# Patient Record
Sex: MALE | Race: BLACK OR AFRICAN AMERICAN | NOT HISPANIC OR LATINO | Employment: OTHER | URBAN - METROPOLITAN AREA
[De-identification: names, ages, dates, MRNs, and addresses within clinical notes are randomized per-mention and may not be internally consistent; named-entity substitution may affect disease eponyms.]

---

## 2017-01-03 ENCOUNTER — HISTORICAL (OUTPATIENT)
Dept: ADMINISTRATIVE | Facility: HOSPITAL | Age: 52
End: 2017-01-03

## 2017-05-29 ENCOUNTER — HISTORICAL (OUTPATIENT)
Dept: RADIOLOGY | Facility: HOSPITAL | Age: 52
End: 2017-05-29

## 2017-05-29 LAB
POC CREATININE: 1 MG/DL (ref 0.6–1.3)
VIT B12 SERPL-MCNC: 414 PG/ML (ref 193–986)

## 2017-06-22 ENCOUNTER — HISTORICAL (OUTPATIENT)
Dept: ADMINISTRATIVE | Facility: HOSPITAL | Age: 52
End: 2017-06-22

## 2017-08-11 ENCOUNTER — HISTORICAL (OUTPATIENT)
Dept: ADMINISTRATIVE | Facility: HOSPITAL | Age: 52
End: 2017-08-11

## 2017-08-23 ENCOUNTER — HISTORICAL (OUTPATIENT)
Dept: ADMINISTRATIVE | Facility: HOSPITAL | Age: 52
End: 2017-08-23

## 2017-10-06 ENCOUNTER — HISTORICAL (OUTPATIENT)
Dept: ADMINISTRATIVE | Facility: HOSPITAL | Age: 52
End: 2017-10-06

## 2017-10-06 LAB
ABS NEUT (OLG): 5.28 X10(3)/MCL (ref 2.1–9.2)
BASOPHILS # BLD AUTO: 0 X10(3)/MCL (ref 0–0.2)
BASOPHILS NFR BLD AUTO: 0 %
EOSINOPHIL # BLD AUTO: 0 X10(3)/MCL (ref 0–0.9)
EOSINOPHIL NFR BLD AUTO: 0 %
ERYTHROCYTE [DISTWIDTH] IN BLOOD BY AUTOMATED COUNT: 12.7 % (ref 11.5–17)
HCT VFR BLD AUTO: 39.8 % (ref 42–52)
HGB BLD-MCNC: 12.7 GM/DL (ref 14–18)
LYMPHOCYTES # BLD AUTO: 0.8 X10(3)/MCL (ref 0.6–4.6)
LYMPHOCYTES NFR BLD AUTO: 13 %
MCH RBC QN AUTO: 28.8 PG (ref 27–31)
MCHC RBC AUTO-ENTMCNC: 31.9 GM/DL (ref 33–36)
MCV RBC AUTO: 90.2 FL (ref 80–94)
MONOCYTES # BLD AUTO: 0.2 X10(3)/MCL (ref 0.1–1.3)
MONOCYTES NFR BLD AUTO: 4 %
NEUTROPHILS # BLD AUTO: 5.28 X10(3)/MCL (ref 2.1–9.2)
NEUTROPHILS NFR BLD AUTO: 82 %
PLATELET # BLD AUTO: 251 X10(3)/MCL (ref 130–400)
PMV BLD AUTO: 8.3 FL (ref 9.4–12.4)
RBC # BLD AUTO: 4.41 X10(6)/MCL (ref 4.7–6.1)
WBC # SPEC AUTO: 6.4 X10(3)/MCL (ref 4.5–11.5)

## 2017-12-04 ENCOUNTER — HISTORICAL (OUTPATIENT)
Dept: ADMINISTRATIVE | Facility: HOSPITAL | Age: 52
End: 2017-12-04

## 2017-12-04 LAB — ERYTHROCYTE [SEDIMENTATION RATE] IN BLOOD: 8 MM/HR (ref 0–15)

## 2017-12-05 ENCOUNTER — HISTORICAL (OUTPATIENT)
Dept: ADMINISTRATIVE | Facility: HOSPITAL | Age: 52
End: 2017-12-05

## 2017-12-20 ENCOUNTER — HISTORICAL (OUTPATIENT)
Dept: ADMINISTRATIVE | Facility: HOSPITAL | Age: 52
End: 2017-12-20

## 2017-12-20 LAB
COLOR STL: NORMAL
CONSISTENCY STL: NORMAL
HEMOCCULT SP1 STL QL: NEGATIVE

## 2017-12-21 ENCOUNTER — HISTORICAL (OUTPATIENT)
Dept: LAB | Facility: HOSPITAL | Age: 52
End: 2017-12-21

## 2017-12-21 LAB
COLOR STL: NORMAL
CONSISTENCY STL: NORMAL
HEMOCCULT SP2 STL QL: NEGATIVE

## 2017-12-26 ENCOUNTER — HISTORICAL (OUTPATIENT)
Dept: LAB | Facility: HOSPITAL | Age: 52
End: 2017-12-26

## 2017-12-26 LAB — HEMOCCULT SP1 STL QL: NEGATIVE

## 2018-01-04 ENCOUNTER — HISTORICAL (OUTPATIENT)
Dept: ADMINISTRATIVE | Facility: HOSPITAL | Age: 53
End: 2018-01-04

## 2018-02-02 ENCOUNTER — HISTORICAL (OUTPATIENT)
Dept: RADIOLOGY | Facility: HOSPITAL | Age: 53
End: 2018-02-02

## 2018-02-02 LAB
EST. AVERAGE GLUCOSE BLD GHB EST-MCNC: 123 MG/DL
HBA1C MFR BLD: 5.9 % (ref 4.2–6.3)

## 2018-02-14 ENCOUNTER — HISTORICAL (OUTPATIENT)
Dept: PHYSICAL THERAPY | Facility: HOSPITAL | Age: 53
End: 2018-02-14

## 2018-02-26 ENCOUNTER — HISTORICAL (OUTPATIENT)
Dept: PHYSICAL THERAPY | Facility: HOSPITAL | Age: 53
End: 2018-02-26

## 2018-05-04 ENCOUNTER — HISTORICAL (OUTPATIENT)
Dept: ADMINISTRATIVE | Facility: HOSPITAL | Age: 53
End: 2018-05-04

## 2018-05-04 LAB
ABS NEUT (OLG): 3.82 X10(3)/MCL (ref 2.1–9.2)
BASOPHILS NFR BLD MANUAL: 1 % (ref 0–2)
EOSINOPHIL NFR BLD MANUAL: 3 % (ref 0–8)
ERYTHROCYTE [DISTWIDTH] IN BLOOD BY AUTOMATED COUNT: 13.2 % (ref 11.5–17)
HCT VFR BLD AUTO: 42 % (ref 42–52)
HGB BLD-MCNC: 13.8 GM/DL (ref 14–18)
LYMPHOCYTES NFR BLD MANUAL: 34 % (ref 13–40)
MCH RBC QN AUTO: 29.7 PG (ref 27–31)
MCHC RBC AUTO-ENTMCNC: 32.9 GM/DL (ref 33–36)
MCV RBC AUTO: 90.3 FL (ref 80–94)
MONOCYTES NFR BLD MANUAL: 5 % (ref 2–11)
NEUTROPHILS NFR BLD MANUAL: 57 % (ref 47–80)
PLATELET # BLD AUTO: 321 X10(3)/MCL (ref 130–400)
PLATELET # BLD EST: NORMAL 10*3/UL
PMV BLD AUTO: 8.2 FL (ref 7.4–10.4)
RBC # BLD AUTO: 4.65 X10(6)/MCL (ref 4.7–6.1)
WBC # SPEC AUTO: 6.1 X10(3)/MCL (ref 4.5–11.5)

## 2018-07-02 ENCOUNTER — HISTORICAL (OUTPATIENT)
Dept: ADMINISTRATIVE | Facility: HOSPITAL | Age: 53
End: 2018-07-02

## 2018-07-05 ENCOUNTER — HISTORICAL (OUTPATIENT)
Dept: ADMINISTRATIVE | Facility: HOSPITAL | Age: 53
End: 2018-07-05

## 2018-07-25 ENCOUNTER — HISTORICAL (OUTPATIENT)
Dept: ADMINISTRATIVE | Facility: HOSPITAL | Age: 53
End: 2018-07-25

## 2018-07-31 ENCOUNTER — HISTORICAL (OUTPATIENT)
Dept: PHYSICAL THERAPY | Facility: HOSPITAL | Age: 53
End: 2018-07-31

## 2018-08-16 ENCOUNTER — HISTORICAL (OUTPATIENT)
Dept: PHYSICAL THERAPY | Facility: HOSPITAL | Age: 53
End: 2018-08-16

## 2018-08-22 ENCOUNTER — HISTORICAL (OUTPATIENT)
Dept: PHYSICAL THERAPY | Facility: HOSPITAL | Age: 53
End: 2018-08-22

## 2018-08-24 ENCOUNTER — HISTORICAL (OUTPATIENT)
Dept: PHYSICAL THERAPY | Facility: HOSPITAL | Age: 53
End: 2018-08-24

## 2018-10-02 ENCOUNTER — HISTORICAL (OUTPATIENT)
Dept: ADMINISTRATIVE | Facility: HOSPITAL | Age: 53
End: 2018-10-02

## 2019-06-18 ENCOUNTER — HISTORICAL (OUTPATIENT)
Dept: INFUSION THERAPY | Facility: HOSPITAL | Age: 54
End: 2019-06-18

## 2019-07-22 ENCOUNTER — HISTORICAL (OUTPATIENT)
Dept: INFUSION THERAPY | Facility: HOSPITAL | Age: 54
End: 2019-07-22

## 2019-08-13 ENCOUNTER — HISTORICAL (OUTPATIENT)
Dept: ADMINISTRATIVE | Facility: HOSPITAL | Age: 54
End: 2019-08-13

## 2019-08-19 ENCOUNTER — HISTORICAL (OUTPATIENT)
Dept: INFUSION THERAPY | Facility: HOSPITAL | Age: 54
End: 2019-08-19

## 2019-08-27 ENCOUNTER — HISTORICAL (OUTPATIENT)
Dept: ADMINISTRATIVE | Facility: HOSPITAL | Age: 54
End: 2019-08-27

## 2019-10-14 ENCOUNTER — HISTORICAL (OUTPATIENT)
Dept: INFUSION THERAPY | Facility: HOSPITAL | Age: 54
End: 2019-10-14

## 2020-01-29 ENCOUNTER — HISTORICAL (OUTPATIENT)
Dept: INFUSION THERAPY | Facility: HOSPITAL | Age: 55
End: 2020-01-29

## 2020-03-25 ENCOUNTER — HISTORICAL (OUTPATIENT)
Dept: INFUSION THERAPY | Facility: HOSPITAL | Age: 55
End: 2020-03-25

## 2020-05-20 ENCOUNTER — HISTORICAL (OUTPATIENT)
Dept: INFUSION THERAPY | Facility: HOSPITAL | Age: 55
End: 2020-05-20

## 2020-09-14 ENCOUNTER — HISTORICAL (OUTPATIENT)
Dept: INFUSION THERAPY | Facility: HOSPITAL | Age: 55
End: 2020-09-14

## 2021-02-09 ENCOUNTER — HISTORICAL (OUTPATIENT)
Dept: INFUSION THERAPY | Facility: HOSPITAL | Age: 56
End: 2021-02-09

## 2021-06-11 ENCOUNTER — HISTORICAL (OUTPATIENT)
Dept: ADMINISTRATIVE | Facility: HOSPITAL | Age: 56
End: 2021-06-11

## 2021-06-11 LAB
ABS NEUT (OLG): 3.53 X10(3)/MCL (ref 2.1–9.2)
BASOPHILS # BLD AUTO: 0 X10(3)/MCL (ref 0–0.2)
BASOPHILS NFR BLD AUTO: 1 %
BUN SERPL-MCNC: 14.1 MG/DL (ref 8.4–25.7)
CALCIUM SERPL-MCNC: 9.6 MG/DL (ref 8.4–10.2)
CHLORIDE SERPL-SCNC: 108 MMOL/L (ref 98–107)
CO2 SERPL-SCNC: 25 MMOL/L (ref 22–29)
CREAT SERPL-MCNC: 1.22 MG/DL (ref 0.73–1.18)
CREAT/UREA NIT SERPL: 12
EOSINOPHIL # BLD AUTO: 0.1 X10(3)/MCL (ref 0–0.9)
EOSINOPHIL NFR BLD AUTO: 2 %
ERYTHROCYTE [DISTWIDTH] IN BLOOD BY AUTOMATED COUNT: 12.8 % (ref 11.5–17)
GLUCOSE SERPL-MCNC: 102 MG/DL (ref 74–100)
HCT VFR BLD AUTO: 42.4 % (ref 42–52)
HGB BLD-MCNC: 14.1 GM/DL (ref 14–18)
LYMPHOCYTES # BLD AUTO: 1.4 X10(3)/MCL (ref 0.6–4.6)
LYMPHOCYTES NFR BLD AUTO: 25 %
MCH RBC QN AUTO: 29.1 PG (ref 27–31)
MCHC RBC AUTO-ENTMCNC: 33.3 GM/DL (ref 33–36)
MCV RBC AUTO: 87.4 FL (ref 80–94)
MONOCYTES # BLD AUTO: 0.5 X10(3)/MCL (ref 0.1–1.3)
MONOCYTES NFR BLD AUTO: 10 %
NEUTROPHILS # BLD AUTO: 3.53 X10(3)/MCL (ref 2.1–9.2)
NEUTROPHILS NFR BLD AUTO: 63 %
PLATELET # BLD AUTO: 352 X10(3)/MCL (ref 130–400)
PMV BLD AUTO: 8.7 FL (ref 9.4–12.4)
POTASSIUM SERPL-SCNC: 4.8 MMOL/L (ref 3.5–5.1)
RBC # BLD AUTO: 4.85 X10(6)/MCL (ref 4.7–6.1)
SODIUM SERPL-SCNC: 141 MMOL/L (ref 136–145)
WBC # SPEC AUTO: 5.6 X10(3)/MCL (ref 4.5–11.5)

## 2022-04-11 ENCOUNTER — HISTORICAL (OUTPATIENT)
Dept: ADMINISTRATIVE | Facility: HOSPITAL | Age: 57
End: 2022-04-11

## 2022-04-27 VITALS
WEIGHT: 150.13 LBS | OXYGEN SATURATION: 100 % | HEIGHT: 67 IN | SYSTOLIC BLOOD PRESSURE: 151 MMHG | DIASTOLIC BLOOD PRESSURE: 89 MMHG | BODY MASS INDEX: 23.56 KG/M2

## 2022-05-31 ENCOUNTER — APPOINTMENT (OUTPATIENT)
Dept: LAB | Facility: HOSPITAL | Age: 57
End: 2022-05-31
Attending: INTERNAL MEDICINE
Payer: MEDICAID

## 2022-05-31 DIAGNOSIS — K62.5 HEMORRHAGE OF RECTUM AND ANUS: ICD-10-CM

## 2022-05-31 DIAGNOSIS — K50.919 CROHN'S DISEASE WITH COMPLICATION: Primary | ICD-10-CM

## 2022-05-31 LAB
ALBUMIN SERPL-MCNC: 3.4 GM/DL (ref 3.5–5)
ALBUMIN/GLOB SERPL: 1.1 RATIO (ref 1.1–2)
ALP SERPL-CCNC: 57 UNIT/L (ref 40–150)
ALT SERPL-CCNC: 15 UNIT/L (ref 0–55)
AST SERPL-CCNC: 16 UNIT/L (ref 5–34)
BASOPHILS # BLD AUTO: 0.03 X10(3)/MCL (ref 0–0.2)
BASOPHILS NFR BLD AUTO: 0.5 %
BILIRUBIN DIRECT+TOT PNL SERPL-MCNC: 0.4 MG/DL
BUN SERPL-MCNC: 12.9 MG/DL (ref 8.4–25.7)
CALCIUM SERPL-MCNC: 8.8 MG/DL (ref 8.4–10.2)
CHLORIDE SERPL-SCNC: 108 MMOL/L (ref 98–107)
CO2 SERPL-SCNC: 26 MMOL/L (ref 22–29)
CREAT SERPL-MCNC: 0.99 MG/DL (ref 0.73–1.18)
CRP SERPL-MCNC: 3.5 MG/L
EOSINOPHIL # BLD AUTO: 0.2 X10(3)/MCL (ref 0–0.9)
EOSINOPHIL NFR BLD AUTO: 3.4 %
ERYTHROCYTE [DISTWIDTH] IN BLOOD BY AUTOMATED COUNT: 12.7 % (ref 11.5–17)
ERYTHROCYTE [SEDIMENTATION RATE] IN BLOOD: 38 MM/HR (ref 0–15)
GLOBULIN SER-MCNC: 3.1 GM/DL (ref 2.4–3.5)
GLUCOSE SERPL-MCNC: 110 MG/DL (ref 74–100)
HCT VFR BLD AUTO: 35.2 % (ref 42–52)
HGB BLD-MCNC: 11.5 GM/DL (ref 14–18)
IMM GRANULOCYTES # BLD AUTO: 0.02 X10(3)/MCL (ref 0–0.02)
IMM GRANULOCYTES NFR BLD AUTO: 0.3 % (ref 0–0.43)
LYMPHOCYTES # BLD AUTO: 1.1 X10(3)/MCL (ref 0.6–4.6)
LYMPHOCYTES NFR BLD AUTO: 18.8 %
MCH RBC QN AUTO: 29.9 PG (ref 27–31)
MCHC RBC AUTO-ENTMCNC: 32.7 MG/DL (ref 33–36)
MCV RBC AUTO: 91.7 FL (ref 80–94)
MONOCYTES # BLD AUTO: 0.52 X10(3)/MCL (ref 0.1–1.3)
MONOCYTES NFR BLD AUTO: 8.9 %
NEUTROPHILS # BLD AUTO: 4 X10(3)/MCL (ref 2.1–9.2)
NEUTROPHILS NFR BLD AUTO: 68.1 %
NRBC BLD AUTO-RTO: 0 %
PLATELET # BLD AUTO: 386 X10(3)/MCL (ref 130–400)
PMV BLD AUTO: 8.6 FL (ref 9.4–12.4)
POTASSIUM SERPL-SCNC: 4.7 MMOL/L (ref 3.5–5.1)
PROT SERPL-MCNC: 6.5 GM/DL (ref 6.4–8.3)
RBC # BLD AUTO: 3.84 X10(6)/MCL (ref 4.7–6.1)
SODIUM SERPL-SCNC: 142 MMOL/L (ref 136–145)
WBC # SPEC AUTO: 5.8 X10(3)/MCL (ref 4.5–11.5)

## 2022-05-31 PROCEDURE — 36415 COLL VENOUS BLD VENIPUNCTURE: CPT

## 2022-05-31 PROCEDURE — 80053 COMPREHEN METABOLIC PANEL: CPT

## 2022-05-31 PROCEDURE — 85651 RBC SED RATE NONAUTOMATED: CPT

## 2022-05-31 PROCEDURE — 86140 C-REACTIVE PROTEIN: CPT

## 2022-05-31 PROCEDURE — 82397 CHEMILUMINESCENT ASSAY: CPT

## 2022-05-31 PROCEDURE — 85025 COMPLETE CBC W/AUTO DIFF WBC: CPT

## 2022-06-03 LAB
CLINICAL BIOCHEMIST REVIEW: ABNORMAL
CLINICAL BIOCHEMIST REVIEW: NORMAL
INFLIXIMAB AB SERPL-MCNC: <20 U/ML
INFLIXIMAB SERPL-MCNC: <1 MCG/ML

## 2022-06-04 LAB — CALPROTECTIN STL-MCNT: 446 MCG/G

## 2022-06-06 PROCEDURE — 82272 OCCULT BLD FECES 1-3 TESTS: CPT | Performed by: INTERNAL MEDICINE

## 2022-06-07 ENCOUNTER — LAB REQUISITION (OUTPATIENT)
Dept: LAB | Facility: HOSPITAL | Age: 57
End: 2022-06-07
Payer: MEDICAID

## 2022-06-07 DIAGNOSIS — Z12.11 ENCOUNTER FOR SCREENING FOR MALIGNANT NEOPLASM OF COLON: ICD-10-CM

## 2022-06-07 LAB
COLOR STL: ABNORMAL
CONSISTENCY STL: ABNORMAL
HEMOCCULT SP1 STL QL: POSITIVE

## 2022-09-23 ENCOUNTER — HOSPITAL ENCOUNTER (EMERGENCY)
Facility: HOSPITAL | Age: 57
Discharge: HOME | End: 2022-09-23
Attending: EMERGENCY MEDICINE
Payer: MEDICAID

## 2022-09-23 VITALS
TEMPERATURE: 98 F | WEIGHT: 150 LBS | OXYGEN SATURATION: 100 % | DIASTOLIC BLOOD PRESSURE: 93 MMHG | HEART RATE: 76 BPM | SYSTOLIC BLOOD PRESSURE: 191 MMHG | BODY MASS INDEX: 23.54 KG/M2 | RESPIRATION RATE: 18 BRPM

## 2022-09-23 DIAGNOSIS — S89.91XA INJURY OF RIGHT KNEE, INITIAL ENCOUNTER: Primary | ICD-10-CM
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
## 2022-09-23 PROCEDURE — 99283 EMERGENCY DEPT VISIT LOW MDM: CPT | Mod: 25

## 2022-09-23 RX ORDER — TRAMADOL HYDROCHLORIDE 50 MG/1
50 TABLET ORAL EVERY 8 HOURS PRN
Qty: 12 TABLET | Refills: 0 | Status: SHIPPED | OUTPATIENT
Start: 2022-09-23 | End: 2022-09-27
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
## 2022-09-23 NOTE — DISCHARGE INSTRUCTIONS
ICE therapy, 20 min on and 20 min off.     You have been prescribed  Tramadol . Please do not take this medication while working, drinking alcohol, swimming, or while driving/operating heavy machinery. This medication may cause drowsiness, dizziness, impair judgment, and reduce physical capabilities.You should not drive, operate heavy machinery, or make life changing decisions while taking this medication.
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
## 2022-09-23 NOTE — ED NOTES
Pt ambulatory to ED05 from [facility], c/o right knee pain, unable to take tylenol or motrin due to stomach discomfort. No obvious deformity, neurovascular intact.

## 2023-01-24 DIAGNOSIS — K50.919 CROHN'S DISEASE OF INTESTINE, UNSPECIFIED COMPLICATION: ICD-10-CM

## 2023-01-24 RX ORDER — DIPHENHYDRAMINE HYDROCHLORIDE 50 MG/ML
50 INJECTION INTRAMUSCULAR; INTRAVENOUS ONCE AS NEEDED
OUTPATIENT
Start: 2023-02-06

## 2023-01-24 RX ORDER — ACETAMINOPHEN 325 MG/1
650 TABLET ORAL
OUTPATIENT
Start: 2023-02-06

## 2023-01-24 RX ORDER — EPINEPHRINE 0.3 MG/.3ML
0.3 INJECTION SUBCUTANEOUS ONCE AS NEEDED
OUTPATIENT
Start: 2023-02-06

## 2023-01-24 RX ORDER — IPRATROPIUM BROMIDE AND ALBUTEROL SULFATE 2.5; .5 MG/3ML; MG/3ML
3 SOLUTION RESPIRATORY (INHALATION)
OUTPATIENT
Start: 2023-02-06

## 2023-01-24 RX ORDER — SODIUM CHLORIDE 0.9 % (FLUSH) 0.9 %
10 SYRINGE (ML) INJECTION
OUTPATIENT
Start: 2023-02-06

## 2023-01-24 RX ORDER — HEPARIN 100 UNIT/ML
500 SYRINGE INTRAVENOUS
OUTPATIENT
Start: 2023-02-06

## 2023-03-07 ENCOUNTER — DOCUMENTATION ONLY (OUTPATIENT)
Dept: INFUSION THERAPY | Facility: HOSPITAL | Age: 58
End: 2023-03-07
Payer: MEDICAID

## 2023-03-07 NOTE — PROGRESS NOTES
Spoke to Vangie at Dr. Breaux office in regards to no show appt x2 and numerous attempts contact the pt and pt mother with no answer or call back. So at this time Ms. Schaeffery has instructed me to not move forward with r/s the pt unless he f/u with Dr. Breaux first.

## 2023-04-24 ENCOUNTER — LAB VISIT (OUTPATIENT)
Dept: LAB | Facility: HOSPITAL | Age: 58
End: 2023-04-24
Attending: INTERNAL MEDICINE
Payer: MEDICAID

## 2023-04-24 DIAGNOSIS — K50.919 CROHN'S DISEASE, UNSPECIFIED, WITH UNSPECIFIED COMPLICATIONS: Primary | ICD-10-CM

## 2023-04-24 LAB
BASOPHILS # BLD AUTO: 0.03 X10(3)/MCL (ref 0–0.2)
BASOPHILS NFR BLD AUTO: 0.7 %
CRP SERPL-MCNC: <1 MG/L
EOSINOPHIL # BLD AUTO: 0.08 X10(3)/MCL (ref 0–0.9)
EOSINOPHIL NFR BLD AUTO: 1.7 %
ERYTHROCYTE [DISTWIDTH] IN BLOOD BY AUTOMATED COUNT: 13.1 % (ref 11.5–17)
ERYTHROCYTE [SEDIMENTATION RATE] IN BLOOD: 17 MM/HR (ref 0–15)
HCT VFR BLD AUTO: 43.8 % (ref 42–52)
HGB BLD-MCNC: 13.9 G/DL (ref 14–18)
IMM GRANULOCYTES # BLD AUTO: 0.01 X10(3)/MCL (ref 0–0.04)
IMM GRANULOCYTES NFR BLD AUTO: 0.2 %
LYMPHOCYTES # BLD AUTO: 0.96 X10(3)/MCL (ref 0.6–4.6)
LYMPHOCYTES NFR BLD AUTO: 20.8 %
MCH RBC QN AUTO: 28.8 PG (ref 27–31)
MCHC RBC AUTO-ENTMCNC: 31.7 G/DL (ref 33–36)
MCV RBC AUTO: 90.9 FL (ref 80–94)
MONOCYTES # BLD AUTO: 0.31 X10(3)/MCL (ref 0.1–1.3)
MONOCYTES NFR BLD AUTO: 6.7 %
NEUTROPHILS # BLD AUTO: 3.22 X10(3)/MCL (ref 2.1–9.2)
NEUTROPHILS NFR BLD AUTO: 69.9 %
NRBC BLD AUTO-RTO: 0 %
PLATELET # BLD AUTO: 334 X10(3)/MCL (ref 130–400)
PMV BLD AUTO: 8.6 FL (ref 7.4–10.4)
RBC # BLD AUTO: 4.82 X10(6)/MCL (ref 4.7–6.1)
WBC # SPEC AUTO: 4.6 X10(3)/MCL (ref 4.5–11.5)

## 2023-04-24 PROCEDURE — 85025 COMPLETE CBC W/AUTO DIFF WBC: CPT

## 2023-04-24 PROCEDURE — 85651 RBC SED RATE NONAUTOMATED: CPT

## 2023-04-24 PROCEDURE — 36415 COLL VENOUS BLD VENIPUNCTURE: CPT

## 2023-04-24 PROCEDURE — 86140 C-REACTIVE PROTEIN: CPT

## 2023-04-25 ENCOUNTER — LAB REQUISITION (OUTPATIENT)
Dept: LAB | Facility: HOSPITAL | Age: 58
End: 2023-04-25
Payer: MEDICAID

## 2023-04-25 DIAGNOSIS — K50.919 CROHN'S DISEASE, UNSPECIFIED, WITH UNSPECIFIED COMPLICATIONS: ICD-10-CM

## 2023-04-25 PROCEDURE — 83993 ASSAY FOR CALPROTECTIN FECAL: CPT | Mod: 90 | Performed by: REGISTERED NURSE

## 2023-04-27 LAB — CALPROTECTIN STL-MCNT: 81.2 MCG/G

## 2023-06-21 ENCOUNTER — HOSPITAL ENCOUNTER (OUTPATIENT)
Dept: RADIOLOGY | Facility: HOSPITAL | Age: 58
Discharge: HOME | End: 2023-06-21
Attending: FAMILY MEDICINE
Payer: MEDICAID

## 2023-06-21 ENCOUNTER — OFFICE VISIT (OUTPATIENT)
Dept: URGENT CARE | Facility: CLINIC | Age: 58
End: 2023-06-21
Payer: MEDICAID

## 2023-06-21 VITALS
HEART RATE: 63 BPM | WEIGHT: 143.81 LBS | OXYGEN SATURATION: 99 % | BODY MASS INDEX: 23.11 KG/M2 | DIASTOLIC BLOOD PRESSURE: 78 MMHG | RESPIRATION RATE: 18 BRPM | HEIGHT: 66 IN | SYSTOLIC BLOOD PRESSURE: 119 MMHG | TEMPERATURE: 98 F

## 2023-06-21 DIAGNOSIS — T14.90XA TRAUMA: ICD-10-CM

## 2023-06-21 DIAGNOSIS — T14.90XA TRAUMA: Primary | ICD-10-CM

## 2023-06-21 DIAGNOSIS — M79.18 MUSCULOSKELETAL PAIN: ICD-10-CM

## 2023-06-21 PROCEDURE — 99214 OFFICE O/P EST MOD 30 MIN: CPT | Mod: S$PBB,,, | Performed by: FAMILY MEDICINE

## 2023-06-21 PROCEDURE — 72100 X-RAY EXAM L-S SPINE 2/3 VWS: CPT | Mod: TC

## 2023-06-21 PROCEDURE — 99215 OFFICE O/P EST HI 40 MIN: CPT | Mod: PBBFAC | Performed by: FAMILY MEDICINE

## 2023-06-21 PROCEDURE — 73130 X-RAY EXAM OF HAND: CPT | Mod: TC,RT

## 2023-06-21 PROCEDURE — 73562 X-RAY EXAM OF KNEE 3: CPT | Mod: TC,RT

## 2023-06-21 PROCEDURE — 99214 PR OFFICE/OUTPT VISIT, EST, LEVL IV, 30-39 MIN: ICD-10-PCS | Mod: S$PBB,,, | Performed by: FAMILY MEDICINE

## 2023-06-21 RX ORDER — ALBUTEROL SULFATE 90 UG/1
AEROSOL, METERED RESPIRATORY (INHALATION)
COMMUNITY
Start: 2023-05-31

## 2023-06-21 RX ORDER — IRON,CARBONYL/ASCORBIC ACID 100-250 MG
1 TABLET ORAL
COMMUNITY
Start: 2023-05-31

## 2023-06-21 RX ORDER — UPADACITINIB 45 MG/1
1 TABLET, EXTENDED RELEASE ORAL
COMMUNITY
Start: 2023-05-01

## 2023-06-21 RX ORDER — TRAMADOL HYDROCHLORIDE 50 MG/1
50 TABLET ORAL EVERY 6 HOURS PRN
Qty: 15 TABLET | Refills: 0 | OUTPATIENT
Start: 2023-06-21 | End: 2023-07-20
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
## 2023-07-20 ENCOUNTER — HOSPITAL ENCOUNTER (EMERGENCY)
Facility: HOSPITAL | Age: 58
Discharge: HOME | End: 2023-07-20
Attending: STUDENT IN AN ORGANIZED HEALTH CARE EDUCATION/TRAINING PROGRAM
Payer: MEDICAID

## 2023-07-20 VITALS
HEART RATE: 56 BPM | DIASTOLIC BLOOD PRESSURE: 99 MMHG | RESPIRATION RATE: 20 BRPM | HEIGHT: 67 IN | WEIGHT: 175 LBS | OXYGEN SATURATION: 99 % | TEMPERATURE: 98 F | BODY MASS INDEX: 27.47 KG/M2 | SYSTOLIC BLOOD PRESSURE: 153 MMHG

## 2023-07-20 DIAGNOSIS — M79.602 LEFT ARM PAIN: Primary | ICD-10-CM
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
## 2023-07-20 PROCEDURE — 99284 EMERGENCY DEPT VISIT MOD MDM: CPT

## 2023-07-20 PROCEDURE — 25000003 PHARM REV CODE 250: Performed by: STUDENT IN AN ORGANIZED HEALTH CARE EDUCATION/TRAINING PROGRAM

## 2023-07-20 RX ORDER — METHOCARBAMOL 500 MG/1
1000 TABLET, FILM COATED ORAL 3 TIMES DAILY
Qty: 30 TABLET | Refills: 0 | Status: SHIPPED | OUTPATIENT
Start: 2023-07-20 | End: 2023-07-25

## 2023-07-20 RX ORDER — TRAMADOL HYDROCHLORIDE 50 MG/1
50 TABLET ORAL EVERY 6 HOURS PRN
Qty: 12 TABLET | Refills: 0 | Status: SHIPPED | OUTPATIENT
Start: 2023-07-20

## 2023-07-20 RX ORDER — TRAMADOL HYDROCHLORIDE 50 MG/1
50 TABLET ORAL
Status: COMPLETED | OUTPATIENT
Start: 2023-07-20 | End: 2023-07-20

## 2023-07-20 RX ORDER — METHOCARBAMOL 500 MG/1
1000 TABLET, FILM COATED ORAL
Status: COMPLETED | OUTPATIENT
Start: 2023-07-20 | End: 2023-07-20

## 2023-07-20 RX ADMIN — METHOCARBAMOL 1000 MG: 500 TABLET ORAL at 07:07

## 2023-07-20 RX ADMIN — TRAMADOL HYDROCHLORIDE 50 MG: 50 TABLET, COATED ORAL at 07:07
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
## 2023-07-20 NOTE — DISCHARGE INSTRUCTIONS

## 2023-08-21 ENCOUNTER — HOSPITAL ENCOUNTER (EMERGENCY)
Facility: HOSPITAL | Age: 58
Discharge: HOME | End: 2023-08-21
Attending: EMERGENCY MEDICINE
Payer: MEDICAID

## 2023-08-21 VITALS
DIASTOLIC BLOOD PRESSURE: 92 MMHG | WEIGHT: 140 LBS | HEART RATE: 61 BPM | RESPIRATION RATE: 16 BRPM | TEMPERATURE: 97 F | OXYGEN SATURATION: 100 % | HEIGHT: 67 IN | SYSTOLIC BLOOD PRESSURE: 134 MMHG | BODY MASS INDEX: 21.97 KG/M2

## 2023-08-21 DIAGNOSIS — G56.00 CARPAL TUNNEL SYNDROME, UNSPECIFIED LATERALITY: ICD-10-CM

## 2023-08-21 DIAGNOSIS — R05.9 COUGH: ICD-10-CM
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
## 2023-08-21 DIAGNOSIS — M70.51 PATELLAR BURSITIS OF RIGHT KNEE: Primary | ICD-10-CM

## 2023-08-21 PROCEDURE — 99284 EMERGENCY DEPT VISIT MOD MDM: CPT | Mod: 25

## 2023-08-21 PROCEDURE — 20610 DRAIN/INJ JOINT/BURSA W/O US: CPT

## 2023-08-21 RX ORDER — LORATADINE 10 MG/1
10 TABLET ORAL DAILY
Qty: 30 TABLET | Refills: 0 | Status: SHIPPED | OUTPATIENT
Start: 2023-08-21 | End: 2023-09-20

## 2023-08-21 RX ORDER — BENZONATATE 100 MG/1
100 CAPSULE ORAL 3 TIMES DAILY PRN
Qty: 15 CAPSULE | Refills: 0 | Status: SHIPPED | OUTPATIENT
Start: 2023-08-21 | End: 2023-08-26

## 2023-08-21 RX ORDER — HYDROCODONE BITARTRATE AND ACETAMINOPHEN 5; 325 MG/1; MG/1
1 TABLET ORAL EVERY 12 HOURS PRN
Qty: 10 TABLET | Refills: 0 | Status: SHIPPED | OUTPATIENT
Start: 2023-08-21 | End: 2023-08-26

## 2023-08-21 NOTE — ED PROVIDER NOTES
Encounter Date: 8/21/2023       History     Chief Complaint   Patient presents with    Cough     States coughing up yellow stuff. Also reports having bilateral carpal tunnel pain and right knee pain from a fall last week.     See MDM    The history is provided by the patient. No  was used.     Review of patient's allergies indicates:   Allergen Reactions    Acetaminophen      Other reaction(s): chronns    Ibuprofen Nausea Only     Past Medical History:   Diagnosis Date    Crohn's disease     GERD (gastroesophageal reflux disease)      No past surgical history on file.  No family history on file.  Social History     Tobacco Use    Smoking status: Some Days     Types: Cigarettes    Smokeless tobacco: Never   Substance Use Topics    Alcohol use: Yes     Comment: daily    Drug use: Never     Review of Systems   Respiratory:  Positive for cough.    Musculoskeletal:  Positive for joint swelling.   All other systems reviewed and are negative.      Physical Exam     Initial Vitals [08/21/23 1529]   BP Pulse Resp Temp SpO2   (!) 153/97 71 18 97.2 °F (36.2 °C) 95 %      MAP       --         Physical Exam    Nursing note and vitals reviewed.  Constitutional: He appears well-developed and well-nourished.   HENT:   Mouth/Throat: No oropharyngeal exudate, posterior oropharyngeal edema, posterior oropharyngeal erythema or tonsillar abscesses.   Cerumen impaction bilateral. No pain   Eyes: Conjunctivae are normal.   Cardiovascular:  Normal rate, regular rhythm, normal heart sounds and intact distal pulses.           Pulmonary/Chest: Breath sounds normal. No respiratory distress.   Musculoskeletal:      Right hand: Tenderness present. No swelling. Normal range of motion.      Left hand: Tenderness present. No swelling. Normal range of motion.      Right knee: Swelling present. No erythema, ecchymosis or bony tenderness. Normal range of motion.      Left knee: Normal.      Comments: Prepatellar bursitis, no  redness, not hot, full ROM of knee.     All other adjacent joints otherwise normal       Neurological: He is alert and oriented to person, place, and time.   Skin: Skin is warm and dry.   Psychiatric: He has a normal mood and affect.         ED Course   Procedures  Labs Reviewed - No data to display       Imaging Results              X-Ray Chest PA And Lateral (Final result)  Result time 08/21/23 16:47:55      Final result by Garland Salazar MD (08/21/23 16:47:55)                   Impression:      No acute cardiopulmonary process identified.      Electronically signed by: Garland Salazar  Date:    08/21/2023  Time:    16:47               Narrative:    EXAMINATION:  XR CHEST PA AND LATERAL    CLINICAL HISTORY:  Cough, unspecified    TECHNIQUE:  Two-view    COMPARISON:  September 13, 2017.    FINDINGS:  Cardiopericardial silhouette is within normal limits.  Hyperinflated lungs are without dense focal or segmental consolidation, congestion, pleural effusion or pneumothorax.                                       X-Ray Knee Complete 4 Or More Views Right (Final result)  Result time 08/21/23 17:01:59      Final result by Garland Salazar MD (08/21/23 17:01:59)                   Impression:      No acute osseous abnormality identified      Electronically signed by: Garland Salazar  Date:    08/21/2023  Time:    17:01               Narrative:    EXAMINATION:  XR KNEE COMP 4 OR MORE VIEWS RIGHT    CLINICAL HISTORY:  Unspecified fall, initial encounter    TECHNIQUE:  Four views    COMPARISON:  June 21, 2023    FINDINGS:  Right knee articular surfaces alignment is preserved.  No acute fracture or dislocation identified.                                       Medications - No data to display  Medical Decision Making  59 y/o male who presents with bilateral hand pain from his carpal tunnel for months now. Also cough for the last week and sometimes productive. Right knee swelling s/p fall about 2 weeks ago. Not red or hot. No fever.  No throat pain    Xray shows no effusion or fracture. Chest xray no pneumonia. Drained the right knee bursa s/t bursitis. Tolerated well. Ace wrap to knee. Will treat cough with meds. No abx at this time warranted.     Amount and/or Complexity of Data Reviewed  Radiology: ordered. Decision-making details documented in ED Course.    Risk  OTC drugs.  Prescription drug management.               ED Course as of 23 1038   Mon Aug 21, 2023   1800 Needle aspiration of right knee prepatellar bursa. Cleaned with hibiclens prior. 18g needle inserted into the bursa and 6ml of pink/yellow appearing fluid. Not thick or purulent in appearance. Removed needle and appled gauze and then pressure with ace wrap.  [EV]      ED Course User Index  [EV] Myriam Fabian FNP                    Clinical Impression:   Final diagnoses:  [R05.9] Cough  [W19.XXXA] Fall  [M70.51] Patellar bursitis of right knee (Primary)  [G56.00] Carpal tunnel syndrome, unspecified laterality        ED Disposition Condition    Discharge Stable          ED Prescriptions       Medication Sig Dispense Start Date End Date Auth. Provider    HYDROcodone-acetaminophen (NORCO) 5-325 mg per tablet () Take 1 tablet by mouth every 12 (twelve) hours as needed for Pain. 10 tablet 2023 Myriam Fabian FNP    benzonatate (TESSALON) 100 MG capsule () Take 1 capsule (100 mg total) by mouth 3 (three) times daily as needed for Cough. 15 capsule 2023 Myriam Fabian FNP    loratadine (CLARITIN) 10 mg tablet Take 1 tablet (10 mg total) by mouth once daily. 30 tablet 2023 Myriam Fabian FNP          Follow-up Information    None          Myriam Fabian FNP  23 1038

## 2023-08-21 NOTE — FIRST PROVIDER EVALUATION
Medical screening examination initiated.  I have conducted a focused provider triage encounter, findings are as follows:  Chief Complaint   Patient presents with    Cough     States coughing up yellow stuff. Also reports having bilateral carpal tunnel pain and right knee pain.          Brief history of present illness:  57 y/o male who presents with joint pain and cough (sputum). Fell about a week ago.     There were no vitals filed for this visit.    Pertinent physical exam:  alert, ambulatory, nonlabored     Brief workup plan:  xray    Preliminary workup initiated; this workup will be continued and followed by the physician or advanced practice provider that is assigned to the patient when roomed.

## 2023-08-22 ENCOUNTER — PATIENT OUTREACH (OUTPATIENT)
Dept: EMERGENCY MEDICINE | Facility: HOSPITAL | Age: 58
End: 2023-08-22
Payer: MEDICAID

## 2023-08-22 NOTE — PROGRESS NOTES
Called and spoke with patient for initial navigation call following ED visit. States he filled his pain medication Rx and voices no complaints. Says Dr Fernando Montoya is his PCP and he has an appointment this Thursday 08/24/23 this week . Declined enrolling in [facility] for navigation follow up calls at this time. Episode closed.

## 2023-08-30 ENCOUNTER — HOSPITAL ENCOUNTER (EMERGENCY)
Facility: HOSPITAL | Age: 58
Discharge: HOME | End: 2023-08-30
Attending: STUDENT IN AN ORGANIZED HEALTH CARE EDUCATION/TRAINING PROGRAM
Payer: MEDICAID

## 2023-08-30 VITALS
DIASTOLIC BLOOD PRESSURE: 85 MMHG | HEART RATE: 76 BPM | RESPIRATION RATE: 18 BRPM | SYSTOLIC BLOOD PRESSURE: 142 MMHG | WEIGHT: 148 LBS | OXYGEN SATURATION: 100 % | BODY MASS INDEX: 23.23 KG/M2 | HEIGHT: 67 IN | TEMPERATURE: 98 F

## 2023-08-30 DIAGNOSIS — M70.51 PATELLAR BURSITIS OF RIGHT KNEE: Primary | ICD-10-CM

## 2023-08-30 DIAGNOSIS — M25.561 RIGHT KNEE PAIN: ICD-10-CM

## 2023-08-30 PROCEDURE — 99283 EMERGENCY DEPT VISIT LOW MDM: CPT

## 2023-08-30 RX ORDER — DICLOFENAC SODIUM 10 MG/G
2 GEL TOPICAL 3 TIMES DAILY
Qty: 200 G | Refills: 0 | Status: SHIPPED | OUTPATIENT
Start: 2023-08-30 | End: 2023-09-06

## 2023-08-30 NOTE — ED PROVIDER NOTES
Encounter Date: 8/30/2023       History     Chief Complaint   Patient presents with    Joint Swelling     Pt reports right knee pain and swelling.pt states right knee was drained last week. Pt reports he wore ace wrap for a little while but had swelling distal to ace wrap so he stopped. Pt also reports plantar foot burning sensation. Pt states sometimes he stumbles into the wall when walking. Pt states he used to have to wear braces on his legs in the 70s     The patient is a 58 y.o. male with a history of crohn's disease and GERD who presents to the Emergency Department with a chief complaint of right knee pain. Patient reports a fall 3 weeks ago and has been having pain since the fall. Patient states that he was seen here last week and was prescribed norco but he needs something stronger. He states he had fluid drained from his knee. Symptoms began 3 weeks ago and have been constant since onset. His pain is currently rated as a 8/10 in severity and described as aching with no radiation. Associated symptoms include nothing. Symptoms are aggravated with ambulation and there are no alleviating factors. The patient denies numbness or tingling to extremity. He reports taking nothing prior to arrival with no relief of symptoms. No other reported symptoms at this time     The history is provided by the patient. No  was used.   Illness   The current episode started several weeks ago. The problem occurs frequently. The problem has been unchanged. The pain is at a severity of 6/10. Nothing relieves the symptoms. The symptoms are aggravated by activity. Pertinent negatives include no fever, no abdominal pain, no nausea, no sore throat, no muscle aches, no shortness of breath and no rash. Services received include medications given and tests performed. Recently, medical care has been given by the PCP and at this facility.     Review of patient's allergies indicates:   Allergen Reactions    Acetaminophen       Other reaction(s): chronns    Ibuprofen Nausea Only     Past Medical History:   Diagnosis Date    Crohn's disease     GERD (gastroesophageal reflux disease)      History reviewed. No pertinent surgical history.  History reviewed. No pertinent family history.  Social History     Tobacco Use    Smoking status: Some Days     Types: Cigarettes    Smokeless tobacco: Never   Substance Use Topics    Alcohol use: Yes     Comment: daily    Drug use: Never     Review of Systems   Constitutional:  Negative for fever.   HENT:  Negative for sore throat.    Respiratory:  Negative for shortness of breath.    Cardiovascular:  Negative for chest pain.   Gastrointestinal:  Negative for abdominal pain and nausea.   Genitourinary:  Negative for dysuria.   Musculoskeletal:  Positive for arthralgias. Negative for back pain.   Skin:  Negative for rash.   Neurological:  Negative for weakness.   Hematological:  Does not bruise/bleed easily.   Psychiatric/Behavioral:  Negative for behavioral problems.    All other systems reviewed and are negative.      Physical Exam     Initial Vitals [08/30/23 1102]   BP Pulse Resp Temp SpO2   (!) 142/85 76 18 98.4 °F (36.9 °C) 100 %      MAP       --         Physical Exam    Nursing note and vitals reviewed.  Constitutional: Vital signs are normal. He appears well-developed and well-nourished.   HENT:   Head: Normocephalic.   Right Ear: Hearing and tympanic membrane normal.   Left Ear: Hearing and tympanic membrane normal.   Mouth/Throat: Uvula is midline, oropharynx is clear and moist and mucous membranes are normal.   Cardiovascular:  Normal rate, regular rhythm, normal heart sounds and normal pulses.           Pulmonary/Chest: Effort normal and breath sounds normal.   Abdominal: Abdomen is soft. Bowel sounds are normal. There is no abdominal tenderness.   Musculoskeletal:      Cervical back: No spinous process tenderness or muscular tenderness.      Right knee: Tenderness present. Normal pulse.       Left knee: Normal.      Comments: Prepatellar bursitis of right knee. Patient has full ROM of the knee. There is no warmth or erythema present   All other adjacent joints normal      Lymphadenopathy:     He has no cervical adenopathy.   Neurological: He is alert. GCS eye subscore is 4. GCS verbal subscore is 5. GCS motor subscore is 6.   Skin: Skin is warm, dry and intact. Capillary refill takes less than 2 seconds.         ED Course   Procedures  Labs Reviewed - No data to display       Imaging Results              X-Ray Knee Complete 4 Or More Views Right (Final result)  Result time 08/30/23 12:25:29      Final result by Clifford Doyle MD (08/30/23 12:25:29)                   Impression:      No acute findings.      Electronically signed by: Clifford Doyle  Date:    08/30/2023  Time:    12:25               Narrative:    EXAMINATION:  XR KNEE COMP 4 OR MORE VIEWS RIGHT    CLINICAL HISTORY:  Pain in right knee    COMPARISON:  21 August 2023    FINDINGS:  Four views right knee.  There is no fracture or dislocation.  Mild degenerative changes without significant joint space narrowing.  No sizeable joint effusion.                                       Medications - No data to display  Medical Decision Making  Problems Addressed:  Patellar bursitis of right knee: acute illness or injury               ED Course as of 08/30/23 1324   Wed Aug 30, 2023   1240 Upon review of  it appears patient had 10 Norco 5 prescribed on 08/21/23 and then had 20 norco 5 prescribed on 08/24/23 [LM]   1300 Patient states he is just here because he needs pain medication. He states that nothing works for his pain but narcotics. He has had two narcotic prescriptions filled in the last 10 days. I have explained to patient that he needs to follow up with his pcp and orthopedic clinic. He is amendable to plan and ready for discharge home.  [LM]   1300 Discussed results in detail with patient including follow up. I did send orthopedic referral.   [LM]      ED Course User Index  [LM] Brielle Cordero, NP               Medical Decision Making:   Initial Assessment:   The patient is a 58 y.o. male with a history of crohn's disease and GERD who presents to the Emergency Department with a chief complaint of right knee pain. Patient reports a fall 3 weeks ago and has been having pain since the fall. Patient states that he was seen here last week and was prescribed norco but he needs something stronger. He states he had fluid drained from his knee. Symptoms began 3 weeks ago and have been constant since onset. His pain is currently rated as a 8/10 in severity and described as aching with no radiation. Associated symptoms include nothing. Symptoms are aggravated with ambulation and there are no alleviating factors. The patient denies numbness or tingling to extremity. He reports taking nothing prior to arrival with no relief of symptoms. No other reported symptoms at this time   Differential Diagnosis:   Differential diagnosis include but are not limited to on effusion, bursitis, osteoarthritis of knee, stress fracture   Clinical Tests:   Radiological Study: Ordered and Reviewed  ED Management:  MDM  History obtained by patient.  Co-morbidities and/or factors adding to the complexity or risk for the patient?: none  Differential diagnoses: Differential diagnosis include but are not limited to on effusion, bursitis, osteoarthritis of knee, stress fracture   Decision to obtain previous or outside records?: no  Chart Review (nursing home, outside records, CareEverywhere): none  Review of RX medications/new RX prescribed by me?: diclofenac gel  My EKG Interpretation: see above  Labs/imaging/other tests obtained/considered (risk/benefits of testing discussed): xr right knee  Labs/tests intepretation: NAF on xray, mild degenerative changes  My independent imaging interpretation: none  Treatment/interventions, IV fluids, IV medications: none  Complex management (IV  controlled substances, went to OR, DNR, meds requiring monitoring, transfer, etc)?: none  Workup/treatment affected by social determinants of health?: none   Point of care US done/interpretation: none  Consults/radiologist/EMS/social work/family discussion/alternate history: none  Advanced care planning/end of life discussion: none  Shared decision making: Shared decision making with patient  ETOH/smoking/drug cessation discussion: none  Dispo: discharge home.        Clinical Impression:   Final diagnoses:  [M25.561] Right knee pain  [M70.51] Patellar bursitis of right knee (Primary)        ED Disposition Condition    Discharge Stable          ED Prescriptions       Medication Sig Dispense Start Date End Date Auth. Provider    diclofenac sodium (VOLTAREN) 1 % Gel Apply 2 g topically 3 (three) times daily. for 7 days 200 g 8/30/2023 9/6/2023 Brielle Cordero NP          Follow-up Information       Follow up With Specialties Details Why Contact Info    [facility] Schedule an appointment as soon as possible for a visit   [address] [facility] [id number] [phone]             Brielle Cordero NP  08/30/23 4452       Brielle Cordero NP  08/30/23 0631

## 2023-08-30 NOTE — FIRST PROVIDER EVALUATION
"Medical screening examination initiated.  I have conducted a focused provider triage encounter, findings are as follows:    Brief history of present illness:  58 Male presents to ED for evaluation of right knee pain and swelling.  States he has had multiple falls. Recently had knee drained.     Vitals:    08/30/23 1102   BP: (!) 142/85   Pulse: 76   Resp: 18   Temp: 98.4 °F (36.9 °C)   SpO2: 100%   Weight: 67.1 kg (148 lb)   Height: 5' 7" (1.702 m)       Pertinent physical exam:  Patient is awake and alert and oriented.  Ambulatory to triage.  In no acute distress.      Brief workup plan:  XR right knee      Preliminary workup initiated; this workup will be continued and followed by the physician or advanced practice provider that is assigned to the patient when roomed.  "

## 2023-09-11 ENCOUNTER — LAB VISIT (OUTPATIENT)
Dept: LAB | Facility: HOSPITAL | Age: 58
End: 2023-09-11
Attending: INTERNAL MEDICINE
Payer: MEDICAID

## 2023-09-11 DIAGNOSIS — D50.0 IRON DEFICIENCY ANEMIA SECONDARY TO BLOOD LOSS (CHRONIC): ICD-10-CM

## 2023-09-11 DIAGNOSIS — K50.919 CROHN'S DISEASE WITH COMPLICATION: Primary | ICD-10-CM

## 2023-09-11 LAB
CHOLEST SERPL-MCNC: 229 MG/DL
CHOLEST/HDLC SERPL: 2 {RATIO} (ref 0–5)
HDLC SERPL-MCNC: 103 MG/DL (ref 35–60)
LDLC SERPL CALC-MCNC: 115 MG/DL (ref 50–140)
TRIGL SERPL-MCNC: 54 MG/DL (ref 34–140)
VLDLC SERPL CALC-MCNC: 11 MG/DL

## 2023-09-11 PROCEDURE — 36415 COLL VENOUS BLD VENIPUNCTURE: CPT

## 2023-09-11 PROCEDURE — 80061 LIPID PANEL: CPT

## 2023-09-25 ENCOUNTER — HOSPITAL ENCOUNTER (EMERGENCY)
Facility: HOSPITAL | Age: 58
Discharge: HOME | End: 2023-09-25
Attending: STUDENT IN AN ORGANIZED HEALTH CARE EDUCATION/TRAINING PROGRAM
Payer: MEDICAID

## 2023-09-25 VITALS
HEIGHT: 67 IN | BODY MASS INDEX: 23.23 KG/M2 | DIASTOLIC BLOOD PRESSURE: 92 MMHG | WEIGHT: 148 LBS | RESPIRATION RATE: 18 BRPM | HEART RATE: 50 BPM | SYSTOLIC BLOOD PRESSURE: 163 MMHG | OXYGEN SATURATION: 97 % | TEMPERATURE: 98 F

## 2023-09-25 DIAGNOSIS — K50.919 CROHN'S DISEASE WITH COMPLICATION, UNSPECIFIED GASTROINTESTINAL TRACT LOCATION: ICD-10-CM

## 2023-09-25 DIAGNOSIS — R11.2 NAUSEA AND VOMITING, UNSPECIFIED VOMITING TYPE: ICD-10-CM

## 2023-09-25 DIAGNOSIS — R10.9 ABDOMINAL PAIN, UNSPECIFIED ABDOMINAL LOCATION: Primary | ICD-10-CM

## 2023-09-25 LAB
ALBUMIN SERPL-MCNC: 3.7 G/DL (ref 3.4–4.8)
ALBUMIN/GLOB SERPL: 1 RATIO (ref 1.1–2)
ALP SERPL-CCNC: 49 UNIT/L (ref 40–150)
ALT SERPL-CCNC: 17 UNIT/L (ref 0–55)
APPEARANCE UR: CLEAR
AST SERPL-CCNC: 28 UNIT/L (ref 5–34)
BACTERIA #/AREA URNS AUTO: NORMAL /HPF
BASOPHILS # BLD AUTO: 0.02 X10(3)/MCL
BASOPHILS NFR BLD AUTO: 0.6 %
BILIRUB SERPL-MCNC: 0.3 MG/DL
BILIRUB UR QL STRIP.AUTO: NEGATIVE
BUN SERPL-MCNC: 11.9 MG/DL (ref 8.4–25.7)
CALCIUM SERPL-MCNC: 8.7 MG/DL (ref 8.8–10)
CHLORIDE SERPL-SCNC: 103 MMOL/L (ref 98–107)
CO2 SERPL-SCNC: 28 MMOL/L (ref 23–31)
COLOR UR AUTO: YELLOW
CREAT SERPL-MCNC: 1.01 MG/DL (ref 0.73–1.18)
EOSINOPHIL # BLD AUTO: 0 X10(3)/MCL (ref 0–0.9)
EOSINOPHIL NFR BLD AUTO: 0 %
ERYTHROCYTE [DISTWIDTH] IN BLOOD BY AUTOMATED COUNT: 13.2 % (ref 11.5–17)
GFR SERPLBLD CREATININE-BSD FMLA CKD-EPI: >60 MLS/MIN/1.73/M2
GLOBULIN SER-MCNC: 3.6 GM/DL (ref 2.4–3.5)
GLUCOSE SERPL-MCNC: 100 MG/DL (ref 82–115)
GLUCOSE UR QL STRIP.AUTO: NEGATIVE
HCT VFR BLD AUTO: 38.4 % (ref 42–52)
HGB BLD-MCNC: 12.5 G/DL (ref 14–18)
IMM GRANULOCYTES # BLD AUTO: 0.01 X10(3)/MCL (ref 0–0.04)
IMM GRANULOCYTES NFR BLD AUTO: 0.3 %
KETONES UR QL STRIP.AUTO: NEGATIVE
LEUKOCYTE ESTERASE UR QL STRIP.AUTO: NEGATIVE
LIPASE SERPL-CCNC: 16 U/L
LYMPHOCYTES # BLD AUTO: 1.67 X10(3)/MCL (ref 0.6–4.6)
LYMPHOCYTES NFR BLD AUTO: 48.7 %
MCH RBC QN AUTO: 29.8 PG (ref 27–31)
MCHC RBC AUTO-ENTMCNC: 32.6 G/DL (ref 33–36)
MCV RBC AUTO: 91.4 FL (ref 80–94)
MONOCYTES # BLD AUTO: 0.37 X10(3)/MCL (ref 0.1–1.3)
MONOCYTES NFR BLD AUTO: 10.8 %
NEUTROPHILS # BLD AUTO: 1.36 X10(3)/MCL (ref 2.1–9.2)
NEUTROPHILS NFR BLD AUTO: 39.6 %
NITRITE UR QL STRIP.AUTO: NEGATIVE
NRBC BLD AUTO-RTO: 0 %
PH UR STRIP.AUTO: 5.5 [PH]
PLATELET # BLD AUTO: 254 X10(3)/MCL (ref 130–400)
PMV BLD AUTO: 8 FL (ref 7.4–10.4)
POTASSIUM SERPL-SCNC: 3.5 MMOL/L (ref 3.5–5.1)
PROT SERPL-MCNC: 7.3 GM/DL (ref 5.8–7.6)
PROT UR QL STRIP.AUTO: ABNORMAL
RBC # BLD AUTO: 4.2 X10(6)/MCL (ref 4.7–6.1)
RBC #/AREA URNS AUTO: <5 /HPF
RBC UR QL AUTO: NEGATIVE
SODIUM SERPL-SCNC: 141 MMOL/L (ref 136–145)
SP GR UR STRIP.AUTO: 1.02 (ref 1–1.03)
SQUAMOUS #/AREA URNS AUTO: <5 /HPF
UROBILINOGEN UR STRIP-ACNC: 0.2
WBC # SPEC AUTO: 3.43 X10(3)/MCL (ref 4.5–11.5)
WBC #/AREA URNS AUTO: <5 /HPF

## 2023-09-25 PROCEDURE — 63600175 PHARM REV CODE 636 W HCPCS: Performed by: NURSE PRACTITIONER

## 2023-09-25 PROCEDURE — 96372 THER/PROPH/DIAG INJ SC/IM: CPT | Mod: 59 | Performed by: NURSE PRACTITIONER

## 2023-09-25 PROCEDURE — 96374 THER/PROPH/DIAG INJ IV PUSH: CPT

## 2023-09-25 PROCEDURE — 80053 COMPREHEN METABOLIC PANEL: CPT | Performed by: NURSE PRACTITIONER

## 2023-09-25 PROCEDURE — 99284 EMERGENCY DEPT VISIT MOD MDM: CPT | Mod: 25

## 2023-09-25 PROCEDURE — 81001 URINALYSIS AUTO W/SCOPE: CPT | Performed by: NURSE PRACTITIONER

## 2023-09-25 PROCEDURE — 83690 ASSAY OF LIPASE: CPT | Performed by: NURSE PRACTITIONER

## 2023-09-25 PROCEDURE — 25000003 PHARM REV CODE 250: Performed by: NURSE PRACTITIONER

## 2023-09-25 PROCEDURE — 85025 COMPLETE CBC W/AUTO DIFF WBC: CPT | Performed by: NURSE PRACTITIONER

## 2023-09-25 PROCEDURE — 96361 HYDRATE IV INFUSION ADD-ON: CPT

## 2023-09-25 RX ORDER — DICYCLOMINE HYDROCHLORIDE 10 MG/ML
20 INJECTION INTRAMUSCULAR
Status: COMPLETED | OUTPATIENT
Start: 2023-09-25 | End: 2023-09-25

## 2023-09-25 RX ORDER — DICYCLOMINE HYDROCHLORIDE 20 MG/1
20 TABLET ORAL 2 TIMES DAILY PRN
Qty: 10 TABLET | Refills: 0 | Status: SHIPPED | OUTPATIENT
Start: 2023-09-25

## 2023-09-25 RX ORDER — ONDANSETRON 8 MG/1
8 TABLET, ORALLY DISINTEGRATING ORAL EVERY 6 HOURS PRN
Qty: 20 TABLET | Refills: 0 | Status: SHIPPED | OUTPATIENT
Start: 2023-09-25

## 2023-09-25 RX ORDER — SODIUM CHLORIDE 9 MG/ML
1000 INJECTION, SOLUTION INTRAVENOUS
Status: COMPLETED | OUTPATIENT
Start: 2023-09-25 | End: 2023-09-25

## 2023-09-25 RX ORDER — ONDANSETRON 2 MG/ML
4 INJECTION INTRAMUSCULAR; INTRAVENOUS
Status: COMPLETED | OUTPATIENT
Start: 2023-09-25 | End: 2023-09-25

## 2023-09-25 RX ORDER — METHYLPREDNISOLONE SOD SUCC 125 MG
125 VIAL (EA) INJECTION
Status: DISCONTINUED | OUTPATIENT
Start: 2023-09-25 | End: 2023-09-25 | Stop reason: HOSPADM

## 2023-09-25 RX ORDER — PREDNISONE 20 MG/1
40 TABLET ORAL DAILY
Qty: 10 TABLET | Refills: 0 | Status: SHIPPED | OUTPATIENT
Start: 2023-09-25 | End: 2023-09-30

## 2023-09-25 RX ADMIN — SODIUM CHLORIDE 1000 ML: 9 INJECTION, SOLUTION INTRAVENOUS at 01:09

## 2023-09-25 RX ADMIN — ONDANSETRON 4 MG: 2 INJECTION INTRAMUSCULAR; INTRAVENOUS at 01:09

## 2023-09-25 RX ADMIN — DICYCLOMINE HYDROCHLORIDE 20 MG: 20 INJECTION, SOLUTION INTRAMUSCULAR at 01:09

## 2023-09-25 NOTE — ED PROVIDER NOTES
Encounter Date: 9/25/2023       History     Chief Complaint   Patient presents with    Abdominal Pain     Pt reports abd pain, n/v since Friday. PMH crohn's, arthritis. Denies fever, known sick contacts..     Patient states lower abdominal pain with nausea and vomiting x 3 days. Denies any diarrhea, blood in stool, dysuria, or fever. States a hx. Of Crohn's. States that pain is intermittent and cramping.     The history is provided by the patient.   Abdominal Pain  The current episode started several days ago. The onset of the illness was gradual. The abdominal pain is generalized. The abdominal pain does not radiate. The severity of the abdominal pain is 9/10. The abdominal pain is relieved by nothing. The other symptoms of the illness include nausea and vomiting. The other symptoms of the illness do not include fever or diarrhea.     Review of patient's allergies indicates:   Allergen Reactions    Acetaminophen      Other reaction(s): chronns    Tramadol     Ibuprofen Nausea Only     Past Medical History:   Diagnosis Date    Crohn's disease     GERD (gastroesophageal reflux disease)      No past surgical history on file.  No family history on file.  Social History     Tobacco Use    Smoking status: Some Days     Types: Cigarettes    Smokeless tobacco: Never   Substance Use Topics    Alcohol use: Yes     Comment: daily    Drug use: Never     Review of Systems   Constitutional: Negative.  Negative for fever.   HENT: Negative.     Eyes: Negative.    Respiratory: Negative.     Cardiovascular: Negative.    Gastrointestinal:  Positive for abdominal pain, nausea and vomiting. Negative for diarrhea.   Endocrine: Negative.    Genitourinary: Negative.    Musculoskeletal: Negative.    Skin: Negative.    Allergic/Immunologic: Negative.    Neurological: Negative.    Hematological: Negative.    Psychiatric/Behavioral: Negative.     All other systems reviewed and are negative.      Physical Exam     Initial Vitals [09/25/23  1214]   BP Pulse Resp Temp SpO2   (!) 156/100 (!) 51 (!) 22 98.1 °F (36.7 °C) 100 %      MAP       --         Physical Exam    Nursing note and vitals reviewed.  Constitutional: He appears well-developed and well-nourished. No distress.   HENT:   Head: Normocephalic and atraumatic.   Mouth/Throat: Oropharynx is clear and moist.   Eyes: Conjunctivae and EOM are normal. Pupils are equal, round, and reactive to light.   Neck: Neck supple.   Normal range of motion.  Cardiovascular:  Normal rate, regular rhythm, normal heart sounds and intact distal pulses.           Pulmonary/Chest: Breath sounds normal. No respiratory distress. He has no wheezes.   Abdominal: Abdomen is soft. Bowel sounds are normal. He exhibits no distension. There is no abdominal tenderness.   Musculoskeletal:         General: No edema. Normal range of motion.      Cervical back: Normal range of motion and neck supple.     Neurological: He is alert and oriented to person, place, and time. He has normal strength. GCS score is 15. GCS eye subscore is 4. GCS verbal subscore is 5. GCS motor subscore is 6.   Skin: Skin is warm and dry. No rash noted.   Psychiatric: He has a normal mood and affect. Thought content normal.         ED Course   Procedures  Labs Reviewed   COMPREHENSIVE METABOLIC PANEL - Abnormal; Notable for the following components:       Result Value    Calcium Level Total 8.7 (*)     Globulin 3.6 (*)     Albumin/Globulin Ratio 1.0 (*)     All other components within normal limits   URINALYSIS, REFLEX TO URINE CULTURE - Abnormal; Notable for the following components:    Protein, UA Trace (*)     All other components within normal limits   CBC WITH DIFFERENTIAL - Abnormal; Notable for the following components:    WBC 3.43 (*)     RBC 4.20 (*)     Hgb 12.5 (*)     Hct 38.4 (*)     MCHC 32.6 (*)     Neut # 1.36 (*)     All other components within normal limits   LIPASE - Normal   URINALYSIS, MICROSCOPIC - Normal   CBC W/ AUTO DIFFERENTIAL     Narrative:     The following orders were created for panel order CBC Auto Differential.  Procedure                               Abnormality         Status                     ---------                               -----------         ------                     CBC with Differential[[id number]]        Abnormal            Final result                 Please view results for these tests on the individual orders.          Imaging Results    None          Medications   methylPREDNISolone sodium succinate injection 125 mg (has no administration in time range)   ondansetron injection 4 mg (4 mg Intravenous Given 9/25/23 1347)   0.9%  NaCl infusion (1,000 mLs Intravenous New Bag 9/25/23 1349)   dicyclomine injection 20 mg (20 mg Intramuscular Given 9/25/23 1353)     Medical Decision Making  Patient states lower abdominal pain with nausea and vomiting x 3 days. Denies any diarrhea, blood in stool, dysuria, or fever. States a hx. Of Crohn's. States that pain is intermittent and cramping.     The history is provided by the patient.   Abdominal Pain  The current episode started several days ago. The onset of the illness was gradual. The abdominal pain is generalized. The abdominal pain does not radiate. The severity of the abdominal pain is 9/10. The abdominal pain is relieved by nothing. The other symptoms of the illness include nausea and vomiting. The other symptoms of the illness do not include fever or diarrhea.   Patient is awake, alert, afebrile, and nontoxic appearing in the ED.     Amount and/or Complexity of Data Reviewed  Labs: ordered.     Details: Labs are unremarkable.   Discussion of management or test interpretation with external provider(s): Differential diagnosis (including but not limited to):   Judging by the patient's chief complaint and pertinent history, the patient has the following possible differential diagnoses, including but not limited to the following.  Some of these are deemed to be lower  "likelihood and some more likely based on my physical exam and history combined with possible lab work and/or imaging studies.   Please see the pertinent studies, and refer to the HPI.  Some of these diagnoses will take further evaluation to fully rule out, perhaps as an outpatient and the patient was encouraged to follow up when discharged for more comprehensive evaluation.  Abdominal Pain, Crohn's, Crohn's Flare, Gastroenteritis.     Patient was given IM Bentyl for pain and IV Zofran for nausea in the ED. Patient states that pain and nausea are relieved. Patient is walking around the ED with a liter of Soda. Discussed with patient that he most likely is having a Crohn's Flare and offered CT scan of abdomen. Patient refused CT scan of abdomen stating that he is ready to leave "right now." Discussed with patient that we will discharge him home with steroids, zofran, pain control, and have him follow-up with his GI. Patient states understanding and was given strict ED return precautions.  Nursing reports that patient walked out of the ED prior to receiving his discharge information.       Risk  Prescription drug management.                               Clinical Impression:   Final diagnoses:  [R10.9] Abdominal pain, unspecified abdominal location (Primary)  [R11.2] Nausea and vomiting, unspecified vomiting type  [K50.919] Crohn's disease with complication, unspecified gastrointestinal tract location        ED Disposition Condition    Discharge Stable          ED Prescriptions       Medication Sig Dispense Start Date End Date Auth. Provider    ondansetron (ZOFRAN-ODT) 8 MG TbDL Take 1 tablet (8 mg total) by mouth every 6 (six) hours as needed (Nausea). 20 tablet 9/25/2023 -- Kaia Martinez FNP    dicyclomine (BENTYL) 20 mg tablet Take 1 tablet (20 mg total) by mouth 2 (two) times daily as needed (Abdominal Pain). 10 tablet 9/25/2023 -- Kaia Martinez FNP    predniSONE (DELTASONE) 20 MG tablet Take 2 tablets (40 mg " total) by mouth once daily. for 5 days 10 tablet 9/25/2023 9/30/2023 Kaia Martinez FNP          Follow-up Information       Follow up With Specialties Details Why Contact Info    Primary Care Provider  In 3 days      Arvind Breaux MD Gastroenterology In 1 week  [address] [address]  [phone]               Kaia Martinez FNP  09/25/23 1559

## 2023-09-25 NOTE — FIRST PROVIDER EVALUATION
Medical screening examination initiated.  I have conducted a focused provider triage encounter, findings are as follows:    Brief history of present illness:  Patient states nausea x 3 days. States abdominal pain and diarrhea.     There were no vitals filed for this visit.    Pertinent physical exam:  Awake, alert, ambulatory      Brief workup plan:  Labs    Preliminary workup initiated; this workup will be continued and followed by the physician or advanced practice provider that is assigned to the patient when roomed.

## 2023-12-09 ENCOUNTER — HOSPITAL ENCOUNTER (EMERGENCY)
Facility: HOSPITAL | Age: 58
Discharge: HOME | End: 2023-12-09
Attending: STUDENT IN AN ORGANIZED HEALTH CARE EDUCATION/TRAINING PROGRAM
Payer: MEDICAID

## 2023-12-09 VITALS
HEART RATE: 62 BPM | DIASTOLIC BLOOD PRESSURE: 105 MMHG | TEMPERATURE: 98 F | OXYGEN SATURATION: 99 % | RESPIRATION RATE: 19 BRPM | SYSTOLIC BLOOD PRESSURE: 164 MMHG

## 2023-12-09 DIAGNOSIS — M79.641 RIGHT HAND PAIN: Primary | ICD-10-CM

## 2023-12-09 PROCEDURE — 99283 EMERGENCY DEPT VISIT LOW MDM: CPT

## 2023-12-09 RX ORDER — TIZANIDINE 4 MG/1
4 TABLET ORAL EVERY 8 HOURS PRN
Qty: 12 TABLET | Refills: 0 | Status: SHIPPED | OUTPATIENT
Start: 2023-12-09

## 2023-12-09 NOTE — ED PROVIDER NOTES
Encounter Date: 12/9/2023       History     Chief Complaint   Patient presents with    Hand Pain     Presents with R hand pain after hitting hand on something Wednesday, unsure of what. Mild swelling noted. Pt can move fingers in triage.      58 y.o.  male with a history of Crohn's disease and GERD presents to Emergency Department with a chief complaint of R hand pain. Symptoms began three days ago after injury to his R hand and have been constant since onset. Patient states he hit his hand on something but isn't sure exactly what happened due to him being intoxicated at the time. Associated symptoms include R hand swelling. Symptoms are aggravated with palpation and exertion and there are no alleviating factors. The patient denies CP, SOB, fever, chills, abdominal pain, or dizziness. No other reported symptoms at this time      The history is provided by the patient. No  was used.   Hand Pain  This is a new problem. The current episode started more than 2 days ago. The problem occurs constantly. The problem has not changed since onset.Pertinent negatives include no chest pain, no abdominal pain, no headaches and no shortness of breath. The symptoms are aggravated by exertion. He has tried nothing for the symptoms.     Review of patient's allergies indicates:   Allergen Reactions    Acetaminophen      Other reaction(s): chronns    Tramadol     Ibuprofen Nausea Only     Past Medical History:   Diagnosis Date    Crohn's disease     GERD (gastroesophageal reflux disease)      History reviewed. No pertinent surgical history.  No family history on file.  Social History     Tobacco Use    Smoking status: Some Days     Types: Cigarettes    Smokeless tobacco: Never   Substance Use Topics    Alcohol use: Yes     Comment: daily    Drug use: Never     Review of Systems   Constitutional:  Negative for chills, fatigue and fever.   Eyes:  Negative for photophobia and visual disturbance.    Respiratory:  Negative for cough, shortness of breath, wheezing and stridor.    Cardiovascular:  Negative for chest pain, palpitations and leg swelling.   Gastrointestinal:  Negative for abdominal pain, nausea and vomiting.   Musculoskeletal:  Positive for arthralgias and joint swelling.   Neurological:  Negative for dizziness, syncope, weakness and headaches.   All other systems reviewed and are negative.      Physical Exam     Initial Vitals [12/09/23 1306]   BP Pulse Resp Temp SpO2   (!) 164/105 62 19 98.2 °F (36.8 °C) 99 %      MAP       --         Physical Exam    Nursing note and vitals reviewed.  Constitutional: He appears well-developed and well-nourished. He is not diaphoretic.  Non-toxic appearance. No distress.   HENT:   Head: Normocephalic and atraumatic.   Right Ear: External ear normal.   Left Ear: External ear normal.   Nose: Nose normal.   Mouth/Throat: Oropharynx is clear and moist.   Eyes: Conjunctivae and EOM are normal. Pupils are equal, round, and reactive to light.   Neck: Neck supple.   Normal range of motion.  Cardiovascular:  Normal rate, regular rhythm, normal heart sounds, intact distal pulses and normal pulses.           Pulmonary/Chest: Effort normal and breath sounds normal. No tachypnea and no bradypnea. No respiratory distress. He has no wheezes. He exhibits no tenderness.   Abdominal: Abdomen is soft. Bowel sounds are normal. He exhibits no distension. There is no abdominal tenderness. There is no rebound.   Musculoskeletal:         General: Tenderness present. Normal range of motion.      Right hand: Swelling and tenderness present. Normal range of motion. Normal strength. Normal sensation. There is no disruption of two-point discrimination. Normal capillary refill. Normal pulse.      Left hand: Normal.      Cervical back: Normal range of motion and neck supple.      Comments: Tenderness noted to R hand with mild swelling. Full 5/5 ROM noted. All other adjacent joints otherwise  normal.        Neurological: He is alert and oriented to person, place, and time. He has normal strength. No sensory deficit. GCS score is 15. GCS eye subscore is 4. GCS verbal subscore is 5. GCS motor subscore is 6.   Skin: Skin is warm and dry. Capillary refill takes less than 2 seconds. No abrasion, no bruising, no burn, no ecchymosis, no laceration, no lesion, no rash and no abscess noted. No erythema.   Psychiatric: Thought content normal. He is agitated.         ED Course   Procedures  Labs Reviewed - No data to display       Imaging Results              X-Ray Hand 3 view Right (Final result)  Result time 12/09/23 14:10:13      Final result by Quinton Kay MD (12/09/23 14:10:13)                   Impression:      No acute osseous process appreciated.      Electronically signed by: Quinton Kya  Date:    12/09/2023  Time:    14:10               Narrative:    EXAMINATION:  XR HAND COMPLETE 3 VIEW RIGHT    CLINICAL HISTORY:  hand pain swelling\;    TECHNIQUE:  Frontal, lateral and oblique views of the right hand    COMPARISON:  Radiography 06/21/2023    FINDINGS:  Suspect prior fracture of the 5th metacarpal and possibly distal 2nd metacarpal.  No acute fracture identified.  Joint alignments are maintained with regional degenerative changes, greatest at the thumb MCP joint.  Degenerative changes are similar since June.                                       Medications - No data to display  Medical Decision Making  Patient awake, alert, has non-labored breathing, and follows commands appropriately. C/o R hand pain after injury three days ago. Afebrile. NAD Noted.     Differential Diagnosis: OA, Joint Swelling, Boxer's Fracture     Amount and/or Complexity of Data Reviewed  Radiology: ordered.     Details: Hand Xray- No acute osseous process appreciated. Informed patient of results.   Discussion of management or test interpretation with external provider(s): Discussed plan of care and interventions with  patient. Agreed to and aware of plan of care. Comfortable being discharged home. Patient discharged home. Patient denies new or additional complaints; no further tests indicated at this time. Verbalized understanding of instructions. No emergent or apparent distress noted prior to discharge. To follow up with PCP in 1 week as needed. Strict ER return precautions given.       Risk  Prescription drug management.               ED Course as of 12/09/23 1613   Sat Dec 09, 2023   1556 Discussed allergies with patient. Patient reports he is allergic to NSAIDs, Tylenol (Acetaminophen), and Tramadol. Patient is unsure what his reactions are to these medications. Explicated to patient since he is allergic to Acetaminophen, Norco cannot be prescribed since this Acetaminophen is a component of Norco. Patient agitated, did not answer any additional questions regarding allergies to medications. [JA]      ED Course User Index  [JA] Kiara Alfred, NP                           Clinical Impression:  Final diagnoses:  [M79.641] Right hand pain (Primary)          ED Disposition Condition    Discharge Stable          ED Prescriptions       Medication Sig Dispense Start Date End Date Auth. Provider    tiZANidine (ZANAFLEX) 4 MG tablet Take 1 tablet (4 mg total) by mouth every 8 (eight) hours as needed (Take as needed every 8 hours for muscle pain.). 12 tablet 12/9/2023 -- Kiara Alfred, NP          Follow-up Information       Follow up With Specialties Details Why Contact Info    PCP  Call in 1 week As needed, If symptoms worsen     [facility] Emergency Medicine Go to  If symptoms worsen, As needed [address] [facility] [id number] [phone]             Kiara Alfred, NP  12/09/23 5403

## 2023-12-09 NOTE — FIRST PROVIDER EVALUATION
Medical screening examination initiated.  I have conducted a focused provider triage encounter, findings are as follows:    Brief history of present illness:  57y/o M presents to the ED with right hand swelling. Onset Wednesday    There were no vitals filed for this visit.    Pertinent physical exam:  AAA x 3    Brief workup plan:  Imaging    Preliminary workup initiated; this workup will be continued and followed by the physician or advanced practice provider that is assigned to the patient when roomed.

## 2023-12-09 NOTE — ED NOTES
"Pt given discharge paperwork, educated pt on prescription of a Zanaflex. Pt angered by prescription given, states "I told the doctor what I want them to prescribe. They don't get to fucking play God. I have Crohn's, I can't take that stuff." Pt educated that requested Norco's contain acetaminophen, which pt had stated he has an allergy to.  Pt states "If she doesn't give me what I want, I'll just write her up".  Pt's tone more aggressive at this time. Offered d/c paperwork and prescription to pt once again. Pt states "I don't want that shit, I'm getting out of here." Pt leaves ED without prescription or paperwork. Ambulatory, NAD, AAOx4.  "

## 2024-05-12 ENCOUNTER — HOSPITAL ENCOUNTER (EMERGENCY)
Facility: HOSPITAL | Age: 59
Discharge: HOME | End: 2024-05-12
Attending: EMERGENCY MEDICINE
Payer: MEDICAID

## 2024-05-12 VITALS
OXYGEN SATURATION: 98 % | DIASTOLIC BLOOD PRESSURE: 97 MMHG | RESPIRATION RATE: 16 BRPM | WEIGHT: 145 LBS | HEART RATE: 84 BPM | SYSTOLIC BLOOD PRESSURE: 162 MMHG | BODY MASS INDEX: 22.76 KG/M2 | HEIGHT: 67 IN | TEMPERATURE: 98 F

## 2024-05-12 DIAGNOSIS — M25.511 ACUTE PAIN OF RIGHT SHOULDER: Primary | ICD-10-CM

## 2024-05-12 PROCEDURE — 99283 EMERGENCY DEPT VISIT LOW MDM: CPT

## 2024-05-12 RX ORDER — TRAMADOL HYDROCHLORIDE 50 MG/1
50 TABLET ORAL EVERY 6 HOURS PRN
Qty: 12 TABLET | Refills: 0 | Status: SHIPPED | OUTPATIENT
Start: 2024-05-12

## 2024-05-12 NOTE — ED PROVIDER NOTES
Encounter Date: 5/12/2024       History     Chief Complaint   Patient presents with    Shoulder Pain     C/O R posterior shoulder pain x 4 days, no relief with pain relief patches (pt. unsure of type). Denies taking anything PO for pain due to Hx crohns. Pain positional and worse with arms at his sides. Frequently sleeps on R side and carries his bike up stairs for the past 3 years.      This is a 60 yo gentleman who says that he is having some pain behind his right shoulder he has been taking some over-the-counter pain patches but he says that this really has not helped.  He does not recall any direct injury to his shoulder he has not having any chest pain or shortness of breath.  He says he does carry his bike upstairs and he has a history of arthritis.  He also says that he has been engaging in some what he describes as vigorous sex activities lately they thinks he may have injured his shoulder doing that to.  Patient seems to have full range of motion of the shoulder he does have some tenderness over his rhomboids and trapezius muscle on the right side.      Review of patient's allergies indicates:   Allergen Reactions    Acetaminophen      Other reaction(s): chronns    Tramadol     Ibuprofen Nausea Only     Past Medical History:   Diagnosis Date    Crohn's disease     GERD (gastroesophageal reflux disease)      No past surgical history on file.  No family history on file.  Social History     Tobacco Use    Smoking status: Some Days     Types: Cigarettes    Smokeless tobacco: Never   Substance Use Topics    Alcohol use: Yes     Comment: daily    Drug use: Never     Review of Systems   Musculoskeletal:         Right shoulder pain       Physical Exam     Initial Vitals [05/12/24 0402]   BP Pulse Resp Temp SpO2   (!) 162/97 84 16 98.2 °F (36.8 °C) 98 %      MAP       --         Physical Exam    Constitutional: He appears well-developed and well-nourished. No distress.   HENT:   Head: Normocephalic and atraumatic.    Eyes: Conjunctivae and EOM are normal. Pupils are equal, round, and reactive to light. Right eye exhibits no discharge. Left eye exhibits no discharge. No scleral icterus.   Neck: No stridor present. No tracheal deviation present.   Pulmonary/Chest: No stridor.   Abdominal: He exhibits no distension.   Musculoskeletal:         General: No edema. Normal range of motion.      Comments: Patient has full range motion of his right shoulder he does have some tenderness over his trapezius and rhomboid on the right.  Patient has clubbing of his nails and some changes in the MCPs of his hands that suggest history of arthritis.     Neurological: He is alert and oriented to person, place, and time. He has normal strength and normal reflexes. No cranial nerve deficit.   Skin: Skin is warm and dry. No rash noted. No erythema. No pallor.   Psychiatric: He has a normal mood and affect. His behavior is normal. Judgment and thought content normal.         ED Course   Procedures  Labs Reviewed - No data to display       Imaging Results    None          Medications - No data to display  Medical Decision Making  Risk  Prescription drug management.                                      Clinical Impression:  Final diagnoses:  [M25.511] Acute pain of right shoulder (Primary)          ED Disposition Condition    Discharge Stable          ED Prescriptions       Medication Sig Dispense Start Date End Date Auth. Provider    traMADoL (ULTRAM) 50 mg tablet Take 1 tablet (50 mg total) by mouth every 6 (six) hours as needed for Pain. 12 tablet 5/12/2024 -- Lux Soliz MD          Follow-up Information       Follow up With Specialties Details Why Contact Info    Yaa Taveras FNP Family Medicine In 2 days  [address] [place] [id number] [phone]               Lux Soliz MD  05/12/24 1092

## 2024-08-10 ENCOUNTER — HOSPITAL ENCOUNTER (INPATIENT)
Facility: HOSPITAL | Age: 59
LOS: 40 days | Discharge: NURSING, REHAB OR LONG-TERM CARE | DRG: 065 | End: 2024-09-19
Attending: EMERGENCY MEDICINE | Admitting: STUDENT IN AN ORGANIZED HEALTH CARE EDUCATION/TRAINING PROGRAM
Payer: MEDICAID

## 2024-08-10 DIAGNOSIS — I10 PRIMARY HYPERTENSION: Primary | ICD-10-CM

## 2024-08-10 DIAGNOSIS — R29.818 ACUTE FOCAL NEUROLOGICAL DEFICIT: ICD-10-CM

## 2024-08-10 DIAGNOSIS — I63.9 STROKE: ICD-10-CM

## 2024-08-10 DIAGNOSIS — R07.9 CHEST PAIN: ICD-10-CM

## 2024-08-10 PROBLEM — I61.9 INTRAPARENCHYMAL HEMORRHAGE OF BRAIN: Status: ACTIVE | Noted: 2024-08-10

## 2024-08-10 LAB
ALBUMIN SERPL-MCNC: 3.7 G/DL (ref 3.4–4.8)
ALBUMIN/GLOB SERPL: 1.1 RATIO (ref 1.1–2)
ALP SERPL-CCNC: 60 UNIT/L (ref 40–150)
ALT SERPL-CCNC: 16 UNIT/L (ref 0–55)
ANION GAP SERPL CALC-SCNC: 9 MEQ/L
APICAL FOUR CHAMBER EJECTION FRACTION: 56 %
APICAL TWO CHAMBER EJECTION FRACTION: 59 %
AST SERPL-CCNC: 21 UNIT/L (ref 5–34)
AV INDEX (PROSTH): 0.78
AV MEAN GRADIENT: 5 MMHG
AV PEAK GRADIENT: 9 MMHG
AV VELOCITY RATIO: 0.79
BASOPHILS # BLD AUTO: 0.03 X10(3)/MCL
BASOPHILS NFR BLD AUTO: 0.5 %
BILIRUB SERPL-MCNC: 0.3 MG/DL
BUN SERPL-MCNC: 26.9 MG/DL (ref 8.4–25.7)
CALCIUM SERPL-MCNC: 9 MG/DL (ref 8.8–10)
CHLORIDE SERPL-SCNC: 110 MMOL/L (ref 98–107)
CHOLEST SERPL-MCNC: 215 MG/DL
CHOLEST/HDLC SERPL: 3 {RATIO} (ref 0–5)
CO2 SERPL-SCNC: 22 MMOL/L (ref 23–31)
CREAT SERPL-MCNC: 1.44 MG/DL (ref 0.73–1.18)
CREAT/UREA NIT SERPL: 19
CV ECHO LV RWT: 0.37 CM
DOP CALC AO PEAK VEL: 1.48 M/S
DOP CALC AO VTI: 27.2 CM
DOP CALC LVOT PEAK VEL: 1.17 M/S
DOP CALCLVOT PEAK VEL VTI: 21.3 CM
E WAVE DECELERATION TIME: 180 MSEC
E/A RATIO: 1.17
E/E' RATIO: 7.13 M/S
ECHO LV POSTERIOR WALL: 0.9 CM (ref 0.6–1.1)
EOSINOPHIL # BLD AUTO: 0.07 X10(3)/MCL (ref 0–0.9)
EOSINOPHIL NFR BLD AUTO: 1.1 %
ERYTHROCYTE [DISTWIDTH] IN BLOOD BY AUTOMATED COUNT: 13.3 % (ref 11.5–17)
EST. AVERAGE GLUCOSE BLD GHB EST-MCNC: 111.2 MG/DL
FRACTIONAL SHORTENING: 29 % (ref 28–44)
GFR SERPLBLD CREATININE-BSD FMLA CKD-EPI: 55 ML/MIN/1.73/M2
GLOBULIN SER-MCNC: 3.4 GM/DL (ref 2.4–3.5)
GLUCOSE SERPL-MCNC: 119 MG/DL (ref 82–115)
HBA1C MFR BLD: 5.5 %
HCT VFR BLD AUTO: 37.3 % (ref 42–52)
HDLC SERPL-MCNC: 76 MG/DL (ref 35–60)
HGB BLD-MCNC: 12.1 G/DL (ref 14–18)
IMM GRANULOCYTES # BLD AUTO: 0.03 X10(3)/MCL (ref 0–0.04)
IMM GRANULOCYTES NFR BLD AUTO: 0.5 %
INR PPP: 1
INTERVENTRICULAR SEPTUM: 1.2 CM (ref 0.6–1.1)
LDLC SERPL CALC-MCNC: 127 MG/DL (ref 50–140)
LEFT ATRIUM AREA SYSTOLIC (APICAL 2 CHAMBER): 24.5 CM2
LEFT ATRIUM AREA SYSTOLIC (APICAL 4 CHAMBER): 25.2 CM2
LEFT ATRIUM SIZE: 3.3 CM
LEFT ATRIUM VOLUME INDEX MOD: 46.8 ML/M2
LEFT ATRIUM VOLUME MOD: 82.4 CM3
LEFT INTERNAL DIMENSION IN SYSTOLE: 3.5 CM (ref 2.1–4)
LEFT VENTRICLE DIASTOLIC VOLUME INDEX: 64.1 ML/M2
LEFT VENTRICLE DIASTOLIC VOLUME: 112.81 ML
LEFT VENTRICLE END DIASTOLIC VOLUME APICAL 2 CHAMBER: 172 ML
LEFT VENTRICLE END DIASTOLIC VOLUME APICAL 4 CHAMBER: 164 ML
LEFT VENTRICLE END SYSTOLIC VOLUME APICAL 2 CHAMBER: 80.5 ML
LEFT VENTRICLE END SYSTOLIC VOLUME APICAL 4 CHAMBER: 80 ML
LEFT VENTRICLE MASS INDEX: 107 G/M2
LEFT VENTRICLE SYSTOLIC VOLUME INDEX: 28.9 ML/M2
LEFT VENTRICLE SYSTOLIC VOLUME: 50.87 ML
LEFT VENTRICULAR INTERNAL DIMENSION IN DIASTOLE: 4.9 CM (ref 3.5–6)
LEFT VENTRICULAR MASS: 188.09 G
LV LATERAL E/E' RATIO: 6.31 M/S
LV SEPTAL E/E' RATIO: 8.2 M/S
LVED V (TEICH): 112.81 ML
LVES V (TEICH): 50.87 ML
LVOT MG: 2 MMHG
LVOT MV: 0.7 CM/S
LYMPHOCYTES # BLD AUTO: 2.1 X10(3)/MCL (ref 0.6–4.6)
LYMPHOCYTES NFR BLD AUTO: 33.5 %
MCH RBC QN AUTO: 29.8 PG (ref 27–31)
MCHC RBC AUTO-ENTMCNC: 32.4 G/DL (ref 33–36)
MCV RBC AUTO: 91.9 FL (ref 80–94)
MONOCYTES # BLD AUTO: 0.59 X10(3)/MCL (ref 0.1–1.3)
MONOCYTES NFR BLD AUTO: 9.4 %
MV PEAK A VEL: 0.7 M/S
MV PEAK E VEL: 0.82 M/S
NEUTROPHILS # BLD AUTO: 3.44 X10(3)/MCL (ref 2.1–9.2)
NEUTROPHILS NFR BLD AUTO: 55 %
NRBC BLD AUTO-RTO: 0 %
OHS CV RV/LV RATIO: 0.84 CM
OHS LV EJECTION FRACTION SIMPSONS BIPLANE MOD: 58 %
OHS QRS DURATION: 92 MS
OHS QTC CALCULATION: 424 MS
OSMOLALITY SERPL: 293 MOSM/KG (ref 280–300)
PLATELET # BLD AUTO: 349 X10(3)/MCL (ref 130–400)
PLATELETS.RETICULATED NFR BLD AUTO: 1 % (ref 0.9–11.2)
PMV BLD AUTO: 8.4 FL (ref 7.4–10.4)
POC PTINR: 1.1 (ref 0.9–1.2)
POC PTWBT: 13.7 SEC (ref 9.7–14.3)
POCT GLUCOSE: 128 MG/DL (ref 70–110)
POTASSIUM SERPL-SCNC: 4.3 MMOL/L (ref 3.5–5.1)
PROT SERPL-MCNC: 7.1 GM/DL (ref 5.8–7.6)
PROTHROMBIN TIME: 13.5 SECONDS (ref 12.5–14.5)
PV PEAK GRADIENT: 6 MMHG
PV PEAK VELOCITY: 1.2 M/S
RA PRESSURE ESTIMATED: 3 MMHG
RBC # BLD AUTO: 4.06 X10(6)/MCL (ref 4.7–6.1)
RIGHT VENTRICULAR END-DIASTOLIC DIMENSION: 4.1 CM
SAMPLE: NORMAL
SODIUM SERPL-SCNC: 138 MMOL/L (ref 136–145)
SODIUM SERPL-SCNC: 141 MMOL/L (ref 136–145)
TDI LATERAL: 0.13 M/S
TDI SEPTAL: 0.1 M/S
TDI: 0.12 M/S
TRICUSPID ANNULAR PLANE SYSTOLIC EXCURSION: 1.57 CM
TRIGL SERPL-MCNC: 60 MG/DL (ref 34–140)
TSH SERPL-ACNC: 0.61 UIU/ML (ref 0.35–4.94)
VLDLC SERPL CALC-MCNC: 12 MG/DL
WBC # BLD AUTO: 6.26 X10(3)/MCL (ref 4.5–11.5)
Z-SCORE OF LEFT VENTRICULAR DIMENSION IN END DIASTOLE: 0.07
Z-SCORE OF LEFT VENTRICULAR DIMENSION IN END SYSTOLE: 1.19

## 2024-08-10 PROCEDURE — 85610 PROTHROMBIN TIME: CPT | Performed by: EMERGENCY MEDICINE

## 2024-08-10 PROCEDURE — 63600175 PHARM REV CODE 636 W HCPCS: Performed by: EMERGENCY MEDICINE

## 2024-08-10 PROCEDURE — 93010 ELECTROCARDIOGRAM REPORT: CPT | Mod: ,,, | Performed by: INTERNAL MEDICINE

## 2024-08-10 PROCEDURE — C9248 INJ, CLEVIDIPINE BUTYRATE: HCPCS | Performed by: EMERGENCY MEDICINE

## 2024-08-10 PROCEDURE — 25000003 PHARM REV CODE 250

## 2024-08-10 PROCEDURE — 85025 COMPLETE CBC W/AUTO DIFF WBC: CPT | Performed by: EMERGENCY MEDICINE

## 2024-08-10 PROCEDURE — 84443 ASSAY THYROID STIM HORMONE: CPT | Performed by: EMERGENCY MEDICINE

## 2024-08-10 PROCEDURE — 80053 COMPREHEN METABOLIC PANEL: CPT | Performed by: EMERGENCY MEDICINE

## 2024-08-10 PROCEDURE — 93005 ELECTROCARDIOGRAM TRACING: CPT

## 2024-08-10 PROCEDURE — 20000000 HC ICU ROOM

## 2024-08-10 PROCEDURE — 36620 INSERTION CATHETER ARTERY: CPT

## 2024-08-10 PROCEDURE — C1751 CATH, INF, PER/CENT/MIDLINE: HCPCS

## 2024-08-10 PROCEDURE — 63600175 PHARM REV CODE 636 W HCPCS: Performed by: NURSE PRACTITIONER

## 2024-08-10 PROCEDURE — 83930 ASSAY OF BLOOD OSMOLALITY: CPT | Performed by: NURSE PRACTITIONER

## 2024-08-10 PROCEDURE — 36573 INSJ PICC RS&I 5 YR+: CPT

## 2024-08-10 PROCEDURE — 99223 1ST HOSP IP/OBS HIGH 75: CPT | Mod: FS,,, | Performed by: PSYCHIATRY & NEUROLOGY

## 2024-08-10 PROCEDURE — 05HA33Z INSERTION OF INFUSION DEVICE INTO LEFT BRACHIAL VEIN, PERCUTANEOUS APPROACH: ICD-10-PCS | Performed by: HOSPITALIST

## 2024-08-10 PROCEDURE — 83036 HEMOGLOBIN GLYCOSYLATED A1C: CPT | Performed by: NURSE PRACTITIONER

## 2024-08-10 PROCEDURE — 4A133B1 MONITORING OF ARTERIAL PRESSURE, PERIPHERAL, PERCUTANEOUS APPROACH: ICD-10-PCS | Performed by: STUDENT IN AN ORGANIZED HEALTH CARE EDUCATION/TRAINING PROGRAM

## 2024-08-10 PROCEDURE — 84295 ASSAY OF SERUM SODIUM: CPT | Performed by: NURSE PRACTITIONER

## 2024-08-10 PROCEDURE — 80061 LIPID PANEL: CPT | Performed by: EMERGENCY MEDICINE

## 2024-08-10 PROCEDURE — 25500020 PHARM REV CODE 255: Performed by: EMERGENCY MEDICINE

## 2024-08-10 PROCEDURE — 36415 COLL VENOUS BLD VENIPUNCTURE: CPT | Performed by: NURSE PRACTITIONER

## 2024-08-10 PROCEDURE — 63600175 PHARM REV CODE 636 W HCPCS

## 2024-08-10 RX ORDER — HYDRALAZINE HYDROCHLORIDE 20 MG/ML
10 INJECTION INTRAMUSCULAR; INTRAVENOUS EVERY 4 HOURS PRN
Status: DISCONTINUED | OUTPATIENT
Start: 2024-08-10 | End: 2024-09-19 | Stop reason: HOSPADM

## 2024-08-10 RX ORDER — MUPIROCIN 20 MG/G
OINTMENT TOPICAL 2 TIMES DAILY
Status: DISPENSED | OUTPATIENT
Start: 2024-08-12 | End: 2024-08-17

## 2024-08-10 RX ORDER — SODIUM CHLORIDE 0.9 % (FLUSH) 0.9 %
10 SYRINGE (ML) INJECTION
Status: DISCONTINUED | OUTPATIENT
Start: 2024-08-10 | End: 2024-08-16

## 2024-08-10 RX ORDER — LABETALOL HYDROCHLORIDE 5 MG/ML
10 INJECTION, SOLUTION INTRAVENOUS
Status: DISCONTINUED | OUTPATIENT
Start: 2024-08-10 | End: 2024-08-15

## 2024-08-10 RX ORDER — 3% SODIUM CHLORIDE 3 G/100ML
60 INJECTION, SOLUTION INTRAVENOUS CONTINUOUS
Status: DISCONTINUED | OUTPATIENT
Start: 2024-08-10 | End: 2024-08-13

## 2024-08-10 RX ORDER — SODIUM CHLORIDE 0.9 % (FLUSH) 0.9 %
10 SYRINGE (ML) INJECTION EVERY 6 HOURS
Status: DISCONTINUED | OUTPATIENT
Start: 2024-08-11 | End: 2024-08-16

## 2024-08-10 RX ORDER — LIDOCAINE HYDROCHLORIDE 10 MG/ML
1 INJECTION, SOLUTION INFILTRATION; PERINEURAL ONCE
Status: COMPLETED | OUTPATIENT
Start: 2024-08-10 | End: 2024-08-10

## 2024-08-10 RX ORDER — BISACODYL 10 MG/1
10 SUPPOSITORY RECTAL DAILY PRN
Status: DISCONTINUED | OUTPATIENT
Start: 2024-08-10 | End: 2024-09-19 | Stop reason: HOSPADM

## 2024-08-10 RX ORDER — LIDOCAINE HYDROCHLORIDE 10 MG/ML
INJECTION, SOLUTION INFILTRATION; PERINEURAL
Status: COMPLETED
Start: 2024-08-10 | End: 2024-08-10

## 2024-08-10 RX ADMIN — CLEVIPIDINE 8 MG/HR: 0.5 EMULSION INTRAVENOUS at 12:08

## 2024-08-10 RX ADMIN — LIDOCAINE HYDROCHLORIDE 200 MG: 10 INJECTION, SOLUTION INFILTRATION; PERINEURAL at 06:08

## 2024-08-10 RX ADMIN — CLEVIPIDINE 12 MG/HR: 0.5 EMULSION INTRAVENOUS at 08:08

## 2024-08-10 RX ADMIN — LABETALOL HYDROCHLORIDE 10 MG: 5 INJECTION, SOLUTION INTRAVENOUS at 11:08

## 2024-08-10 RX ADMIN — CLEVIPIDINE 6 MG/HR: 0.5 EMULSION INTRAVENOUS at 11:08

## 2024-08-10 RX ADMIN — CLEVIPIDINE 10 MG/HR: 0.5 EMULSION INTRAVENOUS at 03:08

## 2024-08-10 RX ADMIN — IOHEXOL 100 ML: 350 INJECTION, SOLUTION INTRAVENOUS at 08:08

## 2024-08-10 RX ADMIN — LABETALOL HYDROCHLORIDE 10 MG: 5 INJECTION, SOLUTION INTRAVENOUS at 08:08

## 2024-08-10 RX ADMIN — CLEVIPIDINE 12 MG/HR: 0.5 EMULSION INTRAVENOUS at 04:08

## 2024-08-10 RX ADMIN — HYDRALAZINE HYDROCHLORIDE 10 MG: 20 INJECTION INTRAMUSCULAR; INTRAVENOUS at 10:08

## 2024-08-10 RX ADMIN — SODIUM CHLORIDE 50 ML/HR: 3 INJECTION, SOLUTION INTRAVENOUS at 09:08

## 2024-08-10 RX ADMIN — SODIUM CHLORIDE, PRESERVATIVE FREE 10 ML: 5 INJECTION INTRAVENOUS at 11:08

## 2024-08-10 RX ADMIN — CLEVIPIDINE 14 MG/HR: 0.5 EMULSION INTRAVENOUS at 05:08

## 2024-08-10 RX ADMIN — CLEVIPIDINE 1 MG/HR: 0.5 EMULSION INTRAVENOUS at 08:08

## 2024-08-10 NOTE — PROGRESS NOTES
Care update   Discussed case with Dr Butterfield   -recommend 3% NS for increased swelling on ct head at 2:00 pm   -q6 Na and osmo   -goal na 145-155  -hold if osmo is greater than 320 or Na greater than 160. Hold for 6 hours and restart when Na less than 160 or osmo is less than 320

## 2024-08-10 NOTE — H&P
"[facility] - 7th Floor ICU  Pulmonary Critical Care Note    Patient Name: Dell Garrett Jr.  MRN: [id number]  Admission Date: 8/10/2024  Hospital Length of Stay: 0 days  Code Status: No Order  Attending Provider: No att. providers found  Primary Care Provider: Yaa Taveras FNP     Subjective:     HPI:   Dell Garrett is a 59 yr old M w PMHx of Crohn's disease, GERD, and chronic tobacco use who presented to [facility] ED on 8/10/24 for acute onset left sided weakness, facial droop and aphasia. Patient's symptoms started around 06:45 when he was getting a hair cut. Patient reported standing from madrigal chair and suddenly developed a HA which he described as a "sticking" sensation. Patient subsequently fell and hit his head but did not lose consciousness. Patient presented bradycardic HR 48 and hypertensive with -160s. Labwork significant for mild normocytic anemia, NAGMA with bicarb 22. BUN/Cr elevated but appears to be at patient baseline. Patient is not on chronic anticoagulation. Imaging consistent with R basal ganglia hemorrhage w 8mm right to left shift and extensive intraventricular hemorrhage  Neurology was consulted from ED. Patient started on Cleviprex for BP control. Admitted to ICU for close monitoring.     Hospital Course/Significant events:  Admit to ICU - 8/10     24 Hour Interval History:  NA     Past Medical History:   Diagnosis Date    Crohn's disease     GERD (gastroesophageal reflux disease)        History reviewed. No pertinent surgical history.    Social History     Socioeconomic History    Marital status: Single   Tobacco Use    Smoking status: Some Days     Types: Cigarettes    Smokeless tobacco: Never   Substance and Sexual Activity    Alcohol use: Yes     Comment: daily    Drug use: Never           Current Outpatient Medications   Medication Instructions    albuterol (PROVENTIL/VENTOLIN HFA) 90 mcg/actuation inhaler Inhalation    diclofenac sodium (VOLTAREN) 2 g, Topical " (Top), 3 times daily    dicyclomine (BENTYL) 20 mg, Oral, 2 times daily PRN    iron-vitamin C 100-250 mg, ICAR-C, 100-250 mg Tab 1 tablet, Oral    loratadine (CLARITIN) 10 mg, Oral, Daily    ondansetron (ZOFRAN-ODT) 8 mg, Oral, Every 6 hours PRN    RINVOQ 45 mg Tb24 1 tablet, Oral    tiZANidine (ZANAFLEX) 4 mg, Oral, Every 8 hours PRN    traMADoL (ULTRAM) 50 mg, Oral, Every 6 hours PRN       Current Inpatient Medications      Current Intravenous Infusions   clevidipine  0-16 mg/hr Intravenous Continuous 4 mL/hr at 08/10/24 0826 2 mg/hr at 08/10/24 0826         ROS   Unable to assess 2/2 patient effort     Objective:     No intake or output data in the 24 hours ending 08/10/24 1030      Vital Signs (Most Recent):  Temp: 98.1 °F (36.7 °C) (08/10/24 0735)  Pulse: 66 (08/10/24 0942)  Resp: 17 (08/10/24 0942)  BP: 138/85 (08/10/24 0942)  SpO2: 97 % (08/10/24 0942)  Body mass index is 22.55 kg/m².  Weight: 65.3 kg (143 lb 15.4 oz) Vital Signs (24h Range):  Temp:  [98.1 °F (36.7 °C)] 98.1 °F (36.7 °C)  Pulse:  [] 66  Resp:  [13-26] 17  SpO2:  [94 %-100 %] 97 %  BP: (134-165)/(76-89) 138/85     Physical Exam  Vitals and nursing note reviewed.   Constitutional:       General: He is sleeping. He is not in acute distress.     Appearance: He is normal weight. He is not ill-appearing, toxic-appearing or diaphoretic.      Comments: Sating well on room air    HENT:      Head: Normocephalic. No raccoon eyes, contusion, masses or laceration.   Eyes:      Extraocular Movements:      Right eye: No nystagmus.      Left eye: No nystagmus.      Pupils: Pupils are equal.      Comments: Pupils fixed    Neck:      Vascular: No carotid bruit.   Cardiovascular:      Rate and Rhythm: Normal rate and regular rhythm.      Pulses: Normal pulses.      Heart sounds: No murmur heard.     No gallop.   Pulmonary:      Effort: Pulmonary effort is normal. No respiratory distress.      Breath sounds: No wheezing, rhonchi or rales.   Abdominal:       "General: Abdomen is flat. Bowel sounds are normal. There is no distension.      Palpations: Abdomen is soft.      Tenderness: There is no guarding.   Musculoskeletal:      Right lower leg: No edema.      Left lower leg: No edema.      Comments: Spontaneous movement of RUE    Skin:     General: Skin is warm and dry.      Capillary Refill: Capillary refill takes less than 2 seconds.   Neurological:      Comments: Unable to assess 2/2 patient effort    Psychiatric:         Behavior: Behavior is uncooperative.      Comments: Unable to assess 2/2 patient effort            Lines/Drains/Airways       Peripheral Intravenous Line  Duration                  Peripheral IV - Single Lumen 08/10/24 0854 20 G 1 1/4 in No Left;Anterior Forearm <1 day         Peripheral IV - Single Lumen 08/10/24 18 G Right Antecubital <1 day         Peripheral IV - Single Lumen 08/10/24 18 G Right Antecubital <1 day                    Significant Labs:    Lab Results   Component Value Date    WBC 6.26 08/10/2024    HGB 12.1 (L) 08/10/2024    HCT 37.3 (L) 08/10/2024    MCV 91.9 08/10/2024     08/10/2024           BMP  Lab Results   Component Value Date     08/10/2024    K 4.3 08/10/2024    CO2 22 (L) 08/10/2024    BUN 26.9 (H) 08/10/2024    CREATININE 1.44 (H) 08/10/2024    CALCIUM 9.0 08/10/2024    AGAP 9.0 08/10/2024    EGFRNONAA >60 06/11/2021         ABG  No results for input(s): "PH", "PO2", "PCO2", "HCO3", "POCBASEDEF" in the last 168 hours.    Mechanical Ventilation Support:         Significant Imaging:  I have reviewed the pertinent imaging within the past 24 hours.    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  CT angiogram was performed from the level of the luis to the top of the head following the IV administration 100 cc of Isovue 370 contrast .  Sagittal and coronal reconstructions and maximum intensity projection reconstructions were performed. Arterial stenosis percentages are based on NASCET measurement criteria.  Additional multiplanar reconstructions were performed on post processed imaging.  Automatic exposure control (AEC) is utilized to reduce patient radiation exposure.    COMPARISON:  None    FINDINGS:  There is a intracranial hemorrhage seen which is discussed in detail on a prior report.    Vascular images: The aortic arch is slightly dilated.  Maximum dimension is 3.9 cm.  There is a 3 vessel arch seen.  The common carotid arteries are widely patent.  No significant plaque is seen.  No significant plaque is seen in the carotid bulbs.  Internal carotid arteries are widely patent.  They are seen to level the petrous ridge and clinoid and supraclinoid region.  No obstruction is seen.  MCAs are patent.  M1 segments, M2 segments M3 segments of patent.  No aneurysm or obstruction is seen.    The A1 segments are patent.  Anterior cerebral arteries are patent.  Anterior communicating arteries patent.  No aneurysm or obstruction is seen.    Both vertebral arteries are widely patent.  No dissection is seen.  No obstruction is seen.  Both vertebral arteries perfuse a vertebral artery which shows some evidence of dolichoectasia.  No aneurysm is seen.  No obstruction is seen.  Posterior cerebral arteries are widely patent.  Aneurysm obstruction is seen.    .                                       CT Brain Perfusion inc Post Processing (Final result)   Result time 08/10/24 09:58:36      Final result by Maverick March MD (08/10/24 09:58:36)                   Narrative:    EXAMINATION  CT BRAIN PERFUSION INC POST PROCESSING    CLINICAL HISTORY  stroke;    TECHNIQUE  Axial perfusion CT images were obtained, with arterial in-flow JOSE DE JESUS at the basilar artery and venous out-flow JOSE DE JESUS at the straight sinus. Post-processing of the CT perfusion data was performed, generating color para-metric maps for cerebral blood volume (CBV), cerebral blood flow (CBF), mean transit time (MTT), time-to-peak (TTP), and time-to-maximum (T-max).  The perfusion maps and hemodynamic curves were transferred to PACS.    CONTRAST  *IV contrast media: Omnipaque 350  *Injected volume: 50 mL  *Injection rate: 5 mL/s    COMPARISON  *No prior CT perfusion exam is available at the time of the initial interpretation.  *Non-contrast head CT obtained immediately prior was reviewed.    FINDINGS/IMPRESSION  Exam quality: Somewhat limited assessment secondary to widespread right cerebral intraparenchymal hemorrhage as seen on the recent non-contrast head CT.    Asymmetrically decreased blood flow noted through the area of intra-axial hematoma, with no large volume area of perfusion mismatch.    Prior to the final Radiology report, initial assessment performed by teleradiology service and pertinent communication to the Neuro Interventional Team was accomplished for potential treatment planning; please refer to the corresponding consult and/or intervention notes for additional details.    RADIATION DOSE  Automated tube current modulation, weight-based exposure dosing, and/or iterative reconstruction technique utilized to reach lowest reasonably achievable exposure rate.    DLP: 1695 mGy*cm      Electronically signed by: Maverick March  Date:    08/10/2024  Time:    09:58                                     CT HEAD FOR STROKE (Final result)  Result time 08/10/24 08:11:45      Final result by Eduard Barajas  MD VERONICA (08/10/24 08:11:45)                   Impression:      1. There is 4.2 x 3.8 cm (AP x T) intraparenchymal hemorrhage centered in the right thalamocapsular region series 2 image 36. There is mild surrounding edema with resultant effacement of the adjacent sulci and Sylvian fissure, as well as compression of the right lateral ventricle with 8mm midline shift on series 2 image 36.  Intraventricular extension of bleed is seen in the right lateral ventricle and 3rd ventricle. There is mild dilatation of the left lateral ventricle compared to the right suggesting possible early obstructive hydrocephalus component. Sulcal hyperdensities are seen along the right temporal lobe reflective of subarachnoid hemorrhage. Recommend continued serial interval follow-up to full resolution as indicated.    2. Details and other findings as noted above.    I concur with the preliminary report above.      Electronically signed by: Den Barajas  Date:    08/10/2024  Time:    08:11               Narrative:    EXAMINATION:  CT HEAD FOR STROKE    CLINICAL HISTORY:  Neuro deficit, acute, stroke suspected;    TECHNIQUE:  Multiple axial images were obtained from the base of the brain to the vertex without contrast administration.  Sagittal and coronal reconstructions were performed..Automatic exposure control is utilized to reduce patient radiation exposure.    COMPARISON:  None    FINDINGS:  Hemorrhage: There is 4.2 x 3.8 cm (AP x T) intraparenchymal hemorrhage centered in the right thalamocapsular region series 2 image 36. There is mild surrounding edema with resultant effacement of the adjacent sulci and Sylvian fissure, as well as compression of the right lateral ventricle with 8mm midline shift on series 2 image 36.  Intraventricular extension of bleed is seen in the right lateral ventricle and 3rd ventricle. There is mild dilatation of the left lateral ventricle compared to the right suggesting possible early obstructive  hydrocephalus component. Sulcal hyperdensities are seen along the right temporal lobe reflective of subarachnoid hemorrhage. Recommend continued serial interval follow-up to full resolution as indicated    CSF spaces: The ventricles sulci and basal cisterns are within normal limits.    Cerebellum: Unremarkable    Vascular: Unremarkable venous sinuses    Sella and skull base: The sella appears to be within normal limits for age.    Cerebellopontine angles: Within normal limits.    Herniation: None.    Intracranial calcifications: Incidental note is made of some pineal region calcification.    Calvarium: No acute linear or depressed skull fracture is seen.    Maxillofacial Structures:Paranasal sinuses: The visualized paranasal sinuses appear clear with no mucoperiosteal thickening or air fluid levels identified    Orbits: The orbits appear unremarkable.    Zygomatic arches: The zygomatic arches are intact and unremarkable    Temporal bones and mastoids: The temporal bones and mastoids appear unremarkable.    TMJ: The mandibular condyles appear normally placed with respect to the mandibular fossa.    Notifications: The results were discussed with the emergency room physician (Dr Burnette) prior to dictation at 2024-08-10 08:00:14 CDT.                        Preliminary result by Stu Peck MD (08/10/24 08:00:30)                   Impression:    1. There is 4.2 x 3.8 cm (AP x T) intraparenchymal hemorrhage centered in the right thalamocapsular region series 2 image 36. There is mild surrounding edema with resultant effacement of the adjacent sulci and Sylvian fissure, as well as compression of the right lateral ventricle with 8mm midline shift on series 2 image 36. Intravantricular extension of bleed is seen in the right lateral ventricle and 3rd ventricle. There is mild dilatation of the left lateral ventricle compared to the right suggesting possible early obstructive hydrocephalus component. Sulcal  hyperdensities are seen along the right temporal lobe reflective of subarachnoid hemorrhage. Recommend continued serial interval follow-up to full resolution as indicated.  2. Details and other findings as noted above.               Narrative:    START OF REPORT:  Technique: CT of the head was performed without intravenous contrast with axial as well as coronal and sagittal images.    Comparison: None.    Dosage Information: Automated exposure control was utilized.    Clinical history: Slurred speech, facial droop, left arm weakness.    Findings:  Hemorrhage: There is 4.2 x 3.8 cm (AP x T) intraparenchymal hemorrhage centered in the right thalamocapsular region series 2 image 36. There is mild surrounding edema with resultant effacement of the adjacent sulci and Sylvian fissure, as well as compression of the right lateral ventricle with 8mm midline shift on series 2 image 36. Intravantricular extension of bleed is seen in the right lateral ventricle and 3rd ventricle. There is mild dilatation of the left lateral ventricle compared to the right suggesting possible early obstructive hydrocephalus component. Sulcal hyperdensities are seen along the right temporal lobe reflective of subarachnoid hemorrhage. Recommend continued serial interval follow-up to full resolution as indicated.  CSF spaces: The ventricles sulci and basal cisterns are within normal limits.  Cerebellum: Unremarkable.  Vascular: Unremarkable venous sinuses.  Sella and skull base: The sella appears to be within normal limits for age.  Cerebellopontine angles: Within normal limits.  Herniation: None.  Intracranial calcifications: Incidental note is made of some pineal region calcification.  Calvarium: No acute linear or depressed skull fracture is seen.    Maxillofacial Structures:  Paranasal sinuses: The visualized paranasal sinuses appear clear with no mucoperiosteal thickening or air fluid levels identified.  Orbits: The orbits appear  unremarkable.  Zygomatic arches: The zygomatic arches are intact and unremarkable.  Temporal bones and mastoids: The temporal bones and mastoids appear unremarkable.  TMJ: The mandibular condyles appear normally placed with respect to the mandibular fossa.    Notifications: The results were discussed with the emergency room physician (Dr Burnette) prior to dictation at 2024-08-10 08:00:14 CDT.                                         CT Cervical Spine Without Contrast (Final result)  Result time 08/10/24 08:01:39      Final result by Eduard Barajas MD (08/10/24 08:01:39)                   Impression:      Loss of the normal lordotic curve of the cervical spine most likely related to spasm but otherwise unremarkable with no evidence of acute fracture or dislocation seen    Incidental note is made of some degenerative changes in the  cervical spine      Electronically signed by: Den Barajas  Date:    08/10/2024  Time:    08:01               Narrative:    EXAMINATION:  CT CERVICAL SPINE WITHOUT CONTRAST    CLINICAL HISTORY:  Polytrauma, blunt;    TECHNIQUE:  Low dose axial images, sagittal and coronal reformations were performed though the cervical spine.  Contrast was not administered. Automatic exposure control is utilized to reduce patient radiation exposure.    COMPARISON:  10/11/2018    FINDINGS:  The vertebral body heights are well maintained. There is some loss of the normal lordotic curve cervical spine most likely related to spasm. No fracture is seen. No dislocation is seen. The odontoid and lateral masses appear grossly unremarkable.  There are some degenerative changes seen in the cervical spine which appear to be chronic.                                       No results found for this or any previous visit.     Assessment/Plan:     Assessment  R basal ganglia hemorrhage w 8mm right to left shift   Intraventricular hemorrhage   Mild Normocytic Anemia   Elevated Blood Pressure, no previous dx of HTN    Hypercholesterolemia   Elevated Creatinine, appears to be at baseline of 1.4       Plan  Admit to ICU for close monitoring   Neurology following   Q1hr neurochecks   SBP goal <130-150 for first 24 hours, then SBP goal <140, wean Cleviprex as necessary with PRN antihypertensives in place   Avoid anticoagulation and DAPT   Repeat CT head wo contrast later this afternoon to evaluate for interval change, will consider EVD placement if progressive   Echo with bubble   NPO  Aspiration and seizure precautions  Elevate HOB  PT/OT/SLP   Neurosurgery consulted, pending final recommendations     DVT Prophylaxis: SCDs  GI Prophylaxis: None      32 minutes of critical care was time spent personally by me on the following activities: development of treatment plan with patient or surrogate and bedside caregivers, discussions with consultants, evaluation of patient's response to treatment, examination of patient, ordering and performing treatments and interventions, ordering and review of laboratory studies, ordering and review of radiographic studies, pulse oximetry, re-evaluation of patient's condition.  This critical care time did not overlap with that of any other provider or involve time for any procedures.     Brooks Carlisle MD  Pulmonary Critical Care Medicine  [facility] - 7th Floor ICU  DOS: 08/10/2024

## 2024-08-10 NOTE — CONSULTS
[facility] - 7th Floor ICU  Neurosurgery  Consult Note    Inpatient consult to Neurosurgery  Consult performed by: Sofia Brody PA  Consult ordered by: Ochoa Parsons III, MD        Subjective:     Chief Complaint/Reason for Admission: IPH    History of Present Illness: Patient is a 60 y/o male with a hx of crohn's disease and GERD, who presented to the ED today via EMS as a code FAST. He was last seen normal at 6:45am. EMS reports the pt was at the [facility] getting his haircut just pta when he developed aphasia. Pt stood up and fell, hitting his head. Per EMS, pt had slurred speech and left arm weakness en route. Pt alert only to voice. Pt has no hx of CVA. Pt not on BT. CBG en route 126.     CT head significant for right basal ganglia hemorrhage with 8 mm right-to-left shift as well as IVH. CTA head and neck was negative for LVH or vascular abnormality. Dr. Fernández was consulted for evaluation and treatment recommendations.    On PE today he is sitting up in bed, maintaining airway. He does not open his eyes to pain. He is following commands in the right UE and LE.    PTA Medications   Medication Sig    albuterol (PROVENTIL/VENTOLIN HFA) 90 mcg/actuation inhaler Inhale into the lungs.    diclofenac sodium (VOLTAREN) 1 % Gel Apply 2 g topically 3 (three) times daily. for 7 days    dicyclomine (BENTYL) 20 mg tablet Take 1 tablet (20 mg total) by mouth 2 (two) times daily as needed (Abdominal Pain).    iron-vitamin C 100-250 mg, ICAR-C, 100-250 mg Tab Take 1 tablet by mouth.    loratadine (CLARITIN) 10 mg tablet Take 1 tablet (10 mg total) by mouth once daily.    ondansetron (ZOFRAN-ODT) 8 MG TbDL Take 1 tablet (8 mg total) by mouth every 6 (six) hours as needed (Nausea).    RINVOQ 45 mg Tb24 Take 1 tablet by mouth.    tiZANidine (ZANAFLEX) 4 MG tablet Take 1 tablet (4 mg total) by mouth every 8 (eight) hours as needed (Take as needed every 8 hours for muscle pain.).    traMADoL (ULTRAM) 50  mg tablet Take 1 tablet (50 mg total) by mouth every 6 (six) hours as needed for Pain.       Review of patient's allergies indicates:   Allergen Reactions    Acetaminophen      Other reaction(s): chronns    Tramadol     Ibuprofen Nausea Only       Past Medical History:   Diagnosis Date    Crohn's disease     GERD (gastroesophageal reflux disease)      History reviewed. No pertinent surgical history.  Family History    None       Tobacco Use    Smoking status: Some Days     Types: Cigarettes    Smokeless tobacco: Never   Substance and Sexual Activity    Alcohol use: Yes     Comment: daily    Drug use: Never    Sexual activity: Not on file     Review of Systems  Objective:   Unable to obtain d/t AMS    Weight: 65.3 kg (143 lb 15.4 oz)  Body mass index is 22.55 kg/m².  Vital Signs (Most Recent):  Temp: 98.9 °F (37.2 °C) (08/10/24 1015)  Pulse: 95 (08/10/24 1115)  Resp: 20 (08/10/24 1115)  BP: (!) 144/84 (08/10/24 1115)  SpO2: 100 % (08/10/24 1115) Vital Signs (24h Range):  Temp:  [98.1 °F (36.7 °C)-98.9 °F (37.2 °C)] 98.9 °F (37.2 °C)  Pulse:  [] 95  Resp:  [13-26] 20  SpO2:  [94 %-100 %] 100 %  BP: (134-165)/(76-90) 144/84     Date 08/10/24 0700 - 08/11/24 0659   Shift 0016-9483 9160-6609 0477-2135 24 Hour Total   INTAKE   I.V.(mL/kg) 15.4(0.2)   15.4(0.2)   Shift Total(mL/kg) 15.4(0.2)   15.4(0.2)   OUTPUT   Shift Total(mL/kg)       Weight (kg) 65.3 65.3 65.3 65.3                       Male External Urinary Catheter 08/10/24 1015 Medium (Active)   Collection Container Standard drainage bag 08/10/24 1015   Securement Method secured to top of thigh w/ adhesive device 08/10/24 1015   Skin no redness;no breakdown;skin barrier applied;penis/scrotum cleansed w/ soap and water 08/10/24 1015   Tolerance no signs/symptoms of discomfort;requires minimal reinforced 08/10/24 1015   Catheter Change Date 08/10/24 08/10/24 1015   Catheter Change Time 1015 08/10/24 1015       Neurosurgery Physical Exam  AFVSS  Pupils reactive  bilateral, brisk  Does not open eyes to painful stimuli. Is nodding appropriately to some questions but did no answer questions for me. Nurse reports he was oriented to name 1hr ago.  Followed commands briskly in right UE and LE  Some spontaneous movement in the left UE and LE    Significant Labs:  Recent Labs   Lab 08/10/24  0759      K 4.3   *   CO2 22*   BUN 26.9*   CREATININE 1.44*   CALCIUM 9.0     Recent Labs   Lab 08/10/24  0759   WBC 6.26   HGB 12.1*   HCT 37.3*        Recent Labs   Lab 08/10/24  0738 08/10/24  0853   INR 1.1 1.0     Microbiology Results (last 7 days)       ** No results found for the last 168 hours. **            Significant Diagnostics:  CT HEAD FOR STROKE [[id number]] Resulted: 08/10/24 0811   Order Status: Completed Updated: 08/10/24 0814   Narrative:     EXAMINATION:  CT HEAD FOR STROKE    CLINICAL HISTORY:  Neuro deficit, acute, stroke suspected;    TECHNIQUE:  Multiple axial images were obtained from the base of the brain to the vertex without contrast administration.  Sagittal and coronal reconstructions were performed..Automatic exposure control is utilized to reduce patient radiation exposure.    COMPARISON:  None    FINDINGS:  Hemorrhage: There is 4.2 x 3.8 cm (AP x T) intraparenchymal hemorrhage centered in the right thalamocapsular region series 2 image 36. There is mild surrounding edema with resultant effacement of the adjacent sulci and Sylvian fissure, as well as compression of the right lateral ventricle with 8mm midline shift on series 2 image 36.  Intraventricular extension of bleed is seen in the right lateral ventricle and 3rd ventricle. There is mild dilatation of the left lateral ventricle compared to the right suggesting possible early obstructive hydrocephalus component. Sulcal hyperdensities are seen along the right temporal lobe reflective of subarachnoid hemorrhage. Recommend continued serial interval follow-up to full resolution as  indicated    CSF spaces: The ventricles sulci and basal cisterns are within normal limits.    Cerebellum: Unremarkable    Vascular: Unremarkable venous sinuses    Sella and skull base: The sella appears to be within normal limits for age.    Cerebellopontine angles: Within normal limits.    Herniation: None.    Intracranial calcifications: Incidental note is made of some pineal region calcification.    Calvarium: No acute linear or depressed skull fracture is seen.    Maxillofacial Structures:Paranasal sinuses: The visualized paranasal sinuses appear clear with no mucoperiosteal thickening or air fluid levels identified    Orbits: The orbits appear unremarkable.    Zygomatic arches: The zygomatic arches are intact and unremarkable    Temporal bones and mastoids: The temporal bones and mastoids appear unremarkable.    TMJ: The mandibular condyles appear normally placed with respect to the mandibular fossa.    Notifications: The results were discussed with the emergency room physician (Dr Burnette) prior to dictation at 2024-08-10 08:00:14 CDT.   Impression:       1. There is 4.2 x 3.8 cm (AP x T) intraparenchymal hemorrhage centered in the right thalamocapsular region series 2 image 36. There is mild surrounding edema with resultant effacement of the adjacent sulci and Sylvian fissure, as well as compression of the right lateral ventricle with 8mm midline shift on series 2 image 36.  Intraventricular extension of bleed is seen in the right lateral ventricle and 3rd ventricle. There is mild dilatation of the left lateral ventricle compared to the right suggesting possible early obstructive hydrocephalus component. Sulcal hyperdensities are seen along the right temporal lobe reflective of subarachnoid hemorrhage. Recommend continued serial interval follow-up to full resolution as indicated.    2. Details and other findings as noted above.       Assessment/Plan:     Active Diagnoses:    Diagnosis Date Noted POA     Intraparenchymal hemorrhage of brain [I61.9] 08/10/2024 Yes      Problems Resolved During this Admission:     His GCS is currently 8  CT head with right thalamic IPH with mild edema and intraventricular ext and 8mm midline shift  He is admitted to ICU with Q1 hour neuro exams  BP parameters below 140/90  Keppra BID  SCDs for DVT prophylaxis  Repeat CT head in 6hrs  Call with any neuro decline    Thank you for your consult. I will follow-up with patient. Please contact us if you have any additional questions.    DAVE Hernandez  Neurosurgery  [facility] - 7th Floor ICU

## 2024-08-10 NOTE — PROCEDURES
"Dell Garrett Jr. is a 59 y.o. male patient.    Temp: 98.5 °F (36.9 °C) (08/10/24 1600)  Pulse: 86 (08/10/24 1815)  Resp: 19 (08/10/24 1815)  BP: (!) 147/80 (08/10/24 1815)  SpO2: 96 % (08/10/24 1815)  Weight: 65.3 kg (143 lb 15.4 oz) (08/10/24 1346)  Height: 5' 7" (170.2 cm) (08/10/24 1346)       Arterial Line    Date/Time: 8/10/2024 6:30 PM  Location procedure was performed: [facility] CRITICAL [facility]    Performed by: Brooks Carlisle MD  Authorized by: Brooks Carlisle MD  Consent Done: Yes  Consent: Verbal consent obtained.  Consent given by: patient  Preparation: Patient was prepped and draped in the usual sterile fashion.  Indications: hemodynamic monitoring  Location: left radial    Anesthesia:  Local Anesthetic: lidocaine 1% without epinephrine    Patient sedated: no  Needle gauge: 20  Seldinger technique: Seldinger technique used  Number of attempts: 1  Complications: No  Estimated blood loss (mL): 5  Post-procedure: dressing applied  Patient tolerance: Patient tolerated the procedure well with no immediate complications        Brooks Carlisle MD   [facility] Internal Medicine [id number]  8/10/2024    "

## 2024-08-10 NOTE — ED PROVIDER NOTES
Encounter Date: 8/10/2024    SCRIBE #1 NOTE: I, Jay Jay Kay, am scribing for, and in the presence of,  Ochoa Parsons III, MD. I have scribed the following portions of the note - Other sections scribed: HPI, ROS, PE.       History     Chief Complaint   Patient presents with    Cerebrovascular Accident     Pt was at [place] [address] sudden onset, unable to move left arm with left facial droop and slurred speech. Pt stood up and then fell and also has hematoma to head. Pt has a R fixed gaze. Cbg- 126     60 y/o male with a hx of crohn's disease and GERD presents to the ED via EMS as a code FAST. EMS reports the pt was at the [facility] getting his haircut just pta when he developed aphasia. Pt stood up and fell sticking his head. Per EMS, pt had slurred speech and left arm weakness en route. Pt alert only to voice. Pt has no hx of CVA. Pt not on BT. CBG en route 126.     Hx and ROS limited due to aphasia    The history is provided by the EMS personnel and medical records. History limited by: aphasia. No  was used.     Review of patient's allergies indicates:   Allergen Reactions    Acetaminophen      Other reaction(s): chronns    Tramadol     Ibuprofen Nausea Only     Past Medical History:   Diagnosis Date    Crohn's disease     GERD (gastroesophageal reflux disease)      History reviewed. No pertinent surgical history.  No family history on file.  Social History     Tobacco Use    Smoking status: Some Days     Types: Cigarettes    Smokeless tobacco: Never   Substance Use Topics    Alcohol use: Yes     Comment: daily    Drug use: Never     Review of Systems   Unable to perform ROS: Other (Aphasia)       Physical Exam     Initial Vitals [08/10/24 0735]   BP Pulse Resp Temp SpO2   (!) 148/76 (!) 48 14 98.1 °F (36.7 °C) 100 %      MAP       --         Physical Exam    Nursing note and vitals reviewed.  Constitutional: He appears well-developed and well-nourished. Cervical collar in place.   HENT:    Head: Normocephalic.   Questionable hematoma to left temporal area   Eyes: Pupils are equal, round, and reactive to light.   Leftward gaze   Neck: Trachea normal.   Normal range of motion.  Cardiovascular:  Normal rate and regular rhythm.           No murmur heard.  Pulmonary/Chest: Breath sounds normal. No respiratory distress.   Abdominal: Abdomen is soft. Bowel sounds are normal. He exhibits no distension. There is no abdominal tenderness.   Musculoskeletal:      Cervical back: Normal range of motion.     Neurological:   Left facial droop. Expressive aphasia. 3/5 motor strength to the left upper and left lower extremities.    Skin: Skin is warm and dry. No rash noted.   Psychiatric:   Shakes head yes and no to questions         ED Course   Critical Care    Date/Time: 8/10/2024 9:14 AM    Performed by: Ochoa Parsons III, MD  Authorized by: Ochoa Parsons III, MD  Direct patient critical care time: 45 minutes  Documentation critical care time: 5 minutes  Consulting other physicians critical care time: 5 minutes  Total critical care time (exclusive of procedural time) : 55 minutes  Critical care was necessary to treat or prevent imminent or life-threatening deterioration of the following conditions: CNS failure or compromise.  Critical care was time spent personally by me on the following activities: discussions with primary provider, discussions with consultants, examination of patient, re-evaluation of patient's condition, review of old charts, ordering and review of laboratory studies and ordering and performing treatments and interventions.        Labs Reviewed   COMPREHENSIVE METABOLIC PANEL - Abnormal       Result Value    Sodium 141      Potassium 4.3      Chloride 110 (*)     CO2 22 (*)     Glucose 119 (*)     Blood Urea Nitrogen 26.9 (*)     Creatinine 1.44 (*)     Calcium 9.0      Protein Total 7.1      Albumin 3.7      Globulin 3.4      Albumin/Globulin Ratio 1.1      Bilirubin Total 0.3      ALP  60      ALT 16      AST 21      eGFR 55      Anion Gap 9.0      BUN/Creatinine Ratio 19     LIPID PANEL - Abnormal    Cholesterol Total 215 (*)     HDL Cholesterol 76 (*)     Triglyceride 60      Cholesterol/HDL Ratio 3      Very Low Density Lipoprotein 12      LDL Cholesterol 127.00     CBC WITH DIFFERENTIAL - Abnormal    WBC 6.26      RBC 4.06 (*)     Hgb 12.1 (*)     Hct 37.3 (*)     MCV 91.9      MCH 29.8      MCHC 32.4 (*)     RDW 13.3      Platelet 349      MPV 8.4      Neut % 55.0      Lymph % 33.5      Mono % 9.4      Eos % 1.1      Basophil % 0.5      Lymph # 2.10      Neut # 3.44      Mono # 0.59      Eos # 0.07      Baso # 0.03      IG# 0.03      IG% 0.5      NRBC% 0.0      IPF 1.0     POCT GLUCOSE - Abnormal    POCT Glucose 128 (*)    PROTIME-INR - Normal    PT 13.5      INR 1.0     TSH - Normal    TSH 0.611     CBC W/ AUTO DIFFERENTIAL    Narrative:     The following orders were created for panel order CBC W/ AUTO DIFFERENTIAL.  Procedure                               Abnormality         Status                     ---------                               -----------         ------                     CBC with Differential[[id number]]       Abnormal            Final result                 Please view results for these tests on the individual orders.   POCT GLUCOSE, HAND-HELD DEVICE   ISTAT PROCEDURE    POC PTWBT 13.7      POC PTINR 1.1      Sample unknown          ECG Results              ECG 12 lead (Final result)        Collection Time Result Time QRS Duration OHS QTC Calculation    08/10/24 08:23:20 08/10/24 11:30:27 92 424                     Final result by Interface, Lab In [facility] (08/10/24 11:30:36)                   Narrative:    Test Reason : R29.818,    Vent. Rate : 061 BPM     Atrial Rate : 061 BPM     P-R Int : 166 ms          QRS Dur : 092 ms      QT Int : 422 ms       P-R-T Axes : 077 056 068 degrees     QTc Int : 424 ms    Sinus rhythm with marked sinus arrythmia  Minimal voltage criteria  for LVH, may be normal variant ( Sokolow-Bello )  Borderline Abnormal ECG  No previous ECGs available  Confirmed by Franko Carlisle MD ([id number]) on 8/10/2024 11:30:24 AM    Referred By:             Confirmed By:Franko Carlisle MD                                  Imaging Results              CTA Head and Neck (xpd) (Final result)  Result time 08/10/24 09:11:47      Final result by Eduard Barajas MD (08/10/24 09:11:47)                   Impression:        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  CT angiogram was performed from the level of the luis to the top of the head following the IV administration 100 cc of Isovue 370 contrast .  Sagittal and coronal reconstructions and maximum intensity projection reconstructions were performed. Arterial stenosis percentages are based on NASCET measurement criteria.  Additional multiplanar reconstructions were performed on post processed imaging.  Automatic exposure control (AEC) is utilized to reduce patient radiation exposure.    COMPARISON:  None    FINDINGS:  There is a intracranial hemorrhage seen which is discussed in detail on a prior report.    Vascular images: The aortic arch is slightly dilated.  Maximum dimension is 3.9 cm.  There is a 3 vessel arch seen.  The common carotid arteries are widely patent.  No significant plaque is seen.  No significant plaque is seen in the carotid bulbs.  Internal carotid arteries are widely patent.  They are seen to level the petrous ridge and clinoid and supraclinoid region.  No obstruction is seen.  MCAs are patent.  M1 segments, M2 segments M3 segments  of patent.  No aneurysm or obstruction is seen.    The A1 segments are patent.  Anterior cerebral arteries are patent.  Anterior communicating arteries patent.  No aneurysm or obstruction is seen.    Both vertebral arteries are widely patent.  No dissection is seen.  No obstruction is seen.  Both vertebral arteries perfuse a vertebral artery which shows some evidence of dolichoectasia.  No aneurysm is seen.  No obstruction is seen.  Posterior cerebral arteries are widely patent.  Aneurysm obstruction is seen.    .                                       CT Brain Perfusion inc Post Processing (Final result)  Result time 08/10/24 09:58:36      Final result by Maverick March MD (08/10/24 09:58:36)                   Narrative:    EXAMINATION  CT BRAIN PERFUSION INC POST PROCESSING    CLINICAL HISTORY  stroke;    TECHNIQUE  Axial perfusion CT images were obtained, with arterial in-flow JOSE DE JESUS at the basilar artery and venous out-flow JOSE DE JESUS at the straight sinus. Post-processing of the CT perfusion data was performed, generating color para-metric maps for cerebral blood volume (CBV), cerebral blood flow (CBF), mean transit time (MTT), time-to-peak (TTP), and time-to-maximum (T-max).  The perfusion maps and hemodynamic curves were transferred to PACS.    CONTRAST  *IV contrast media: Omnipaque 350  *Injected volume: 50 mL  *Injection rate: 5 mL/s    COMPARISON  *No prior CT perfusion exam is available at the time of the initial interpretation.  *Non-contrast head CT obtained immediately prior was reviewed.    FINDINGS/IMPRESSION  Exam quality: Somewhat limited assessment secondary to widespread right cerebral intraparenchymal hemorrhage as seen on the recent non-contrast head CT.    Asymmetrically decreased blood flow noted through the area of intra-axial hematoma, with no large volume area of perfusion mismatch.    Prior to the final Radiology report, initial assessment performed by teleradiology service and pertinent  communication to the Neuro Interventional Team was accomplished for potential treatment planning; please refer to the corresponding consult and/or intervention notes for additional details.    RADIATION DOSE  Automated tube current modulation, weight-based exposure dosing, and/or iterative reconstruction technique utilized to reach lowest reasonably achievable exposure rate.    DLP: 1695 mGy*cm      Electronically signed by: Maverick March  Date:    08/10/2024  Time:    09:58                                     CT HEAD FOR STROKE (Final result)  Result time 08/10/24 08:11:45      Final result by Eduard Barajas MD (08/10/24 08:11:45)                   Impression:      1. There is 4.2 x 3.8 cm (AP x T) intraparenchymal hemorrhage centered in the right thalamocapsular region series 2 image 36. There is mild surrounding edema with resultant effacement of the adjacent sulci and Sylvian fissure, as well as compression of the right lateral ventricle with 8mm midline shift on series 2 image 36.  Intraventricular extension of bleed is seen in the right lateral ventricle and 3rd ventricle. There is mild dilatation of the left lateral ventricle compared to the right suggesting possible early obstructive hydrocephalus component. Sulcal hyperdensities are seen along the right temporal lobe reflective of subarachnoid hemorrhage. Recommend continued serial interval follow-up to full resolution as indicated.    2. Details and other findings as noted above.    I concur with the preliminary report above.      Electronically signed by: Den Barajas  Date:    08/10/2024  Time:    08:11               Narrative:    EXAMINATION:  CT HEAD FOR STROKE    CLINICAL HISTORY:  Neuro deficit, acute, stroke suspected;    TECHNIQUE:  Multiple axial images were obtained from the base of the brain to the vertex without contrast administration.  Sagittal and coronal reconstructions were performed..Automatic exposure control is utilized to  reduce patient radiation exposure.    COMPARISON:  None    FINDINGS:  Hemorrhage: There is 4.2 x 3.8 cm (AP x T) intraparenchymal hemorrhage centered in the right thalamocapsular region series 2 image 36. There is mild surrounding edema with resultant effacement of the adjacent sulci and Sylvian fissure, as well as compression of the right lateral ventricle with 8mm midline shift on series 2 image 36.  Intraventricular extension of bleed is seen in the right lateral ventricle and 3rd ventricle. There is mild dilatation of the left lateral ventricle compared to the right suggesting possible early obstructive hydrocephalus component. Sulcal hyperdensities are seen along the right temporal lobe reflective of subarachnoid hemorrhage. Recommend continued serial interval follow-up to full resolution as indicated    CSF spaces: The ventricles sulci and basal cisterns are within normal limits.    Cerebellum: Unremarkable    Vascular: Unremarkable venous sinuses    Sella and skull base: The sella appears to be within normal limits for age.    Cerebellopontine angles: Within normal limits.    Herniation: None.    Intracranial calcifications: Incidental note is made of some pineal region calcification.    Calvarium: No acute linear or depressed skull fracture is seen.    Maxillofacial Structures:Paranasal sinuses: The visualized paranasal sinuses appear clear with no mucoperiosteal thickening or air fluid levels identified    Orbits: The orbits appear unremarkable.    Zygomatic arches: The zygomatic arches are intact and unremarkable    Temporal bones and mastoids: The temporal bones and mastoids appear unremarkable.    TMJ: The mandibular condyles appear normally placed with respect to the mandibular fossa.    Notifications: The results were discussed with the emergency room physician (Dr Burnette) prior to dictation at 2024-08-10 08:00:14 CDT.                        Preliminary result by Stu Peck MD (08/10/24  08:00:30)                   Impression:    1. There is 4.2 x 3.8 cm (AP x T) intraparenchymal hemorrhage centered in the right thalamocapsular region series 2 image 36. There is mild surrounding edema with resultant effacement of the adjacent sulci and Sylvian fissure, as well as compression of the right lateral ventricle with 8mm midline shift on series 2 image 36. Intravantricular extension of bleed is seen in the right lateral ventricle and 3rd ventricle. There is mild dilatation of the left lateral ventricle compared to the right suggesting possible early obstructive hydrocephalus component. Sulcal hyperdensities are seen along the right temporal lobe reflective of subarachnoid hemorrhage. Recommend continued serial interval follow-up to full resolution as indicated.  2. Details and other findings as noted above.               Narrative:    START OF REPORT:  Technique: CT of the head was performed without intravenous contrast with axial as well as coronal and sagittal images.    Comparison: None.    Dosage Information: Automated exposure control was utilized.    Clinical history: Slurred speech, facial droop, left arm weakness.    Findings:  Hemorrhage: There is 4.2 x 3.8 cm (AP x T) intraparenchymal hemorrhage centered in the right thalamocapsular region series 2 image 36. There is mild surrounding edema with resultant effacement of the adjacent sulci and Sylvian fissure, as well as compression of the right lateral ventricle with 8mm midline shift on series 2 image 36. Intravantricular extension of bleed is seen in the right lateral ventricle and 3rd ventricle. There is mild dilatation of the left lateral ventricle compared to the right suggesting possible early obstructive hydrocephalus component. Sulcal hyperdensities are seen along the right temporal lobe reflective of subarachnoid hemorrhage. Recommend continued serial interval follow-up to full resolution as indicated.  CSF spaces: The ventricles sulci and  basal cisterns are within normal limits.  Cerebellum: Unremarkable.  Vascular: Unremarkable venous sinuses.  Sella and skull base: The sella appears to be within normal limits for age.  Cerebellopontine angles: Within normal limits.  Herniation: None.  Intracranial calcifications: Incidental note is made of some pineal region calcification.  Calvarium: No acute linear or depressed skull fracture is seen.    Maxillofacial Structures:  Paranasal sinuses: The visualized paranasal sinuses appear clear with no mucoperiosteal thickening or air fluid levels identified.  Orbits: The orbits appear unremarkable.  Zygomatic arches: The zygomatic arches are intact and unremarkable.  Temporal bones and mastoids: The temporal bones and mastoids appear unremarkable.  TMJ: The mandibular condyles appear normally placed with respect to the mandibular fossa.    Notifications: The results were discussed with the emergency room physician (Dr Burnette) prior to dictation at 2024-08-10 08:00:14 CDT.                                         CT Cervical Spine Without Contrast (Final result)  Result time 08/10/24 08:01:39      Final result by Eduard Barajas MD (08/10/24 08:01:39)                   Impression:      Loss of the normal lordotic curve of the cervical spine most likely related to spasm but otherwise unremarkable with no evidence of acute fracture or dislocation seen    Incidental note is made of some degenerative changes in the  cervical spine      Electronically signed by: Den Barajas  Date:    08/10/2024  Time:    08:01               Narrative:    EXAMINATION:  CT CERVICAL SPINE WITHOUT CONTRAST    CLINICAL HISTORY:  Polytrauma, blunt;    TECHNIQUE:  Low dose axial images, sagittal and coronal reformations were performed though the cervical spine.  Contrast was not administered. Automatic exposure control is utilized to reduce patient radiation exposure.    COMPARISON:  10/11/2018    FINDINGS:  The vertebral body  heights are well maintained. There is some loss of the normal lordotic curve cervical spine most likely related to spasm. No fracture is seen. No dislocation is seen. The odontoid and lateral masses appear grossly unremarkable.  There are some degenerative changes seen in the cervical spine which appear to be chronic.                                       Medications   clevidipine (CLEVIPREX) 25 mg/50 mL infusion ( Intravenous Canceled Entry 8/10/24 1133)   sodium chloride 0.9% flush 10 mL (has no administration in time range)   labetaloL injection 10 mg (has no administration in time range)   bisacodyL suppository 10 mg (has no administration in time range)   iohexoL (OMNIPAQUE 350) injection 100 mL (100 mLs Intravenous Given 8/10/24 0808)     Medical Decision Making  Differential diagnosis includes but is not limited to: stroke, hemorrhagic vs. ischemic TIA, cervical spine injury    Within thrombolytic window was taken promptly the CT scan CT scan did reveal intracranial hemorrhage in his stroke pattern not a candidate for thrombolysis.  Patient's CT angio was read by Interventional Neurology no need for intervention CT scan of the cervical spine was done secondary to him striking his head it was negative cervical collar removed by me afterwards patient placed on Cleviprex to keep his blood pressure systolic below 140 discussed case with Neurosurgery in intensive care will admit patient able to shake his head at admission yesterday no for questions still appears to be aphasic unable to ascertain if he was left-handed,     Problems Addressed:  Acute focal neurological deficit: complicated acute illness or injury with systemic symptoms that poses a threat to life or bodily functions    Amount and/or Complexity of Data Reviewed  Independent Historian: EMS     Details: EMS reports the pt was at the [facility] getting his haircut just pta when he developed aphasia. Pt stood up and fell sticking his head. Per EMS, pt  had slurred speech and left arm weakness en route. Pt alert only to voice. Pt has no hx of CVA. Pt not on BT. CBG en route 126.     Labs: ordered.  Radiology: ordered and independent interpretation performed.     Details: Intracranial hemorrhage spotted on web read of CT head. Not a candidate for TNKase.     Risk  Prescription drug management.  Decision regarding hospitalization.            Scribe Attestation:   Scribe #1: I performed the above scribed service and the documentation accurately describes the services I performed. I attest to the accuracy of the note.    Attending Attestation:           Physician Attestation for Scribe:  Physician Attestation Statement for Scribe #1: I, Ochoa Parsons III, MD, reviewed documentation, as scribed by Jay Jay Kay in my presence, and it is both accurate and complete.             ED Course as of 08/10/24 1206   Sat Aug 10, 2024   0905 Paged neurosurgery [LH]   0911 Paged ICU [LH]   0918 EKG done at 08:23. Normal Sinus Rhythm. Rate of 61. No ectopy. Normal axis. No ST or T wave abnormalities. LVH.  [LH]      ED Course User Index  [] Jay Jay Kay                           Clinical Impression:  Final diagnoses:  [R29.818] Acute focal neurological deficit          ED Disposition Condition    Admit                 Ochoa Parsons III, MD  08/10/24 0917       Ochoa Parsons III, MD  08/10/24 1206

## 2024-08-10 NOTE — CONSULTS
[facility] - 7th Floor ICU  Neurology  Consult Note    Patient Name: Dell Garrett Jr.  MRN: [id number]  Admission Date: 8/10/2024  Hospital Length of Stay: 0 days  Code Status: No Order   Attending Provider: No att. providers found   Consulting Provider: Ileana Rene NP  Primary Care Physician: Yaa Taveras FNP  Principal Problem:<principal problem not specified>    Inpatient consult to Vascular (Stroke) Neurology  Consult performed by: Ileana Rene NP  Consult ordered by: Ochoa Parsons III, MD         Subjective:     Chief Complaint:  aphasia, left sided weakness     HPI:   59 year old male with a past medical history of Crohn's disease and GERD presented to ED on 08/10 for aphasia.  He was last seen normal at 6:45 a.m..  He was at the [employer] shop getting his haircut when he suddenly stood up and fell.  On scene, he had left arm weakness and slurred speech and was only responsive to voice.  Upon arrival to ED, a stroke alert was called.  CT head significant for right basal ganglia hemorrhage with 8 mm right-to-left shift as well as IVH.  CTA head and neck was negative for LVH or vascular abnormality.  Neurology was consulted for stroke workup.     Past Medical History:   Diagnosis Date    Crohn's disease     GERD (gastroesophageal reflux disease)        History reviewed. No pertinent surgical history.    Review of patient's allergies indicates:   Allergen Reactions    Acetaminophen      Other reaction(s): chronns    Tramadol     Ibuprofen Nausea Only       Current Neurological Medications:     No current facility-administered medications on file prior to encounter.     Current Outpatient Medications on File Prior to Encounter   Medication Sig    albuterol (PROVENTIL/VENTOLIN HFA) 90 mcg/actuation inhaler Inhale into the lungs.    diclofenac sodium (VOLTAREN) 1 % Gel Apply 2 g topically 3 (three) times daily. for 7 days    dicyclomine (BENTYL) 20 mg tablet Take 1 tablet (20 mg  total) by mouth 2 (two) times daily as needed (Abdominal Pain).    iron-vitamin C 100-250 mg, ICAR-C, 100-250 mg Tab Take 1 tablet by mouth.    loratadine (CLARITIN) 10 mg tablet Take 1 tablet (10 mg total) by mouth once daily.    ondansetron (ZOFRAN-ODT) 8 MG TbDL Take 1 tablet (8 mg total) by mouth every 6 (six) hours as needed (Nausea).    RINVOQ 45 mg Tb24 Take 1 tablet by mouth.    tiZANidine (ZANAFLEX) 4 MG tablet Take 1 tablet (4 mg total) by mouth every 8 (eight) hours as needed (Take as needed every 8 hours for muscle pain.).    traMADoL (ULTRAM) 50 mg tablet Take 1 tablet (50 mg total) by mouth every 6 (six) hours as needed for Pain.     Family History    None       Tobacco Use    Smoking status: Some Days     Types: Cigarettes    Smokeless tobacco: Never   Substance and Sexual Activity    Alcohol use: Yes     Comment: daily    Drug use: Never    Sexual activity: Not on file     Review of Systems   Unable to perform ROS: Acuity of condition     Objective:     Vital Signs (Most Recent):  Temp: 98.9 °F (37.2 °C) (08/10/24 1015)  Pulse: 109 (08/10/24 1030)  Resp: 20 (08/10/24 1030)  BP: (!) 150/89 (08/10/24 1030)  SpO2: 97 % (08/10/24 1030) Vital Signs (24h Range):  Temp:  [98.1 °F (36.7 °C)-98.9 °F (37.2 °C)] 98.9 °F (37.2 °C)  Pulse:  [] 109  Resp:  [13-26] 20  SpO2:  [94 %-100 %] 97 %  BP: (134-165)/(76-90) 150/89     Weight: 65.3 kg (143 lb 15.4 oz)  Body mass index is 22.55 kg/m².     Physical Exam  Vitals and nursing note reviewed.   Constitutional:       General: He is not in acute distress.     Appearance: He is not toxic-appearing.   Eyes:      Pupils: Pupils are equal, round, and reactive to light.   Cardiovascular:      Rate and Rhythm: Normal rate.   Pulmonary:      Effort: Pulmonary effort is normal.   Musculoskeletal:      Cervical back: Normal range of motion.      Right lower leg: No edema.      Left lower leg: No edema.   Skin:     General: Skin is warm and dry.      Capillary Refill:  Capillary refill takes less than 2 seconds.   Neurological:      Mental Status: He is lethargic.      Cranial Nerves: Dysarthria and facial asymmetry (left facial droop) present.      Comments:     Limited PE   Limited participation in exam  He did tell me he is 59 but outside of that aphasic  Does not follow commands  Spontaneously move RUE, RLE  Withdraws and localizes painful stim  Gaze midline  Resists having eyes opened  PERR           NEUROLOGICAL EXAMINATION:     CRANIAL NERVES     CN III, IV, VI   Pupils are equal, round, and reactive to light.      Significant Labs:   Recent Lab Results         08/10/24  0853   08/10/24  0759   08/10/24  0738   08/10/24  0734        Immature Platelet Fraction   1.0           Albumin/Globulin Ratio   1.1           Albumin   3.7           ALP   60           ALT   16           Anion Gap   9.0           AST   21           Baso #   0.03           Basophil %   0.5           BILIRUBIN TOTAL   0.3           BUN   26.9           BUN/CREAT RATIO   19           Calcium   9.0           Chloride   110           Cholesterol Total   215           CO2   22           Creatinine   1.44           eGFR   55           Eos #   0.07           Eos %   1.1           Globulin, Total   3.4           Glucose   119           HDL   76           Hematocrit   37.3           Hemoglobin   12.1           Immature Grans (Abs)   0.03           Immature Granulocytes   0.5           INR 1.0             LDL Cholesterol   127.00           Lymph #   2.10           LYMPH %   33.5           MCH   29.8           MCHC   32.4           MCV   91.9           Mono #   0.59           Mono %   9.4           MPV   8.4           Neut #   3.44           Neut %   55.0           nRBC   0.0           Platelet Count   349           POC PTINR     1.1         POC PTWBT     13.7         POCT Glucose       128       Potassium   4.3           PROTEIN TOTAL   7.1           PT 13.5             RBC   4.06           RDW   13.3            Sample     unknown         Sodium   141           Total Cholesterol/HDL Ratio   3           Triglycerides   60           TSH   0.611           Very Low Density Lipoprotein   12           WBC   6.26                   Significant Imaging: I have reviewed all pertinent imaging results/findings within the past 24 hours.  Assessment and Plan:     Intraparenchymal hemorrhage of brain  Presented with sudden onset aphasia and left sided weakness      -CT head:  There is 4.2 x 3.8 cm (AP x T) intraparenchymal hemorrhage centered in the right thalamocapsular region series 2 image 36. There is mild surrounding edema with resultant effacement of the adjacent sulci and Sylvian fissure, as well as compression of the right lateral ventricle with 8mm midline shift on series 2 image 36.  Intraventricular extension of bleed is seen in the right lateral ventricle and 3rd ventricle. There is mild dilatation of the left lateral ventricle compared to the right suggesting possible early obstructive hydrocephalus component. Sulcal hyperdensities are seen along the right temporal lobe reflective of subarachnoid hemorrhage. Recommend continued serial interval follow-up to full resolution as indicated.  -CTA head and neck:  Mild dolichoectasia in the basilar artery  Extensive intracranial hemorrhage       Plan    -hourly neuro checks ... notify neurology of any neuro change  -strict blood pressure control ... -150 for the first 24 hours, then SBP less than 140  -avoid antiplatelet or anticoagulation at this time  -seizure precautions ... notify neurology of any seizure-like activity  -therapy evaluations tomorrow   -neurosurgery consulted      Further recommendations to follow from MD     VTE Risk Mitigation (From admission, onward)      None            Ileana Rene NP  Neurology  [facility] - 7th Floor ICU

## 2024-08-10 NOTE — SUBJECTIVE & OBJECTIVE
Past Medical History:   Diagnosis Date    Crohn's disease     GERD (gastroesophageal reflux disease)        History reviewed. No pertinent surgical history.    Review of patient's allergies indicates:   Allergen Reactions    Acetaminophen      Other reaction(s): chronns    Tramadol     Ibuprofen Nausea Only       Current Neurological Medications:     No current facility-administered medications on file prior to encounter.     Current Outpatient Medications on File Prior to Encounter   Medication Sig    albuterol (PROVENTIL/VENTOLIN HFA) 90 mcg/actuation inhaler Inhale into the lungs.    diclofenac sodium (VOLTAREN) 1 % Gel Apply 2 g topically 3 (three) times daily. for 7 days    dicyclomine (BENTYL) 20 mg tablet Take 1 tablet (20 mg total) by mouth 2 (two) times daily as needed (Abdominal Pain).    iron-vitamin C 100-250 mg, ICAR-C, 100-250 mg Tab Take 1 tablet by mouth.    loratadine (CLARITIN) 10 mg tablet Take 1 tablet (10 mg total) by mouth once daily.    ondansetron (ZOFRAN-ODT) 8 MG TbDL Take 1 tablet (8 mg total) by mouth every 6 (six) hours as needed (Nausea).    RINVOQ 45 mg Tb24 Take 1 tablet by mouth.    tiZANidine (ZANAFLEX) 4 MG tablet Take 1 tablet (4 mg total) by mouth every 8 (eight) hours as needed (Take as needed every 8 hours for muscle pain.).    traMADoL (ULTRAM) 50 mg tablet Take 1 tablet (50 mg total) by mouth every 6 (six) hours as needed for Pain.     Family History    None       Tobacco Use    Smoking status: Some Days     Types: Cigarettes    Smokeless tobacco: Never   Substance and Sexual Activity    Alcohol use: Yes     Comment: daily    Drug use: Never    Sexual activity: Not on file     Review of Systems   Unable to perform ROS: Acuity of condition     Objective:     Vital Signs (Most Recent):  Temp: 98.9 °F (37.2 °C) (08/10/24 1015)  Pulse: 109 (08/10/24 1030)  Resp: 20 (08/10/24 1030)  BP: (!) 150/89 (08/10/24 1030)  SpO2: 97 % (08/10/24 1030) Vital Signs (24h Range):  Temp:  [98.1  °F (36.7 °C)-98.9 °F (37.2 °C)] 98.9 °F (37.2 °C)  Pulse:  [] 109  Resp:  [13-26] 20  SpO2:  [94 %-100 %] 97 %  BP: (134-165)/(76-90) 150/89     Weight: 65.3 kg (143 lb 15.4 oz)  Body mass index is 22.55 kg/m².     Physical Exam  Vitals and nursing note reviewed.   Constitutional:       General: He is not in acute distress.     Appearance: He is not toxic-appearing.   Eyes:      Pupils: Pupils are equal, round, and reactive to light.   Cardiovascular:      Rate and Rhythm: Normal rate.   Pulmonary:      Effort: Pulmonary effort is normal.   Musculoskeletal:      Cervical back: Normal range of motion.      Right lower leg: No edema.      Left lower leg: No edema.   Skin:     General: Skin is warm and dry.      Capillary Refill: Capillary refill takes less than 2 seconds.   Neurological:      Mental Status: He is lethargic.      Cranial Nerves: Dysarthria and facial asymmetry (left facial droop) present.      Comments:     Limited PE   Limited participation in exam  He did tell me he is 59 but outside of that aphasic  Does not follow commands  Spontaneously move RUE, RLE  Withdraws and localizes painful stim  Gaze midline  Resists having eyes opened  PERR           NEUROLOGICAL EXAMINATION:     CRANIAL NERVES     CN III, IV, VI   Pupils are equal, round, and reactive to light.      Significant Labs:   Recent Lab Results         08/10/24  0853   08/10/24  0759   08/10/24  0738   08/10/24  0734        Immature Platelet Fraction   1.0           Albumin/Globulin Ratio   1.1           Albumin   3.7           ALP   60           ALT   16           Anion Gap   9.0           AST   21           Baso #   0.03           Basophil %   0.5           BILIRUBIN TOTAL   0.3           BUN   26.9           BUN/CREAT RATIO   19           Calcium   9.0           Chloride   110           Cholesterol Total   215           CO2   22           Creatinine   1.44           eGFR   55           Eos #   0.07           Eos %   1.1            Globulin, Total   3.4           Glucose   119           HDL   76           Hematocrit   37.3           Hemoglobin   12.1           Immature Grans (Abs)   0.03           Immature Granulocytes   0.5           INR 1.0             LDL Cholesterol   127.00           Lymph #   2.10           LYMPH %   33.5           MCH   29.8           MCHC   32.4           MCV   91.9           Mono #   0.59           Mono %   9.4           MPV   8.4           Neut #   3.44           Neut %   55.0           nRBC   0.0           Platelet Count   349           POC PTINR     1.1         POC PTWBT     13.7         POCT Glucose       128       Potassium   4.3           PROTEIN TOTAL   7.1           PT 13.5             RBC   4.06           RDW   13.3           Sample     unknown         Sodium   141           Total Cholesterol/HDL Ratio   3           Triglycerides   60           TSH   0.611           Very Low Density Lipoprotein   12           WBC   6.26                   Significant Imaging: I have reviewed all pertinent imaging results/findings within the past 24 hours.

## 2024-08-10 NOTE — NURSING
Nurses Note -- 4 Eyes      8/10/2024   10:59 AM      Skin assessed during: Admit      [x] No Altered Skin Integrity Present    [x]Prevention Measures Documented      [] Yes- Altered Skin Integrity Present or Discovered   [] LDA Added if Not in Epic (Describe Wound)   [] New Altered Skin Integrity was Present on Admit and Documented in LDA   [] Wound Image Taken    Wound Care Consulted? No    Attending Nurse:  Myriam Bethea RN/Staff Member:   DANIEL Gilmore

## 2024-08-10 NOTE — HPI
59 year old male with a past medical history of Crohn's disease and GERD presented to ED on 08/10 for aphasia.  He was last seen normal at 6:45 a.m..  He was at the [employer] getting his haircut when he suddenly stood up and fell.  On scene, he had left arm weakness and slurred speech and was only responsive to voice.  Upon arrival to ED, a stroke alert was called.  CT head significant for right basal ganglia hemorrhage with 8 mm right-to-left shift as well as IVH.  CTA head and neck was negative for LVH or vascular abnormality.  Neurology was consulted for stroke workup.

## 2024-08-10 NOTE — PT/OT/SLP PROGRESS
[facility] Speech Language Pathology Department    Patient Name:  Dell Garrett Jr.   MRN:  [id number]    Routine CVA orders received, chart reviewed, and nursing consulted.  Pt admitted to ICU with a right thalamic ICH with intraventricular extension after presenting with aphasia, slurred speech and left UE weakness.  Nursing unable to complete swallow screen as pt minimally responsive.  Repeat CT head pending.  SLP to follow up as appropriate.

## 2024-08-11 LAB
ALBUMIN SERPL-MCNC: 3.7 G/DL (ref 3.4–4.8)
ALBUMIN/GLOB SERPL: 1.1 RATIO (ref 1.1–2)
ALP SERPL-CCNC: 62 UNIT/L (ref 40–150)
ALT SERPL-CCNC: 17 UNIT/L (ref 0–55)
ANION GAP SERPL CALC-SCNC: 10 MEQ/L
AST SERPL-CCNC: 20 UNIT/L (ref 5–34)
BASOPHILS # BLD AUTO: 0.02 X10(3)/MCL
BASOPHILS NFR BLD AUTO: 0.2 %
BILIRUB SERPL-MCNC: 0.9 MG/DL
BUN SERPL-MCNC: 12.9 MG/DL (ref 8.4–25.7)
CALCIUM SERPL-MCNC: 9.3 MG/DL (ref 8.8–10)
CHLORIDE SERPL-SCNC: 110 MMOL/L (ref 98–107)
CO2 SERPL-SCNC: 20 MMOL/L (ref 23–31)
CREAT SERPL-MCNC: 0.85 MG/DL (ref 0.73–1.18)
CREAT/UREA NIT SERPL: 15
EOSINOPHIL # BLD AUTO: 0 X10(3)/MCL (ref 0–0.9)
EOSINOPHIL NFR BLD AUTO: 0 %
ERYTHROCYTE [DISTWIDTH] IN BLOOD BY AUTOMATED COUNT: 13.3 % (ref 11.5–17)
GFR SERPLBLD CREATININE-BSD FMLA CKD-EPI: >60 ML/MIN/1.73/M2
GLOBULIN SER-MCNC: 3.4 GM/DL (ref 2.4–3.5)
GLUCOSE SERPL-MCNC: 114 MG/DL (ref 82–115)
HCT VFR BLD AUTO: 37.3 % (ref 42–52)
HGB BLD-MCNC: 12.5 G/DL (ref 14–18)
IMM GRANULOCYTES # BLD AUTO: 0.04 X10(3)/MCL (ref 0–0.04)
IMM GRANULOCYTES NFR BLD AUTO: 0.4 %
LYMPHOCYTES # BLD AUTO: 0.9 X10(3)/MCL (ref 0.6–4.6)
LYMPHOCYTES NFR BLD AUTO: 8.8 %
MAGNESIUM SERPL-MCNC: 2.1 MG/DL (ref 1.6–2.6)
MCH RBC QN AUTO: 29.8 PG (ref 27–31)
MCHC RBC AUTO-ENTMCNC: 33.5 G/DL (ref 33–36)
MCV RBC AUTO: 88.8 FL (ref 80–94)
MONOCYTES # BLD AUTO: 0.62 X10(3)/MCL (ref 0.1–1.3)
MONOCYTES NFR BLD AUTO: 6 %
NEUTROPHILS # BLD AUTO: 8.69 X10(3)/MCL (ref 2.1–9.2)
NEUTROPHILS NFR BLD AUTO: 84.6 %
NRBC BLD AUTO-RTO: 0 %
OSMOLALITY SERPL: 293 MOSM/KG (ref 280–300)
OSMOLALITY SERPL: 296 MOSM/KG (ref 280–300)
OSMOLALITY SERPL: 301 MOSM/KG (ref 280–300)
OSMOLALITY SERPL: 302 MOSM/KG (ref 280–300)
PHOSPHATE SERPL-MCNC: 3.5 MG/DL (ref 2.3–4.7)
PLATELET # BLD AUTO: 381 X10(3)/MCL (ref 130–400)
PLATELETS.RETICULATED NFR BLD AUTO: 1 % (ref 0.9–11.2)
PMV BLD AUTO: 8.5 FL (ref 7.4–10.4)
POTASSIUM SERPL-SCNC: 4 MMOL/L (ref 3.5–5.1)
PROT SERPL-MCNC: 7.1 GM/DL (ref 5.8–7.6)
RBC # BLD AUTO: 4.2 X10(6)/MCL (ref 4.7–6.1)
SODIUM SERPL-SCNC: 138 MMOL/L (ref 136–145)
SODIUM SERPL-SCNC: 140 MMOL/L (ref 136–145)
SODIUM SERPL-SCNC: 140 MMOL/L (ref 136–145)
SODIUM SERPL-SCNC: 141 MMOL/L (ref 136–145)
WBC # BLD AUTO: 10.27 X10(3)/MCL (ref 4.5–11.5)

## 2024-08-11 PROCEDURE — C9248 INJ, CLEVIDIPINE BUTYRATE: HCPCS | Performed by: EMERGENCY MEDICINE

## 2024-08-11 PROCEDURE — 25000003 PHARM REV CODE 250: Performed by: STUDENT IN AN ORGANIZED HEALTH CARE EDUCATION/TRAINING PROGRAM

## 2024-08-11 PROCEDURE — 25000003 PHARM REV CODE 250

## 2024-08-11 PROCEDURE — 83735 ASSAY OF MAGNESIUM: CPT

## 2024-08-11 PROCEDURE — 63600175 PHARM REV CODE 636 W HCPCS

## 2024-08-11 PROCEDURE — 20000000 HC ICU ROOM

## 2024-08-11 PROCEDURE — 99233 SBSQ HOSP IP/OBS HIGH 50: CPT | Mod: FS,,, | Performed by: PSYCHIATRY & NEUROLOGY

## 2024-08-11 PROCEDURE — 25000003 PHARM REV CODE 250: Performed by: NURSE PRACTITIONER

## 2024-08-11 PROCEDURE — 99233 SBSQ HOSP IP/OBS HIGH 50: CPT | Mod: ,,, | Performed by: NEUROLOGICAL SURGERY

## 2024-08-11 PROCEDURE — 25000003 PHARM REV CODE 250: Performed by: INTERNAL MEDICINE

## 2024-08-11 PROCEDURE — 84295 ASSAY OF SERUM SODIUM: CPT | Performed by: PSYCHIATRY & NEUROLOGY

## 2024-08-11 PROCEDURE — 85025 COMPLETE CBC W/AUTO DIFF WBC: CPT

## 2024-08-11 PROCEDURE — 63600175 PHARM REV CODE 636 W HCPCS: Performed by: EMERGENCY MEDICINE

## 2024-08-11 PROCEDURE — 84100 ASSAY OF PHOSPHORUS: CPT

## 2024-08-11 PROCEDURE — 80053 COMPREHEN METABOLIC PANEL: CPT

## 2024-08-11 PROCEDURE — 36415 COLL VENOUS BLD VENIPUNCTURE: CPT | Performed by: PSYCHIATRY & NEUROLOGY

## 2024-08-11 PROCEDURE — 83930 ASSAY OF BLOOD OSMOLALITY: CPT | Performed by: PSYCHIATRY & NEUROLOGY

## 2024-08-11 PROCEDURE — 63600175 PHARM REV CODE 636 W HCPCS: Performed by: NURSE PRACTITIONER

## 2024-08-11 PROCEDURE — A4216 STERILE WATER/SALINE, 10 ML: HCPCS | Performed by: STUDENT IN AN ORGANIZED HEALTH CARE EDUCATION/TRAINING PROGRAM

## 2024-08-11 RX ORDER — ACETAMINOPHEN 325 MG/1
650 TABLET ORAL EVERY 6 HOURS PRN
Status: DISCONTINUED | OUTPATIENT
Start: 2024-08-11 | End: 2024-09-19 | Stop reason: HOSPADM

## 2024-08-11 RX ORDER — ACETAMINOPHEN 650 MG/1
650 SUPPOSITORY RECTAL EVERY 6 HOURS PRN
Status: DISCONTINUED | OUTPATIENT
Start: 2024-08-11 | End: 2024-09-19 | Stop reason: HOSPADM

## 2024-08-11 RX ORDER — SODIUM CHLORIDE 9 MG/ML
INJECTION, SOLUTION INTRAVENOUS
Status: DISCONTINUED | OUTPATIENT
Start: 2024-08-11 | End: 2024-09-19 | Stop reason: HOSPADM

## 2024-08-11 RX ADMIN — LABETALOL HYDROCHLORIDE 10 MG: 5 INJECTION, SOLUTION INTRAVENOUS at 04:08

## 2024-08-11 RX ADMIN — CLEVIPIDINE 6 MG/HR: 0.5 EMULSION INTRAVENOUS at 06:08

## 2024-08-11 RX ADMIN — HYDRALAZINE HYDROCHLORIDE 10 MG: 20 INJECTION INTRAMUSCULAR; INTRAVENOUS at 11:08

## 2024-08-11 RX ADMIN — CLEVIPIDINE 5 MG/HR: 0.5 EMULSION INTRAVENOUS at 03:08

## 2024-08-11 RX ADMIN — CLEVIPIDINE 8 MG/HR: 0.5 EMULSION INTRAVENOUS at 08:08

## 2024-08-11 RX ADMIN — HYDRALAZINE HYDROCHLORIDE 10 MG: 20 INJECTION INTRAMUSCULAR; INTRAVENOUS at 02:08

## 2024-08-11 RX ADMIN — SODIUM CHLORIDE 60 ML/HR: 3 INJECTION, SOLUTION INTRAVENOUS at 05:08

## 2024-08-11 RX ADMIN — LEVETIRACETAM 500 MG: 100 INJECTION, SOLUTION INTRAVENOUS at 11:08

## 2024-08-11 RX ADMIN — SODIUM CHLORIDE, PRESERVATIVE FREE 10 ML: 5 INJECTION INTRAVENOUS at 05:08

## 2024-08-11 RX ADMIN — LABETALOL HYDROCHLORIDE 10 MG: 5 INJECTION, SOLUTION INTRAVENOUS at 06:08

## 2024-08-11 RX ADMIN — ACETAMINOPHEN 650 MG: 650 SUPPOSITORY RECTAL at 09:08

## 2024-08-11 RX ADMIN — LEVETIRACETAM 500 MG: 100 INJECTION, SOLUTION INTRAVENOUS at 08:08

## 2024-08-11 RX ADMIN — CLEVIPIDINE 8 MG/HR: 0.5 EMULSION INTRAVENOUS at 11:08

## 2024-08-11 RX ADMIN — SODIUM CHLORIDE, PRESERVATIVE FREE 10 ML: 5 INJECTION INTRAVENOUS at 11:08

## 2024-08-11 RX ADMIN — SODIUM CHLORIDE 50 ML/HR: 3 INJECTION, SOLUTION INTRAVENOUS at 06:08

## 2024-08-11 RX ADMIN — CLEVIPIDINE 4 MG/HR: 0.5 EMULSION INTRAVENOUS at 05:08

## 2024-08-11 RX ADMIN — SODIUM CHLORIDE: 9 INJECTION, SOLUTION INTRAVENOUS at 11:08

## 2024-08-11 RX ADMIN — CLEVIPIDINE 4 MG/HR: 0.5 EMULSION INTRAVENOUS at 01:08

## 2024-08-11 NOTE — PT/OT/SLP PROGRESS
Physical Therapy Treatment    Patient Name:  Dell Garrett Jr.   MRN:  [id number]    Spoke with nurse. Patient not appropriate for therapy today. PT to f/u tomorrow.

## 2024-08-11 NOTE — PROCEDURES
"Dell Garrett Jr. is a 59 y.o. male patient.    Temp: 98.5 °F (36.9 °C) (08/10/24 1600)  Pulse: 70 (08/10/24 1845)  Resp: 20 (08/10/24 1845)  BP: 126/76 (08/10/24 1845)  SpO2: 97 % (08/10/24 1845)  Weight: 65.3 kg (143 lb 15.4 oz) (08/10/24 1346)  Height: 5' 7" (170.2 cm) (08/10/24 1346)    PICC  Date/Time: 8/10/2024 7:23 PM  Performed by: Adolfo Kiran RN  Consent Done: Yes  Time out: Immediately prior to procedure a time out was called to verify the correct patient, procedure, equipment, support staff and site/side marked as required  Indications: med administration and vascular access  Anesthesia: local infiltration  Local anesthetic: lidocaine 1% without epinephrine  Anesthetic Total (mL): 5  Preparation: skin prepped with ChloraPrep  Skin prep agent dried: skin prep agent completely dried prior to procedure  Sterile barriers: all five maximum sterile barriers used - cap, mask, sterile gown, sterile gloves, and large sterile sheet  Hand hygiene: hand hygiene performed prior to central venous catheter insertion  Location details: left brachial  Catheter type: double lumen  Catheter size: 5 Fr  Catheter Length: 44cm    Ultrasound guidance: yes  Vessel Caliber: medium and patent, compressibility normal  Needle advanced into vessel with real time Ultrasound guidance.  Guidewire confirmed in vessel.  Sterile sheath used.  Number of attempts: 1  Post-procedure: blood return through all ports, chlorhexidine patch and sterile dressing applied    Assessment: placement verified by x-ray and free fluid flow          Name Adolfo Kiran RN  8/10/2024    "

## 2024-08-11 NOTE — ASSESSMENT & PLAN NOTE
Presented with sudden onset aphasia and left sided weakness      -CT head:  There is 4.2 x 3.8 cm (AP x T) intraparenchymal hemorrhage centered in the right thalamocapsular region series 2 image 36. There is mild surrounding edema with resultant effacement of the adjacent sulci and Sylvian fissure, as well as compression of the right lateral ventricle with 8mm midline shift on series 2 image 36.  Intraventricular extension of bleed is seen in the right lateral ventricle and 3rd ventricle. There is mild dilatation of the left lateral ventricle compared to the right suggesting possible early obstructive hydrocephalus component. Sulcal hyperdensities are seen along the right temporal lobe reflective of subarachnoid hemorrhage. Recommend continued serial interval follow-up to full resolution as indicated.  -CTA head and neck:  Mild dolichoectasia in the basilar artery  Extensive intracranial hemorrhage       Plan    -hourly neuro checks ... notify neurology of any neuro change  -strict blood pressure control ... -150 for the first 24 hours, then SBP less than 140  -avoid antiplatelet or anticoagulation at this time  -seizure precautions ... notify neurology of any seizure-like activity  -will add keppra 500 mg BID for seizure prophylaxis   -therapy evaluations tomorrow   -neurosurgery consulted

## 2024-08-11 NOTE — PROGRESS NOTES
"[facility]  Pulmonary Critical Care Note    Patient Name: Dell Garrett Jr.  MRN: [id number]  Admission Date: 8/10/2024  Hospital Length of Stay: 1 days  Code Status: Full Code  Attending Provider: JOE Piper MD  Primary Care Provider: Yaa Taveras FNP     Subjective:     HPI:   Dell Garrett is a 59 yr old M w PMHx of Crohn's disease, GERD, and chronic tobacco use who presented to [facility] ED on 8/10/24 for acute onset left sided weakness, facial droop and aphasia. Patient's symptoms started around 06:45 when he was getting a hair cut. Patient reported standing from madrigal chair and suddenly developed a HA which he described as a "sticking" sensation. Patient subsequently fell and hit his head but did not lose consciousness. Patient presented bradycardic HR 48 and hypertensive with -160s. Labwork significant for mild normocytic anemia, NAGMA with bicarb 22. BUN/Cr elevated but appears to be at patient baseline. Patient is not on chronic anticoagulation. Imaging consistent with R basal ganglia hemorrhage w 8mm right to left shift and extensive intraventricular hemorrhage  Neurology was consulted from ED. Patient started on Cleviprex for BP control. Admitted to ICU for close monitoring.     Hospital Course/Significant events:  Admit to ICU - 8/10     24 Hour Interval History:  CT head last evening suggested increased cerebral edema and 3% saline was initiated per Neurology.  Patient remains on Cleviprex for blood pressure control.  No real change in his neuro status.  Does withdraw right side to stimulation.  Does not speak or open his eyes.  Left side remains flaccid.    Past Medical History:   Diagnosis Date    Crohn's disease     GERD (gastroesophageal reflux disease)        History reviewed. No pertinent surgical history.    Social History     Socioeconomic History    Marital status: Single   Tobacco Use    Smoking status: Some Days     Types: Cigarettes    Smokeless " tobacco: Never   Substance and Sexual Activity    Alcohol use: Yes     Comment: daily    Drug use: Never           Current Outpatient Medications   Medication Instructions    albuterol (PROVENTIL/VENTOLIN HFA) 90 mcg/actuation inhaler Inhalation    diclofenac sodium (VOLTAREN) 2 g, Topical (Top), 3 times daily    dicyclomine (BENTYL) 20 mg, Oral, 2 times daily PRN    iron-vitamin C 100-250 mg, ICAR-C, 100-250 mg Tab 1 tablet, Oral    loratadine (CLARITIN) 10 mg, Oral, Daily    ondansetron (ZOFRAN-ODT) 8 mg, Oral, Every 6 hours PRN    RINVOQ 45 mg Tb24 1 tablet, Oral    tiZANidine (ZANAFLEX) 4 mg, Oral, Every 8 hours PRN    traMADoL (ULTRAM) 50 mg, Oral, Every 6 hours PRN       Current Inpatient Medications   [START ON 8/12/2024] mupirocin   Nasal BID    sodium chloride 0.9%  10 mL Intravenous Q6H       Current Intravenous Infusions   clevidipine  0-16 mg/hr Intravenous Continuous 12 mL/hr at 08/11/24 0642 6 mg/hr at 08/11/24 0642    sodium chloride 3% HYPERTONIC  50 mL/hr Intravenous Continuous 50 mL/hr at 08/11/24 0641 50 mL/hr at 08/11/24 0641         ROS   Unable to assess 2/2 patient effort     Objective:       Intake/Output Summary (Last 24 hours) at 8/11/2024 0800  Last data filed at 8/11/2024 0535  Gross per 24 hour   Intake 748.75 ml   Output 1550 ml   Net -801.25 ml         Vital Signs (Most Recent):  Temp: 99 °F (37.2 °C) (08/11/24 0400)  Pulse: (!) 59 (08/11/24 0745)  Resp: 15 (08/11/24 0745)  BP: 128/71 (08/11/24 0745)  SpO2: 97 % (08/11/24 0745)  Body mass index is 22.55 kg/m².  Weight: 65.3 kg (143 lb 15.4 oz) Vital Signs (24h Range):  Temp:  [98.5 °F (36.9 °C)-99.8 °F (37.7 °C)] 99 °F (37.2 °C)  Pulse:  [] 59  Resp:  [13-26] 15  SpO2:  [94 %-100 %] 97 %  BP: (116-165)/() 128/71  Arterial Line BP: (140-164)/(54-80) 149/55     Physical Exam  Vitals and nursing note reviewed.   Constitutional:       General: He is sleeping. He is not in acute distress.     Appearance: He is normal weight. He  is not ill-appearing, toxic-appearing or diaphoretic.      Comments: Sating well on room air    HENT:      Head: Normocephalic. No raccoon eyes, contusion, masses or laceration.   Eyes:      Extraocular Movements:      Right eye: No nystagmus.      Left eye: No nystagmus.      Pupils: Pupils are equal.      Comments: Pupils fixed    Neck:      Vascular: No carotid bruit.   Cardiovascular:      Rate and Rhythm: Normal rate and regular rhythm.      Pulses: Normal pulses.      Heart sounds: No murmur heard.     No gallop.   Pulmonary:      Effort: Pulmonary effort is normal. No respiratory distress.      Breath sounds: No wheezing, rhonchi or rales.   Abdominal:      General: Abdomen is flat. Bowel sounds are normal. There is no distension.      Palpations: Abdomen is soft.      Tenderness: There is no guarding.   Musculoskeletal:      Right lower leg: No edema.      Left lower leg: No edema.      Comments: Spontaneous movement of RUE    Skin:     General: Skin is warm and dry.      Capillary Refill: Capillary refill takes less than 2 seconds.   Neurological:      Comments: Unable to assess 2/2 patient effort    Psychiatric:         Behavior: Behavior is uncooperative.      Comments: Unable to assess 2/2 patient effort            Lines/Drains/Airways       Peripherally Inserted Central Catheter Line  Duration             PICC Double Lumen 08/10/24 1921 left brachial <1 day              Drain  Duration             Male External Urinary Catheter 08/10/24 1015 Medium <1 day              Arterial Line  Duration             Arterial Line 08/10/24 1810 Left Radial <1 day              Peripheral Intravenous Line  Duration                  Peripheral IV - Single Lumen 08/10/24 18 G Right Antecubital 1 day         Peripheral IV - Single Lumen 08/10/24 18 G Right Antecubital 1 day         Peripheral IV - Single Lumen 08/10/24 0854 20 G 1 1/4 in No Left;Anterior Forearm <1 day                    Significant Labs:    Lab Results  "  Component Value Date    WBC 10.27 08/11/2024    HGB 12.5 (L) 08/11/2024    HCT 37.3 (L) 08/11/2024    MCV 88.8 08/11/2024     08/11/2024           BMP  Lab Results   Component Value Date     08/11/2024    K 4.0 08/11/2024    CO2 20 (L) 08/11/2024    BUN 12.9 08/11/2024    CREATININE 0.85 08/11/2024    CALCIUM 9.3 08/11/2024    AGAP 10.0 08/11/2024    EGFRNONAA >60 06/11/2021         ABG  No results for input(s): "PH", "PO2", "PCO2", "HCO3", "POCBASEDEF" in the last 168 hours.    Mechanical Ventilation Support:         Significant Imaging:  I have reviewed the pertinent imaging within the past 24 hours.    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  CT angiogram was performed from the level of the luis to the top of the head following the IV administration 100 cc of Isovue 370 contrast .  Sagittal and coronal reconstructions and maximum intensity projection reconstructions were performed. Arterial stenosis percentages are based on NASCET measurement criteria.  Additional multiplanar reconstructions were performed on post processed imaging.  Automatic exposure control (AEC) is utilized to reduce patient radiation exposure.    COMPARISON:  None    FINDINGS:  There is a intracranial hemorrhage seen which is discussed in detail on a prior report.    Vascular images: The aortic arch is slightly dilated.  Maximum dimension is 3.9 " cm.  There is a 3 vessel arch seen.  The common carotid arteries are widely patent.  No significant plaque is seen.  No significant plaque is seen in the carotid bulbs.  Internal carotid arteries are widely patent.  They are seen to level the petrous ridge and clinoid and supraclinoid region.  No obstruction is seen.  MCAs are patent.  M1 segments, M2 segments M3 segments of patent.  No aneurysm or obstruction is seen.    The A1 segments are patent.  Anterior cerebral arteries are patent.  Anterior communicating arteries patent.  No aneurysm or obstruction is seen.    Both vertebral arteries are widely patent.  No dissection is seen.  No obstruction is seen.  Both vertebral arteries perfuse a vertebral artery which shows some evidence of dolichoectasia.  No aneurysm is seen.  No obstruction is seen.  Posterior cerebral arteries are widely patent.  Aneurysm obstruction is seen.    .                                       CT Brain Perfusion inc Post Processing (Final result)  Result time 08/10/24 09:58:36      Final result by Maverick March MD (08/10/24 09:58:36)                   Narrative:    EXAMINATION  CT BRAIN PERFUSION INC POST PROCESSING    CLINICAL HISTORY  stroke;    TECHNIQUE  Axial perfusion CT images were obtained, with arterial in-flow JOSE DE JESUS at the basilar artery and venous out-flow JOSE DE JESUS at the straight sinus. Post-processing of the CT perfusion data was performed, generating color para-metric maps for cerebral blood volume (CBV), cerebral blood flow (CBF), mean transit time (MTT), time-to-peak (TTP), and time-to-maximum (T-max).  The perfusion maps and hemodynamic curves were transferred to PACS.    CONTRAST  *IV contrast media: Omnipaque 350  *Injected volume: 50 mL  *Injection rate: 5 mL/s    COMPARISON  *No prior CT perfusion exam is available at the time of the initial interpretation.  *Non-contrast head CT obtained immediately prior was reviewed.    FINDINGS/IMPRESSION  Exam quality: Somewhat limited  assessment secondary to widespread right cerebral intraparenchymal hemorrhage as seen on the recent non-contrast head CT.    Asymmetrically decreased blood flow noted through the area of intra-axial hematoma, with no large volume area of perfusion mismatch.    Prior to the final Radiology report, initial assessment performed by teleradiology service and pertinent communication to the Neuro Interventional Team was accomplished for potential treatment planning; please refer to the corresponding consult and/or intervention notes for additional details.    RADIATION DOSE  Automated tube current modulation, weight-based exposure dosing, and/or iterative reconstruction technique utilized to reach lowest reasonably achievable exposure rate.    DLP: 1695 mGy*cm      Electronically signed by: Maverick March  Date:    08/10/2024  Time:    09:58                                     CT HEAD FOR STROKE (Final result)  Result time 08/10/24 08:11:45      Final result by Eduard Barajas MD (08/10/24 08:11:45)                   Impression:      1. There is 4.2 x 3.8 cm (AP x T) intraparenchymal hemorrhage centered in the right thalamocapsular region series 2 image 36. There is mild surrounding edema with resultant effacement of the adjacent sulci and Sylvian fissure, as well as compression of the right lateral ventricle with 8mm midline shift on series 2 image 36.  Intraventricular extension of bleed is seen in the right lateral ventricle and 3rd ventricle. There is mild dilatation of the left lateral ventricle compared to the right suggesting possible early obstructive hydrocephalus component. Sulcal hyperdensities are seen along the right temporal lobe reflective of subarachnoid hemorrhage. Recommend continued serial interval follow-up to full resolution as indicated.    2. Details and other findings as noted above.    I concur with the preliminary report above.      Electronically signed by: Den  [facility]  Date:    08/10/2024  Time:    08:11               Narrative:    EXAMINATION:  CT HEAD FOR STROKE    CLINICAL HISTORY:  Neuro deficit, acute, stroke suspected;    TECHNIQUE:  Multiple axial images were obtained from the base of the brain to the vertex without contrast administration.  Sagittal and coronal reconstructions were performed..Automatic exposure control is utilized to reduce patient radiation exposure.    COMPARISON:  None    FINDINGS:  Hemorrhage: There is 4.2 x 3.8 cm (AP x T) intraparenchymal hemorrhage centered in the right thalamocapsular region series 2 image 36. There is mild surrounding edema with resultant effacement of the adjacent sulci and Sylvian fissure, as well as compression of the right lateral ventricle with 8mm midline shift on series 2 image 36.  Intraventricular extension of bleed is seen in the right lateral ventricle and 3rd ventricle. There is mild dilatation of the left lateral ventricle compared to the right suggesting possible early obstructive hydrocephalus component. Sulcal hyperdensities are seen along the right temporal lobe reflective of subarachnoid hemorrhage. Recommend continued serial interval follow-up to full resolution as indicated    CSF spaces: The ventricles sulci and basal cisterns are within normal limits.    Cerebellum: Unremarkable    Vascular: Unremarkable venous sinuses    Sella and skull base: The sella appears to be within normal limits for age.    Cerebellopontine angles: Within normal limits.    Herniation: None.    Intracranial calcifications: Incidental note is made of some pineal region calcification.    Calvarium: No acute linear or depressed skull fracture is seen.    Maxillofacial Structures:Paranasal sinuses: The visualized paranasal sinuses appear clear with no mucoperiosteal thickening or air fluid levels identified    Orbits: The orbits appear unremarkable.    Zygomatic arches: The zygomatic arches are intact and  unremarkable    Temporal bones and mastoids: The temporal bones and mastoids appear unremarkable.    TMJ: The mandibular condyles appear normally placed with respect to the mandibular fossa.    Notifications: The results were discussed with the emergency room physician (Dr Burnette) prior to dictation at 2024-08-10 08:00:14 CDT.                        Preliminary result by Stu Peck MD (08/10/24 08:00:30)                   Impression:    1. There is 4.2 x 3.8 cm (AP x T) intraparenchymal hemorrhage centered in the right thalamocapsular region series 2 image 36. There is mild surrounding edema with resultant effacement of the adjacent sulci and Sylvian fissure, as well as compression of the right lateral ventricle with 8mm midline shift on series 2 image 36. Intravantricular extension of bleed is seen in the right lateral ventricle and 3rd ventricle. There is mild dilatation of the left lateral ventricle compared to the right suggesting possible early obstructive hydrocephalus component. Sulcal hyperdensities are seen along the right temporal lobe reflective of subarachnoid hemorrhage. Recommend continued serial interval follow-up to full resolution as indicated.  2. Details and other findings as noted above.               Narrative:    START OF REPORT:  Technique: CT of the head was performed without intravenous contrast with axial as well as coronal and sagittal images.    Comparison: None.    Dosage Information: Automated exposure control was utilized.    Clinical history: Slurred speech, facial droop, left arm weakness.    Findings:  Hemorrhage: There is 4.2 x 3.8 cm (AP x T) intraparenchymal hemorrhage centered in the right thalamocapsular region series 2 image 36. There is mild surrounding edema with resultant effacement of the adjacent sulci and Sylvian fissure, as well as compression of the right lateral ventricle with 8mm midline shift on series 2 image 36. Intravantricular extension of bleed is seen  in the right lateral ventricle and 3rd ventricle. There is mild dilatation of the left lateral ventricle compared to the right suggesting possible early obstructive hydrocephalus component. Sulcal hyperdensities are seen along the right temporal lobe reflective of subarachnoid hemorrhage. Recommend continued serial interval follow-up to full resolution as indicated.  CSF spaces: The ventricles sulci and basal cisterns are within normal limits.  Cerebellum: Unremarkable.  Vascular: Unremarkable venous sinuses.  Sella and skull base: The sella appears to be within normal limits for age.  Cerebellopontine angles: Within normal limits.  Herniation: None.  Intracranial calcifications: Incidental note is made of some pineal region calcification.  Calvarium: No acute linear or depressed skull fracture is seen.    Maxillofacial Structures:  Paranasal sinuses: The visualized paranasal sinuses appear clear with no mucoperiosteal thickening or air fluid levels identified.  Orbits: The orbits appear unremarkable.  Zygomatic arches: The zygomatic arches are intact and unremarkable.  Temporal bones and mastoids: The temporal bones and mastoids appear unremarkable.  TMJ: The mandibular condyles appear normally placed with respect to the mandibular fossa.    Notifications: The results were discussed with the emergency room physician (Dr Burnette) prior to dictation at 2024-08-10 08:00:14 CDT.                                         CT Cervical Spine Without Contrast (Final result)  Result time 08/10/24 08:01:39      Final result by Eduard Barajas MD (08/10/24 08:01:39)                   Impression:      Loss of the normal lordotic curve of the cervical spine most likely related to spasm but otherwise unremarkable with no evidence of acute fracture or dislocation seen    Incidental note is made of some degenerative changes in the  cervical spine      Electronically signed by: Den  Checohasinh  Date:    08/10/2024  Time:    08:01               Narrative:    EXAMINATION:  CT CERVICAL SPINE WITHOUT CONTRAST    CLINICAL HISTORY:  Polytrauma, blunt;    TECHNIQUE:  Low dose axial images, sagittal and coronal reformations were performed though the cervical spine.  Contrast was not administered. Automatic exposure control is utilized to reduce patient radiation exposure.    COMPARISON:  10/11/2018    FINDINGS:  The vertebral body heights are well maintained. There is some loss of the normal lordotic curve cervical spine most likely related to spasm. No fracture is seen. No dislocation is seen. The odontoid and lateral masses appear grossly unremarkable.  There are some degenerative changes seen in the cervical spine which appear to be chronic.                                       Results for orders placed or performed during the hospital encounter of 08/10/24   ECG 12 lead    Collection Time: 08/10/24  8:23 AM   Result Value Ref Range    QRS Duration 92 ms    [facility] QTC Calculation 424 ms    Narrative    Test Reason : R29.818,    Vent. Rate : 061 BPM     Atrial Rate : 061 BPM     P-R Int : 166 ms          QRS Dur : 092 ms      QT Int : 422 ms       P-R-T Axes : 077 056 068 degrees     QTc Int : 424 ms    Sinus rhythm with marked sinus arrythmia  Minimal voltage criteria for LVH, may be normal variant ( Sokolow-Bello )  Borderline Abnormal ECG  No previous ECGs available  Confirmed by Franko Carlisle MD ([id number]) on 8/10/2024 11:30:24 AM    Referred By:             Confirmed By:Franko Carlisle MD        Assessment/Plan:     Assessment  R basal ganglia hemorrhage w 8mm right to left shift and intraventricular extension with left-sided weakness and suppressed alertness  Elevated Blood Pressure, no previous dx of HTN   Hypercholesterolemia   Elevated Creatinine-now normal      Plan  Continue ICU monitoring with blood pressure control per Cleviprex drip.  Continue to follow recommendations and parameters set by  Neurology and Neurosurgery.  Continue 3% with close monitoring of electrolytes.    DVT Prophylaxis: SCDs  GI Prophylaxis: None      33 minutes of critical care was time spent personally by me on the following activities: development of treatment plan with patient or surrogate and bedside caregivers, discussions with consultants, evaluation of patient's response to treatment, examination of patient, ordering and performing treatments and interventions, ordering and review of laboratory studies, ordering and review of radiographic studies, pulse oximetry, re-evaluation of patient's condition.  This critical care time did not overlap with that of any other provider or involve time for any procedures.     AAYUSH Piper MD  Pulmonary Critical Care Medicine  [facility] - 7th Floor ICU  DOS: 08/11/2024

## 2024-08-11 NOTE — PROGRESS NOTES
Neurologically about the same   We will answer questions, mostly appropriately  Follow-up CT stable   Continue blood pressure control  No plans for surgery at this point

## 2024-08-11 NOTE — NURSING
Nurses Note -- 4 Eyes      8/11/2024   8:48 AM      Skin assessed during: Daily Assessment      [x] No Altered Skin Integrity Present    [x]Prevention Measures Documented      [] Yes- Altered Skin Integrity Present or Discovered   [] LDA Added if Not in Epic (Describe Wound)   [] New Altered Skin Integrity was Present on Admit and Documented in LDA   [] Wound Image Taken    Wound Care Consulted? No    Attending Nurse:  Myriam Bethea RN/Staff Member:   DANIEL Gilmore

## 2024-08-11 NOTE — SUBJECTIVE & OBJECTIVE
Subjective:     Interval History:   No new issues, currently agitate 2/2 feeling like he needs to urinate.  Neurologically appears unchanged compared to yesterday.     Current Neurological Medications:     Current Facility-Administered Medications   Medication Dose Route Frequency Provider Last Rate Last Admin    bisacodyL suppository 10 mg  10 mg Rectal Daily PRN Ileana Rene, NP        clevidipine (CLEVIPREX) 25 mg/50 mL infusion  0-16 mg/hr Intravenous Continuous Ochoa Parsons III, MD 8 mL/hr at 08/11/24 1035 4 mg/hr at 08/11/24 1035    hydrALAZINE injection 10 mg  10 mg Intravenous Q4H PRN Brooks Carlisle MD   10 mg at 08/11/24 0247    labetaloL injection 10 mg  10 mg Intravenous Q2H PRN Ileana Rene NP   10 mg at 08/11/24 0641    [START ON 8/12/2024] mupirocin 2 % ointment   Nasal BID JOE Piper MD        sodium chloride 0.9% flush 10 mL  10 mL Intravenous PRN Ileana Rene NP        sodium chloride 0.9% flush 10 mL  10 mL Intravenous Q6H Luis Enrique Saeed MD   10 mL at 08/11/24 0529    And    sodium chloride 0.9% flush 10 mL  10 mL Intravenous PRN Luis Enrique Saeed MD        sodium chloride 3% HYPERTONIC solution  50 mL/hr Intravenous Continuous AnjuIleana mendoza NP 50 mL/hr at 08/11/24 1035 Rate Verify at 08/11/24 1035       Review of Systems   Unable to perform ROS: Acuity of condition     Objective:     Vital Signs (Most Recent):  Temp: 98.5 °F (36.9 °C) (08/11/24 0800)  Pulse: 64 (08/11/24 1030)  Resp: 20 (08/11/24 1030)  BP: 129/71 (08/11/24 1030)  SpO2: 98 % (08/11/24 1030) Vital Signs (24h Range):  Temp:  [98.5 °F (36.9 °C)-99.8 °F (37.7 °C)] 98.5 °F (36.9 °C)  Pulse:  [] 64  Resp:  [13-26] 20  SpO2:  [95 %-100 %] 98 %  BP: (116-163)/() 129/71  Arterial Line BP: (128-172)/(54-86) 140/60     Weight: 65.3 kg (143 lb 15.4 oz)  Body mass index is 22.55 kg/m².     Physical Exam        Vitals and nursing note reviewed.   Constitutional:       General: He is  "not in acute distress.     Appearance: He is not toxic-appearing.   Eyes:      Pupils: Pupils are equal, round, and reactive to light.   Cardiovascular:      Rate and Rhythm: Normal rate.   Pulmonary:      Effort: Pulmonary effort is normal.   Musculoskeletal:      Cervical back: Normal range of motion.      Right lower leg: No edema.      Left lower leg: No edema.   Skin:     General: Skin is warm and dry.      Capillary Refill: Capillary refill takes less than 2 seconds.   Neurological:      Mental Status: He is lethargic.      Cranial Nerves: Dysarthria and facial asymmetry (left facial droop) present.      Comments:      Limited PE   Limited participation in exam  Dysarthria, currently saying "I need to pee"  Does not follow commands  Spontaneously move RUE, RLE  Withdraws and localizes painful stim LUE, LLE  Gaze midline  Resists having eyes opened  PERR          NEUROLOGICAL EXAMINATION:      CRANIAL NERVES      CN III, IV, VI   Pupils are equal, round, and reactive to light.     Significant Labs:   Recent Lab Results         08/11/24  0855   08/11/24  0347   08/10/24  1831   08/10/24  1308        Immature Platelet Fraction   1.0           Albumin/Globulin Ratio   1.1           A2C EF       59       A4C EF       56       Albumin   3.7           ALP   62           ALT   17           Anion Gap   10.0           Ao peak joceline       1.48       Ao VTI       27.20       AST   20           AV mean gradient       5       AV index (prosthetic)       0.78       AV peak gradient       9       AV Velocity Ratio       0.79       Baso #   0.02           Basophil %   0.2           BILIRUBIN TOTAL   0.9           BUN   12.9           BUN/CREAT RATIO   15           Calcium   9.3           Chloride   110           CO2   20           Creatinine   0.85           Left Ventricle Relative Wall Thickness       0.37       E/A ratio       1.17       E/E' ratio       7.13       eGFR   >60           Eos #   0.00           Eos %   0.0        "    E wave deceleration time       180.00       FS       29       Globulin, Total   3.4           Glucose   114           Hematocrit   37.3           Hemoglobin   12.5           Immature Grans (Abs)   0.04           Immature Granulocytes   0.4           IVSd       1.20       LA area A2C       24.50       LA area A4C       25.20       LA size       3.30       LA volume       82.40       LA Volume Index (Mod)       46.8       LV LATERAL E/E' RATIO       6.31       LV SEPTAL E/E' RATIO       8.20       LV EDV BP       112.81       LV Diastolic Volume Index       64.10       Left Ventricular End Diastolic Volume by Teichholz Method       112.81       LV EDV A2C       172       LV EDV A4C       164.00       Left Ventricular End Systolic Volume by Teichholz Method       50.87       LV ESV A2C       80.50       LV ESV A4C       80.00       LVIDd       4.90       LVIDs       3.50       LV mass       188.09       LV Mass Index       107       Left Ventricular Outflow Tract Mean Gradient       2.00       Left Ventricular Outflow Tract Mean Velocity       0.70       LVOT peak jorge luis       1.17       LVOT peak VTI       21.30       LV ESV BP       50.87       LV Systolic Volume Index       28.9       Lymph #   0.90           LYMPH %   8.8           Magnesium    2.10           MCH   29.8           MCHC   33.5           MCV   88.8           Mean e'       0.12       Mono #   0.62           Mono %   6.0           MPV   8.5           MV Peak A Jorge Luis       0.70       MV Peak E Jorge Luis       0.82       Neut #   8.69           Neut %   84.6           nRBC   0.0           Trejo's Biplane MOD Ejection Fraction       58       Osmolality 296   293   293         Phosphorus Level   3.5           Platelet Count   381           Potassium   4.0           PROTEIN TOTAL   7.1           PV peak gradient       6       PV PEAK VELOCITY       1.20       Posterior Wall       0.90       Est. RA pres       3       RBC   4.20           RDW   13.3           RV/LV  Ratio       0.84       RVDD       4.10       Sodium 138   140   138         TAPSE       1.57       TDI SEPTAL       0.10       TDI LATERAL       0.13       WBC   10.27           ZLVIDD       0.07       ZLVIDS       1.19               Significant Imaging: I have reviewed all pertinent imaging results/findings within the past 24 hours.

## 2024-08-11 NOTE — PROGRESS NOTES
[facility] - 7th Floor ICU  Neurology  Progress Note    Patient Name: Dell Garrett Jr.  MRN: [id number]  Admission Date: 8/10/2024  Hospital Length of Stay: 1 days  Code Status: Full Code   Attending Provider: JOE Pipre MD  Primary Care Physician: Yaa Taveras FNP   Principal Problem:<principal problem not specified>    Subjective:     Interval History:   No new issues, currently agitate 2/2 feeling like he needs to urinate.  Neurologically appears unchanged compared to yesterday.     Current Neurological Medications:     Current Facility-Administered Medications   Medication Dose Route Frequency Provider Last Rate Last Admin    bisacodyL suppository 10 mg  10 mg Rectal Daily PRN AnjuIleana mendoza NP        clevidipine (CLEVIPREX) 25 mg/50 mL infusion  0-16 mg/hr Intravenous Continuous Ochoa Parsons III, MD 8 mL/hr at 08/11/24 1035 4 mg/hr at 08/11/24 1035    hydrALAZINE injection 10 mg  10 mg Intravenous Q4H PRN Brooks Carlisle MD   10 mg at 08/11/24 0247    labetaloL injection 10 mg  10 mg Intravenous Q2H PRN Ileana Rene NP   10 mg at 08/11/24 0641    [START ON 8/12/2024] mupirocin 2 % ointment   Nasal BID JOE Piper MD        sodium chloride 0.9% flush 10 mL  10 mL Intravenous PRN Ileana Rene NP        sodium chloride 0.9% flush 10 mL  10 mL Intravenous Q6H Luis Enrique Saeed MD   10 mL at 08/11/24 0529    And    sodium chloride 0.9% flush 10 mL  10 mL Intravenous PRN Luis Enrique Saeed MD        sodium chloride 3% HYPERTONIC solution  50 mL/hr Intravenous Continuous Ileana Rene NP 50 mL/hr at 08/11/24 1035 Rate Verify at 08/11/24 1035       Review of Systems   Unable to perform ROS: Acuity of condition     Objective:     Vital Signs (Most Recent):  Temp: 98.5 °F (36.9 °C) (08/11/24 0800)  Pulse: 64 (08/11/24 1030)  Resp: 20 (08/11/24 1030)  BP: 129/71 (08/11/24 1030)  SpO2: 98 % (08/11/24 1030) Vital Signs (24h Range):  Temp:  [98.5 °F (36.9  "°C)-99.8 °F (37.7 °C)] 98.5 °F (36.9 °C)  Pulse:  [] 64  Resp:  [13-26] 20  SpO2:  [95 %-100 %] 98 %  BP: (116-163)/() 129/71  Arterial Line BP: (128-172)/(54-86) 140/60     Weight: 65.3 kg (143 lb 15.4 oz)  Body mass index is 22.55 kg/m².     Physical Exam        Vitals and nursing note reviewed.   Constitutional:       General: He is not in acute distress.     Appearance: He is not toxic-appearing.   Eyes:      Pupils: Pupils are equal, round, and reactive to light.   Cardiovascular:      Rate and Rhythm: Normal rate.   Pulmonary:      Effort: Pulmonary effort is normal.   Musculoskeletal:      Cervical back: Normal range of motion.      Right lower leg: No edema.      Left lower leg: No edema.   Skin:     General: Skin is warm and dry.      Capillary Refill: Capillary refill takes less than 2 seconds.   Neurological:      Mental Status: He is lethargic.      Cranial Nerves: Dysarthria and facial asymmetry (left facial droop) present.      Comments:      Limited PE   Limited participation in exam  Dysarthria, currently saying "I need to pee"  Does not follow commands  Spontaneously move RUE, RLE  Withdraws and localizes painful stim LUE, LLE  Gaze midline  Resists having eyes opened  PERR          NEUROLOGICAL EXAMINATION:      CRANIAL NERVES      CN III, IV, VI   Pupils are equal, round, and reactive to light.     Significant Labs:   Recent Lab Results         08/11/24  0855   08/11/24  0347   08/10/24  1831   08/10/24  1308        Immature Platelet Fraction   1.0           Albumin/Globulin Ratio   1.1           A2C EF       59       A4C EF       56       Albumin   3.7           ALP   62           ALT   17           Anion Gap   10.0           Ao peak joceline       1.48       Ao VTI       27.20       AST   20           AV mean gradient       5       AV index (prosthetic)       0.78       AV peak gradient       9       AV Velocity Ratio       0.79       Baso #   0.02           Basophil %   0.2           " BILIRUBIN TOTAL   0.9           BUN   12.9           BUN/CREAT RATIO   15           Calcium   9.3           Chloride   110           CO2   20           Creatinine   0.85           Left Ventricle Relative Wall Thickness       0.37       E/A ratio       1.17       E/E' ratio       7.13       eGFR   >60           Eos #   0.00           Eos %   0.0           E wave deceleration time       180.00       FS       29       Globulin, Total   3.4           Glucose   114           Hematocrit   37.3           Hemoglobin   12.5           Immature Grans (Abs)   0.04           Immature Granulocytes   0.4           IVSd       1.20       LA area A2C       24.50       LA area A4C       25.20       LA size       3.30       LA volume       82.40       LA Volume Index (Mod)       46.8       LV LATERAL E/E' RATIO       6.31       LV SEPTAL E/E' RATIO       8.20       LV EDV BP       112.81       LV Diastolic Volume Index       64.10       Left Ventricular End Diastolic Volume by Teichholz Method       112.81       LV EDV A2C       172       LV EDV A4C       164.00       Left Ventricular End Systolic Volume by Teichholz Method       50.87       LV ESV A2C       80.50       LV ESV A4C       80.00       LVIDd       4.90       LVIDs       3.50       LV mass       188.09       LV Mass Index       107       Left Ventricular Outflow Tract Mean Gradient       2.00       Left Ventricular Outflow Tract Mean Velocity       0.70       LVOT peak jorge luis       1.17       LVOT peak VTI       21.30       LV ESV BP       50.87       LV Systolic Volume Index       28.9       Lymph #   0.90           LYMPH %   8.8           Magnesium    2.10           MCH   29.8           MCHC   33.5           MCV   88.8           Mean e'       0.12       Mono #   0.62           Mono %   6.0           MPV   8.5           MV Peak A Jorge Luis       0.70       MV Peak E Jorge Luis       0.82       Neut #   8.69           Neut %   84.6           nRBC   0.0           Trejo's Biplane MOD  Ejection Fraction       58       Osmolality 296   293   293         Phosphorus Level   3.5           Platelet Count   381           Potassium   4.0           PROTEIN TOTAL   7.1           PV peak gradient       6       PV PEAK VELOCITY       1.20       Posterior Wall       0.90       Est. RA pres       3       RBC   4.20           RDW   13.3           RV/LV Ratio       0.84       RVDD       4.10       Sodium 138   140   138         TAPSE       1.57       TDI SEPTAL       0.10       TDI LATERAL       0.13       WBC   10.27           ZLVIDD       0.07       ZLVIDS       1.19               Significant Imaging: I have reviewed all pertinent imaging results/findings within the past 24 hours.  Assessment and Plan:     Intraparenchymal hemorrhage of brain  Presented with sudden onset aphasia and left sided weakness      -CT head:  There is 4.2 x 3.8 cm (AP x T) intraparenchymal hemorrhage centered in the right thalamocapsular region series 2 image 36. There is mild surrounding edema with resultant effacement of the adjacent sulci and Sylvian fissure, as well as compression of the right lateral ventricle with 8mm midline shift on series 2 image 36.  Intraventricular extension of bleed is seen in the right lateral ventricle and 3rd ventricle. There is mild dilatation of the left lateral ventricle compared to the right suggesting possible early obstructive hydrocephalus component. Sulcal hyperdensities are seen along the right temporal lobe reflective of subarachnoid hemorrhage. Recommend continued serial interval follow-up to full resolution as indicated.  -CTA head and neck:  Mild dolichoectasia in the basilar artery  Extensive intracranial hemorrhage       Plan    - hourly neuro checks ... notify neurology of any neuro change  - SBP less than 140  - avoid antiplatelet or anticoagulation at this time  - seizure precautions ... notify neurology of any seizure-like activity  - will add keppra 500 mg BID for seizure  prophylaxis   - therapy evaluations  - neurosurgery was consulted    Care update (1300)  -repeat CT haed in am  -increase 3% to 60 mL/hr    Further recommendations to follow from MD     VTE Risk Mitigation (From admission, onward)           Ordered     IP VTE LOW RISK PATIENT  Once         08/10/24 1059     Place sequential compression device  Until discontinued         08/10/24 1059                    Ileana Rene NP  Neurology  [facility] - 7th Floor ICU

## 2024-08-11 NOTE — PT/OT/SLP PROGRESS
Occupational Therapy      Patient Name:  Dell Garrett Jr.   MRN:  [id number]    Patient not seen today secondary to Nurse/ CORY hold (Not appropriate today.). Will follow-up tomorrow.    8/11/2024

## 2024-08-11 NOTE — PLAN OF CARE
Problem: Adult Inpatient Plan of Care  Goal: Plan of Care Review  Outcome: Progressing  Goal: Patient-Specific Goal (Individualized)  Outcome: Progressing  Goal: Absence of Hospital-Acquired Illness or Injury  Outcome: Progressing  Goal: Optimal Comfort and Wellbeing  Outcome: Progressing  Goal: Readiness for Transition of Care  Outcome: Progressing     Problem: Skin Injury Risk Increased  Goal: Skin Health and Integrity  Outcome: Progressing     Problem: Stroke, Intracerebral Hemorrhage  Goal: Optimal Coping  Outcome: Progressing  Goal: Effective Bowel Elimination  Outcome: Progressing  Goal: Optimal Cerebral Tissue Perfusion  Outcome: Progressing  Goal: Optimal Cognitive Function  Outcome: Progressing  Goal: Effective Communication Skills  Outcome: Progressing  Goal: Optimal Functional Ability  Outcome: Progressing  Goal: Optimal Nutrition Intake  Outcome: Progressing  Goal: Optimal Pain Control and Function  Outcome: Progressing  Goal: Effective Oxygenation and Ventilation  Outcome: Progressing  Goal: Improved Sensorimotor Function  Outcome: Progressing  Goal: Safe and Effective Swallow  Outcome: Progressing  Goal: Effective Urinary Elimination  Outcome: Progressing     Problem: Infection  Goal: Absence of Infection Signs and Symptoms  Outcome: Progressing

## 2024-08-12 PROBLEM — E44.0 MODERATE MALNUTRITION: Status: ACTIVE | Noted: 2024-08-12

## 2024-08-12 LAB
ALBUMIN SERPL-MCNC: 3.6 G/DL (ref 3.4–4.8)
ALBUMIN/GLOB SERPL: 1 RATIO (ref 1.1–2)
ALP SERPL-CCNC: 59 UNIT/L (ref 40–150)
ALT SERPL-CCNC: 16 UNIT/L (ref 0–55)
ANION GAP SERPL CALC-SCNC: 10 MMOL/L (ref 8–16)
ANION GAP SERPL CALC-SCNC: 11 MEQ/L
AST SERPL-CCNC: 17 UNIT/L (ref 5–34)
BASOPHILS # BLD AUTO: 0.02 X10(3)/MCL
BASOPHILS NFR BLD AUTO: 0.2 %
BILIRUB SERPL-MCNC: 1 MG/DL
BUN SERPL-MCNC: 12.2 MG/DL (ref 8.4–25.7)
BUN SERPL-MCNC: 26 MG/DL (ref 6–30)
CALCIUM SERPL-MCNC: 9.2 MG/DL (ref 8.8–10)
CHLORIDE SERPL-SCNC: 109 MMOL/L (ref 95–110)
CHLORIDE SERPL-SCNC: 115 MMOL/L (ref 98–107)
CO2 SERPL-SCNC: 19 MMOL/L (ref 23–31)
CREAT SERPL-MCNC: 0.78 MG/DL (ref 0.73–1.18)
CREAT SERPL-MCNC: 1.4 MG/DL (ref 0.5–1.4)
CREAT/UREA NIT SERPL: 16
EOSINOPHIL # BLD AUTO: 0 X10(3)/MCL (ref 0–0.9)
EOSINOPHIL NFR BLD AUTO: 0 %
ERYTHROCYTE [DISTWIDTH] IN BLOOD BY AUTOMATED COUNT: 13.6 % (ref 11.5–17)
GFR SERPLBLD CREATININE-BSD FMLA CKD-EPI: >60 ML/MIN/1.73/M2
GLOBULIN SER-MCNC: 3.7 GM/DL (ref 2.4–3.5)
GLUCOSE SERPL-MCNC: 119 MG/DL (ref 70–110)
GLUCOSE SERPL-MCNC: 121 MG/DL (ref 82–115)
HCT VFR BLD AUTO: 38.6 % (ref 42–52)
HCT VFR BLD CALC: 36 %PCV (ref 36–54)
HGB BLD-MCNC: 12 G/DL
HGB BLD-MCNC: 12.9 G/DL (ref 14–18)
IMM GRANULOCYTES # BLD AUTO: 0.03 X10(3)/MCL (ref 0–0.04)
IMM GRANULOCYTES NFR BLD AUTO: 0.3 %
LYMPHOCYTES # BLD AUTO: 1.04 X10(3)/MCL (ref 0.6–4.6)
LYMPHOCYTES NFR BLD AUTO: 9.1 %
MAGNESIUM SERPL-MCNC: 2.4 MG/DL (ref 1.6–2.6)
MCH RBC QN AUTO: 30.1 PG (ref 27–31)
MCHC RBC AUTO-ENTMCNC: 33.4 G/DL (ref 33–36)
MCV RBC AUTO: 90 FL (ref 80–94)
MONOCYTES # BLD AUTO: 1.21 X10(3)/MCL (ref 0.1–1.3)
MONOCYTES NFR BLD AUTO: 10.6 %
NEUTROPHILS # BLD AUTO: 9.12 X10(3)/MCL (ref 2.1–9.2)
NEUTROPHILS NFR BLD AUTO: 79.8 %
NRBC BLD AUTO-RTO: 0 %
OSMOLALITY SERPL: 303 MOSM/KG (ref 280–300)
OSMOLALITY SERPL: 304 MOSM/KG (ref 280–300)
OSMOLALITY SERPL: 312 MOSM/KG (ref 280–300)
OSMOLALITY SERPL: 322 MOSM/KG (ref 280–300)
PHOSPHATE SERPL-MCNC: 2.7 MG/DL (ref 2.3–4.7)
PLATELET # BLD AUTO: 326 X10(3)/MCL (ref 130–400)
PLATELETS.RETICULATED NFR BLD AUTO: 1.1 % (ref 0.9–11.2)
PMV BLD AUTO: 8.5 FL (ref 7.4–10.4)
POC IONIZED CALCIUM: 1.13 MMOL/L (ref 1.06–1.42)
POC TCO2 (MEASURED): 27 MMOL/L (ref 23–29)
POTASSIUM BLD-SCNC: 3.7 MMOL/L (ref 3.5–5.1)
POTASSIUM SERPL-SCNC: 3.9 MMOL/L (ref 3.5–5.1)
PROT SERPL-MCNC: 7.3 GM/DL (ref 5.8–7.6)
RBC # BLD AUTO: 4.29 X10(6)/MCL (ref 4.7–6.1)
SAMPLE: ABNORMAL
SODIUM BLD-SCNC: 142 MMOL/L (ref 136–145)
SODIUM SERPL-SCNC: 145 MMOL/L (ref 136–145)
SODIUM SERPL-SCNC: 146 MMOL/L (ref 136–145)
SODIUM SERPL-SCNC: 146 MMOL/L (ref 136–145)
SODIUM SERPL-SCNC: 150 MMOL/L (ref 136–145)
WBC # BLD AUTO: 11.42 X10(3)/MCL (ref 4.5–11.5)

## 2024-08-12 PROCEDURE — 25000003 PHARM REV CODE 250: Performed by: STUDENT IN AN ORGANIZED HEALTH CARE EDUCATION/TRAINING PROGRAM

## 2024-08-12 PROCEDURE — 83930 ASSAY OF BLOOD OSMOLALITY: CPT | Performed by: PSYCHIATRY & NEUROLOGY

## 2024-08-12 PROCEDURE — 84100 ASSAY OF PHOSPHORUS: CPT

## 2024-08-12 PROCEDURE — 25000003 PHARM REV CODE 250: Performed by: INTERNAL MEDICINE

## 2024-08-12 PROCEDURE — 99233 SBSQ HOSP IP/OBS HIGH 50: CPT | Mod: FS,,, | Performed by: NEUROLOGICAL SURGERY

## 2024-08-12 PROCEDURE — 63600175 PHARM REV CODE 636 W HCPCS: Performed by: EMERGENCY MEDICINE

## 2024-08-12 PROCEDURE — 25000003 PHARM REV CODE 250

## 2024-08-12 PROCEDURE — C9248 INJ, CLEVIDIPINE BUTYRATE: HCPCS | Performed by: EMERGENCY MEDICINE

## 2024-08-12 PROCEDURE — 83735 ASSAY OF MAGNESIUM: CPT

## 2024-08-12 PROCEDURE — 36415 COLL VENOUS BLD VENIPUNCTURE: CPT | Performed by: PSYCHIATRY & NEUROLOGY

## 2024-08-12 PROCEDURE — 25000003 PHARM REV CODE 250: Performed by: NURSE PRACTITIONER

## 2024-08-12 PROCEDURE — 97166 OT EVAL MOD COMPLEX 45 MIN: CPT

## 2024-08-12 PROCEDURE — A4216 STERILE WATER/SALINE, 10 ML: HCPCS | Performed by: STUDENT IN AN ORGANIZED HEALTH CARE EDUCATION/TRAINING PROGRAM

## 2024-08-12 PROCEDURE — 36415 COLL VENOUS BLD VENIPUNCTURE: CPT

## 2024-08-12 PROCEDURE — 99232 SBSQ HOSP IP/OBS MODERATE 35: CPT | Mod: FS,,, | Performed by: PSYCHIATRY & NEUROLOGY

## 2024-08-12 PROCEDURE — 84295 ASSAY OF SERUM SODIUM: CPT | Performed by: PSYCHIATRY & NEUROLOGY

## 2024-08-12 PROCEDURE — 63600175 PHARM REV CODE 636 W HCPCS: Performed by: NURSE PRACTITIONER

## 2024-08-12 PROCEDURE — 80053 COMPREHEN METABOLIC PANEL: CPT

## 2024-08-12 PROCEDURE — 20000000 HC ICU ROOM

## 2024-08-12 PROCEDURE — 97162 PT EVAL MOD COMPLEX 30 MIN: CPT

## 2024-08-12 PROCEDURE — 85025 COMPLETE CBC W/AUTO DIFF WBC: CPT

## 2024-08-12 PROCEDURE — 63600175 PHARM REV CODE 636 W HCPCS

## 2024-08-12 RX ORDER — ENALAPRILAT 1.25 MG/ML
1.25 INJECTION INTRAVENOUS ONCE
Status: COMPLETED | OUTPATIENT
Start: 2024-08-12 | End: 2024-08-12

## 2024-08-12 RX ADMIN — LABETALOL HYDROCHLORIDE 10 MG: 5 INJECTION, SOLUTION INTRAVENOUS at 11:08

## 2024-08-12 RX ADMIN — LABETALOL HYDROCHLORIDE 10 MG: 5 INJECTION, SOLUTION INTRAVENOUS at 09:08

## 2024-08-12 RX ADMIN — CLEVIPIDINE 11 MG/HR: 0.5 EMULSION INTRAVENOUS at 08:08

## 2024-08-12 RX ADMIN — SODIUM CHLORIDE 60 ML/HR: 3 INJECTION, SOLUTION INTRAVENOUS at 11:08

## 2024-08-12 RX ADMIN — SODIUM CHLORIDE 60 ML/HR: 3 INJECTION, SOLUTION INTRAVENOUS at 03:08

## 2024-08-12 RX ADMIN — CLEVIPIDINE 9 MG/HR: 0.5 EMULSION INTRAVENOUS at 02:08

## 2024-08-12 RX ADMIN — HYDRALAZINE HYDROCHLORIDE 10 MG: 20 INJECTION INTRAMUSCULAR; INTRAVENOUS at 04:08

## 2024-08-12 RX ADMIN — CLEVIPIDINE 10 MG/HR: 0.5 EMULSION INTRAVENOUS at 05:08

## 2024-08-12 RX ADMIN — SODIUM CHLORIDE, PRESERVATIVE FREE 10 ML: 5 INJECTION INTRAVENOUS at 11:08

## 2024-08-12 RX ADMIN — LEVETIRACETAM 500 MG: 100 INJECTION, SOLUTION INTRAVENOUS at 08:08

## 2024-08-12 RX ADMIN — SODIUM CHLORIDE, PRESERVATIVE FREE 10 ML: 5 INJECTION INTRAVENOUS at 06:08

## 2024-08-12 RX ADMIN — CLEVIPIDINE 11 MG/HR: 0.5 EMULSION INTRAVENOUS at 09:08

## 2024-08-12 RX ADMIN — MUPIROCIN: 20 OINTMENT TOPICAL at 08:08

## 2024-08-12 RX ADMIN — LABETALOL HYDROCHLORIDE 10 MG: 5 INJECTION, SOLUTION INTRAVENOUS at 06:08

## 2024-08-12 RX ADMIN — CLEVIPIDINE 6 MG/HR: 0.5 EMULSION INTRAVENOUS at 12:08

## 2024-08-12 RX ADMIN — ENALAPRILAT 1.25 MG: 2.5 INJECTION INTRAVENOUS at 07:08

## 2024-08-12 RX ADMIN — HYDRALAZINE HYDROCHLORIDE 10 MG: 20 INJECTION INTRAMUSCULAR; INTRAVENOUS at 09:08

## 2024-08-12 NOTE — PLAN OF CARE
Left Advanced Care Planning information packet in the room for patient's sister after talking to her about it in detail over the phone.

## 2024-08-12 NOTE — ASSESSMENT & PLAN NOTE
Presented with sudden onset aphasia and left sided weakness  Etiology: concerning for hypertensive bleed  Intervention: blood pressure control, ICU admission, neurosurgery consulted    Stroke workup:  -CT head:  There is 4.2 x 3.8 cm (AP x T) intraparenchymal hemorrhage centered in the right thalamocapsular region series 2 image 36. There is mild surrounding edema with resultant effacement of the adjacent sulci and Sylvian fissure, as well as compression of the right lateral ventricle with 8mm midline shift on series 2 image 36.  Intraventricular extension of bleed is seen in the right lateral ventricle and 3rd ventricle. There is mild dilatation of the left lateral ventricle compared to the right suggesting possible early obstructive hydrocephalus component. Sulcal hyperdensities are seen along the right temporal lobe reflective of subarachnoid hemorrhage. Recommend continued serial interval follow-up to full resolution as indicated.  -CTA head and neck:  Mild dolichoectasia in the basilar artery  Extensive intracranial hemorrhage   ECHO w/BS:   Normal left atrial size.estimated ejection fraction of 55 - 60%. Grade II diastolic dysfunction.Study is negative for shunt      Plan    -hourly neuro checks ... notify neurology of any neuro change  -SBP less than 140  -avoid antiplatelet or anticoagulation at this time  -seizure precautions ... notify neurology of any seizure-like activity  -continue keppra 500 mg BID for seizure prophylaxis   Hypertonic saline:  - Na goal 145-155  - 3% hypertonic saline at 50 mL/hour  - check Na and Osmo q6hr   - Hold 3% if Na is above 160 or Osmo is above 320 ... turn off ... recheck labs in 6 hours ... restart 3% when Na is below 160 or Osmo is below 310     Further recommendations may follow per MD.

## 2024-08-12 NOTE — PLAN OF CARE
08/12/24 1005   Discharge Assessment   Assessment Type Discharge Planning Assessment   Confirmed/corrected address, phone number and insurance Yes   Confirmed Demographics Correct on Facesheet   Source of Information patient;family   Communicated JÚNIOR with patient/caregiver Date not available/Unable to determine   Reason For Admission intraparenchimal hemorrhage of brain   People in Home alone   Facility Arrived From: home   Do you expect to return to your current living situation? Other (see comments)  (spoke to sister, see narrative)   Do you have help at home or someone to help you manage your care at home? Yes   Who are your caregiver(s) and their phone number(s)? maybe temporary help from brother and sister   Prior to hospitilization cognitive status: Alert/Oriented   Current cognitive status: Alert/Oriented   Walking or Climbing Stairs Difficulty no   Dressing/Bathing Difficulty no   Home Accessibility stairs to enter home   Number of Stairs, Main Entrance other (see comments)  (one flight)   Stair Railings, Main Entrance railings safe and in good condition   Equipment Currently Used at Home none   Patient currently being followed by outpatient case management? No   Do you currently have service(s) that help you manage your care at home? No   Do you take prescription medications? Yes   Do you have prescription coverage? Yes   Coverage Medicaid   Who is going to help you get home at discharge? family   How do you get to doctors appointments? family or friend will provide   Are you on dialysis? No   Do you take coumadin? No   Discharge Plan A Rehab   Discharge Plan B Other  (to be determined)   DME Needed Upon Discharge  other (see comments)  (to be determined)   Discharge Plan discussed with: Sibling   Name(s) and Number(s) spoke to sister Melina   Transition of Care Barriers Social     Patient lives alone in upstairs apartment. Uses bike to get around. Does not drive. Little help from family. Spoke to  "patient and then called his sister, Melina. Melina states that he is "hard to get along with" and that their mother has Dementia. She said he was living in a house that she owns, but he was not taking care of it or paying bills, so now he has an apartment. She said her brother is now living in that house to help her with their mother and that patient can possibly eventually go there temporarily to recover. Did discuss possible Rehab placement with her. She is wanting him to have this. She also said he was supposed to be on "anger" medication. I notified the nurse and communicated home situation with therapy.  "

## 2024-08-12 NOTE — NURSING
Nurses Note -- 4 Eyes      8/12/2024   4:28 AM      Skin assessed during: Q Shift Change      [x] No Altered Skin Integrity Present    [x]Prevention Measures Documented      [] Yes- Altered Skin Integrity Present or Discovered   [] LDA Added if Not in Epic (Describe Wound)   [] New Altered Skin Integrity was Present on Admit and Documented in LDA   [] Wound Image Taken    Wound Care Consulted? No    Attending Nurse:  Nishant Bethea RN/Staff Member:  Bianka ICU Nurse Tech

## 2024-08-12 NOTE — PROGRESS NOTES
[facility] - 7th Floor ICU  Neurosurgery  Progress Note    Subjective:     Interval History: F/u for right BG hemorrhage with IVH, HD #2  He is sitting up in bed, much more alert today. He is very agitated and refuses to answer questions appropriately. States his first name is Superman and his last name is Legit. Moving all over the bed, very purposeful with right UE and LE. Neurology did start 3% yesterday, currently at 60cc/hr, Na 145. On cleviprex for BP control.    Post-Op Info:  * No surgery found *          Medications:  Continuous Infusions:   clevidipine  0-16 mg/hr Intravenous Continuous 22 mL/hr at 08/12/24 0955 11 mg/hr at 08/12/24 0955    sodium chloride 3% HYPERTONIC  60 mL/hr Intravenous Continuous 60 mL/hr at 08/12/24 0300 60 mL/hr at 08/12/24 0300     Scheduled Meds:   levETIRAcetam (Keppra) IV (PEDS and ADULTS)  500 mg Intravenous Q12H    mupirocin   Nasal BID    sodium chloride 0.9%  10 mL Intravenous Q6H     PRN Meds:  Current Facility-Administered Medications:     0.9% NaCl, , Intravenous, PRN    acetaminophen, 650 mg, Rectal, Q6H PRN    acetaminophen, 650 mg, Oral, Q6H PRN    bisacodyL, 10 mg, Rectal, Daily PRN    hydrALAZINE, 10 mg, Intravenous, Q4H PRN    labetalol, 10 mg, Intravenous, Q2H PRN    sodium chloride 0.9%, 10 mL, Intravenous, PRN    Flushing PICC/Midline Protocol, , , Until Discontinued **AND** sodium chloride 0.9%, 10 mL, Intravenous, Q6H **AND** sodium chloride 0.9%, 10 mL, Intravenous, PRN     Review of Systems  Objective:     Weight: 65.3 kg (143 lb 15.4 oz)  Body mass index is 22.55 kg/m².  Vital Signs (Most Recent):  Temp: 97.9 °F (36.6 °C) (08/12/24 0800)  Pulse: 69 (08/12/24 1004)  Resp: 17 (08/12/24 1004)  BP: (!) 140/79 (08/12/24 1002)  SpO2: 96 % (08/12/24 1004) Vital Signs (24h Range):  Temp:  [97.9 °F (36.6 °C)-101.3 °F (38.5 °C)] 97.9 °F (36.6 °C)  Pulse:  [] 69  Resp:  [14-36] 17  SpO2:  [94 %-99 %] 96 %  BP: (101-164)/() 140/79  Arterial  "Line BP: (118-160)/(53-80) 130/63     Date 08/12/24 0700 - 08/13/24 0659   Shift 2364-5487 1827-6357 6087-6285 24 Hour Total   INTAKE   Shift Total(mL/kg)       OUTPUT   Urine(mL/kg/hr) 1000   1000   Shift Total(mL/kg) 1000(15.3)   1000(15.3)   Weight (kg) 65.3 65.3 65.3 65.3                       Male External Urinary Catheter 08/10/24 1015 Medium (Active)   Collection Container Other (Comment) 08/12/24 0948   Securement Method secured to lower ABD w/ tape 08/12/24 0948   Skin no redness;no breakdown 08/12/24 0948   Tolerance no signs/symptoms of discomfort 08/12/24 0948   Output (mL) 400 mL 08/12/24 0959   Catheter Change Date 08/11/24 08/12/24 0948   Catheter Change Time 2130 08/12/24 0400       Neurosurgery Physical Exam  AFVSS  PERRL, brisk bilateral. EOMI  Alert, refusing to answer questions appropriately with obvious sarcasm. Speech clear. Follows commands in the right UE and LE    Significant Labs:  Recent Labs   Lab 08/11/24 0347 08/11/24  0855 08/11/24 2052 08/12/24 0311 08/12/24 0824      < > 141 145 146*   K 4.0  --   --  3.9  --    *  --   --  115*  --    CO2 20*  --   --  19*  --    BUN 12.9  --   --  12.2  --    CREATININE 0.85  --   --  0.78  --    CALCIUM 9.3  --   --  9.2  --    MG 2.10  --   --  2.40  --     < > = values in this interval not displayed.     Recent Labs   Lab 08/11/24 0347 08/12/24 0311   WBC 10.27 11.42   HGB 12.5* 12.9*   HCT 37.3* 38.6*    326     No results for input(s): "LABPT", "INR", "APTT" in the last 48 hours.  Microbiology Results (last 7 days)       ** No results found for the last 168 hours. **            Significant Diagnostics:  CT Head Without Contrast [[id number]] Resulted: 08/12/24 0837   Order Status: Completed Updated: 08/12/24 0840   Narrative:     EXAMINATION:  CT HEAD WITHOUT CONTRAST    CLINICAL HISTORY:  Stroke, follow up;    TECHNIQUE:  Low dose axial images were obtained through the head.  Coronal and sagittal reformations were also " performed. Contrast was not administered.  Dose reduction techniques including automatic exposure control (AEC) were utilized.    Dose (DLP): 998 mGycm    COMPARISON:  CT head without contrast 08/10/2024. CT angiogram head and neck 08/10/2024.    FINDINGS:  Similar appearance of an acute intraparenchymal hematoma measuring 4.8 cm x 3.4 cm x 3.2 cm in AP, transverse, and AP dimensions involving the right thalamus and right putamen.  There is extension of hemorrhage into the right lateral and 3rd ventricles (and to a lesser extent left occipital horn) and also extension of hemorrhage into the subarachnoid space of the right sylvian fissure.  Mild vasogenic edema along the margins of the intraparenchymal hematoma.  Right to left midline shift of approximately 6 mm appears similar.  No evidence of descending transtentorial herniation.  No hydrocephalus.    Paranasal sinuses are clear.  Middle ear cavities and mastoid air cells are clear.    No acute osseous abnormality.  Chronic fracture deformity of the left lamina papyracea.   Impression:       Unchanged appearance of the intraparenchymal hematoma centered in the right deep gray nuclei with intraventricular and subarachnoid extension of hemorrhage.  Unchanged 6 mm right to left midline shift.  No hydrocephalus.       Assessment/Plan:     Active Diagnoses:    Diagnosis Date Noted POA    Intraparenchymal hemorrhage of brain [I61.9] 08/10/2024 Yes      Problems Resolved During this Admission:     His neuro exam is improved from yesterday  Repeat CT head shows stable IPH, IVH and stable midline shift. No hydrocephalus noted.  No plans for surgical intervention  OK for Q2 hour neuro exams  BP parameters below 140/90  Keppra BID  SCDs for DVT prophylaxis  Hypertonic saline being managed by neurology  We will sign off. Call with any neuro decline    DAVE Hernandez  Neurosurgery  [facility] - 7th Floor ICU

## 2024-08-12 NOTE — ASSESSMENT & PLAN NOTE
Patient meets ASPEN criteria for moderate malnutrition of acute illness or injury per RD assessment as evidenced by:  Energy Intake (Malnutrition):  (does not meet criteria)  Weight Loss (Malnutrition):  (does not meet criteria)  Subcutaneous Fat (Malnutrition): mild depletion  Muscle Mass (Malnutrition): mild depletion           A minimum of two characteristics is recommended for diagnosis of either severe or non-severe malnutrition.

## 2024-08-12 NOTE — PT/OT/SLP EVAL
"Occupational Therapy  Evaluation    Name: Dell Garrett Jr.  MRN: [id number]  Admitting Diagnosis: CVA  Recent Surgery: * No surgery found *      Recommendations:     Discharge therapy intensity: High Intensity Therapy   Discharge Equipment Recommendations:  to be determined by next level of care      Assessment:     Dell Garrett Jr. is a 59 y.o. male with a medical diagnosis of R basal ganglia hemorrhage with R to L shift and intraventricular extension, elevated BP.  He presents alert, R gaze preference, LUE and L flaccid, resistive to physical cues and assist.  Easily agitated.  Difficulty engaging in conversation or reasoning.   He presents with the following performance deficits affecting function: weakness, impaired self care skills, impaired functional mobility, gait instability, visual deficits, impaired balance, decreased upper extremity function, decreased lower extremity function, decreased safety awareness, abnormal tone, impaired fine motor.     Rehab Prognosis: Good; patient would benefit from acute skilled OT services to address these deficits and reach maximum level of function.       Plan:     Patient to be seen 6 x/week to address the above listed problems via self-care/home management, therapeutic activities, therapeutic exercises, neuromuscular re-education  Plan of Care Expires: 09/12/24  Plan of Care Reviewed with: patient    Subjective     Chief Complaint: "I want water"  Patient/Family Comments/goals: "go home"    Occupational Profile:  Living Environment: per chart, pt lives alone in an upstairs apartment, helps with care of mother  Previous level of function: independent  Roles and Routines: brother, son  Equipment Used at Home: none  Assistance upon Discharge: TBD    Pain/Comfort:  Pain Rating 1: 0/10    Patients cultural, spiritual, Restoration conflicts given the current situation:      Objective:     OT communicated with RN prior to session.      Patient was found HOB elevated with " arterial line (vital monitoring, SCD, heel floats) upon OT entry to room.    General Precautions: Standard,   Orthopedic Precautions: N/A  Braces: N/A    Vital Signs: Blood Pressure: 121/87    Bed Mobility:    Patient completed Supine to Sit with maxx2  Patient completed Sit to Supine with maxx2  Resistive to physical assist    Activities of Daily Living:  Grooming: maximal assistance      AMPAC 6 Click ADL:  AMPA Total Score: 11    Functional Cognition:  Follows commands RUE  Dysarthric speech    Visual Perceptual Skills:  R gaze preference     Upper Extremity Function:  Right Upper Extremity:   RUE WFL    Left Upper Extremity:  Flaccid, no active movement noted    Balance:   Max A static sitting balance, difficult to obtain a true assessment due to resistance from pt    Therapeutic Positioning  Risk for acquired pressure injuries is increased due to relative decrease in mobility d/t hospitalization  and impaired mobility.    OT interventions performed during the course of today's session:   Therapeutic positioning was provided at the conclusion of session to offload all bony prominences for the prevention and/or reduction of pressure injuries    Skin assessment: all bony prominences were assessed    Findings: no redness or breakdown noted    OT recommendations for therapeutic positioning throughout hospitalization:   Follow [facility] Pressure Injury Prevention Protocol        Patient Education:  Patient provided with verbal education education regarding safety awareness and Discharge/DME recommendations.  Additional teaching is warranted.     Patient left HOB elevated with all lines intact and CM present.  L heel floated, pt refused R heel float but actively moving.    GOALS:   Multidisciplinary Problems       Occupational Therapy Goals          Problem: Occupational Therapy    Goal Priority Disciplines Outcome Interventions   Occupational Therapy Goal     OT, PT/OT Progressing    Description: Goals to be met by:  9/12/24     Patient will increase functional independence with ADLs by performing:    UE Dressing with Stand-by Assistance.  LE Dressing with mod A  Grooming while seated at sink with min A  Assistance.  Toileting from toilet with mod A for hygiene and clothing management.   Toilet transfer to toilet with mod A  Increased functional strength to 3/5 LUE through therEX, neuromuscular re-ed.  Pt will visually attend to L side of environment with min cues                           History:     Past Medical History:   Diagnosis Date    Crohn's disease     GERD (gastroesophageal reflux disease)        History reviewed. No pertinent surgical history.    Time Tracking:     OT Date of Treatment:    OT Start Time: 0936  OT Stop Time: 0950  OT Total Time (min): 14 min    Billable Minutes:Evaluation MOD    8/12/2024

## 2024-08-12 NOTE — PROGRESS NOTES
[facility] - 7th Floor ICU  Neurology  Progress Note    Patient Name: Dell Garrett Jr.  MRN: [id number]  Admission Date: 8/10/2024  Hospital Length of Stay: 2 days  Code Status: Full Code   Attending Provider: JOE Piper MD  Primary Care Physician: Yaa Taveras FNP   Principal Problem:<principal problem not specified>    HPI:   59 year old male with a past medical history of Crohn's disease and GERD presented to ED on 08/10 for aphasia.  He was last seen normal at 6:45 a.m..  He was at the [employer] shop getting his haircut when he suddenly stood up and fell.  On scene, he had left arm weakness and slurred speech and was only responsive to voice.  Upon arrival to ED, a stroke alert was called.  CT head significant for right basal ganglia hemorrhage with 8 mm right-to-left shift as well as IVH.  CTA head and neck was negative for LVH or vascular abnormality.  Neurology was consulted for stroke workup.    Overview/Hospital Course:  No notes on file        Subjective:     Interval History: No new issues overnight. No official read on repeat CT head. No plans for surgical intervention per Neurosurgery. . Hypertonic saline at 60 ml/hr.    Current Neurological Medications:     Current Facility-Administered Medications   Medication Dose Route Frequency Provider Last Rate Last Admin    0.9%  NaCl infusion   Intravenous PRN JOE Piper MD   Stopped at 08/11/24 1455    acetaminophen suppository 650 mg  650 mg Rectal Q6H PRN Chinmay Grubbs DO   650 mg at 08/11/24 2111    acetaminophen tablet 650 mg  650 mg Oral Q6H PRN Chinmay Grubbs DO        bisacodyL suppository 10 mg  10 mg Rectal Daily PRN Ileana Rene, SHARRI        clevidipine (CLEVIPREX) 25 mg/50 mL infusion  0-16 mg/hr Intravenous Continuous Ochoa Parsons III, MD 20 mL/hr at 08/12/24 0515 10 mg/hr at 08/12/24 0515    hydrALAZINE injection 10 mg  10 mg Intravenous Q4H PRN Brooks Carlisle MD   10 mg at 08/11/24 1113     labetaloL injection 10 mg  10 mg Intravenous Q2H PRN AnjuIleana NP   10 mg at 08/11/24 0641    levETIRAcetam (Keppra) 500 mg in D5W 100 mL IVPB (MB+)  500 mg Intravenous Q12H Anju, Ileana W NP   Stopped at 08/11/24 2035    mupirocin 2 % ointment   Nasal BID JOE Piper MD        sodium chloride 0.9% flush 10 mL  10 mL Intravenous PRN AnjuIleana NP        sodium chloride 0.9% flush 10 mL  10 mL Intravenous Q6H Luis Enrique Saeed MD   10 mL at 08/11/24 1745    And    sodium chloride 0.9% flush 10 mL  10 mL Intravenous PRN Luis Enrique Saeed MD        sodium chloride 3% HYPERTONIC solution  60 mL/hr Intravenous Continuous Ileana Rene NP 60 mL/hr at 08/12/24 0300 60 mL/hr at 08/12/24 0300       Review of Systems  Objective:     Vital Signs (Most Recent):  Temp: 98.9 °F (37.2 °C) (08/12/24 0400)  Pulse: 102 (08/12/24 0715)  Resp: 18 (08/12/24 0715)  BP: 130/74 (08/12/24 0715)  SpO2: 98 % (08/12/24 0715) Vital Signs (24h Range):  Temp:  [98.9 °F (37.2 °C)-101.3 °F (38.5 °C)] 98.9 °F (37.2 °C)  Pulse:  [] 102  Resp:  [14-28] 18  SpO2:  [94 %-99 %] 98 %  BP: (101-164)/() 130/74  Arterial Line BP: (118-171)/(53-86) 140/62     Weight: 65.3 kg (143 lb 15.4 oz)  Body mass index is 22.55 kg/m².     Physical Exam        Vitals and nursing note reviewed.   Constitutional:       General: He is not in acute distress.     Appearance: He is not toxic-appearing.   Eyes:      Pupils: Pupils are equal, round, and reactive to light.   Cardiovascular:      Rate and Rhythm: Normal rate.   Pulmonary:      Effort: Pulmonary effort is normal.   Musculoskeletal:      Cervical back: Normal range of motion.      Right lower leg: No edema.      Left lower leg: No edema.   Skin:     General: Skin is warm and dry.      Capillary Refill: Capillary refill takes less than 2 seconds.   Neurological:      Mental Status: He is lethargic.      Cranial Nerves: Dysarthria and facial asymmetry (left facial  droop) present.      Comments:      Limited PE   Limited participation in exam  Dysarthria  Does not follow commands  Spontaneously move RUE, RLE  Withdraws and localizes painful stim LUE, LLE  Gaze midline  Resists having eyes opened  PERR          NEUROLOGICAL EXAMINATION:      CRANIAL NERVES      CN III, IV, VI   Pupils are equal, round, and reactive to light.    Significant Labs: CBC:   Recent Labs   Lab 08/11/24 0347 08/12/24 0311   WBC 10.27 11.42   HGB 12.5* 12.9*   HCT 37.3* 38.6*    326     CMP:   Recent Labs   Lab 08/11/24  0347 08/11/24  0855 08/11/24  1453 08/11/24 2052 08/12/24 0311      < > 140 141 145   K 4.0  --   --   --  3.9   *  --   --   --  115*   CO2 20*  --   --   --  19*   BUN 12.9  --   --   --  12.2   CREATININE 0.85  --   --   --  0.78   CALCIUM 9.3  --   --   --  9.2   MG 2.10  --   --   --  2.40   ALBUMIN 3.7  --   --   --  3.6   BILITOT 0.9  --   --   --  1.0   ALKPHOS 62  --   --   --  59   AST 20  --   --   --  17   ALT 17  --   --   --  16    < > = values in this interval not displayed.       Significant Imaging: I have reviewed all pertinent imaging results/findings within the past 24 hours.  Assessment and Plan:     Intraparenchymal hemorrhage of brain  Presented with sudden onset aphasia and left sided weakness  Etiology: concerning for hypertensive bleed  Intervention: blood pressure control, ICU admission, neurosurgery consulted    Stroke workup:  -CT head:  There is 4.2 x 3.8 cm (AP x T) intraparenchymal hemorrhage centered in the right thalamocapsular region series 2 image 36. There is mild surrounding edema with resultant effacement of the adjacent sulci and Sylvian fissure, as well as compression of the right lateral ventricle with 8mm midline shift on series 2 image 36.  Intraventricular extension of bleed is seen in the right lateral ventricle and 3rd ventricle. There is mild dilatation of the left lateral ventricle compared to the right suggesting  possible early obstructive hydrocephalus component. Sulcal hyperdensities are seen along the right temporal lobe reflective of subarachnoid hemorrhage. Recommend continued serial interval follow-up to full resolution as indicated.  -CTA head and neck:  Mild dolichoectasia in the basilar artery  Extensive intracranial hemorrhage   ECHO w/BS:   Normal left atrial size.estimated ejection fraction of 55 - 60%. Grade II diastolic dysfunction.Study is negative for shunt      Plan    -hourly neuro checks ... notify neurology of any neuro change  -SBP less than 140  -avoid antiplatelet or anticoagulation at this time  -seizure precautions ... notify neurology of any seizure-like activity  -continue keppra 500 mg BID for seizure prophylaxis   Hypertonic saline:  - Na goal 145-155  - 3% hypertonic saline at 50 mL/hour  - check Na and Osmo q6hr   - Hold 3% if Na is above 160 or Osmo is above 320 ... turn off ... recheck labs in 6 hours ... restart 3% when Na is below 160 or Osmo is below 310     Further recommendations may follow per MD.          VTE Risk Mitigation (From admission, onward)           Ordered     IP VTE LOW RISK PATIENT  Once         08/10/24 1059     Place sequential compression device  Until discontinued         08/10/24 1059                    Fern Aguilar NP  Neurology  [facility] - 7th Floor ICU

## 2024-08-12 NOTE — PT/OT/SLP PROGRESS
SLP attempted to visit patient this AM for bedside swallow evaluation; however patient lethargic and refusing PO at this time. Will continue to follow up as appropriate.

## 2024-08-12 NOTE — CONSULTS
Inpatient Nutrition Assessment    Admit Date: 8/10/2024   Total duration of encounter: 2 days   Patient Age: 59 y.o.    Nutrition Recommendation/Prescription     When feasible, regular diet as tolerated, consistency per SLP; add Boost Plus (provides 360 kcal, 14 g protein per serving).  If oral intake remains not feasible, recommend tube feeding.    Tube feeding recommendation:  Peptamen Intense VHP goal rate 50 ml/hr to provide (calculations based on estimated 20 hr/d run time)   1000 kcal/d  93 g protein/d  840 ml free water/d    Communication of Recommendations: reviewed with nurse, reviewed with patient, and reviewed with family    Nutrition Assessment     Malnutrition Assessment/Nutrition-Focused Physical Exam    Malnutrition Context: acute illness or injury (08/12/24 1142)  Malnutrition Level: moderate (08/12/24 1142)  Energy Intake (Malnutrition):  (does not meet criteria) (08/12/24 1142)  Weight Loss (Malnutrition):  (does not meet criteria) (08/12/24 1142)  Subcutaneous Fat (Malnutrition): mild depletion (08/12/24 1142)     Upper Arm Region (Subcutaneous Fat Loss): mild depletion     Muscle Mass (Malnutrition): mild depletion (08/12/24 1142)     Clavicle Bone Region (Muscle Loss): mild depletion                             A minimum of two characteristics is recommended for diagnosis of either severe or non-severe malnutrition.    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 with left-sided weakness and suppressed alertness  Elevated blood pressure, no previous dx of HTN   Hypercholesterolemia   Elevated creatinine, resolved    Relevant Medical History: Crohn's  disease, GERD, and chronic tobacco use     Scheduled Medications:  levETIRAcetam (Keppra) IV (PEDS and ADULTS), 500 mg, Q12H  mupirocin, , BID  sodium chloride 0.9%, 10 mL, Q6H    Continuous Infusions:  clevidipine, Last Rate: 11 mg/hr (08/12/24 0955)  sodium chloride 3% HYPERTONIC, Last Rate: 60 mL/hr (08/12/24 1120)    PRN Medications:   0.9% NaCl, , PRN  acetaminophen, 650 mg, Q6H PRN  acetaminophen, 650 mg, Q6H PRN  bisacodyL, 10 mg, Daily PRN  hydrALAZINE, 10 mg, Q4H PRN  labetalol, 10 mg, Q2H PRN  sodium chloride 0.9%, 10 mL, PRN  sodium chloride 0.9%, 10 mL, PRN    Calorie Containing IV Medications: Cleviprex @ 22 ml/hr (provides 1056 kcal/d)    Recent Labs   Lab 08/10/24  0740 08/10/24  0759 08/10/24  0759 08/10/24  1831 08/11/24  0347 08/11/24  0855 08/11/24  1453 08/11/24  2052 08/12/24  0311 08/12/24  0824   NA  --  141   < > 138 140 138 140 141 145 146*   K  --  4.3  --   --  4.0  --   --   --  3.9  --    CALCIUM  --  9.0  --   --  9.3  --   --   --  9.2  --    PHOS  --   --   --   --  3.5  --   --   --  2.7  --    MG  --   --   --   --  2.10  --   --   --  2.40  --    CO2  --  22*  --   --  20*  --   --   --  19*  --    BUN  --  26.9*  --   --  12.9  --   --   --  12.2  --    CREATININE  --  1.44*  --   --  0.85  --   --   --  0.78  --    EGFRNORACEVR  --  55  --   --  >60  --   --   --  >60  --    GLUCOSE  --  119*  --   --  114  --   --   --  121*  --    BILITOT  --  0.3  --   --  0.9  --   --   --  1.0  --    ALKPHOS  --  60  --   --  62  --   --   --  59  --    ALT  --  16  --   --  17  --   --   --  16  --    AST  --  21  --   --  20  --   --   --  17  --    ALBUMIN  --  3.7  --   --  3.7  --   --   --  3.6  --    TRIG  --  60  --   --   --   --   --   --   --   --    HGBA1C  --  5.5  --   --   --   --   --   --   --   --    WBC  --  6.26  --   --  10.27  --   --   --  11.42  --    HGB  --  12.1*  --   --  12.5*  --   --   --  12.9*  --    HCT 36 37.3*  --   --  37.3*  --   --   --  38.6*  --     <  "> = values in this interval not displayed.     Nutrition Orders:  Diet NPO    Appetite/Oral Intake: NPO/not applicable  Factors Affecting Nutritional Intake: impaired cognitive status/motor control and NPO  Social Needs Impacting Access to Food: none identified  Food/Buddhist/Cultural Preferences: none reported  Food Allergies: no known food allergies  Last Bowel Movement: 08/10/24  Wound(s): no pressure injuries documented at this time     Comments    8/12/24 Patient confused, sister at bedside. Patient's sister reports good appetite/intake prior to admission, regular diet at home, denies weight loss. Patient reports liking vanilla/strawberry Ensure. He is currently NPO, had not been able to participate in SLP evaluation. Tube feeding recommendation provided if oral intake remains not feasible.    Anthropometrics    Height: 5' 7" (170.2 cm), Height Method: Measured  Last Weight: 65.3 kg (143 lb 15.4 oz) (08/10/24 1346), Weight Method: Bed Scale  BMI (Calculated): 22.5  BMI Classification: normal (BMI 18.5-24.9)        Ideal Body Weight (IBW), Male: 148 lb     % Ideal Body Weight, Male (lb): 97.27 %                          Usual Weight Provided By: family/caregiver denies unintentional weight loss    Wt Readings from Last 5 Encounters:   08/10/24 65.3 kg (143 lb 15.4 oz)   05/12/24 65.8 kg (145 lb)   09/25/23 67.1 kg (148 lb)   08/30/23 67.1 kg (148 lb)   08/21/23 63.5 kg (140 lb)     Weight Change(s) Since Admission: none, new admit  Wt Readings from Last 1 Encounters:   08/10/24 1346 65.3 kg (143 lb 15.4 oz)   08/10/24 0743 65.3 kg (143 lb 15.4 oz)   Admit Weight: 65.3 kg (143 lb 15.4 oz) (08/10/24 0743), Weight Method: Bed Scale    Estimated Needs    Weight Used For Calorie Calculations: 65.3 kg (143 lb 15.4 oz)  Energy Calorie Requirements (kcal): 1984, 1.4 stress factor  Energy Need Method: [place]-St Jeor  Weight Used For Protein Calculations: 65.3 kg (143 lb 15.4 oz)  Protein Requirements: 79-98 g, 1.2-1.5 " g/kg  Fluid Requirements (mL): 1984, 1 ml/kcal      Enteral Nutrition Patient not receiving enteral nutrition at this time.    Parenteral Nutrition Patient not receiving parenteral nutrition support at this time.    Evaluation of Received Nutrient Intake    Calories: not meeting estimated needs  Protein: not meeting estimated needs    Patient Education Not applicable.    Nutrition Diagnosis     PES: Inadequate energy intake related to inability to consume sufficient nutrients as evidenced by less than 80% needs met. (new)  PES: Moderate acute disease or injury related malnutrition related to acute illness as evidenced by mild fat depletion and mild muscle depletion. (new)    Nutrition Interventions     Intervention(s): modified composition of meals/snacks, modified composition of enteral nutrition, commercial beverage, and collaboration with other providers  Goal: Meet greater than 80% of nutritional needs by follow-up. (new)    Nutrition Goals & Monitoring     Dietitian will monitor: food and beverage intake, energy intake, enteral nutrition intake, parenteral nutrition intake, weight, electrolyte/renal panel, and beliefs/attitudes  Discharge planning: too early to determine; pending clinical course  Nutrition Risk/Follow-Up: moderate (follow-up in 3-5 days)   Please consult if re-assessment needed sooner.

## 2024-08-12 NOTE — PLAN OF CARE
Problem: Occupational Therapy  Goal: Occupational Therapy Goal  Description: Goals to be met by: 9/12/24     Patient will increase functional independence with ADLs by performing:    UE Dressing with Stand-by Assistance.  LE Dressing with mod A  Grooming while seated at sink with min A  Assistance.  Toileting from toilet with mod A for hygiene and clothing management.   Toilet transfer to toilet with mod A  Increased functional strength to 3/5 LUE through therEX, neuromuscular re-ed.  Pt will visually attend to L side of environment with min cues      Outcome: Progressing

## 2024-08-12 NOTE — SUBJECTIVE & OBJECTIVE
Subjective:     Interval History: No new issues overnight. No official read on repeat CT head. No plans for surgical intervention per Neurosurgery. . Hypertonic saline at 60 ml/hr.    Current Neurological Medications:     Current Facility-Administered Medications   Medication Dose Route Frequency Provider Last Rate Last Admin    0.9%  NaCl infusion   Intravenous PRN JOE Piper MD   Stopped at 08/11/24 1455    acetaminophen suppository 650 mg  650 mg Rectal Q6H PRN Chinmay Grubbs DO   650 mg at 08/11/24 2111    acetaminophen tablet 650 mg  650 mg Oral Q6H PRN Chinmay Grubbs DO        bisacodyL suppository 10 mg  10 mg Rectal Daily PRN Ileana Rene NP        clevidipine (CLEVIPREX) 25 mg/50 mL infusion  0-16 mg/hr Intravenous Continuous Ochoa Parsons III, MD 20 mL/hr at 08/12/24 0515 10 mg/hr at 08/12/24 0515    hydrALAZINE injection 10 mg  10 mg Intravenous Q4H PRN Brooks Carlisle MD   10 mg at 08/11/24 1113    labetaloL injection 10 mg  10 mg Intravenous Q2H PRN Ileana Rene NP   10 mg at 08/11/24 0641    levETIRAcetam (Keppra) 500 mg in D5W 100 mL IVPB (MB+)  500 mg Intravenous Q12H Ileana Rene NP   Stopped at 08/11/24 2035    mupirocin 2 % ointment   Nasal BID JOE Piper MD        sodium chloride 0.9% flush 10 mL  10 mL Intravenous PRN Ileana Rene NP        sodium chloride 0.9% flush 10 mL  10 mL Intravenous Q6H Luis Enrique Saeed MD   10 mL at 08/11/24 1745    And    sodium chloride 0.9% flush 10 mL  10 mL Intravenous PRN Luis Enrique Saeed MD        sodium chloride 3% HYPERTONIC solution  60 mL/hr Intravenous Continuous Ileana Rene NP 60 mL/hr at 08/12/24 0300 60 mL/hr at 08/12/24 0300       Review of Systems  Objective:     Vital Signs (Most Recent):  Temp: 98.9 °F (37.2 °C) (08/12/24 0400)  Pulse: 102 (08/12/24 0715)  Resp: 18 (08/12/24 0715)  BP: 130/74 (08/12/24 0715)  SpO2: 98 % (08/12/24 0715) Vital Signs (24h Range):  Temp:  [98.9 °F  (37.2 °C)-101.3 °F (38.5 °C)] 98.9 °F (37.2 °C)  Pulse:  [] 102  Resp:  [14-28] 18  SpO2:  [94 %-99 %] 98 %  BP: (101-164)/() 130/74  Arterial Line BP: (118-171)/(53-86) 140/62     Weight: 65.3 kg (143 lb 15.4 oz)  Body mass index is 22.55 kg/m².     Physical Exam        Vitals and nursing note reviewed.   Constitutional:       General: He is not in acute distress.     Appearance: He is not toxic-appearing.   Eyes:      Pupils: Pupils are equal, round, and reactive to light.   Cardiovascular:      Rate and Rhythm: Normal rate.   Pulmonary:      Effort: Pulmonary effort is normal.   Musculoskeletal:      Cervical back: Normal range of motion.      Right lower leg: No edema.      Left lower leg: No edema.   Skin:     General: Skin is warm and dry.      Capillary Refill: Capillary refill takes less than 2 seconds.   Neurological:      Mental Status: He is lethargic.      Cranial Nerves: Dysarthria and facial asymmetry (left facial droop) present.      Comments:      Limited PE   Limited participation in exam  Dysarthria  Does not follow commands  Spontaneously move RUE, RLE  Withdraws and localizes painful stim LUE, LLE  Gaze midline  Resists having eyes opened  PERR          NEUROLOGICAL EXAMINATION:      CRANIAL NERVES      CN III, IV, VI   Pupils are equal, round, and reactive to light.    Significant Labs: CBC:   Recent Labs   Lab 08/11/24  0347 08/12/24  0311   WBC 10.27 11.42   HGB 12.5* 12.9*   HCT 37.3* 38.6*    326     CMP:   Recent Labs   Lab 08/11/24  0347 08/11/24  0855 08/11/24  1453 08/11/24  2052 08/12/24  0311      < > 140 141 145   K 4.0  --   --   --  3.9   *  --   --   --  115*   CO2 20*  --   --   --  19*   BUN 12.9  --   --   --  12.2   CREATININE 0.85  --   --   --  0.78   CALCIUM 9.3  --   --   --  9.2   MG 2.10  --   --   --  2.40   ALBUMIN 3.7  --   --   --  3.6   BILITOT 0.9  --   --   --  1.0   ALKPHOS 62  --   --   --  59   AST 20  --   --   --  17   ALT 17  --    --   --  16    < > = values in this interval not displayed.       Significant Imaging: I have reviewed all pertinent imaging results/findings within the past 24 hours.

## 2024-08-12 NOTE — PLAN OF CARE
Problem: Physical Therapy  Goal: Physical Therapy Goal  Description: Goals to be met by: 2024     Patient will increase functional independence with mobility by performin. Supine to sit with MInimal Assistance  2. Sit to supine with MInimal Assistance  3. Sit to stand transfer with Minimal Assistance  4. Pt to follow 75% of commands.   5. Gait  x 75 feet with Minimal Assistance using Rolling Walker vs LRAD.   6. Pt to increase LLE strength to 3/5     Outcome: Progressing

## 2024-08-12 NOTE — PROGRESS NOTES
"[facility] - 7th Floor ICU  Pulmonary Critical Care Note    Patient Name: Dell Garrett Jr.  MRN: [id number]  Admission Date: 8/10/2024  Hospital Length of Stay: 2 days  Code Status: Full Code  Attending Provider: JOE Piper MD  Primary Care Provider: Yaa Taveras FNP     Subjective:     HPI:   Dell Garrett is a 59 yr old M w PMHx of Crohn's disease, GERD, and chronic tobacco use who presented to [facility] ED on 8/10/24 for acute onset left sided weakness, facial droop and aphasia. Patient's symptoms started around 06:45 when he was getting a hair cut. Patient reported standing from madrigal chair and suddenly developed a HA which he described as a "sticking" sensation. Patient subsequently fell and hit his head but did not lose consciousness. Patient presented bradycardic HR 48 and hypertensive with -160s. Labwork significant for mild normocytic anemia, NAGMA with bicarb 22. BUN/Cr elevated but appears to be at patient baseline. Patient is not on chronic anticoagulation. Imaging consistent with R basal ganglia hemorrhage w 8mm right to left shift and extensive intraventricular hemorrhage  Neurology was consulted from ED. Patient started on Cleviprex for BP control. Admitted to ICU for close monitoring.     Hospital Course/Significant events:  Admit to ICU - 8/10     24 Hour Interval History:  Patient awake this morning and answers questions appropriately for the most part.  Complains of being very thirsty.  3% saline at 60 cc/hour.  Still on Cleviprex for blood pressure control.  No new neuro changes by exam.  CT head this morning shows persistent midline shift but stable.  Hemorrhage unchanged.    Past Medical History:   Diagnosis Date    Crohn's disease     GERD (gastroesophageal reflux disease)        History reviewed. No pertinent surgical history.    Social History     Socioeconomic History    Marital status: Single   Tobacco Use    Smoking status: Some Days     Types: Cigarettes    " Smokeless tobacco: Never   Substance and Sexual Activity    Alcohol use: Yes     Comment: daily    Drug use: Never     Social Determinants of Health     Financial Resource Strain: Patient Declined (8/11/2024)    Overall Financial Resource Strain (CARDIA)     Difficulty of Paying Living Expenses: Patient declined   Food Insecurity: Patient Declined (8/11/2024)    Hunger Vital Sign     Worried About Running Out of Food in the Last Year: Patient declined     Ran Out of Food in the Last Year: Patient declined   Transportation Needs: Patient Declined (8/11/2024)    TRANSPORTATION NEEDS     Transportation : Patient declined   Stress: Patient Declined (8/11/2024)    [employer] - Occupational Stress Questionnaire     Feeling of Stress : Patient declined   Housing Stability: Patient Declined (8/11/2024)    Housing Stability Vital Sign     Unable to Pay for Housing in the Last Year: Patient declined     Homeless in the Last Year: Patient declined           Current Outpatient Medications   Medication Instructions    albuterol (PROVENTIL/VENTOLIN HFA) 90 mcg/actuation inhaler Inhalation    diclofenac sodium (VOLTAREN) 2 g, Topical (Top), 3 times daily    dicyclomine (BENTYL) 20 mg, Oral, 2 times daily PRN    iron-vitamin C 100-250 mg, ICAR-C, 100-250 mg Tab 1 tablet, Oral    loratadine (CLARITIN) 10 mg, Oral, Daily    ondansetron (ZOFRAN-ODT) 8 mg, Oral, Every 6 hours PRN    RINVOQ 45 mg Tb24 1 tablet, Oral    tiZANidine (ZANAFLEX) 4 mg, Oral, Every 8 hours PRN    traMADoL (ULTRAM) 50 mg, Oral, Every 6 hours PRN       Current Inpatient Medications   levETIRAcetam (Keppra) IV (PEDS and ADULTS)  500 mg Intravenous Q12H    mupirocin   Nasal BID    sodium chloride 0.9%  10 mL Intravenous Q6H       Current Intravenous Infusions   clevidipine  0-16 mg/hr Intravenous Continuous 22 mL/hr at 08/12/24 0822 11 mg/hr at 08/12/24 0822    sodium chloride 3% HYPERTONIC  60 mL/hr Intravenous Continuous 60 mL/hr at  08/12/24 0300 60 mL/hr at 08/12/24 0300         ROS   Unable to assess 2/2 patient effort     Objective:       Intake/Output Summary (Last 24 hours) at 8/12/2024 0850  Last data filed at 8/12/2024 0400  Gross per 24 hour   Intake 749.75 ml   Output 1800 ml   Net -1050.25 ml         Vital Signs (Most Recent):  Temp: 98.9 °F (37.2 °C) (08/12/24 0400)  Pulse: 102 (08/12/24 0715)  Resp: 18 (08/12/24 0715)  BP: 130/74 (08/12/24 0715)  SpO2: 98 % (08/12/24 0715)  Body mass index is 22.55 kg/m².  Weight: 65.3 kg (143 lb 15.4 oz) Vital Signs (24h Range):  Temp:  [98.9 °F (37.2 °C)-101.3 °F (38.5 °C)] 98.9 °F (37.2 °C)  Pulse:  [] 102  Resp:  [14-28] 18  SpO2:  [94 %-99 %] 98 %  BP: (101-164)/() 130/74  Arterial Line BP: (118-171)/(53-86) 140/62     Physical Exam  Vitals and nursing note reviewed.   Constitutional:       General: He is sleeping. He is not in acute distress.     Appearance: He is normal weight. He is not ill-appearing, toxic-appearing or diaphoretic.      Comments: SpO2 98 on room air    HENT:      Head: Normocephalic. No raccoon eyes, contusion, masses or laceration.   Eyes:      Extraocular Movements:      Right eye: No nystagmus.      Left eye: No nystagmus.      Pupils: Pupils are equal.      Comments: Pupils fixed    Neck:      Vascular: No carotid bruit.   Cardiovascular:      Rate and Rhythm: Normal rate and regular rhythm.      Pulses: Normal pulses.      Heart sounds: No murmur heard.     No gallop.   Pulmonary:      Effort: Pulmonary effort is normal. No respiratory distress.      Breath sounds: No wheezing, rhonchi or rales.   Abdominal:      General: Abdomen is flat. Bowel sounds are normal. There is no distension.      Palpations: Abdomen is soft.      Tenderness: There is no guarding.   Musculoskeletal:      Right lower leg: No edema.      Left lower leg: No edema.      Comments: Spontaneous movement of RUE    Skin:     General: Skin is warm and dry.      Capillary Refill: Capillary  "refill takes less than 2 seconds.   Neurological:      Comments: Weakness left upper and lower extremities   Psychiatric:         Behavior: Behavior is uncooperative.      Comments: Unable to assess 2/2 patient effort            Lines/Drains/Airways       Peripherally Inserted Central Catheter Line  Duration             PICC Double Lumen 08/10/24 1921 left brachial 1 day              Drain  Duration             Male External Urinary Catheter 08/10/24 1015 Medium 1 day              Arterial Line  Duration             Arterial Line 08/10/24 1810 Left Radial 1 day              Peripheral Intravenous Line  Duration                  Peripheral IV - Single Lumen 08/10/24 18 G Right Antecubital 2 days         Peripheral IV - Single Lumen 08/10/24 18 G Right Antecubital 2 days         Peripheral IV - Single Lumen 08/10/24 0854 20 G 1 1/4 in No Left;Anterior Forearm 1 day                    Significant Labs:    Lab Results   Component Value Date    WBC 11.42 08/12/2024    HGB 12.9 (L) 08/12/2024    HCT 38.6 (L) 08/12/2024    MCV 90.0 08/12/2024     08/12/2024           BMP  Lab Results   Component Value Date     08/12/2024    K 3.9 08/12/2024    CO2 19 (L) 08/12/2024    BUN 12.2 08/12/2024    CREATININE 0.78 08/12/2024    CALCIUM 9.2 08/12/2024    AGAP 11.0 08/12/2024    EGFRNONAA >60 06/11/2021         ABG  No results for input(s): "PH", "PO2", "PCO2", "HCO3", "POCBASEDEF" in the last 168 hours.    Mechanical Ventilation Support:         Significant Imaging:  I have reviewed the pertinent imaging within the past 24 hours.    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  CT angiogram was performed from the level of the luis to the top of the head following the IV administration 100 cc of Isovue 370 contrast .  Sagittal and coronal reconstructions and maximum intensity projection reconstructions were performed. Arterial stenosis percentages are based on NASCET measurement criteria.  Additional multiplanar reconstructions were performed on post processed imaging.  Automatic exposure control (AEC) is utilized to reduce patient radiation exposure.    COMPARISON:  None    FINDINGS:  There is a intracranial hemorrhage seen which is discussed in detail on a prior report.    Vascular images: The aortic arch is slightly dilated.  Maximum dimension is 3.9 cm.  There is a 3 vessel arch seen.  The common carotid arteries are widely patent.  No significant plaque is seen.  No significant plaque is seen in the carotid bulbs.  Internal carotid arteries are widely patent.  They are seen to level the petrous ridge and clinoid and supraclinoid region.  No obstruction is seen.  MCAs are patent.  M1 segments, M2 segments M3 segments of patent.  No aneurysm or obstruction is seen.    The A1 segments are patent.  Anterior cerebral arteries are patent.  Anterior communicating arteries patent.  No aneurysm or obstruction is seen.    Both vertebral arteries are widely patent.  No dissection is seen.  No obstruction is seen.  Both vertebral arteries perfuse a vertebral artery which shows some evidence of dolichoectasia.  No aneurysm is seen.  No obstruction is seen.  Posterior cerebral arteries are widely patent.  Aneurysm obstruction is seen.    .                                       CT Brain Perfusion inc Post Processing (Final result)  Result time  08/10/24 09:58:36      Final result by Maverick March MD (08/10/24 09:58:36)                   Narrative:    EXAMINATION  CT BRAIN PERFUSION INC POST PROCESSING    CLINICAL HISTORY  stroke;    TECHNIQUE  Axial perfusion CT images were obtained, with arterial in-flow JOSE DE JESUS at the basilar artery and venous out-flow JOSE DE JESUS at the straight sinus. Post-processing of the CT perfusion data was performed, generating color para-metric maps for cerebral blood volume (CBV), cerebral blood flow (CBF), mean transit time (MTT), time-to-peak (TTP), and time-to-maximum (T-max).  The perfusion maps and hemodynamic curves were transferred to PACS.    CONTRAST  *IV contrast media: Omnipaque 350  *Injected volume: 50 mL  *Injection rate: 5 mL/s    COMPARISON  *No prior CT perfusion exam is available at the time of the initial interpretation.  *Non-contrast head CT obtained immediately prior was reviewed.    FINDINGS/IMPRESSION  Exam quality: Somewhat limited assessment secondary to widespread right cerebral intraparenchymal hemorrhage as seen on the recent non-contrast head CT.    Asymmetrically decreased blood flow noted through the area of intra-axial hematoma, with no large volume area of perfusion mismatch.    Prior to the final Radiology report, initial assessment performed by teleradiology service and pertinent communication to the Neuro Interventional Team was accomplished for potential treatment planning; please refer to the corresponding consult and/or intervention notes for additional details.    RADIATION DOSE  Automated tube current modulation, weight-based exposure dosing, and/or iterative reconstruction technique utilized to reach lowest reasonably achievable exposure rate.    DLP: 1695 mGy*cm      Electronically signed by: Maverick March  Date:    08/10/2024  Time:    09:58                                     CT HEAD FOR STROKE (Final result)  Result time 08/10/24 08:11:45      Final result by Eduard Barajas MD  (08/10/24 08:11:45)                   Impression:      1. There is 4.2 x 3.8 cm (AP x T) intraparenchymal hemorrhage centered in the right thalamocapsular region series 2 image 36. There is mild surrounding edema with resultant effacement of the adjacent sulci and Sylvian fissure, as well as compression of the right lateral ventricle with 8mm midline shift on series 2 image 36.  Intraventricular extension of bleed is seen in the right lateral ventricle and 3rd ventricle. There is mild dilatation of the left lateral ventricle compared to the right suggesting possible early obstructive hydrocephalus component. Sulcal hyperdensities are seen along the right temporal lobe reflective of subarachnoid hemorrhage. Recommend continued serial interval follow-up to full resolution as indicated.    2. Details and other findings as noted above.    I concur with the preliminary report above.      Electronically signed by: Den Barajas  Date:    08/10/2024  Time:    08:11               Narrative:    EXAMINATION:  CT HEAD FOR STROKE    CLINICAL HISTORY:  Neuro deficit, acute, stroke suspected;    TECHNIQUE:  Multiple axial images were obtained from the base of the brain to the vertex without contrast administration.  Sagittal and coronal reconstructions were performed..Automatic exposure control is utilized to reduce patient radiation exposure.    COMPARISON:  None    FINDINGS:  Hemorrhage: There is 4.2 x 3.8 cm (AP x T) intraparenchymal hemorrhage centered in the right thalamocapsular region series 2 image 36. There is mild surrounding edema with resultant effacement of the adjacent sulci and Sylvian fissure, as well as compression of the right lateral ventricle with 8mm midline shift on series 2 image 36.  Intraventricular extension of bleed is seen in the right lateral ventricle and 3rd ventricle. There is mild dilatation of the left lateral ventricle compared to the right suggesting possible early obstructive hydrocephalus  component. Sulcal hyperdensities are seen along the right temporal lobe reflective of subarachnoid hemorrhage. Recommend continued serial interval follow-up to full resolution as indicated    CSF spaces: The ventricles sulci and basal cisterns are within normal limits.    Cerebellum: Unremarkable    Vascular: Unremarkable venous sinuses    Sella and skull base: The sella appears to be within normal limits for age.    Cerebellopontine angles: Within normal limits.    Herniation: None.    Intracranial calcifications: Incidental note is made of some pineal region calcification.    Calvarium: No acute linear or depressed skull fracture is seen.    Maxillofacial Structures:Paranasal sinuses: The visualized paranasal sinuses appear clear with no mucoperiosteal thickening or air fluid levels identified    Orbits: The orbits appear unremarkable.    Zygomatic arches: The zygomatic arches are intact and unremarkable    Temporal bones and mastoids: The temporal bones and mastoids appear unremarkable.    TMJ: The mandibular condyles appear normally placed with respect to the mandibular fossa.    Notifications: The results were discussed with the emergency room physician (Dr Burnette) prior to dictation at 2024-08-10 08:00:14 CDT.                        Preliminary result by Stu Peck MD (08/10/24 08:00:30)                   Impression:    1. There is 4.2 x 3.8 cm (AP x T) intraparenchymal hemorrhage centered in the right thalamocapsular region series 2 image 36. There is mild surrounding edema with resultant effacement of the adjacent sulci and Sylvian fissure, as well as compression of the right lateral ventricle with 8mm midline shift on series 2 image 36. Intravantricular extension of bleed is seen in the right lateral ventricle and 3rd ventricle. There is mild dilatation of the left lateral ventricle compared to the right suggesting possible early obstructive hydrocephalus component. Sulcal hyperdensities are seen  along the right temporal lobe reflective of subarachnoid hemorrhage. Recommend continued serial interval follow-up to full resolution as indicated.  2. Details and other findings as noted above.               Narrative:    START OF REPORT:  Technique: CT of the head was performed without intravenous contrast with axial as well as coronal and sagittal images.    Comparison: None.    Dosage Information: Automated exposure control was utilized.    Clinical history: Slurred speech, facial droop, left arm weakness.    Findings:  Hemorrhage: There is 4.2 x 3.8 cm (AP x T) intraparenchymal hemorrhage centered in the right thalamocapsular region series 2 image 36. There is mild surrounding edema with resultant effacement of the adjacent sulci and Sylvian fissure, as well as compression of the right lateral ventricle with 8mm midline shift on series 2 image 36. Intravantricular extension of bleed is seen in the right lateral ventricle and 3rd ventricle. There is mild dilatation of the left lateral ventricle compared to the right suggesting possible early obstructive hydrocephalus component. Sulcal hyperdensities are seen along the right temporal lobe reflective of subarachnoid hemorrhage. Recommend continued serial interval follow-up to full resolution as indicated.  CSF spaces: The ventricles sulci and basal cisterns are within normal limits.  Cerebellum: Unremarkable.  Vascular: Unremarkable venous sinuses.  Sella and skull base: The sella appears to be within normal limits for age.  Cerebellopontine angles: Within normal limits.  Herniation: None.  Intracranial calcifications: Incidental note is made of some pineal region calcification.  Calvarium: No acute linear or depressed skull fracture is seen.    Maxillofacial Structures:  Paranasal sinuses: The visualized paranasal sinuses appear clear with no mucoperiosteal thickening or air fluid levels identified.  Orbits: The orbits appear unremarkable.  Zygomatic arches: The  zygomatic arches are intact and unremarkable.  Temporal bones and mastoids: The temporal bones and mastoids appear unremarkable.  TMJ: The mandibular condyles appear normally placed with respect to the mandibular fossa.    Notifications: The results were discussed with the emergency room physician (Dr Burnette) prior to dictation at 2024-08-10 08:00:14 CDT.                                         CT Cervical Spine Without Contrast (Final result)  Result time 08/10/24 08:01:39      Final result by Eduard Barajas MD (08/10/24 08:01:39)                   Impression:      Loss of the normal lordotic curve of the cervical spine most likely related to spasm but otherwise unremarkable with no evidence of acute fracture or dislocation seen    Incidental note is made of some degenerative changes in the  cervical spine      Electronically signed by: Den Barajas  Date:    08/10/2024  Time:    08:01               Narrative:    EXAMINATION:  CT CERVICAL SPINE WITHOUT CONTRAST    CLINICAL HISTORY:  Polytrauma, blunt;    TECHNIQUE:  Low dose axial images, sagittal and coronal reformations were performed though the cervical spine.  Contrast was not administered. Automatic exposure control is utilized to reduce patient radiation exposure.    COMPARISON:  10/11/2018    FINDINGS:  The vertebral body heights are well maintained. There is some loss of the normal lordotic curve cervical spine most likely related to spasm. No fracture is seen. No dislocation is seen. The odontoid and lateral masses appear grossly unremarkable.  There are some degenerative changes seen in the cervical spine which appear to be chronic.                                       Results for orders placed or performed during the hospital encounter of 08/10/24   ECG 12 lead    Collection Time: 08/10/24  8:23 AM   Result Value Ref Range    QRS Duration 92 ms    [facility] QTC Calculation 424 ms    Narrative    Test Reason : R29.818,    Vent. Rate : 061 BPM      Atrial Rate : 061 BPM     P-R Int : 166 ms          QRS Dur : 092 ms      QT Int : 422 ms       P-R-T Axes : 077 056 068 degrees     QTc Int : 424 ms    Sinus rhythm with marked sinus arrythmia  Minimal voltage criteria for LVH, may be normal variant ( Sokolow-Bello )  Borderline Abnormal ECG  No previous ECGs available  Confirmed by Franko Carlisle MD ([id number]) on 8/10/2024 11:30:24 AM    Referred By:             Confirmed By:Franko Carlisle MD        Assessment/Plan:     Assessment  R basal ganglia hemorrhage w 8mm right to left shift and intraventricular extension with left-sided weakness and suppressed alertness  Elevated Blood Pressure, no previous dx of HTN   Hypercholesterolemia   Elevated Creatinine-now normal      Plan  Continue ICU monitoring with blood pressure control per Cleviprex drip.  We will begin PRN meds per IV push and wean Cleviprex as tolerated.  Ask speech to evaluate.  Continue to follow recommendations and parameters set by Neurology and Neurosurgery.  Continue 3% with close monitoring of electrolytes.    DVT Prophylaxis: SCDs  GI Prophylaxis: None      34 minutes of critical care was time spent personally by me on the following activities: development of treatment plan with patient or surrogate and bedside caregivers, discussions with consultants, evaluation of patient's response to treatment, examination of patient, ordering and performing treatments and interventions, ordering and review of laboratory studies, ordering and review of radiographic studies, pulse oximetry, re-evaluation of patient's condition.  This critical care time did not overlap with that of any other provider or involve time for any procedures.     AAYUSH Piper MD  Pulmonary Critical Care Medicine  [facility] - 7th Floor ICU  DOS: 08/12/2024

## 2024-08-12 NOTE — PT/OT/SLP EVAL
"Physical Therapy Evaluation    Patient Name:  Dell Garrett Jr.   MRN:  [id number]    Recommendations:     Discharge therapy intensity: High Intensity Therapy   Discharge Equipment Recommendations: to be determined by next level of care   Barriers to discharge: Impaired mobility and Ongoing medical needs    Assessment:     Dell Garrett Jr. is a 59 y.o. 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  He presents with the following impairments/functional limitations: weakness, impaired functional mobility, decreased safety awareness, impaired self care skills, impaired endurance, impaired balance.    Pt alert and agitated upon entry. Pt seems to be unreliable historian at this time. Pt presents with L sided hemiparesis. Pt required max A x2 for supine to sit and sit to supine. Pt resistance to assistance with mobility. Unsafe to mobilize further today. Recommending high intensity therapy at this time to return patient to PLOF.     Rehab Prognosis: Good; patient would benefit from acute skilled PT services to address these deficits and reach maximum level of function.    Recent Surgery: * No surgery found *      Plan:     During this hospitalization, patient would benefit from acute PT services 6 x/week to address the identified rehab impairments via gait training, therapeutic activities, therapeutic exercises, neuromuscular re-education and progress toward the following goals:    Plan of Care Expires:  09/12/24    Subjective     Chief Complaint: "don't touch me"  Patient/Family Comments/goals: return home  Pain/Comfort:   No verbal pain rating stated     Patients cultural, spiritual, Mu-ism conflicts given the current situation: no    Living Environment:  Per chart, Pt lives alone in upstairs apartment.   Prior to admission, patients level of function was independent.  Equipment used at home: none.  DME owned (not currently used): " none.  Unsure of who patient will have assistance from at discharge.     Objective:     Communicated with RN prior to session.  Patient found HOB elevated with peripheral IV, arterial line, blood pressure cuff, pulse ox (continuous), PICC line, telemetry (R mitten)  upon PT entry to room.    General Precautions: Standard, fall  Orthopedic Precautions:N/A   Braces: N/A  Respiratory Status: Room air  Blood Pressure: 121/67      Exams:  Cognitive Exam:  Patient is oriented to Person  RLE ROM: WFL  RLE Strength: WFL  LLE: flaccid   Skin integrity: Visible skin intact      Functional Mobility:  Bed Mobility:   of note: pt resistance to assistance  Supine to Sit: maximal assistance x 2  Sit to Supine: maximal assistance x2       AM-PAC 6 CLICK MOBILITY  Total Score:        Treatment & Education:       Patient provided with verbal education education regarding PT role/goals/POC, fall prevention, and safety awareness.  Understanding was verbalized, however additional teaching warranted.     Patient left HOB elevated with all lines intact, call button in reach, RN notified, and  present.    GOALS:   Multidisciplinary Problems       Physical Therapy Goals          Problem: Physical Therapy    Goal Priority Disciplines Outcome Goal Variances Interventions   Physical Therapy Goal     PT, PT/OT Progressing     Description: Goals to be met by: 2024     Patient will increase functional independence with mobility by performin. Supine to sit with MInimal Assistance  2. Sit to supine with MInimal Assistance  3. Sit to stand transfer with Minimal Assistance  4. Pt to follow 75% of commands.   5. Gait  x 75 feet with Minimal Assistance using Rolling Walker vs LRAD.                          History:     Past Medical History:   Diagnosis Date    Crohn's disease     GERD (gastroesophageal reflux disease)        History reviewed. No pertinent surgical history.    Time Tracking:     PT Received On: 24  PT Start  Time: 0936     PT Stop Time: 0948  PT Total Time (min): 12 min     Billable Minutes: Evaluation Mod      08/12/2024

## 2024-08-13 LAB
ALBUMIN SERPL-MCNC: 3.5 G/DL (ref 3.4–4.8)
ALBUMIN/GLOB SERPL: 0.9 RATIO (ref 1.1–2)
ALP SERPL-CCNC: 60 UNIT/L (ref 40–150)
ALT SERPL-CCNC: 13 UNIT/L (ref 0–55)
ANION GAP SERPL CALC-SCNC: 11 MEQ/L
AST SERPL-CCNC: 14 UNIT/L (ref 5–34)
BACTERIA #/AREA URNS AUTO: ABNORMAL /HPF
BASOPHILS # BLD AUTO: 0.03 X10(3)/MCL
BASOPHILS NFR BLD AUTO: 0.3 %
BILIRUB SERPL-MCNC: 0.9 MG/DL
BILIRUB UR QL STRIP.AUTO: NEGATIVE
BUN SERPL-MCNC: 19.9 MG/DL (ref 8.4–25.7)
CALCIUM SERPL-MCNC: 9.6 MG/DL (ref 8.8–10)
CHLORIDE SERPL-SCNC: 117 MMOL/L (ref 98–107)
CLARITY UR: CLEAR
CO2 SERPL-SCNC: 20 MMOL/L (ref 23–31)
COLOR UR AUTO: ABNORMAL
CREAT SERPL-MCNC: 0.83 MG/DL (ref 0.73–1.18)
CREAT/UREA NIT SERPL: 24
EOSINOPHIL # BLD AUTO: 0 X10(3)/MCL (ref 0–0.9)
EOSINOPHIL NFR BLD AUTO: 0 %
ERYTHROCYTE [DISTWIDTH] IN BLOOD BY AUTOMATED COUNT: 13.6 % (ref 11.5–17)
GFR SERPLBLD CREATININE-BSD FMLA CKD-EPI: >60 ML/MIN/1.73/M2
GLOBULIN SER-MCNC: 4 GM/DL (ref 2.4–3.5)
GLUCOSE SERPL-MCNC: 116 MG/DL (ref 82–115)
GLUCOSE UR QL STRIP: ABNORMAL
HCT VFR BLD AUTO: 37.9 % (ref 42–52)
HGB BLD-MCNC: 12.3 G/DL (ref 14–18)
HGB UR QL STRIP: ABNORMAL
IMM GRANULOCYTES # BLD AUTO: 0.02 X10(3)/MCL (ref 0–0.04)
IMM GRANULOCYTES NFR BLD AUTO: 0.2 %
KETONES UR QL STRIP: NEGATIVE
LEUKOCYTE ESTERASE UR QL STRIP: 500
LYMPHOCYTES # BLD AUTO: 0.68 X10(3)/MCL (ref 0.6–4.6)
LYMPHOCYTES NFR BLD AUTO: 7.3 %
MAGNESIUM SERPL-MCNC: 2.4 MG/DL (ref 1.6–2.6)
MCH RBC QN AUTO: 29.5 PG (ref 27–31)
MCHC RBC AUTO-ENTMCNC: 32.5 G/DL (ref 33–36)
MCV RBC AUTO: 90.9 FL (ref 80–94)
MONOCYTES # BLD AUTO: 0.78 X10(3)/MCL (ref 0.1–1.3)
MONOCYTES NFR BLD AUTO: 8.3 %
MUCOUS THREADS URNS QL MICRO: ABNORMAL /LPF
NEUTROPHILS # BLD AUTO: 7.85 X10(3)/MCL (ref 2.1–9.2)
NEUTROPHILS NFR BLD AUTO: 83.9 %
NITRITE UR QL STRIP: NEGATIVE
NRBC BLD AUTO-RTO: 0 %
OSMOLALITY SERPL: 321 MOSM/KG (ref 280–300)
PH UR STRIP: 6 [PH]
PHOSPHATE SERPL-MCNC: 3.3 MG/DL (ref 2.3–4.7)
PLATELET # BLD AUTO: 288 X10(3)/MCL (ref 130–400)
PLATELETS.RETICULATED NFR BLD AUTO: 1.1 % (ref 0.9–11.2)
PMV BLD AUTO: 8.9 FL (ref 7.4–10.4)
POTASSIUM SERPL-SCNC: 3.7 MMOL/L (ref 3.5–5.1)
PROT SERPL-MCNC: 7.5 GM/DL (ref 5.8–7.6)
PROT UR QL STRIP: ABNORMAL
RBC # BLD AUTO: 4.17 X10(6)/MCL (ref 4.7–6.1)
RBC #/AREA URNS AUTO: ABNORMAL /HPF
SODIUM SERPL-SCNC: 148 MMOL/L (ref 136–145)
SP GR UR STRIP.AUTO: 1.03 (ref 1–1.03)
SQUAMOUS #/AREA URNS LPF: ABNORMAL /HPF
UROBILINOGEN UR STRIP-ACNC: NORMAL
WBC # BLD AUTO: 9.36 X10(3)/MCL (ref 4.5–11.5)
WBC #/AREA URNS AUTO: >100 /HPF

## 2024-08-13 PROCEDURE — 81001 URINALYSIS AUTO W/SCOPE: CPT | Performed by: PHYSICIAN ASSISTANT

## 2024-08-13 PROCEDURE — 36415 COLL VENOUS BLD VENIPUNCTURE: CPT

## 2024-08-13 PROCEDURE — 36415 COLL VENOUS BLD VENIPUNCTURE: CPT | Performed by: PHYSICIAN ASSISTANT

## 2024-08-13 PROCEDURE — 83930 ASSAY OF BLOOD OSMOLALITY: CPT | Performed by: PSYCHIATRY & NEUROLOGY

## 2024-08-13 PROCEDURE — 87086 URINE CULTURE/COLONY COUNT: CPT | Performed by: PHYSICIAN ASSISTANT

## 2024-08-13 PROCEDURE — 63600175 PHARM REV CODE 636 W HCPCS: Performed by: NURSE PRACTITIONER

## 2024-08-13 PROCEDURE — 87040 BLOOD CULTURE FOR BACTERIA: CPT | Performed by: PHYSICIAN ASSISTANT

## 2024-08-13 PROCEDURE — 83735 ASSAY OF MAGNESIUM: CPT

## 2024-08-13 PROCEDURE — 25000003 PHARM REV CODE 250

## 2024-08-13 PROCEDURE — 85025 COMPLETE CBC W/AUTO DIFF WBC: CPT

## 2024-08-13 PROCEDURE — A4216 STERILE WATER/SALINE, 10 ML: HCPCS | Performed by: STUDENT IN AN ORGANIZED HEALTH CARE EDUCATION/TRAINING PROGRAM

## 2024-08-13 PROCEDURE — 25000003 PHARM REV CODE 250: Performed by: INTERNAL MEDICINE

## 2024-08-13 PROCEDURE — 25000003 PHARM REV CODE 250: Performed by: STUDENT IN AN ORGANIZED HEALTH CARE EDUCATION/TRAINING PROGRAM

## 2024-08-13 PROCEDURE — 97110 THERAPEUTIC EXERCISES: CPT | Mod: CQ

## 2024-08-13 PROCEDURE — 92610 EVALUATE SWALLOWING FUNCTION: CPT

## 2024-08-13 PROCEDURE — 63600175 PHARM REV CODE 636 W HCPCS

## 2024-08-13 PROCEDURE — 80053 COMPREHEN METABOLIC PANEL: CPT

## 2024-08-13 PROCEDURE — 25000003 PHARM REV CODE 250: Performed by: PHYSICIAN ASSISTANT

## 2024-08-13 PROCEDURE — 25000003 PHARM REV CODE 250: Performed by: NURSE PRACTITIONER

## 2024-08-13 PROCEDURE — 21400001 HC TELEMETRY ROOM

## 2024-08-13 PROCEDURE — 97530 THERAPEUTIC ACTIVITIES: CPT

## 2024-08-13 PROCEDURE — 84100 ASSAY OF PHOSPHORUS: CPT

## 2024-08-13 PROCEDURE — 63600175 PHARM REV CODE 636 W HCPCS: Performed by: PHYSICIAN ASSISTANT

## 2024-08-13 RX ORDER — AMLODIPINE BESYLATE 5 MG/1
5 TABLET ORAL DAILY
Status: DISCONTINUED | OUTPATIENT
Start: 2024-08-13 | End: 2024-08-14

## 2024-08-13 RX ORDER — ALPRAZOLAM 0.25 MG/1
0.25 TABLET ORAL EVERY 4 HOURS PRN
Status: DISCONTINUED | OUTPATIENT
Start: 2024-08-13 | End: 2024-08-27

## 2024-08-13 RX ORDER — QUETIAPINE FUMARATE 25 MG/1
50 TABLET, FILM COATED ORAL DAILY
Status: DISCONTINUED | OUTPATIENT
Start: 2024-08-13 | End: 2024-08-15

## 2024-08-13 RX ORDER — CLONIDINE HYDROCHLORIDE 0.1 MG/1
0.1 TABLET ORAL ONCE
Status: CANCELLED | OUTPATIENT
Start: 2024-08-13

## 2024-08-13 RX ORDER — AMLODIPINE BESYLATE 5 MG/1
5 TABLET ORAL DAILY
Status: DISCONTINUED | OUTPATIENT
Start: 2024-08-14 | End: 2024-08-13

## 2024-08-13 RX ADMIN — MUPIROCIN: 20 OINTMENT TOPICAL at 07:08

## 2024-08-13 RX ADMIN — ALPRAZOLAM 0.25 MG: 0.25 TABLET ORAL at 02:08

## 2024-08-13 RX ADMIN — HYDRALAZINE HYDROCHLORIDE 10 MG: 20 INJECTION INTRAMUSCULAR; INTRAVENOUS at 08:08

## 2024-08-13 RX ADMIN — LEVETIRACETAM 500 MG: 100 INJECTION, SOLUTION INTRAVENOUS at 08:08

## 2024-08-13 RX ADMIN — CEFTRIAXONE SODIUM 1 G: 1 INJECTION, POWDER, FOR SOLUTION INTRAMUSCULAR; INTRAVENOUS at 05:08

## 2024-08-13 RX ADMIN — AMLODIPINE BESYLATE 5 MG: 5 TABLET ORAL at 05:08

## 2024-08-13 RX ADMIN — SODIUM CHLORIDE, PRESERVATIVE FREE 10 ML: 5 INJECTION INTRAVENOUS at 12:08

## 2024-08-13 RX ADMIN — LABETALOL HYDROCHLORIDE 10 MG: 5 INJECTION, SOLUTION INTRAVENOUS at 02:08

## 2024-08-13 RX ADMIN — SODIUM CHLORIDE, PRESERVATIVE FREE 10 ML: 5 INJECTION INTRAVENOUS at 06:08

## 2024-08-13 RX ADMIN — QUETIAPINE FUMARATE 50 MG: 25 TABLET ORAL at 07:08

## 2024-08-13 RX ADMIN — HYDRALAZINE HYDROCHLORIDE 10 MG: 20 INJECTION INTRAMUSCULAR; INTRAVENOUS at 02:08

## 2024-08-13 RX ADMIN — ACETAMINOPHEN 325MG 650 MG: 325 TABLET ORAL at 04:08

## 2024-08-13 RX ADMIN — LABETALOL HYDROCHLORIDE 10 MG: 5 INJECTION, SOLUTION INTRAVENOUS at 04:08

## 2024-08-13 RX ADMIN — ALPRAZOLAM 0.25 MG: 0.25 TABLET ORAL at 07:08

## 2024-08-13 RX ADMIN — HYDRALAZINE HYDROCHLORIDE 10 MG: 20 INJECTION INTRAMUSCULAR; INTRAVENOUS at 12:08

## 2024-08-13 RX ADMIN — ACETAMINOPHEN 325MG 650 MG: 325 TABLET ORAL at 08:08

## 2024-08-13 RX ADMIN — MUPIROCIN: 20 OINTMENT TOPICAL at 09:08

## 2024-08-13 RX ADMIN — ALPRAZOLAM 0.25 MG: 0.25 TABLET ORAL at 09:08

## 2024-08-13 NOTE — PROGRESS NOTES
"[facility] - 7th Floor ICU  Pulmonary Critical Care Note    Patient Name: Dell Garrett Jr.  MRN: [id number]  Admission Date: 8/10/2024  Hospital Length of Stay: 3 days  Code Status: Full Code  Attending Provider: JOE Piper MD  Primary Care Provider: Yaa Taveras FNP     Subjective:     HPI:   Dell Garrett is a 59 yr old M w PMHx of Crohn's disease, GERD, and chronic tobacco use who presented to [facility] ED on 8/10/24 for acute onset left sided weakness, facial droop and aphasia. Patient's symptoms started around 06:45 when he was getting a hair cut. Patient reported standing from madrigal chair and suddenly developed a HA which he described as a "sticking" sensation. Patient subsequently fell and hit his head but did not lose consciousness. Patient presented bradycardic HR 48 and hypertensive with -160s. Labwork significant for mild normocytic anemia, NAGMA with bicarb 22. BUN/Cr elevated but appears to be at patient baseline. Patient is not on chronic anticoagulation. Imaging consistent with R basal ganglia hemorrhage w 8mm right to left shift and extensive intraventricular hemorrhage  Neurology was consulted from ED. Patient started on Cleviprex for BP control. Admitted to ICU for close monitoring.     Hospital Course/Significant events:  Admit to ICU - 8/10     24 Hour Interval History:  He is off cleviprex drip and no longer on 3% saline. CT head yesterday on 8/12 with unchanged intraparenchymal hematoma and 6mm right to left midline shift. Temp of 100.6F overnight. He was agitated this morning and was given a xanax and seroquel. He is pretty sleepy on my exam, opens eyes to deep stimulation and spontaneously moves right side but does not follow commands.     Past Medical History:   Diagnosis Date    Crohn's disease     GERD (gastroesophageal reflux disease)        History reviewed. No pertinent surgical history.    Social History     Socioeconomic History    Marital status: Single "   Tobacco Use    Smoking status: Some Days     Types: Cigarettes    Smokeless tobacco: Never   Substance and Sexual Activity    Alcohol use: Yes     Comment: daily    Drug use: Never     Social Determinants of Health     Financial Resource Strain: Patient Declined (8/11/2024)    Overall Financial Resource Strain (CARDIA)     Difficulty of Paying Living Expenses: Patient declined   Food Insecurity: Patient Declined (8/11/2024)    Hunger Vital Sign     Worried About Running Out of Food in the Last Year: Patient declined     Ran Out of Food in the Last Year: Patient declined   Transportation Needs: Patient Declined (8/11/2024)    TRANSPORTATION NEEDS     Transportation : Patient declined   Stress: Patient Declined (8/11/2024)    [employer] - Occupational Stress Questionnaire     Feeling of Stress : Patient declined   Housing Stability: Patient Declined (8/11/2024)    Housing Stability Vital Sign     Unable to Pay for Housing in the Last Year: Patient declined     Homeless in the Last Year: Patient declined           Current Outpatient Medications   Medication Instructions    albuterol (PROVENTIL/VENTOLIN HFA) 90 mcg/actuation inhaler Inhalation    diclofenac sodium (VOLTAREN) 2 g, Topical (Top), 3 times daily    dicyclomine (BENTYL) 20 mg, Oral, 2 times daily PRN    iron-vitamin C 100-250 mg, ICAR-C, 100-250 mg Tab 1 tablet, Oral    loratadine (CLARITIN) 10 mg, Oral, Daily    ondansetron (ZOFRAN-ODT) 8 mg, Oral, Every 6 hours PRN    RINVOQ 45 mg Tb24 1 tablet, Oral    tiZANidine (ZANAFLEX) 4 mg, Oral, Every 8 hours PRN    traMADoL (ULTRAM) 50 mg, Oral, Every 6 hours PRN       Current Inpatient Medications   levETIRAcetam (Keppra) IV (PEDS and ADULTS)  500 mg Intravenous Q12H    mupirocin   Nasal BID    QUEtiapine  50 mg Per NG tube Daily    sodium chloride 0.9%  10 mL Intravenous Q6H       Current Intravenous Infusions   clevidipine  0-16 mg/hr Intravenous Continuous 12 mL/hr at 08/12/24  1227 6 mg/hr at 08/12/24 1227    sodium chloride 3% HYPERTONIC  60 mL/hr Intravenous Continuous 60 mL/hr at 08/12/24 1120 60 mL/hr at 08/12/24 1120         ROS   Unable to assess 2/2 patient effort     Objective:       Intake/Output Summary (Last 24 hours) at 8/13/2024 0905  Last data filed at 8/13/2024 0000  Gross per 24 hour   Intake --   Output 1750 ml   Net -1750 ml         Vital Signs (Most Recent):  Temp: 99.7 °F (37.6 °C) (08/13/24 0531)  Pulse: 74 (08/13/24 0810)  Resp: 16 (08/13/24 0810)  BP: 138/86 (08/13/24 0803)  SpO2: 97 % (08/13/24 0810)  Body mass index is 22.55 kg/m².  Weight: 65.3 kg (143 lb 15.4 oz) Vital Signs (24h Range):  Temp:  [97.9 °F (36.6 °C)-100.6 °F (38.1 °C)] 99.7 °F (37.6 °C)  Pulse:  [] 74  Resp:  [12-36] 16  SpO2:  [89 %-99 %] 97 %  BP: (115-162)/() 138/86  Arterial Line BP: (126-173)/(58-85) 126/59     Physical Exam  Vitals and nursing note reviewed.   Constitutional:       General: He is sleeping. He is not in acute distress.     Appearance: He is normal weight. He is not ill-appearing, toxic-appearing or diaphoretic.   HENT:      Head: Normocephalic. No raccoon eyes, contusion, masses or laceration.      Nose:      Comments: NG tube present   Eyes:      Extraocular Movements:      Right eye: No nystagmus.      Left eye: No nystagmus.      Pupils: Pupils are equal.      Comments: Pupils fixed    Neck:      Vascular: No carotid bruit.   Cardiovascular:      Rate and Rhythm: Normal rate and regular rhythm.      Pulses: Normal pulses.      Heart sounds: No murmur heard.     No gallop.   Pulmonary:      Effort: Pulmonary effort is normal. No respiratory distress.      Breath sounds: No wheezing, rhonchi or rales.   Abdominal:      General: Abdomen is flat. Bowel sounds are normal. There is no distension.      Palpations: Abdomen is soft.      Tenderness: There is no guarding.   Musculoskeletal:      Right lower leg: No edema.      Left lower leg: No edema.      Comments:  "Spontaneous movement of RUE    Skin:     General: Skin is warm and dry.      Capillary Refill: Capillary refill takes less than 2 seconds.   Neurological:      Comments: Weakness left upper and lower extremities   Psychiatric:         Behavior: Behavior is uncooperative.      Comments: Unable to assess 2/2 patient effort            Lines/Drains/Airways       Peripherally Inserted Central Catheter Line  Duration             PICC Double Lumen 08/10/24 1921 left brachial 2 days              Drain  Duration             Male External Urinary Catheter 08/10/24 1015 Medium 2 days              Arterial Line  Duration             Arterial Line 08/10/24 1810 Left Radial 2 days              Peripheral Intravenous Line  Duration                  Peripheral IV - Single Lumen 08/10/24 0854 20 G 1 1/4 in No Left;Anterior Forearm 3 days         Peripheral IV - Single Lumen 08/10/24 18 G Right Antecubital 3 days         Peripheral IV - Single Lumen 08/10/24 18 G Right Antecubital 3 days                    Significant Labs:    Lab Results   Component Value Date    WBC 9.36 08/13/2024    HGB 12.3 (L) 08/13/2024    HCT 37.9 (L) 08/13/2024    MCV 90.9 08/13/2024     08/13/2024           BMP  Lab Results   Component Value Date     (H) 08/13/2024    K 3.7 08/13/2024    CO2 20 (L) 08/13/2024    BUN 19.9 08/13/2024    CREATININE 0.83 08/13/2024    CALCIUM 9.6 08/13/2024    AGAP 11.0 08/13/2024    EGFRNONAA >60 06/11/2021         ABG  No results for input(s): "PH", "PO2", "PCO2", "HCO3", "POCBASEDEF" in the last 168 hours.    Mechanical Ventilation Support:         Significant Imaging:  I have reviewed the pertinent imaging within the past 24 hours.         Assessment/Plan:     Assessment  R basal ganglia hemorrhage w 8mm right to left shift and intraventricular extension with left-sided weakness and suppressed alertness  Elevated Blood Pressure, no previous dx of HTN   Reported hx of crack use  Hypercholesterolemia   Elevated " Creatinine-now normal      Plan  -Continue blood pressure control with oral medications  -keppra for seizure prophylaxis  -tube feeds for nutrition until cleared by speech  -OK to downgrade to floor bed     DVT Prophylaxis: SCDs  GI Prophylaxis: None      34 minutes of critical care was time spent personally by me on the following activities: development of treatment plan with patient or surrogate and bedside caregivers, discussions with consultants, evaluation of patient's response to treatment, examination of patient, ordering and performing treatments and interventions, ordering and review of laboratory studies, ordering and review of radiographic studies, pulse oximetry, re-evaluation of patient's condition.  This critical care time did not overlap with that of any other provider or involve time for any procedures.     KHANH Flores  Pulmonary Critical Care Medicine  [facility] - 7th Floor ICU  DOS: 08/13/2024

## 2024-08-13 NOTE — H&P
[facility] Hospital Medicine History & Physical Examination       Patient Name: Dell Garrett Jr.  MRN: [id number]  Patient Class: IP- Inpatient   Admission Date: 8/10/2024   Admitting Physician: Luis Enrique Saeed MD   Length of Stay: 3  Attending Physician: Abhijeet Hoyt MD   Primary Care Provider: Yaa Taveras FNP  Face-to-Face encounter date: 08/13/2024  Code Status:  Full code    Chief Complaint: Cerebrovascular Accident (Pt was at [facility], 0645 sudden onset, unable to move left arm with left facial droop and slurred speech. Pt stood up and then fell and also has hematoma to head. Pt has a R fixed gaze. Cbg- 126)        Patient information was obtained from patient, patient's family, past medical records and ER records.     HISTORY OF PRESENT ILLNESS:   Dell Garrett Jr. is a 59 y.o. Black or  male with a past medical history of Crohn's disease and GERD. The patient presented to [facility] on 8/10/2024 with aphasia, left arm weakness and fall.  Patient presented to the ED with bradycardia, normocytic anemia, bicarb 22.  CT of the head revealed a 4.2 x 3.8 cm intraparenchymal hemorrhage of the right thalamus capsular region with mild surrounding edema and effacement of the adjacent sulci in sylvian fissure with compression of the right lateral ventricle with an 8 mm midline shift attestation of bleed into the right lateral ventricle and 3rd ventricle with mild dilation of the left lateral ventricle compared to the right suggesting possible early obstructive hydrocephalus.  There was no noted to be sulci hyperdensities along the right temporal lobe reflecting subarachnoid hemorrhage.  CTA of the head and neck revealed extensive intracranial hemorrhage and mild dolichoectasia of the basilar artery.  Neurology and Neurosurgery was consulted and patient was started on Cleviprex drip for blood pressure parameters less than 140/90 and admitted to ICU.  Repeat CT of the head  revealed stable bleed with unchanged shift and no new hemorrhages.  Patient was started on 3% saline drip on 08/11/2024.  Cleviprex and saline drip has been discontinued.  Patient was currently has been NG tube and refusing speech therapy.  Repeat CT of the head on 08/11/2024 revealed unchanged appearance of intraparenchymal hematoma and unchanged 6 mm right-to-left midline shift without hydrocephalus.  Patient developed a fever of 100.6° F fairly this morning (08/13/2024).  No leukocytosis on labs this morning.  Patient has been getting Xanax and Seroquel for agitation.  Patient was cleared for downgrade from ICU to regular floor on 08/13/2024.  Neurology and neurosurgery has signed off.  He is admitted to hospital medicine services for further medical management.    PAST MEDICAL HISTORY:     Past Medical History:   Diagnosis Date    Crohn's disease     GERD (gastroesophageal reflux disease)        PAST SURGICAL HISTORY:   Unable to obtain due to medical condition    ALLERGIES:   Acetaminophen, Tramadol, and Ibuprofen    FAMILY HISTORY:   Unable to obtain due to medical condition    SOCIAL HISTORY:     Social History     Tobacco Use    Smoking status: Some Days     Types: Cigarettes    Smokeless tobacco: Never   Substance Use Topics    Alcohol use: Yes     Comment: daily        Screening for Social Drivers for health:  Patient screened for food insecurity, housing instability, transportation needs, utility difficulties, and interpersonal safety (select all that apply as identified as concern)  []Housing or Food  []Transportation Needs  []Utility Difficulties  []Interpersonal safety  [x]None    HOME MEDICATIONS:     Prior to Admission medications    Medication Sig Start Date End Date Taking? Authorizing Provider   albuterol (PROVENTIL/VENTOLIN HFA) 90 mcg/actuation inhaler Inhale into the lungs. 5/31/23   Provider, Historical   diclofenac sodium (VOLTAREN) 1 % Gel Apply 2 g topically 3 (three) times daily. for 7  days 8/30/23 9/6/23  Brielle Cordero, NP   dicyclomine (BENTYL) 20 mg tablet Take 1 tablet (20 mg total) by mouth 2 (two) times daily as needed (Abdominal Pain). 9/25/23   Kaia Martinez FNP   iron-vitamin C 100-250 mg, ICAR-C, 100-250 mg Tab Take 1 tablet by mouth. 5/31/23   Provider, Historical   loratadine (CLARITIN) 10 mg tablet Take 1 tablet (10 mg total) by mouth once daily. 8/21/23 9/20/23  Myriam Fabian FNP   ondansetron (ZOFRAN-ODT) 8 MG TbDL Take 1 tablet (8 mg total) by mouth every 6 (six) hours as needed (Nausea). 9/25/23   Kaia Martinez FNP   RINVOQ 45 mg Tb24 Take 1 tablet by mouth. 5/1/23   Provider, Historical   tiZANidine (ZANAFLEX) 4 MG tablet Take 1 tablet (4 mg total) by mouth every 8 (eight) hours as needed (Take as needed every 8 hours for muscle pain.). 12/9/23   Kiara Alfred NP   traMADoL (ULTRAM) 50 mg tablet Take 1 tablet (50 mg total) by mouth every 6 (six) hours as needed for Pain. 5/12/24   Lux Soliz MD       REVIEW OF SYSTEMS:   Unable to obtain due to medical condition    PHYSICAL EXAM:     VITAL SIGNS: 24 HRS MIN & MAX LAST   Temp  Min: 97.9 °F (36.6 °C)  Max: 100.6 °F (38.1 °C) 98.2 °F (36.8 °C)   BP  Min: 115/93  Max: 162/83 (!) 146/84   Pulse  Min: 47  Max: 96  (!) 55   Resp  Min: 12  Max: 28 17   SpO2  Min: 89 %  Max: 99 % 97 %       General appearance: Well-developed, well-nourished male in no apparent distress.  No family at bedside.  HEENT: Atraumatic head. Moist mucous membranes of oral cavity.  Lungs: Clear to auscultation bilaterally.   Heart: Regular rate and rhythm.   Abdomen: Soft, non-distended.  NG tube in place.  Extremities: No cyanosis, clubbing. No deformities.  Skin: No Rash. Warm and dry.  Neuro:  Sleeping.  Opens eyes but closes eyes and seems to be sleeping.  Moves right upper and lower extremities spontaneously.  Does not withdraw left lower extremity from stimuli.  Mittens in place.  Psych:  Uncooperative    Per nurse patient  becomes severely sleepy after receiving Xanax 0.25 mg.      LABS AND IMAGING:     Recent Labs   Lab 08/11/24 0347 08/12/24 0311 08/13/24  0235   WBC 10.27 11.42 9.36   RBC 4.20* 4.29* 4.17*   HGB 12.5* 12.9* 12.3*   HCT 37.3* 38.6* 37.9*   MCV 88.8 90.0 90.9   MCH 29.8 30.1 29.5   MCHC 33.5 33.4 32.5*   RDW 13.3 13.6 13.6    326 288   MPV 8.5 8.5 8.9       Recent Labs   Lab 08/11/24  0347 08/11/24  0855 08/12/24 0311 08/12/24 0824 08/12/24  1515 08/12/24 2028 08/13/24 0235      < > 145   < > 146* 150* 148*   K 4.0  --  3.9  --   --   --  3.7   *  --  115*  --   --   --  117*   CO2 20*  --  19*  --   --   --  20*   BUN 12.9  --  12.2  --   --   --  19.9   CREATININE 0.85  --  0.78  --   --   --  0.83   CALCIUM 9.3  --  9.2  --   --   --  9.6   MG 2.10  --  2.40  --   --   --  2.40   ALBUMIN 3.7  --  3.6  --   --   --  3.5   ALKPHOS 62  --  59  --   --   --  60   ALT 17  --  16  --   --   --  13   AST 20  --  17  --   --   --  14   BILITOT 0.9  --  1.0  --   --   --  0.9    < > = values in this interval not displayed.       Microbiology Results (last 7 days)       Procedure Component Value Units Date/Time    Urine culture [[id number]] Collected: 08/13/24 1216    Order Status: Sent Specimen: Urine Updated: 08/13/24 1234    Blood Culture [[id number]] Collected: 08/13/24 1128    Order Status: Resulted Specimen: Blood, Venous Updated: 08/13/24 1130    Blood Culture [[id number]] Collected: 08/13/24 1128    Order Status: Resulted Specimen: Blood, Venous Updated: 08/13/24 1130             X-Ray Chest 1 View  Narrative: EXAMINATION:  XR CHEST 1 VIEW    CLINICAL HISTORY:  fever;    TECHNIQUE:  Single frontal view of the chest was performed.    COMPARISON:  08/10/2024    FINDINGS:  LINES AND TUBES: Left upper extremity PICC tip projects over the SVC.  Enteric tube courses below the diaphragm.    MEDIASTINUM AND MADINA: Cardiac silhouette is enlarged.    LUNGS: No lobar consolidation. No  edema.    PLEURA:No pleural effusion. No pneumothorax.    OTHER: Patient is rotated to the left.  Impression: Enlarged cardiac silhouette without overt edema.    Electronically signed by: Isabella Jacques  Date:    08/13/2024  Time:    12:33  XR Gastric tube check, non-radiologist performed  EXAMINATION  XR GASTRIC TUBE CHECK, NON-RADIOLOGIST PERFORMED    CLINICAL HISTORY  NG tube insertion;    TECHNIQUE  A total of 1 AP image(s) submitted of the partially visualized lower chest and upper abdomen.    COMPARISON  4 January 2018    FINDINGS  Lines/tubes/devices: Enteric tube is present, following the expected esophageal course and terminating over the left upper abdomen.  The catheter side port is visualized at or just distal to the level of the GE junction, with tip projecting over the lateral left upper quadrant.    A non-obstructed bowel gas pattern is present. No intra-abdominal mass effect is appreciated.  No obvious findings to suggest large volume pneumoperitoneum are appreciated, although detection of free intra-abdominal air is markedly degraded secondary to supine technique.  Included lower thoracic cavity and visualized osseous structures are without acute abnormality.    IMPRESSION  1. Enteric tube side port at or just distal to the GE junction, recommend advancement approximately 5-7 cm.  2. No evidence of acute or focal intra-abdominal process. Additional details provided above.    Electronically signed by: Maverick March  Date:    08/13/2024  Time:    11:32  X-Ray Abdomen AP 1 View  EXAMINATION  XR ABDOMEN AP 1 VIEW    CLINICAL HISTORY  NG placement;    TECHNIQUE  A total of 1 AP image(s) of the abdomen.    COMPARISON  12 August 2024    FINDINGS  Lines/tubes/devices: Enteric tube is slightly advanced in the interval, side port now approximately 6 cm distal to the GE junction.    There are no interval changes to suggest new or worsening high-grade mechanical bowel obstruction.  No intra-abdominal mass  effect is developed.  Detection of air-fluid levels and low-volume pneumoperitoneum is limited due to supine technique.    Included lower thoracic cavity and osseous structures are similar in comparison.    IMPRESSION  1. Interval advancement of enteric tube, tip likely within the body of the stomach.  2. No new or worsening abnormality of the visualized upper abdomen.    Electronically signed by: Maverick March  Date:    08/13/2024  Time:    09:47        ASSESSMENT & PLAN:   Assessment:  Right thalamic capsular intraparenchymal hematoma with subarachnoid and intraventricular extension and right-to-left midline shift of 6 mm  Normocytic anemia, stable   Hypernatremia/hyperchloremia  Metabolic acidosis   Febrile likely secondary to acute bacterial cystitis  History of Crohn's disease and GERD    Plan:  - Neurology and neurosurgery has signed off  - Continue with tube feeds until patient was able to complete speech therapy evaluation  - Continue with occupational and physical therapy  - Leukocytosis workup included UA, chest x-ray and blood cultures.    - UA with trace mucus, no squamous epithelial cells, greater than 100 WBCs, 11-20 RBCs, 500 leukocyte esterase, 1+ glucose, 1+ blood and trace protein.  - Chest x-ray with enlarged cardiac silhouette without overt edema  - Follow results of blood and urine culture   - Will start Rocephin at this time  - Consider discontinuing Xanax  - Resume appropriate home medications when deemed necessary   - Labs in AM      VTE Prophylaxis: will be placed on SCD for DVT prophylaxis and will be advised to be as mobile as possible and sit in a chair as tolerated      __________________________________________________________________________  INPATIENT LIST OF MEDICATIONS     Current Facility-Administered Medications:     0.9%  NaCl infusion, , Intravenous, PRN, JOE Piper MD, Stopped at 08/11/24 9525    acetaminophen suppository 650 mg, 650 mg, Rectal, Q6H PRN, Chinmay Grubbs DO,  650 mg at 08/11/24 2111    acetaminophen tablet 650 mg, 650 mg, Oral, Q6H PRN, Chinmay Grubbs DO, 650 mg at 08/13/24 0431    ALPRAZolam tablet 0.25 mg, 0.25 mg, Per NG tube, Q4H PRN, JOE Piper MD, 0.25 mg at 08/13/24 1434    bisacodyL suppository 10 mg, 10 mg, Rectal, Daily PRN, Ileana Rene, NP    hydrALAZINE injection 10 mg, 10 mg, Intravenous, Q4H PRN, Brooks Carlisle MD, 10 mg at 08/13/24 0032    labetaloL injection 10 mg, 10 mg, Intravenous, Q2H PRN, Ileana Rene NP, 10 mg at 08/13/24 0236    levETIRAcetam (Keppra) 500 mg in D5W 100 mL IVPB (MB+), 500 mg, Intravenous, Q12H, AnjuIleana mendoza NP, Stopped at 08/13/24 0837    mupirocin 2 % ointment, , Nasal, BID, JOE Piper MD, Given at 08/13/24 0759    QUEtiapine tablet 50 mg, 50 mg, Per NG tube, Daily, JOE Piper MD, 50 mg at 08/13/24 0759    sodium chloride 0.9% flush 10 mL, 10 mL, Intravenous, PRN, AnjuIleana mendoza NP    Flushing PICC/Midline Protocol, , , Until Discontinued **AND** sodium chloride 0.9% flush 10 mL, 10 mL, Intravenous, Q6H, 10 mL at 08/13/24 1200 **AND** sodium chloride 0.9% flush 10 mL, 10 mL, Intravenous, PRN, Luis Enrique Saeed MD      Scheduled Meds:   levETIRAcetam (Keppra) IV (PEDS and ADULTS)  500 mg Intravenous Q12H    mupirocin   Nasal BID    QUEtiapine  50 mg Per NG tube Daily    sodium chloride 0.9%  10 mL Intravenous Q6H     Continuous Infusions:  PRN Meds:.  Current Facility-Administered Medications:     0.9% NaCl, , Intravenous, PRN    acetaminophen, 650 mg, Rectal, Q6H PRN    acetaminophen, 650 mg, Oral, Q6H PRN    ALPRAZolam, 0.25 mg, Per NG tube, Q4H PRN    bisacodyL, 10 mg, Rectal, Daily PRN    hydrALAZINE, 10 mg, Intravenous, Q4H PRN    labetalol, 10 mg, Intravenous, Q2H PRN    sodium chloride 0.9%, 10 mL, Intravenous, PRN    Flushing PICC/Midline Protocol, , , Until Discontinued **AND** sodium chloride 0.9%, 10 mL, Intravenous, Q6H **AND** sodium chloride 0.9%, 10 mL,  Intravenous, PRN      Discharge Planning and Disposition: Anticipated discharge to be determined.    IGeorgina PA, have reviewed and discussed the case with MD Dr. Lai Cardoso 08/13/2024-chart reviewed patient examined 59-year-old  male with a history of gastroesophageal reflux disease and Crohn's disease.  Patient presents to emergency room  post fall with aphasia and left arm weakness.  Sinus bradycardia.  CT of the head was done revealing a 4.2 x 3.8 cm intraparenchymal hemorrhage of the right thalamus capsular region with mild surrounding edema and effacement of the adjacent sulci and sylvian fissure with compression of the right lateral ventricle with an 8 mm midline shift attestation of bleed into the right lateral ventricle and 3rd ventricle with mild dilation of the left lateral ventricle compared to the right suggesting possible early obstructive hydrocephalus..  CTA of the head neck revealed extensive intracranial hemorrhage and mild dolichoectasia of the basilar artery.  Neurology or neurosurgery was consulted in ICU and patient was started on Cleviprex drip for blood pressure control to keep less than 140/90 patient was admitted to ICU.  Repeat CT of the head revealed stable bleed.  Patient was started on 3% saline drip on 08/11/2024.  Cleviprex on saline drip has been discontinued.  Patient is refusing speech therapy and remains with a NG tube.  Today patient developed a fever of 100.6 without leukocytosis.  He has periods/episodes of agitation controlled with Xanax and Seroquel.  Downgraded to hospitalist services.  Neurology and neurosurgery signed off the case.  Patient uncooperative.    General appearance: Well-developed, well-nourished male in no apparent distress.  No family at bedside.  HEENT: Atraumatic head. Moist mucous membranes of oral cavity.  Lungs: Clear to auscultation bilaterally.   Heart: Regular rate and rhythm.   Abdomen: Soft, non-distended.   NG tube in place.  Extremities: No cyanosis, clubbing. No deformities.  Skin: No Rash. Warm and dry.  Neuro:  Sleeping.  Opens eyes but closes eyes and seems to be sleeping.  Moves right upper and lower extremities spontaneously.  Does not withdraw left lower extremity from stimuli.  Mittens in place.  Psych:  Uncooperative      A-  Right thalamic capsular intraparenchymal hematoma with subarachnoid and intraventricular extension and right-to-left midline shift of 6 mm  Normocytic anemia, stable   Hypernatremia/hyperchloremia  Metabolic acidosis   Febrile episode  History of Crohn's disease and GERD  Uncooperative w medical tx   Substance abuse - cocaine in UDS      Plan -  - transfer to [facility] hospitalist  - Neurology and neurosurgery has signed off  - Continue with tube feeds until patient was able to complete speech therapy evaluation  - Continue with occupational and physical therapy  - Leukocytosis workup included UA, chest x-ray and blood cultures.    - UA with trace mucus, no squamous epithelial cells, greater than 100 WBCs, 11-20 RBCs, 500 leukocyte esterase, 1+ glucose, 1+ blood and trace protein.  - Chest x-ray with enlarged cardiac silhouette without overt edema  - Follow results of blood and urine culture   - continue Rocephin at this time  - Consider discontinuing Xanax  - Resume appropriate home medications when deemed necessary   - Labs in AM      Agree with the assessment and plans done by DAVE Varela.  Some corrections were done to HPI/physical examination/assessment and plans at the time of my evaluation.      Portion of this note is dictated using EMR integrated voice recognition software and may be subjected to voice recognition errors not corrected with proofreading. Please  for clarification if needed.     STANLEY Hoyt MD  [facility]  8/13/14

## 2024-08-13 NOTE — PROGRESS NOTES
[facility] - 7th Floor ICU  Neurology  Progress Note    Patient Name: Dell Garrett Jr.  MRN: [id number]  Admission Date: 8/10/2024  Hospital Length of Stay: 3 days  Code Status: Full Code   Attending Provider: JOE Piper MD  Primary Care Physician: Yaa Taveras FNP   Principal Problem:<principal problem not specified>    HPI:   59 year old male with a past medical history of Crohn's disease and GERD presented to ED on 08/10 for aphasia.  He was last seen normal at 6:45 a.m..  He was at the [employer] getting his haircut when he suddenly stood up and fell.  On scene, he had left arm weakness and slurred speech and was only responsive to voice.  Upon arrival to ED, a stroke alert was called.  CT head significant for right basal ganglia hemorrhage with 8 mm right-to-left shift as well as IVH.  CTA head and neck was negative for LVH or vascular abnormality.  Neurology was consulted for stroke workup.    Overview/Hospital Course:  No notes on file        Subjective:     Interval History: No acute events overnight. Patient is more alert this morning. He was very agitated and thrashing around in the bed. He said that he wants to be discharged home, now. Osmolarity 321, Sodium 148. Hypertonic saline on hold.     Current Neurological Medications: Keppra    Current Facility-Administered Medications   Medication Dose Route Frequency Provider Last Rate Last Admin    0.9%  NaCl infusion   Intravenous PRN JOE Piper MD   Stopped at 08/11/24 1455    acetaminophen suppository 650 mg  650 mg Rectal Q6H PRN Chinmay Grubbs DO   650 mg at 08/11/24 2111    acetaminophen tablet 650 mg  650 mg Oral Q6H PRN Chinmay Grubbs DO   650 mg at 08/13/24 0431    ALPRAZolam tablet 0.25 mg  0.25 mg Per NG tube Q4H PRN JOE Piper MD   0.25 mg at 08/13/24 0742    bisacodyL suppository 10 mg  10 mg Rectal Daily PRN Ileana Rene, NP        clevidipine (CLEVIPREX) 25 mg/50 mL infusion  0-16 mg/hr Intravenous  Continuous Ochoa Parsons III, MD 12 mL/hr at 08/12/24 1227 6 mg/hr at 08/12/24 1227    hydrALAZINE injection 10 mg  10 mg Intravenous Q4H PRN Brooks Carlisle MD   10 mg at 08/13/24 0032    labetaloL injection 10 mg  10 mg Intravenous Q2H PRN Anju, Ileana W, NP   10 mg at 08/13/24 0236    levETIRAcetam (Keppra) 500 mg in D5W 100 mL IVPB (MB+)  500 mg Intravenous Q12H Anju, Ileana W,  mL/hr at 08/13/24 0807 500 mg at 08/13/24 0807    mupirocin 2 % ointment   Nasal BID JOE Piper MD   Given at 08/13/24 0759    QUEtiapine tablet 50 mg  50 mg Per NG tube Daily JOE Piper MD   50 mg at 08/13/24 0759    sodium chloride 0.9% flush 10 mL  10 mL Intravenous PRN Ileana Rene, NP        sodium chloride 0.9% flush 10 mL  10 mL Intravenous Q6H Luis Enrique Saeed MD   10 mL at 08/12/24 2352    And    sodium chloride 0.9% flush 10 mL  10 mL Intravenous PRN Luis Enrique Saeed MD        sodium chloride 3% HYPERTONIC solution  60 mL/hr Intravenous Continuous Anju, Ileana W, NP 60 mL/hr at 08/12/24 1120 60 mL/hr at 08/12/24 1120       Review of Systems  Objective:     Vital Signs (Most Recent):  Temp: 99.7 °F (37.6 °C) (08/13/24 0531)  Pulse: 74 (08/13/24 0810)  Resp: 16 (08/13/24 0810)  BP: 138/86 (08/13/24 0803)  SpO2: 97 % (08/13/24 0810) Vital Signs (24h Range):  Temp:  [97.9 °F (36.6 °C)-100.6 °F (38.1 °C)] 99.7 °F (37.6 °C)  Pulse:  [] 74  Resp:  [12-36] 16  SpO2:  [89 %-99 %] 97 %  BP: (115-162)/() 138/86  Arterial Line BP: (126-173)/(57-85) 126/59     Weight: 65.3 kg (143 lb 15.4 oz)  Body mass index is 22.55 kg/m².     Physical Exam  Constitutional:       Appearance: Normal appearance.   HENT:      Head: Normocephalic.      Nose: Nose normal.      Mouth/Throat:      Mouth: Mucous membranes are dry.   Eyes:      Pupils: Pupils are equal, round, and reactive to light.   Pulmonary:      Effort: Pulmonary effort is normal.   Abdominal:      Palpations: Abdomen is soft.    Musculoskeletal:         General: Normal range of motion.      Cervical back: Normal range of motion and neck supple.   Skin:     General: Skin is warm and dry.      Capillary Refill: Capillary refill takes less than 2 seconds.   Neurological:      Mental Status: He is alert.      Comments: Alert to self  Agitated  Visual fields intact  Antigravity RUE/RLE  LUE flaccid  LLE withdraws            NEUROLOGICAL EXAMINATION:     CRANIAL NERVES     CN III, IV, VI   Pupils are equal, round, and reactive to light.      Significant Labs: CBC:   Recent Labs   Lab 08/12/24 0311 08/13/24 0235   WBC 11.42 9.36   HGB 12.9* 12.3*   HCT 38.6* 37.9*    288     CMP:   Recent Labs   Lab 08/12/24 0311 08/12/24 0824 08/12/24  1515 08/12/24 2028 08/13/24 0235      < > 146* 150* 148*   K 3.9  --   --   --  3.7   *  --   --   --  117*   CO2 19*  --   --   --  20*   BUN 12.2  --   --   --  19.9   CREATININE 0.78  --   --   --  0.83   CALCIUM 9.2  --   --   --  9.6   MG 2.40  --   --   --  2.40   ALBUMIN 3.6  --   --   --  3.5   BILITOT 1.0  --   --   --  0.9   ALKPHOS 59  --   --   --  60   AST 17  --   --   --  14   ALT 16  --   --   --  13    < > = values in this interval not displayed.       Significant Imaging: I have reviewed all pertinent imaging results/findings within the past 24 hours.  Assessment and Plan:     Intraparenchymal hemorrhage of brain  Presented with sudden onset aphasia and left sided weakness  Etiology: concerning for hypertensive bleed  Intervention: blood pressure control, ICU admission, neurosurgery consulted    Stroke workup:  -CT head:  There is 4.2 x 3.8 cm (AP x T) intraparenchymal hemorrhage centered in the right thalamocapsular region series 2 image 36. There is mild surrounding edema with resultant effacement of the adjacent sulci and Sylvian fissure, as well as compression of the right lateral ventricle with 8mm midline shift on series 2 image 36.  Intraventricular extension of  bleed is seen in the right lateral ventricle and 3rd ventricle. There is mild dilatation of the left lateral ventricle compared to the right suggesting possible early obstructive hydrocephalus component. Sulcal hyperdensities are seen along the right temporal lobe reflective of subarachnoid hemorrhage. Recommend continued serial interval follow-up to full resolution as indicated.  -CTA head and neck:  Mild dolichoectasia in the basilar artery  Extensive intracranial hemorrhage   ECHO w/BS: Normal left atrial size.  Study is negative for shunt.    Left Ventricle: The left ventricle is mildly dilated. Normal wall thickness. There is normal systolic function with a visually estimated ejection fraction of 55 - 60%. Grade II diastolic dysfunction.  Normal left atrial size.estimated ejection fraction of 55 - 60%. Grade II diastolic dysfunction.Study is negative for shunt    More alert this morning, although very agitated and not very cooperative. He has refused speech therapy.  Stroke workup completed. Etiology likely hypertensive bleed, along with illicit drug use.    Plan  -Discontinue hypertonic saline.  -SBP less [id number]  -continue to avoid antiplatelets  - Atorvastatin 40 mg  -Continue PT/OT/speech therapy recommendations. Will likely need inpatient rehab.   -Based on AHA/ACC 2021 guidelines, patient primary care doctor should target BP goal < 130 mm/hg, LDL-C < 70 mg/dl, and HBA1c of < 7% to minimize the risk of future strokes.  -Follow up in clinic with KHANH Palacios in 3 months.    -Ok to downgrade to Hospitalist's.    No further recommendations from a stroke stand point. Will sign off.          VTE Risk Mitigation (From admission, onward)           Ordered     IP VTE LOW RISK PATIENT  Once         08/10/24 1059     Place sequential compression device  Until discontinued         08/10/24 105                    Fern Aguilar NP  Neurology  [facility] - 7th Floor ICU

## 2024-08-13 NOTE — PT/OT/SLP PROGRESS
Occupational Therapy   Treatment    Name: Dell Garrett Jr.  MRN: [id number]  Admitting Diagnosis:  <principal problem not specified>       Recommendations:     Recommended therapy intensity at discharge: High Intensity Therapy   Discharge Equipment Recommendations:  to be determined by next level of care       Assessment:     Dell Garrett Jr. is a 59 y.o. male with a medical diagnosis of R basal ganglia hemorrhage with R to L shift and intraventricular extension, elevated BP.  He presents drowsy due to seroquel and ativan required due to significant agitation this morning.  Pt attempting to follow commands with RUE and open eyes.  LUE remains flaccid, gentle ROM provided.  Will progress as appropriate and as tolerated.   Performance deficits affecting function are weakness, impaired self care skills, impaired functional mobility, gait instability, visual deficits, impaired balance, decreased upper extremity function, decreased lower extremity function, decreased safety awareness, abnormal tone, impaired fine motor.     Rehab Prognosis:  Good; patient would benefit from acute skilled OT services to address these deficits and reach maximum level of function.       Plan:     Patient to be seen 6 x/week to address the above listed problems via self-care/home management, therapeutic activities, therapeutic exercises, neuromuscular re-education  Plan of Care Expires: 09/12/24  Plan of Care Reviewed with: patient    Subjective     Pain/Comfort:  Pain Rating 1: 0/10    Objective:     Communicated with: RN prior to session.  Patient found HOB elevated with  (NG, IV, vital monitoring, SCD, heel floats, roll belt) upon OT entry to room.    General Precautions: Standard, fall (<140)    Orthopedic Precautions:N/A  Braces: N/A    Vital Signs: Blood Pressure: 140/77, HR 52     Occupational Performance:     Bed Mobility:    Patient completed Supine to Sit with maxx2  Patient completed Sit to Supine with maxx2  Max A static sitting  balance, decreased participation in task, level of arousal did not change    Activities of Daily Living:  Total A ADLs today due to REILLY    Therapeutic Exercise:  PROM of LUE performed 2/10    Therapeutic Positioning    OT interventions performed during the course of today's session in an effort to prevent and/or reduce acquired pressure injuries:   Therapeutic positioning was provided at the conclusion of session to offload all bony prominences for the prevention and/or reduction of pressure injuries            Patient Education:  Patient provided with verbal education education regarding OT role/goals/POC, fall prevention, safety awareness, Discharge/DME recommendations, and pressure ulcer prevention.  Additional teaching is warranted.      Patient left right sidelying with all lines intact and RN present.  Roll belt applied.  PUP applied.    GOALS:   Multidisciplinary Problems       Occupational Therapy Goals          Problem: Occupational Therapy    Goal Priority Disciplines Outcome Interventions   Occupational Therapy Goal     OT, PT/OT Progressing    Description: Goals to be met by: 9/12/24     Patient will increase functional independence with ADLs by performing:    UE Dressing with Stand-by Assistance.  LE Dressing with mod A  Grooming while seated at sink with min A  Assistance.  Toileting from toilet with mod A for hygiene and clothing management.   Toilet transfer to toilet with mod A  Increased functional strength to 3/5 LUE through therEX, neuromuscular re-ed.  Pt will visually attend to L side of environment with min cues                           Time Tracking:     OT Date of Treatment:    OT Start Time: 1130  OT Stop Time: 1155  OT Total Time (min): 25 min    Billable Minutes:therAct 2    OT/MAC: OT     Number of MAC visits since last OT visit: 1    8/13/2024

## 2024-08-13 NOTE — PT/OT/SLP EVAL
[facility]  Speech Language Pathology Department  Clinical Swallow Evaluation    Patient Name:  Dell Garrett Jr.   MRN:  [id number]    Recommendations     General recommendations:  Modified Barium Swallow Study pending improved mental status  Solid texture recommendation:  NPO  Liquid consistency recommendation: NPO   Medications: via NG tube  Precautions: aspiration    History     Dell Garrett Jr. is a/n 59 y.o. male admitted to ICU with a right thalamic ICH with intraventricular extension after presenting with aphasia, slurred speech and left UE weakness.     Past Medical History:   Diagnosis Date    Crohn's disease     GERD (gastroesophageal reflux disease)      Home diet texture/consistency: unknown  Current method of nutrition: Tube feeding (NG)    Imaging   Results for orders placed during the hospital encounter of 08/10/24    X-Ray Chest 1 View    Narrative  EXAMINATION:  XR CHEST 1 VIEW    CLINICAL HISTORY:  fever;    TECHNIQUE:  Single frontal view of the chest was performed.    COMPARISON:  08/10/2024    FINDINGS:  LINES AND TUBES: Left upper extremity PICC tip projects over the SVC.  Enteric tube courses below the diaphragm.    MEDIASTINUM AND MADINA: Cardiac silhouette is enlarged.    LUNGS: No lobar consolidation. No edema.    PLEURA:No pleural effusion. No pneumothorax.    OTHER: Patient is rotated to the left.    Impression  Enlarged cardiac silhouette without overt edema.      Electronically signed by: Isabella Jacques  Date:    08/13/2024  Time:    12:33    Subjective     Patient  with fluctuating levels of alertness .    Patient goals: to eat/drink   Spiritual/Cultural/Rastafari Beliefs/Practices that affect care: no    Pain/Comfort: Pain Rating 1: 0/10    Respiratory Status:  room air    Restraints/positioning devices: soft mittens and roll belt    Objective     ORAL MUSCULATURE  Dentition: missing teeth  Secretion Management: left corner drooling  Mucosal Quality:  good  Facial Movement: reduced left  Buccal Strength & Mobility: impaired  Mandibular Strength & Mobility: WFL  Oral Labial Strength & Mobility: impaired pursing and impaired seal  Lingual Strength & Mobility: impaired strength and impaired protrusion  Vocal Quality: harsh    PO TRIALS  Consistency Fed By Oral Symptoms Pharyngeal Symptoms   Ice chips SLP Bolus holding  Suctioned from oral cavity N/A   Puree SLP Bolus holding  Slowed oral transit time  Tongue pumping  Pocketing left  Oral residue  Residue suctioned Multiple swallows  Throat clear after swallow     Assessment     Pt presents with signs/sx oropharyngeal dysphagia warranting an instrumental assessment of swallow function to determine safety of PO intake and develop appropriate treatment plan.    Education     Patient provided with verbal education regarding SLP POC.  Understanding was verbalized, however additional teaching warranted.    Plan     SLP Follow-Up:  Yes   Plan of Care reviewed with:  patient     Time Tracking     SLP Treatment Date:   08/13/24  Speech Start Time:  1450  Speech Stop Time:  1505     Speech Total Time (min):  15 min    Billable minutes:  Swallow and Oral Function Evaluation, 15 minutes     08/13/2024

## 2024-08-13 NOTE — SUBJECTIVE & OBJECTIVE
Subjective:     Interval History: No acute events overnight. Patient is more alert this morning. He was very agitated and thrashing around in the bed. He said that he wants to be discharged home, now. Osmolarity 321, Sodium 148. Hypertonic saline on hold.     Current Neurological Medications: Keppra    Current Facility-Administered Medications   Medication Dose Route Frequency Provider Last Rate Last Admin    0.9%  NaCl infusion   Intravenous PRN JOE Piper MD   Stopped at 08/11/24 1455    acetaminophen suppository 650 mg  650 mg Rectal Q6H PRN Chinmay Grubbs DO   650 mg at 08/11/24 2111    acetaminophen tablet 650 mg  650 mg Oral Q6H PRN Chinmay Grubbs DO   650 mg at 08/13/24 0431    ALPRAZolam tablet 0.25 mg  0.25 mg Per NG tube Q4H PRN JOE Piper MD   0.25 mg at 08/13/24 0742    bisacodyL suppository 10 mg  10 mg Rectal Daily PRN Ileana Rene NP        clevidipine (CLEVIPREX) 25 mg/50 mL infusion  0-16 mg/hr Intravenous Continuous Ochoa Parsons III, MD 12 mL/hr at 08/12/24 1227 6 mg/hr at 08/12/24 1227    hydrALAZINE injection 10 mg  10 mg Intravenous Q4H PRN Brooks Carlisle MD   10 mg at 08/13/24 0032    labetaloL injection 10 mg  10 mg Intravenous Q2H PRN Ileana Rene NP   10 mg at 08/13/24 0236    levETIRAcetam (Keppra) 500 mg in D5W 100 mL IVPB (MB+)  500 mg Intravenous Q12H Ileana Rene  mL/hr at 08/13/24 0807 500 mg at 08/13/24 0807    mupirocin 2 % ointment   Nasal BID JOE Piper MD   Given at 08/13/24 0759    QUEtiapine tablet 50 mg  50 mg Per NG tube Daily JOE Piper MD   50 mg at 08/13/24 0759    sodium chloride 0.9% flush 10 mL  10 mL Intravenous PRN Ileana Rene NP        sodium chloride 0.9% flush 10 mL  10 mL Intravenous Q6H Luis Enrique Saeed MD   10 mL at 08/12/24 2352    And    sodium chloride 0.9% flush 10 mL  10 mL Intravenous PRN Luis Enrique Saeed MD        sodium chloride 3% HYPERTONIC solution  60 mL/hr Intravenous  Continuous Anju, Ileana W, NP 60 mL/hr at 08/12/24 1120 60 mL/hr at 08/12/24 1120       Review of Systems  Objective:     Vital Signs (Most Recent):  Temp: 99.7 °F (37.6 °C) (08/13/24 0531)  Pulse: 74 (08/13/24 0810)  Resp: 16 (08/13/24 0810)  BP: 138/86 (08/13/24 0803)  SpO2: 97 % (08/13/24 0810) Vital Signs (24h Range):  Temp:  [97.9 °F (36.6 °C)-100.6 °F (38.1 °C)] 99.7 °F (37.6 °C)  Pulse:  [] 74  Resp:  [12-36] 16  SpO2:  [89 %-99 %] 97 %  BP: (115-162)/() 138/86  Arterial Line BP: (126-173)/(57-85) 126/59     Weight: 65.3 kg (143 lb 15.4 oz)  Body mass index is 22.55 kg/m².     Physical Exam  Constitutional:       Appearance: Normal appearance.   HENT:      Head: Normocephalic.      Nose: Nose normal.      Mouth/Throat:      Mouth: Mucous membranes are dry.   Eyes:      Pupils: Pupils are equal, round, and reactive to light.   Pulmonary:      Effort: Pulmonary effort is normal.   Abdominal:      Palpations: Abdomen is soft.   Musculoskeletal:         General: Normal range of motion.      Cervical back: Normal range of motion and neck supple.   Skin:     General: Skin is warm and dry.      Capillary Refill: Capillary refill takes less than 2 seconds.   Neurological:      Mental Status: He is alert.      Comments: Alert to self  Agitated  Visual fields intact  Antigravity RUE/RLE  LUE flaccid  LLE withdraws            NEUROLOGICAL EXAMINATION:     CRANIAL NERVES     CN III, IV, VI   Pupils are equal, round, and reactive to light.      Significant Labs: CBC:   Recent Labs   Lab 08/12/24  0311 08/13/24  0235   WBC 11.42 9.36   HGB 12.9* 12.3*   HCT 38.6* 37.9*    288     CMP:   Recent Labs   Lab 08/12/24  0311 08/12/24  0824 08/12/24  1515 08/12/24 2028 08/13/24 0235      < > 146* 150* 148*   K 3.9  --   --   --  3.7   *  --   --   --  117*   CO2 19*  --   --   --  20*   BUN 12.2  --   --   --  19.9   CREATININE 0.78  --   --   --  0.83   CALCIUM 9.2  --   --   --  9.6   MG  2.40  --   --   --  2.40   ALBUMIN 3.6  --   --   --  3.5   BILITOT 1.0  --   --   --  0.9   ALKPHOS 59  --   --   --  60   AST 17  --   --   --  14   ALT 16  --   --   --  13    < > = values in this interval not displayed.       Significant Imaging: I have reviewed all pertinent imaging results/findings within the past 24 hours.

## 2024-08-13 NOTE — ASSESSMENT & PLAN NOTE
Presented with sudden onset aphasia and left sided weakness  Etiology: concerning for hypertensive bleed  Intervention: blood pressure control, ICU admission, neurosurgery consulted    Stroke workup:  -CT head:  There is 4.2 x 3.8 cm (AP x T) intraparenchymal hemorrhage centered in the right thalamocapsular region series 2 image 36. There is mild surrounding edema with resultant effacement of the adjacent sulci and Sylvian fissure, as well as compression of the right lateral ventricle with 8mm midline shift on series 2 image 36.  Intraventricular extension of bleed is seen in the right lateral ventricle and 3rd ventricle. There is mild dilatation of the left lateral ventricle compared to the right suggesting possible early obstructive hydrocephalus component. Sulcal hyperdensities are seen along the right temporal lobe reflective of subarachnoid hemorrhage. Recommend continued serial interval follow-up to full resolution as indicated.  -CTA head and neck:  Mild dolichoectasia in the basilar artery  Extensive intracranial hemorrhage   ECHO w/BS: Normal left atrial size.  Study is negative for shunt.    Left Ventricle: The left ventricle is mildly dilated. Normal wall thickness. There is normal systolic function with a visually estimated ejection fraction of 55 - 60%. Grade II diastolic dysfunction.  Normal left atrial size.estimated ejection fraction of 55 - 60%. Grade II diastolic dysfunction.Study is negative for shunt    More alert this morning, although very agitated and not very cooperative. He has refused speech therapy.  Stroke workup completed. Etiology likely hypertensive bleed, along with illicit drug use.    Plan  -Discontinue hypertonic saline.  -SBP less [id number]  -continue to avoid antiplatelets  - Atorvastatin 40 mg  -Continue PT/OT/speech therapy recommendations. Will likely need inpatient rehab.   -Based on AHA/ACC 2021 guidelines, patient primary care doctor should target BP goal < 130 mm/hg, LDL-C <  70 mg/dl, and HBA1c of < 7% to minimize the risk of future strokes.  -Follow up in clinic with KHANH Palacios in 3 months.    -Ok to downgrade to Hospitalist's.    No further recommendations from a stroke stand point. Will sign off.

## 2024-08-13 NOTE — PT/OT/SLP PROGRESS
Physical Therapy Treatment    Patient Name:  Dell Garrett Jr.   MRN:  [id number]    Recommendations:     Discharge therapy intensity: High Intensity Therapy   Discharge Equipment Recommendations: to be determined by next level of care  Barriers to discharge: Impaired mobility    Assessment:     Dell Garrett Jr. is a 59 y.o. 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  He presents with the following impairments/functional limitations: weakness, impaired endurance, impaired balance, decreased safety awareness, impaired self care skills, impaired functional mobility, decreased upper extremity function, decreased lower extremity function .    Pt less agitated however decreased ability to participate 2/2 sedation. Mobilized to EOB in order to increase arousal. Kept eyes closed but did follow some commands on R side. Noted pt resistive to R cervical rotation with a preference to remain in L rotation.     Rehab Prognosis: Fair; patient would benefit from acute skilled PT services to address these deficits and reach maximum level of function.    Recent Surgery: * No surgery found *      Plan:     During this hospitalization, patient would benefit from acute PT services 6 x/week to address the identified rehab impairments via gait training, therapeutic activities, therapeutic exercises, neuromuscular re-education and progress toward the following goals:    Plan of Care Expires:  09/12/24    Subjective     Chief Complaint: unable to state  Patient/Family Comments/goals: unable to state  Pain/Comfort:  Pain Rating 1: 0/10      Objective:     Communicated with RN prior to session.  Patient found HOB elevated with NG tube, pulse ox (continuous), telemetry, blood pressure cuff, PureWick upon PT entry to room.     General Precautions: Standard, fall  Orthopedic Precautions: N/A  Braces: N/A  Respiratory Status: Room air  Vitals: /77, HR  52, Spo2 97%  Skin Integrity: Visible skin intact      Functional Mobility:  Bed Mobility:    Total assist supine<->sit  Balance:   Max assist for static sitting balance with a L lateral and posterior lean. Unable to progress likely due to sedation. Pt mildly more alert initially but unable to remain awake and engaged in tx session.     Therapeutic Activities/Exercises:  RLE PROM knee flex/ext and JUSTUS PROM ankle PF/DF  LLE AAROM knee extension, difficulty following commands for knee flexion.     Education:  Patient provided with verbal education education regarding PT role/goals/POC.  Additional teaching is warranted.     Patient left HOB elevated with all lines intact, call button in reach, pressure relief boots, and nurse present.    GOALS:   Multidisciplinary Problems       Physical Therapy Goals          Problem: Physical Therapy    Goal Priority Disciplines Outcome Goal Variances Interventions   Physical Therapy Goal     PT, PT/OT Progressing     Description: Goals to be met by: 2024     Patient will increase functional independence with mobility by performin. Supine to sit with MInimal Assistance  2. Sit to supine with MInimal Assistance  3. Sit to stand transfer with Minimal Assistance  4. Pt to follow 75% of commands.   5. Gait  x 75 feet with Minimal Assistance using Rolling Walker vs LRAD.                          Time Tracking:     PT Received On: 24  PT Start Time: 1143     PT Stop Time: 1153  PT Total Time (min): 10 min     Billable Minutes: Therapeutic Exercise 1 unit    Treatment Type: Treatment  PT/PTA: PTA     Number of PTA visits since last PT visit: 2024

## 2024-08-14 PROBLEM — I10 PRIMARY HYPERTENSION: Status: ACTIVE | Noted: 2024-08-14

## 2024-08-14 LAB
ALBUMIN SERPL-MCNC: 3.5 G/DL (ref 3.4–4.8)
ALBUMIN/GLOB SERPL: 0.9 RATIO (ref 1.1–2)
ALP SERPL-CCNC: 59 UNIT/L (ref 40–150)
ALT SERPL-CCNC: 17 UNIT/L (ref 0–55)
ANION GAP SERPL CALC-SCNC: 10 MEQ/L
AST SERPL-CCNC: 18 UNIT/L (ref 5–34)
BASOPHILS # BLD AUTO: 0.02 X10(3)/MCL
BASOPHILS NFR BLD AUTO: 0.2 %
BILIRUB SERPL-MCNC: 0.8 MG/DL
BUN SERPL-MCNC: 24.6 MG/DL (ref 8.4–25.7)
CALCIUM SERPL-MCNC: 9.4 MG/DL (ref 8.8–10)
CHLORIDE SERPL-SCNC: 113 MMOL/L (ref 98–107)
CO2 SERPL-SCNC: 21 MMOL/L (ref 23–31)
CREAT SERPL-MCNC: 0.94 MG/DL (ref 0.73–1.18)
CREAT/UREA NIT SERPL: 26
EOSINOPHIL # BLD AUTO: 0.01 X10(3)/MCL (ref 0–0.9)
EOSINOPHIL NFR BLD AUTO: 0.1 %
ERYTHROCYTE [DISTWIDTH] IN BLOOD BY AUTOMATED COUNT: 13.7 % (ref 11.5–17)
GFR SERPLBLD CREATININE-BSD FMLA CKD-EPI: >60 ML/MIN/1.73/M2
GLOBULIN SER-MCNC: 3.8 GM/DL (ref 2.4–3.5)
GLUCOSE SERPL-MCNC: 127 MG/DL (ref 82–115)
HCT VFR BLD AUTO: 38.4 % (ref 42–52)
HGB BLD-MCNC: 12.6 G/DL (ref 14–18)
IMM GRANULOCYTES # BLD AUTO: 0.02 X10(3)/MCL (ref 0–0.04)
IMM GRANULOCYTES NFR BLD AUTO: 0.2 %
LYMPHOCYTES # BLD AUTO: 0.81 X10(3)/MCL (ref 0.6–4.6)
LYMPHOCYTES NFR BLD AUTO: 7.5 %
MAGNESIUM SERPL-MCNC: 2.3 MG/DL (ref 1.6–2.6)
MCH RBC QN AUTO: 29.8 PG (ref 27–31)
MCHC RBC AUTO-ENTMCNC: 32.8 G/DL (ref 33–36)
MCV RBC AUTO: 90.8 FL (ref 80–94)
MONOCYTES # BLD AUTO: 1.19 X10(3)/MCL (ref 0.1–1.3)
MONOCYTES NFR BLD AUTO: 11 %
NEUTROPHILS # BLD AUTO: 8.74 X10(3)/MCL (ref 2.1–9.2)
NEUTROPHILS NFR BLD AUTO: 81 %
NRBC BLD AUTO-RTO: 0 %
PLATELET # BLD AUTO: 235 X10(3)/MCL (ref 130–400)
PLATELETS.RETICULATED NFR BLD AUTO: 1.2 % (ref 0.9–11.2)
PMV BLD AUTO: 8.5 FL (ref 7.4–10.4)
POTASSIUM SERPL-SCNC: 3.6 MMOL/L (ref 3.5–5.1)
PROT SERPL-MCNC: 7.3 GM/DL (ref 5.8–7.6)
RBC # BLD AUTO: 4.23 X10(6)/MCL (ref 4.7–6.1)
SODIUM SERPL-SCNC: 144 MMOL/L (ref 136–145)
WBC # BLD AUTO: 10.79 X10(3)/MCL (ref 4.5–11.5)

## 2024-08-14 PROCEDURE — 85025 COMPLETE CBC W/AUTO DIFF WBC: CPT | Performed by: PHYSICIAN ASSISTANT

## 2024-08-14 PROCEDURE — 97530 THERAPEUTIC ACTIVITIES: CPT

## 2024-08-14 PROCEDURE — 63600175 PHARM REV CODE 636 W HCPCS: Performed by: PHYSICIAN ASSISTANT

## 2024-08-14 PROCEDURE — 25000003 PHARM REV CODE 250: Performed by: STUDENT IN AN ORGANIZED HEALTH CARE EDUCATION/TRAINING PROGRAM

## 2024-08-14 PROCEDURE — 63600175 PHARM REV CODE 636 W HCPCS: Performed by: NURSE PRACTITIONER

## 2024-08-14 PROCEDURE — 25000003 PHARM REV CODE 250: Performed by: PHYSICIAN ASSISTANT

## 2024-08-14 PROCEDURE — 63600175 PHARM REV CODE 636 W HCPCS

## 2024-08-14 PROCEDURE — 25000003 PHARM REV CODE 250: Performed by: INTERNAL MEDICINE

## 2024-08-14 PROCEDURE — 25000003 PHARM REV CODE 250: Performed by: NURSE PRACTITIONER

## 2024-08-14 PROCEDURE — 83735 ASSAY OF MAGNESIUM: CPT

## 2024-08-14 PROCEDURE — 25000003 PHARM REV CODE 250

## 2024-08-14 PROCEDURE — 92526 ORAL FUNCTION THERAPY: CPT

## 2024-08-14 PROCEDURE — A4216 STERILE WATER/SALINE, 10 ML: HCPCS | Performed by: STUDENT IN AN ORGANIZED HEALTH CARE EDUCATION/TRAINING PROGRAM

## 2024-08-14 PROCEDURE — 36415 COLL VENOUS BLD VENIPUNCTURE: CPT | Performed by: PHYSICIAN ASSISTANT

## 2024-08-14 PROCEDURE — 21400001 HC TELEMETRY ROOM

## 2024-08-14 PROCEDURE — 80053 COMPREHEN METABOLIC PANEL: CPT | Performed by: PHYSICIAN ASSISTANT

## 2024-08-14 RX ORDER — CLONIDINE HYDROCHLORIDE 0.1 MG/1
0.1 TABLET ORAL EVERY 6 HOURS PRN
Status: DISCONTINUED | OUTPATIENT
Start: 2024-08-14 | End: 2024-08-27

## 2024-08-14 RX ORDER — AMLODIPINE BESYLATE 5 MG/1
10 TABLET ORAL DAILY
Status: DISCONTINUED | OUTPATIENT
Start: 2024-08-15 | End: 2024-08-27

## 2024-08-14 RX ORDER — OXYCODONE HYDROCHLORIDE 5 MG/1
5 TABLET ORAL EVERY 6 HOURS PRN
Status: DISCONTINUED | OUTPATIENT
Start: 2024-08-14 | End: 2024-08-27

## 2024-08-14 RX ORDER — DOCUSATE SODIUM 50 MG/5ML
100 LIQUID ORAL DAILY
Status: CANCELLED | OUTPATIENT
Start: 2024-08-15

## 2024-08-14 RX ADMIN — LEVETIRACETAM 500 MG: 100 INJECTION, SOLUTION INTRAVENOUS at 09:08

## 2024-08-14 RX ADMIN — CEFTRIAXONE SODIUM 1 G: 1 INJECTION, POWDER, FOR SOLUTION INTRAMUSCULAR; INTRAVENOUS at 04:08

## 2024-08-14 RX ADMIN — ACETAMINOPHEN 325MG 650 MG: 325 TABLET ORAL at 03:08

## 2024-08-14 RX ADMIN — ALPRAZOLAM 0.25 MG: 0.25 TABLET ORAL at 03:08

## 2024-08-14 RX ADMIN — SODIUM CHLORIDE, PRESERVATIVE FREE 10 ML: 5 INJECTION INTRAVENOUS at 05:08

## 2024-08-14 RX ADMIN — ACETAMINOPHEN 325MG 650 MG: 325 TABLET ORAL at 12:08

## 2024-08-14 RX ADMIN — HYDRALAZINE HYDROCHLORIDE 10 MG: 20 INJECTION INTRAMUSCULAR; INTRAVENOUS at 12:08

## 2024-08-14 RX ADMIN — MUPIROCIN: 20 OINTMENT TOPICAL at 09:08

## 2024-08-14 RX ADMIN — SODIUM CHLORIDE, PRESERVATIVE FREE 10 ML: 5 INJECTION INTRAVENOUS at 12:08

## 2024-08-14 RX ADMIN — HYDRALAZINE HYDROCHLORIDE 10 MG: 20 INJECTION INTRAMUSCULAR; INTRAVENOUS at 05:08

## 2024-08-14 RX ADMIN — OXYCODONE HYDROCHLORIDE 5 MG: 5 TABLET ORAL at 06:08

## 2024-08-14 RX ADMIN — AMLODIPINE BESYLATE 5 MG: 5 TABLET ORAL at 09:08

## 2024-08-14 NOTE — PROGRESS NOTES
[facility] - 7th Floor [facility] Medicine  Progress Note    Patient Name: Dell Garrett Jr.  MRN: [id number]  Patient Class: IP- Inpatient   Admission Date: 8/10/2024  Length of Stay: 4 days  Attending Physician: Abhijeet Hoyt MD  Primary Care Provider: Yaa Taveras FNP        Subjective:     Principal Problem:Intraparenchymal hemorrhage of brain        HPI:  59 y.o. Black or  male with a past medical history of Crohn's disease and GERD. The patient presented to [facility] on 8/10/2024 with aphasia, left arm weakness and fall.  Patient presented to the ED with bradycardia, normocytic anemia, bicarb 22.  CT of the head revealed a 4.2 x 3.8 cm intraparenchymal hemorrhage of the right thalamus capsular region with mild surrounding edema and effacement of the adjacent sulci in sylvian fissure with compression of the right lateral ventricle with an 8 mm midline shift attestation of bleed into the right lateral ventricle and 3rd ventricle with mild dilation of the left lateral ventricle compared to the right suggesting possible early obstructive hydrocephalus.  There was no noted to be sulci hyperdensities along the right temporal lobe reflecting subarachnoid hemorrhage.  CTA of the head and neck revealed extensive intracranial hemorrhage and mild dolichoectasia of the basilar artery.  Neurology and Neurosurgery was consulted and patient was started on Cleviprex drip for blood pressure parameters less than 140/90 and admitted to ICU.  Repeat CT of the head revealed stable bleed with unchanged shift and no new hemorrhages.  Patient was started on 3% saline drip on 08/11/2024.  Cleviprex and saline drip has been discontinued.  Patient was currently has been NG tube and refusing speech therapy.  Repeat CT of the head on 08/11/2024 revealed unchanged appearance of intraparenchymal hematoma and unchanged 6 mm right-to-left midline shift without hydrocephalus.  Patient developed a fever of  100.6° F fairly this morning (08/13/2024).  No leukocytosis on labs this morning.  Patient has been getting Xanax and Seroquel for agitation.  Patient was cleared for downgrade from ICU to regular floor on 08/13/2024.  Neurology and neurosurgery has signed off.  He is admitted to hospital medicine services for further medical management.     Overview/Hospital Course:  8/14/24-Patient is resting in the bed.  Has NGF tube still in place.  MBS in 1-2 days.  Will increase BP meds for better control and monitor.  He will need rehab placement.    Interval History:     Review of Systems   Unable to perform ROS: Mental status change     Objective:     Vital Signs (Most Recent):  Temp: 98.3 °F (36.8 °C) (08/14/24 0338)  Pulse: (!) 53 (08/14/24 1130)  Resp: (!) 21 (08/14/24 1130)  BP: (!) 138/92 (08/14/24 1115)  SpO2: 98 % (08/14/24 1130) Vital Signs (24h Range):  Temp:  [97.7 °F (36.5 °C)-100.9 °F (38.3 °C)] 98.3 °F (36.8 °C)  Pulse:  [] 53  Resp:  [13-32] 21  SpO2:  [86 %-99 %] 98 %  BP: (108-149)/(73-92) 138/92     Weight: 65.3 kg (143 lb 15.4 oz)  Body mass index is 22.55 kg/m².    Intake/Output Summary (Last 24 hours) at 8/14/2024 1404  Last data filed at 8/14/2024 0600  Gross per 24 hour   Intake 1728 ml   Output 1150 ml   Net 578 ml         Physical Exam  Constitutional:       Appearance: Normal appearance. He is normal weight.   HENT:      Head: Normocephalic and atraumatic.      Nose: Nose normal.      Mouth/Throat:      Mouth: Mucous membranes are dry.      Pharynx: Oropharynx is clear.   Eyes:      Extraocular Movements: Extraocular movements intact.      Conjunctiva/sclera: Conjunctivae normal.      Pupils: Pupils are equal, round, and reactive to light.   Cardiovascular:      Rate and Rhythm: Normal rate and regular rhythm.      Pulses: Normal pulses.      Heart sounds: Normal heart sounds.   Pulmonary:      Effort: Pulmonary effort is normal.      Breath sounds: Normal breath sounds.   Abdominal:       General: Bowel sounds are normal.      Palpations: Abdomen is soft.      Comments: NGT in place   Musculoskeletal:         General: Normal range of motion.      Cervical back: Normal range of motion and neck supple.   Skin:     General: Skin is warm and dry.      Capillary Refill: Capillary refill takes 2 to 3 seconds.   Neurological:      Mental Status: He is easily aroused. He is lethargic and disoriented.      Motor: Weakness present.   Psychiatric:         Attention and Perception: He is inattentive.         Speech: Speech is delayed.         Cognition and Memory: Cognition is impaired. Memory is impaired.             Significant Labs: All pertinent labs within the past 24 hours have been reviewed.  BMP:   Recent Labs   Lab 08/14/24  0359      K 3.6   *   CO2 21*   BUN 24.6   CREATININE 0.94   CALCIUM 9.4   MG 2.30     CBC:   Recent Labs   Lab 08/13/24 0235 08/14/24  0359   WBC 9.36 10.79   HGB 12.3* 12.6*   HCT 37.9* 38.4*    235     CMP:   Recent Labs   Lab 08/12/24 2028 08/13/24 0235 08/14/24  0359   * 148* 144   K  --  3.7 3.6   CL  --  117* 113*   CO2  --  20* 21*   BUN  --  19.9 24.6   CREATININE  --  0.83 0.94   CALCIUM  --  9.6 9.4   ALBUMIN  --  3.5 3.5   BILITOT  --  0.9 0.8   ALKPHOS  --  60 59   AST  --  14 18   ALT  --  13 17     Magnesium:   Recent Labs   Lab 08/13/24 0235 08/14/24  0359   MG 2.40 2.30       Significant Imaging: I have reviewed all pertinent imaging results/findings within the past 24 hours.    Assessment/Plan:      Primary hypertension  Chronic, improving. Latest blood pressure and vitals reviewed-     Temp:  [97.7 °F (36.5 °C)-100.9 °F (38.3 °C)]   Pulse:  []   Resp:  [13-32]   BP: (108-149)/(73-92)   SpO2:  [86 %-99 %] .   Home meds for hypertension were reviewed and noted below.       While in the hospital, will manage blood pressure as follows; Adjust home antihypertensive regimen as follows- increase norvasc and add prn clonidine    Will  utilize p.r.n. blood pressure medication only if patient's blood pressure greater than 140/90 and he develops symptoms such as worsening chest pain or shortness of breath.    Moderate malnutrition  Nutrition consulted. Most recent weight and BMI monitored-     Measurements:  Wt Readings from Last 1 Encounters:   08/10/24 65.3 kg (143 lb 15.4 oz)   Body mass index is 22.55 kg/m².    Patient has been screened and assessed by RD.    Malnutrition Type:  Context: acute illness or injury  Level: moderate    Malnutrition Characteristic Summary:  Weight Loss (Malnutrition):  (does not meet criteria)  Energy Intake (Malnutrition):  (does not meet criteria)  Subcutaneous Fat (Malnutrition): mild depletion  Muscle Mass (Malnutrition): mild depletion    Interventions/Recommendations (treatment strategy):           VTE Risk Mitigation (From admission, onward)           Ordered     IP VTE LOW RISK PATIENT  Once         08/10/24 1059     Place sequential compression device  Until discontinued         08/10/24 1059                  Follow labs  Adjust BP meds  MBS tomorrow?  Tube feeds for now  Spoke with sister Melina  Urine is pending-continue rocephin until culture finalizes  Discharge Planning   JÚNIOR:      Code Status: Full Code   Is the patient medically ready for discharge?:     Reason for patient still in hospital (select all that apply): Patient trending condition, Laboratory test, Treatment, Consult recommendations, and PT / OT recommendations  Discharge Plan A: Rehab                  Christopher Mathis MD  Department of Hospital Medicine   [facility] - 7th Floor ICU

## 2024-08-14 NOTE — HOSPITAL COURSE
8/14/24-Patient is resting in the bed.  Has NGF tube still in place.  MBS in 1-2 days.  Will increase BP meds for better control and monitor.  He will need rehab placement.

## 2024-08-14 NOTE — ASSESSMENT & PLAN NOTE
Chronic, improving. Latest blood pressure and vitals reviewed-     Temp:  [97.7 °F (36.5 °C)-100.9 °F (38.3 °C)]   Pulse:  []   Resp:  [13-32]   BP: (108-149)/(73-92)   SpO2:  [86 %-99 %] .   Home meds for hypertension were reviewed and noted below.       While in the hospital, will manage blood pressure as follows; Adjust home antihypertensive regimen as follows- increase norvasc and add prn clonidine    Will utilize p.r.n. blood pressure medication only if patient's blood pressure greater than 140/90 and he develops symptoms such as worsening chest pain or shortness of breath.

## 2024-08-14 NOTE — PT/OT/SLP PROGRESS
Occupational Therapy   Treatment    Name: Dell Garrett Jr.  MRN: [id number]  Admitting Diagnosis:  <principal problem not specified>       Recommendations:     Recommended therapy intensity at discharge: High Intensity Therapy   Discharge Equipment Recommendations:  to be determined by next level of care      Assessment:     Dell Garrett Jr. is a 59 y.o. male with a medical diagnosis of R basal ganglia hemorrhage with R to L shift and intraventricular extension, elevated BP.   He presents drowsy, not responding to OT or opening eyes initially.  As session progressed, he became more alert.  ST present for portion of session for swallowing.  Pt with strong pushing with RUE, cues required for midline orientation.  R lateral gaze preference, able to achieve midline but not cross midline. Followed basic commands with RUE, oriented to place.  Performance deficits affecting function are weakness, impaired self care skills, impaired functional mobility, gait instability, visual deficits, impaired balance, decreased upper extremity function, decreased lower extremity function, decreased safety awareness, abnormal tone, impaired fine motor.     Rehab Prognosis:  Good; patient would benefit from acute skilled OT services to address these deficits and reach maximum level of function.       Plan:     Patient to be seen 6 x/week to address the above listed problems via self-care/home management, therapeutic activities, therapeutic exercises, neuromuscular re-education  Plan of Care Expires: 09/12/24  Plan of Care Reviewed with: patient    Subjective     Pain/Comfort:  Pain Rating 1: 0/10    Objective:     Communicated with: RN prior to session.  Patient found HOB elevated with  (vital monitoring, SCD, roll belt, NG) upon OT entry to room.    General Precautions: Standard, fall (<140)    Orthopedic Precautions:N/A  Braces: N/A  Vital Signs: Blood Pressure: 138/92     Occupational Performance:     Bed Mobility, therACT:  Patient  completed Supine to Sit with maxx2  Patient completed Sit to Supine with maxx2  Max A static sitting balance, strong pusher with RUE, addressed sitting balance with re-orientation of midline, WB RUE  Cervical stretching provided due to L preference       Activities of Daily Living:  Lower Body Dressing: maximal assistance   .  Feeding: NG      Therapeutic Exercise:  PROM LUE provided with proper positoining    Patient Education:  Patient provided with verbal education education regarding OT role/goals/POC, fall prevention, safety awareness, and pressure ulcer prevention.  Understanding was verbalized, however additional teaching warranted.      Patient left  PUP applied, mitt on R hand , RN aware of status.    GOALS:   Multidisciplinary Problems       Occupational Therapy Goals          Problem: Occupational Therapy    Goal Priority Disciplines Outcome Interventions   Occupational Therapy Goal     OT, PT/OT Progressing    Description: Goals to be met by: 9/12/24     Patient will increase functional independence with ADLs by performing:    UE Dressing with Stand-by Assistance.  LE Dressing with mod A  Grooming while seated at sink with min A  Assistance.  Toileting from toilet with mod A for hygiene and clothing management.   Toilet transfer to toilet with mod A  Increased functional strength to 3/5 LUE through therEX, neuromuscular re-ed.  Pt will visually attend to L side of environment with min cues                           Time Tracking:     OT Date of Treatment:    OT Start Time: 1040  OT Stop Time: 1103  OT Total Time (min): 23 min    Billable Minutes:Therapeutic Activity 2    OT/MAC: OT     Number of MAC visits since last OT visit: 2    8/14/2024

## 2024-08-14 NOTE — NURSING
Nurses Note -- 4 Eyes      8/14/2024   4:44 AM      Skin assessed during: Daily Assessment      [x] No Altered Skin Integrity Present    [x]Prevention Measures Documented      [] Yes- Altered Skin Integrity Present or Discovered   [] LDA Added if Not in Epic (Describe Wound)   [] New Altered Skin Integrity was Present on Admit and Documented in LDA   [] Wound Image Taken    Wound Care Consulted? No    Attending Nurse:  Alla Bethea RN/Staff Member:  HAYLEY Hines

## 2024-08-14 NOTE — PT/OT/SLP PROGRESS
[facility]  Speech Language Pathology Department  Dysphagia Therapy Progress Note    Patient Name:  Dell Garrett Jr.   MRN:  [id number]    Recommendations     General recommendations:  Modified Barium Swallow Study pending improved mental status  Solid texture recommendation:  NPO  Liquid consistency recommendation: NPO   Medications: via NG tube    Discharge therapy intensity: High Intensity Therapy   Barriers to safe discharge:  acuity of illness, severity of impairment, and level of skilled assistance needed    Subjective     Patient awake and alert. ST tx completed with PT/OT today.    Spiritual/Cultural/Hoahaoism Beliefs/Practices that affect care: no    Pain/Comfort: Pain Rating 1: 0/10    Objective     Therapeutic PO Trials:  Consistency Amount Fed By Oral Symptoms Pharyngeal Symptoms   Ice chips X3 tsp SLP Bolus holding  Prolonged bolus formation/mastication Wet vocal quality after swallow   Puree X3 tsp SLP Bolus holding  Prolonged bolus formation/mastication  Slowed oral transit time  Pocketing in left buccal cavity Multiple swallows  Throat clear after the swallow     Assessment     Pt continues to require max cues for consistent swallow trigger and to remain awake/alert for PO intake. Remains unsafe for PO diet. Would benefit from MBS in the near future (next 1-2 days; in aim for continued improvement in mentation prior to study) to determine safety of PO intake and develop appropriate treatment plan.     Goals     Multidisciplinary Problems       SLP Goals       Not on file                  Patient Education     Patient provided with verbal education regarding ST POC.  Understanding was verbalized, however additional teaching warranted.    Plan     Will continue to follow and tx as appropriate.    SLP Follow-Up:  Yes   Patient to be seen:  5 x/week   Plan of Care reviewed with:  patient       Time Tracking     SLP Treatment Date:   08/14/24  Speech Start Time:  1000  Speech  Stop Time:  1020     Speech Total Time (min):  20 min    Billable minutes:  Treatment of Swallow Dysfunction, 20 minutes       08/14/2024     Pt expressed understanding. Compliance is expected.

## 2024-08-14 NOTE — HPI
59 y.o. Black or  male with a past medical history of Crohn's disease and GERD. The patient presented to [facility] on 8/10/2024 with aphasia, left arm weakness and fall.  Patient presented to the ED with bradycardia, normocytic anemia, bicarb 22.  CT of the head revealed a 4.2 x 3.8 cm intraparenchymal hemorrhage of the right thalamus capsular region with mild surrounding edema and effacement of the adjacent sulci in sylvian fissure with compression of the right lateral ventricle with an 8 mm midline shift attestation of bleed into the right lateral ventricle and 3rd ventricle with mild dilation of the left lateral ventricle compared to the right suggesting possible early obstructive hydrocephalus.  There was no noted to be sulci hyperdensities along the right temporal lobe reflecting subarachnoid hemorrhage.  CTA of the head and neck revealed extensive intracranial hemorrhage and mild dolichoectasia of the basilar artery.  Neurology and Neurosurgery was consulted and patient was started on Cleviprex drip for blood pressure parameters less than 140/90 and admitted to ICU.  Repeat CT of the head revealed stable bleed with unchanged shift and no new hemorrhages.  Patient was started on 3% saline drip on 08/11/2024.  Cleviprex and saline drip has been discontinued.  Patient was currently has been NG tube and refusing speech therapy.  Repeat CT of the head on 08/11/2024 revealed unchanged appearance of intraparenchymal hematoma and unchanged 6 mm right-to-left midline shift without hydrocephalus.  Patient developed a fever of 100.6° F fairly this morning (08/13/2024).  No leukocytosis on labs this morning.  Patient has been getting Xanax and Seroquel for agitation.  Patient was cleared for downgrade from ICU to regular floor on 08/13/2024.  Neurology and neurosurgery has signed off.  He is admitted to hospital medicine services for further medical management.

## 2024-08-14 NOTE — CONSULTS
Inpatient Nutrition Assessment    Admit Date: 8/10/2024   Total duration of encounter: 4 days   Patient Age: 59 y.o.    Nutrition Recommendation/Prescription     Tube feeding recommendation:  Fibersource HN goal rate 80 ml/hr to provide (calculations based on estimated 20 hr/d run time)   1920 kcal  86 g protein  1296 ml free water, will require additional fluid source, can be given as 30 ml/hr water flush when appropriate    Communication of Recommendations: reviewed with nurse    Nutrition Assessment     Malnutrition Assessment/Nutrition-Focused Physical Exam    Malnutrition Context: acute illness or injury (08/12/24 1142)  Malnutrition Level: moderate (08/12/24 1142)  Energy Intake (Malnutrition):  (does not meet criteria) (08/12/24 1142)  Weight Loss (Malnutrition):  (does not meet criteria) (08/12/24 1142)  Subcutaneous Fat (Malnutrition): mild depletion (08/12/24 1142)     Upper Arm Region (Subcutaneous Fat Loss): mild depletion     Muscle Mass (Malnutrition): mild depletion (08/12/24 1142)     Clavicle Bone Region (Muscle Loss): mild depletion                             A minimum of two characteristics is recommended for diagnosis of either severe or non-severe malnutrition.    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 with left-sided weakness and suppressed alertness  Elevated blood pressure, no previous dx of HTN   Hypercholesterolemia   Elevated creatinine, resolved    Relevant Medical History: Crohn's disease, GERD, and chronic tobacco use     Scheduled Medications:  amLODIPine, 5 mg, Daily  cefTRIAXone (Rocephin) IV (PEDS and ADULTS), 1 g, Q24H  levETIRAcetam (Keppra) IV (PEDS and ADULTS), 500 mg,  Q12H  mupirocin, , BID  QUEtiapine, 50 mg, Daily  sodium chloride 0.9%, 10 mL, Q6H    Continuous Infusions: none       PRN Medications:   0.9% NaCl, , PRN  acetaminophen, 650 mg, Q6H PRN  acetaminophen, 650 mg, Q6H PRN  ALPRAZolam, 0.25 mg, Q4H PRN  bisacodyL, 10 mg, Daily PRN  hydrALAZINE, 10 mg, Q4H PRN  labetalol, 10 mg, Q2H PRN  sodium chloride 0.9%, 10 mL, PRN  sodium chloride 0.9%, 10 mL, PRN    Calorie Containing IV Medications: no significant kcals from medications at this time    Recent Labs   Lab 08/10/24  0740 08/10/24  0759 08/10/24  1831 08/11/24  0347 08/11/24  0855 08/11/24  2052 08/12/24  0311 08/12/24  0824 08/12/24  1515 08/12/24  2028 08/13/24  0235 08/14/24  0359   NA  --  141   < > 140   < > 141 145 146* 146* 150* 148* 144   K  --  4.3  --  4.0  --   --  3.9  --   --   --  3.7 3.6   CALCIUM  --  9.0  --  9.3  --   --  9.2  --   --   --  9.6 9.4   PHOS  --   --   --  3.5  --   --  2.7  --   --   --  3.3  --    MG  --   --   --  2.10  --   --  2.40  --   --   --  2.40 2.30   CO2  --  22*  --  20*  --   --  19*  --   --   --  20* 21*   BUN  --  26.9*  --  12.9  --   --  12.2  --   --   --  19.9 24.6   CREATININE  --  1.44*  --  0.85  --   --  0.78  --   --   --  0.83 0.94   EGFRNORACEVR  --  55  --  >60  --   --  >60  --   --   --  >60 >60   GLUCOSE  --  119*  --  114  --   --  121*  --   --   --  116* 127*   BILITOT  --  0.3  --  0.9  --   --  1.0  --   --   --  0.9 0.8   ALKPHOS  --  60  --  62  --   --  59  --   --   --  60 59   ALT  --  16  --  17  --   --  16  --   --   --  13 17   AST  --  21  --  20  --   --  17  --   --   --  14 18   ALBUMIN  --  3.7  --  3.7  --   --  3.6  --   --   --  3.5 3.5   TRIG  --  60  --   --   --   --   --   --   --   --   --   --    HGBA1C  --  5.5  --   --   --   --   --   --   --   --   --   --    WBC  --  6.26  --  10.27  --   --  11.42  --   --   --  9.36 10.79   HGB  --  12.1*  --  12.5*  --   --  12.9*  --   --   --  12.3* 12.6*   HCT 36 37.3*  --  37.3*   "--   --  38.6*  --   --   --  37.9* 38.4*    < > = values in this interval not displayed.     Nutrition Orders:  Diet NPO  Tube Feedings/Formulas 50; Peptamen Intense VHP; NG    Appetite/Oral Intake: NPO/not applicable  Factors Affecting Nutritional Intake: impaired cognitive status/motor control and NPO  Social Needs Impacting Access to Food: none identified  Food/Congregation/Cultural Preferences: none reported  Food Allergies: no known food allergies  Last Bowel Movement: 08/10/24  Wound(s): no pressure injuries documented at this time     Comments    8/12/24 Patient confused, sister at bedside. Patient's sister reports good appetite/intake prior to admission, regular diet at home, denies weight loss. Patient reports liking vanilla/strawberry Ensure. He is currently NPO, had not been able to participate in SLP evaluation. Tube feeding recommendation provided if oral intake remains not feasible.    8/14/24 Patient remains NPO, started on tube feeding, no longer receiving calories from Cleviprex, will change to standard polymeric formula.    Anthropometrics    Height: 5' 7" (170.2 cm), Height Method: Measured  Last Weight: 65.3 kg (143 lb 15.4 oz) (08/10/24 1346), Weight Method: Bed Scale  BMI (Calculated): 22.5  BMI Classification: normal (BMI 18.5-24.9)        Ideal Body Weight (IBW), Male: 148 lb     % Ideal Body Weight, Male (lb): 97.27 %                          Usual Weight Provided By: family/caregiver denies unintentional weight loss    Wt Readings from Last 5 Encounters:   08/10/24 65.3 kg (143 lb 15.4 oz)   05/12/24 65.8 kg (145 lb)   09/25/23 67.1 kg (148 lb)   08/30/23 67.1 kg (148 lb)   08/21/23 63.5 kg (140 lb)     Weight Change(s) Since Admission: none, new admit  Wt Readings from Last 1 Encounters:   08/10/24 1346 65.3 kg (143 lb 15.4 oz)   08/10/24 0743 65.3 kg (143 lb 15.4 oz)   Admit Weight: 65.3 kg (143 lb 15.4 oz) (08/10/24 0743), Weight Method: Bed Scale    Estimated Needs    Weight Used For " Calorie Calculations: 65.3 kg (143 lb 15.4 oz)  Energy Calorie Requirements (kcal): 1984, 1.4 stress factor  Energy Need Method: Platte-St Jeor  Weight Used For Protein Calculations: 65.3 kg (143 lb 15.4 oz)  Protein Requirements: 79-98 g, 1.2-1.5 g/kg  Fluid Requirements (mL): 1984, 1 ml/kcal      Enteral Nutrition     Formula: Peptamen Intense VHP  Rate/Volume: 50 ml/hr  Water Flushes: 50 ml every 2 hours  Additives/Modulars: none at this time  Route: nasogastric tube  Method: continuous  Total Nutrition Provided by Tube Feeding, Additives, and Flushes:  Calories Provided  1000 kcal/d, 50% needs   Protein Provided  93 g/d, 100% needs   Fluid Provided  1340 ml/d, 68% needs   Continuous feeding calculations based on estimated 20 hr/d run time unless otherwise stated.    Parenteral Nutrition Patient not receiving parenteral nutrition support at this time.    Evaluation of Received Nutrient Intake    Calories: not meeting estimated needs  Protein: meeting estimated needs    Patient Education Not applicable.    Nutrition Diagnosis     PES: Inadequate energy intake related to inability to consume sufficient nutrients as evidenced by less than 80% needs met. (active)  PES: Moderate acute disease or injury related malnutrition related to acute illness as evidenced by mild fat depletion and mild muscle depletion. (active)    Nutrition Interventions     Intervention(s): modified composition of enteral nutrition, modified rate of enteral nutrition, and collaboration with other providers  Goal: Meet greater than 80% of nutritional needs by follow-up. (goal not met)    Nutrition Goals & Monitoring     Dietitian will monitor: food and beverage intake, energy intake, enteral nutrition intake, weight, electrolyte/renal panel, and beliefs/attitudes  Discharge planning: too early to determine; pending clinical course  Nutrition Risk/Follow-Up: moderate (follow-up in 3-5 days)   Please consult if re-assessment needed sooner.

## 2024-08-14 NOTE — PT/OT/SLP PROGRESS
Physical Therapy Treatment    Patient Name:  Dell Garrett Jr.   MRN:  [id number]    Recommendations:     Discharge therapy intensity: High Intensity Therapy   Discharge Equipment Recommendations: to be determined by next level of care  Barriers to discharge: Impaired mobility and Ongoing medical needs    Assessment:     Dell Garrett Jr. is a 59 y.o. 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 .  He presents with the following impairments/functional limitations: weakness, impaired endurance, impaired self care skills, gait instability, impaired functional mobility, impaired balance, impaired cognition, decreased coordination, decreased upper extremity function, decreased lower extremity function . Pt not really consistently  following my verbal commands and he is still pretty lethargic. Once sitting up , he pushes hard with the RUE causing him to list heavily to left side    Rehab Prognosis: Good; patient would benefit from acute skilled PT services to address these deficits and reach maximum level of function.    Recent Surgery: * No surgery found *      Plan:     During this hospitalization, patient would benefit from acute PT services 6 x/week to address the identified rehab impairments via gait training, therapeutic activities, therapeutic exercises and progress toward the following goals:    Plan of Care Expires:  09/12/24    Subjective     Chief Complaint:   Patient/Family Comments/goals:   Pain/Comfort:         Objective:     Communicated with nurse prior to session.  Patient found supine with blood pressure cuff, NG tube, PureWick, peripheral IV, telemetry, SCD, pulse ox (continuous) upon PT entry to room.     General Precautions: Standard, fall  Orthopedic Precautions: N/A  Braces: N/A  Respiratory Status: Room air  Blood Pressure:   Skin Integrity: Visible skin intact      Functional Mobility:  Pt went sit to supine and  supine to sit with total a .  Pt sat at edge of bed needing at least moda to maintain sitting  In sitting worked on pt leaning forward and backward which he did inconsistently. He then had to get back in bed to pee     Therapeutic Activities/Exercises:  Pt given prom to LLE    Education:  Patient provided with verbal education education regarding PT role/goals/POC and safety awareness.  Additional teaching is warranted.     Patient left supine with all lines intact, call button in reach, and restraints reapplied at end of session    GOALS:   Multidisciplinary Problems       Physical Therapy Goals          Problem: Physical Therapy    Goal Priority Disciplines Outcome Goal Variances Interventions   Physical Therapy Goal     PT, PT/OT Progressing     Description: Goals to be met by: 2024     Patient will increase functional independence with mobility by performin. Supine to sit with MInimal Assistance  2. Sit to supine with MInimal Assistance  3. Sit to stand transfer with Minimal Assistance  4. Pt to follow 75% of commands.   5. Gait  x 75 feet with Minimal Assistance using Rolling Walker vs LRAD.                          Time Tracking:     PT Received On:    PT Start Time: 48     PT Stop Time: 1008  PT Total Time (min): 20 min     Billable Minutes: Therapeutic Activity 20    Treatment Type: Treatment  PT/PTA: PT     Number of PTA visits since last PT visit: 2     2024

## 2024-08-14 NOTE — PLAN OF CARE
Problem: Adult Inpatient Plan of Care  Goal: Plan of Care Review  8/14/2024 0747 by David Givens RN  Outcome: Progressing  8/14/2024 0747 by David Givens RN  Outcome: Progressing  Goal: Patient-Specific Goal (Individualized)  8/14/2024 0747 by David Givens RN  Outcome: Progressing  8/14/2024 0747 by David Givens, RN  Outcome: Progressing  Goal: Absence of Hospital-Acquired Illness or Injury  8/14/2024 0747 by David Givens RN  Outcome: Progressing  8/14/2024 0747 by David Givens RN  Outcome: Progressing  Goal: Optimal Comfort and Wellbeing  8/14/2024 0747 by David Givens RN  Outcome: Progressing  8/14/2024 0747 by David Givens RN  Outcome: Progressing  Goal: Readiness for Transition of Care  8/14/2024 0747 by David Givens RN  Outcome: Progressing  8/14/2024 0747 by David Givens, RN  Outcome: Progressing     Problem: Skin Injury Risk Increased  Goal: Skin Health and Integrity  8/14/2024 0747 by David Givens RN  Outcome: Progressing  8/14/2024 0747 by David Givens, RN  Outcome: Progressing     Problem: Stroke, Intracerebral Hemorrhage  Goal: Optimal Coping  8/14/2024 0747 by David Givens RN  Outcome: Progressing  8/14/2024 0747 by David Givens, RN  Outcome: Progressing  Goal: Effective Bowel Elimination  8/14/2024 0747 by David Givens, RN  Outcome: Progressing  8/14/2024 0747 by David Givens, RN  Outcome: Progressing  Goal: Optimal Cerebral Tissue Perfusion  8/14/2024 0747 by David Givens RN  Outcome: Progressing  8/14/2024 0747 by David Givens, RN  Outcome: Progressing  Goal: Optimal Cognitive Function  8/14/2024 0747 by David Givens RN  Outcome: Progressing  8/14/2024 0747 by David Givens, RN  Outcome: Progressing  Goal: Effective Communication Skills  8/14/2024 0747 by David Givens, RN  Outcome: Progressing  8/14/2024 0747 by David Givens RN  Outcome: Progressing  Goal: Optimal Functional Ability  8/14/2024 0747 by  David Givens RN  Outcome: Progressing  8/14/2024 0747 by David Givens RN  Outcome: Progressing  Goal: Optimal Nutrition Intake  8/14/2024 0747 by David Givens RN  Outcome: Progressing  8/14/2024 0747 by David Givens RN  Outcome: Progressing  Goal: Optimal Pain Control and Function  8/14/2024 0747 by David Givens RN  Outcome: Progressing  8/14/2024 0747 by David Givens RN  Outcome: Progressing  Goal: Effective Oxygenation and Ventilation  8/14/2024 0747 by David Givens RN  Outcome: Progressing  8/14/2024 0747 by David Givens RN  Outcome: Progressing  Goal: Improved Sensorimotor Function  8/14/2024 0747 by David Givens RN  Outcome: Progressing  8/14/2024 0747 by David Givens RN  Outcome: Progressing  Goal: Safe and Effective Swallow  8/14/2024 0747 by David Givens RN  Outcome: Progressing  8/14/2024 0747 by David Givens RN  Outcome: Progressing  Goal: Effective Urinary Elimination  8/14/2024 0747 by David Givens RN  Outcome: Progressing  8/14/2024 0747 by David Givens RN  Outcome: Progressing     Problem: Infection  Goal: Absence of Infection Signs and Symptoms  8/14/2024 0747 by David Givens RN  Outcome: Progressing  8/14/2024 0747 by David Givens RN  Outcome: Progressing

## 2024-08-14 NOTE — NURSING
Nurses Note -- 4 Eyes      8/14/2024   7:48 AM      Skin assessed during: Daily Assessment      [x] No Altered Skin Integrity Present    []Prevention Measures Documented      [] Yes- Altered Skin Integrity Present or Discovered   [] LDA Added if Not in Epic (Describe Wound)   [] New Altered Skin Integrity was Present on Admit and Documented in LDA   [] Wound Image Taken    Wound Care Consulted? No    Attending Nurse:  David Bethea RN/Staff Member:  DANIEL Martinez

## 2024-08-14 NOTE — SUBJECTIVE & OBJECTIVE
Interval History:     Review of Systems   Unable to perform ROS: Mental status change     Objective:     Vital Signs (Most Recent):  Temp: 98.3 °F (36.8 °C) (08/14/24 0338)  Pulse: (!) 53 (08/14/24 1130)  Resp: (!) 21 (08/14/24 1130)  BP: (!) 138/92 (08/14/24 1115)  SpO2: 98 % (08/14/24 1130) Vital Signs (24h Range):  Temp:  [97.7 °F (36.5 °C)-100.9 °F (38.3 °C)] 98.3 °F (36.8 °C)  Pulse:  [] 53  Resp:  [13-32] 21  SpO2:  [86 %-99 %] 98 %  BP: (108-149)/(73-92) 138/92     Weight: 65.3 kg (143 lb 15.4 oz)  Body mass index is 22.55 kg/m².    Intake/Output Summary (Last 24 hours) at 8/14/2024 1404  Last data filed at 8/14/2024 0600  Gross per 24 hour   Intake 1728 ml   Output 1150 ml   Net 578 ml         Physical Exam  Constitutional:       Appearance: Normal appearance. He is normal weight.   HENT:      Head: Normocephalic and atraumatic.      Nose: Nose normal.      Mouth/Throat:      Mouth: Mucous membranes are dry.      Pharynx: Oropharynx is clear.   Eyes:      Extraocular Movements: Extraocular movements intact.      Conjunctiva/sclera: Conjunctivae normal.      Pupils: Pupils are equal, round, and reactive to light.   Cardiovascular:      Rate and Rhythm: Normal rate and regular rhythm.      Pulses: Normal pulses.      Heart sounds: Normal heart sounds.   Pulmonary:      Effort: Pulmonary effort is normal.      Breath sounds: Normal breath sounds.   Abdominal:      General: Bowel sounds are normal.      Palpations: Abdomen is soft.      Comments: NGT in place   Musculoskeletal:         General: Normal range of motion.      Cervical back: Normal range of motion and neck supple.   Skin:     General: Skin is warm and dry.      Capillary Refill: Capillary refill takes 2 to 3 seconds.   Neurological:      Mental Status: He is easily aroused. He is lethargic and disoriented.      Motor: Weakness present.   Psychiatric:         Attention and Perception: He is inattentive.         Speech: Speech is delayed.          Cognition and Memory: Cognition is impaired. Memory is impaired.             Significant Labs: All pertinent labs within the past 24 hours have been reviewed.  BMP:   Recent Labs   Lab 08/14/24  0359      K 3.6   *   CO2 21*   BUN 24.6   CREATININE 0.94   CALCIUM 9.4   MG 2.30     CBC:   Recent Labs   Lab 08/13/24 0235 08/14/24 0359   WBC 9.36 10.79   HGB 12.3* 12.6*   HCT 37.9* 38.4*    235     CMP:   Recent Labs   Lab 08/12/24 2028 08/13/24 0235 08/14/24 0359   * 148* 144   K  --  3.7 3.6   CL  --  117* 113*   CO2  --  20* 21*   BUN  --  19.9 24.6   CREATININE  --  0.83 0.94   CALCIUM  --  9.6 9.4   ALBUMIN  --  3.5 3.5   BILITOT  --  0.9 0.8   ALKPHOS  --  60 59   AST  --  14 18   ALT  --  13 17     Magnesium:   Recent Labs   Lab 08/13/24 0235 08/14/24 0359   MG 2.40 2.30       Significant Imaging: I have reviewed all pertinent imaging results/findings within the past 24 hours.

## 2024-08-15 LAB
BACTERIA UR CULT: NO GROWTH
MAGNESIUM SERPL-MCNC: 2.2 MG/DL (ref 1.6–2.6)

## 2024-08-15 PROCEDURE — 97110 THERAPEUTIC EXERCISES: CPT | Mod: CQ

## 2024-08-15 PROCEDURE — 25000003 PHARM REV CODE 250: Performed by: STUDENT IN AN ORGANIZED HEALTH CARE EDUCATION/TRAINING PROGRAM

## 2024-08-15 PROCEDURE — 97535 SELF CARE MNGMENT TRAINING: CPT | Mod: CO

## 2024-08-15 PROCEDURE — 36415 COLL VENOUS BLD VENIPUNCTURE: CPT

## 2024-08-15 PROCEDURE — 25000003 PHARM REV CODE 250: Performed by: INTERNAL MEDICINE

## 2024-08-15 PROCEDURE — 92611 MOTION FLUOROSCOPY/SWALLOW: CPT

## 2024-08-15 PROCEDURE — 63600175 PHARM REV CODE 636 W HCPCS: Performed by: NURSE PRACTITIONER

## 2024-08-15 PROCEDURE — 21400001 HC TELEMETRY ROOM

## 2024-08-15 PROCEDURE — 97530 THERAPEUTIC ACTIVITIES: CPT | Mod: CQ

## 2024-08-15 PROCEDURE — 25000003 PHARM REV CODE 250: Performed by: NURSE PRACTITIONER

## 2024-08-15 PROCEDURE — 25000003 PHARM REV CODE 250

## 2024-08-15 PROCEDURE — A4216 STERILE WATER/SALINE, 10 ML: HCPCS | Performed by: STUDENT IN AN ORGANIZED HEALTH CARE EDUCATION/TRAINING PROGRAM

## 2024-08-15 PROCEDURE — 83735 ASSAY OF MAGNESIUM: CPT

## 2024-08-15 PROCEDURE — 63600175 PHARM REV CODE 636 W HCPCS: Performed by: INTERNAL MEDICINE

## 2024-08-15 RX ORDER — DIPHENHYDRAMINE HCL 25 MG
25 CAPSULE ORAL EVERY 6 HOURS PRN
Status: DISCONTINUED | OUTPATIENT
Start: 2024-08-15 | End: 2024-09-19 | Stop reason: HOSPADM

## 2024-08-15 RX ORDER — QUETIAPINE FUMARATE 25 MG/1
50 TABLET, FILM COATED ORAL NIGHTLY PRN
Status: DISCONTINUED | OUTPATIENT
Start: 2024-08-15 | End: 2024-08-19

## 2024-08-15 RX ADMIN — SODIUM CHLORIDE, PRESERVATIVE FREE 10 ML: 5 INJECTION INTRAVENOUS at 05:08

## 2024-08-15 RX ADMIN — LEVETIRACETAM 500 MG: 100 INJECTION, SOLUTION INTRAVENOUS at 10:08

## 2024-08-15 RX ADMIN — AMLODIPINE BESYLATE 10 MG: 5 TABLET ORAL at 07:08

## 2024-08-15 RX ADMIN — LEVETIRACETAM 500 MG: 100 INJECTION, SOLUTION INTRAVENOUS at 08:08

## 2024-08-15 RX ADMIN — CEFTRIAXONE SODIUM 1 G: 1 INJECTION, POWDER, FOR SOLUTION INTRAMUSCULAR; INTRAVENOUS at 05:08

## 2024-08-15 RX ADMIN — QUETIAPINE FUMARATE 50 MG: 25 TABLET ORAL at 10:08

## 2024-08-15 RX ADMIN — OXYCODONE HYDROCHLORIDE 5 MG: 5 TABLET ORAL at 12:08

## 2024-08-15 RX ADMIN — SODIUM CHLORIDE, PRESERVATIVE FREE 10 ML: 5 INJECTION INTRAVENOUS at 12:08

## 2024-08-15 RX ADMIN — SODIUM CHLORIDE, PRESERVATIVE FREE 10 ML: 5 INJECTION INTRAVENOUS at 06:08

## 2024-08-15 RX ADMIN — MUPIROCIN: 20 OINTMENT TOPICAL at 10:08

## 2024-08-15 RX ADMIN — ACETAMINOPHEN 325MG 650 MG: 325 TABLET ORAL at 10:08

## 2024-08-15 NOTE — PROGRESS NOTES
[facility] Hospital Medicine Progress Note      Chief Complaint: Cerebrovascular Accident (Pt was at [employer], 0645 sudden onset, unable to move left arm with left facial droop and slurred speech. Pt stood up and then fell and also has hematoma to head. Pt has a R fixed gaze. Cbg- 126)         Patient information was obtained from patient, patient's family, past medical records and ER records.      HISTORY OF PRESENT ILLNESS:   Dell Garrett Jr. is a 59 y.o. Black or  male with a past medical history of Crohn's disease and GERD. The patient presented to [facility] on 8/10/2024 with aphasia, left arm weakness and fall.  Patient presented to the ED with bradycardia, normocytic anemia, bicarb 22.  CT of the head revealed a 4.2 x 3.8 cm intraparenchymal hemorrhage of the right thalamus capsular region with mild surrounding edema and effacement of the adjacent sulci in sylvian fissure with compression of the right lateral ventricle with an 8 mm midline shift attestation of bleed into the right lateral ventricle and 3rd ventricle with mild dilation of the left lateral ventricle compared to the right suggesting possible early obstructive hydrocephalus.  There was no noted to be sulci hyperdensities along the right temporal lobe reflecting subarachnoid hemorrhage.  CTA of the head and neck revealed extensive intracranial hemorrhage and mild dolichoectasia of the basilar artery.  Neurology and Neurosurgery was consulted and patient was started on Cleviprex drip for blood pressure parameters less than 140/90 and admitted to ICU.  Repeat CT of the head revealed stable bleed with unchanged shift and no new hemorrhages.  Patient was started on 3% saline drip on 08/11/2024.  Cleviprex and saline drip has been discontinued.  Patient was currently has been NG tube and refusing speech therapy.  Repeat CT of the head on 08/11/2024 revealed unchanged appearance of intraparenchymal hematoma and  unchanged 6 mm right-to-left midline shift without hydrocephalus.  Patient developed a fever of 100.6° F fairly this morning (08/13/2024).  No leukocytosis on labs this morning.  Patient has been getting Xanax and Seroquel for agitation.  Patient was cleared for downgrade from ICU to regular floor on 08/13/2024.  Neurology and neurosurgery has signed off.  He is admitted to hospital medicine services for further medical management.        Currently being managed for right thalamic hemorrhagic stroke with a right to left shift, 6 mm.  He currently suffers from oropharyngeal dysphagia on NG tube feeds       Today's information   Patient seen and examined at bedside, no family member at bedside   Patient was asking for warm blanket, he has no other problems   He is going for swallow study with speech   Vitals reviewed and stable on room air   Blood cultures reviewed negative at 24 hours       Exam  General appearance: Well-developed, well-nourished male in no apparent distress.   HEENT: Atraumatic head. Moist mucous membranes of oral cavity.  Lungs: Clear to auscultation bilaterally.   Heart: Regular rate and rhythm.   Abdomen: Soft, non-distended.  NG tube in place.  Extremities: No cyanosis, clubbing. No deformities.  Skin: No Rash. Warm and dry.  Neuro:  Sleeping.  Opens eyes but closes eyes and seems to be sleeping.  Moves right upper and lower extremities spontaneously.  Does not withdraw left lower extremity from stimuli.  Mittens in place.  Psych:  Uncooperative.       ASSESSMENT & PLAN:   Assessment:  Oropharyngeal dysphagia on NG tube feeds   Right thalamic capsular intraparenchymal hematoma with subarachnoid and intraventricular extension and right-to-left midline shift of 6 mm  Normocytic anemia, stable   Febrile likely secondary to acute bacterial cystitis  UTI   UDS with cocaine   Hypertension, controlled  History of Crohn's disease and GERD     Plan:  Follow up with speech eval for MBS today   Continue  with IV ceftriaxone day 3 of 5   - Neurology and neurosurgery has signed off  - Continue with tube feeds until patient was able to complete speech therapy evaluation  - Continue with occupational and physical therapy  - Leukocytosis workup included UA, chest x-ray and blood cultures.    - UA with trace mucus, no squamous epithelial cells, greater than 100 WBCs, 11-20 RBCs, 500 leukocyte esterase, 1+ glucose, 1+ blood and trace protein.  - Chest x-ray with enlarged cardiac silhouette without overt edema  - Follow results of blood and urine culture   - Resume appropriate home medications when deemed necessary   - Labs in AM        VTE Prophylaxis: will be placed on SCD for DVT prophylaxis  given brain bleed and will be advised to be as mobile as possible and sit in a chair as tolerated     Disposition pending feeding , to rehab    VITAL SIGNS: 24 HRS MIN & MAX LAST   Temp  Min: 98 °F (36.7 °C)  Max: 98.3 °F (36.8 °C) 98 °F (36.7 °C)   BP  Min: 119/68  Max: 139/83 139/83   Pulse  Min: 54  Max: 98  85   Resp  Min: 12  Max: 33 18   SpO2  Min: 95 %  Max: 100 % 96 %     I have reviewed the following labs:  Recent Labs   Lab 08/12/24  0311 08/13/24 0235 08/14/24  0359   WBC 11.42 9.36 10.79   RBC 4.29* 4.17* 4.23*   HGB 12.9* 12.3* 12.6*   HCT 38.6* 37.9* 38.4*   MCV 90.0 90.9 90.8   MCH 30.1 29.5 29.8   MCHC 33.4 32.5* 32.8*   RDW 13.6 13.6 13.7    288 235   MPV 8.5 8.9 8.5     Recent Labs   Lab 08/12/24  0311 08/12/24 0824 08/12/24 2028 08/13/24  0235 08/14/24  0359 08/15/24  0217      < > 150* 148* 144  --    K 3.9  --   --  3.7 3.6  --    *  --   --  117* 113*  --    CO2 19*  --   --  20* 21*  --    BUN 12.2  --   --  19.9 24.6  --    CREATININE 0.78  --   --  0.83 0.94  --    CALCIUM 9.2  --   --  9.6 9.4  --    MG 2.40  --   --  2.40 2.30 2.20   ALBUMIN 3.6  --   --  3.5 3.5  --    ALKPHOS 59  --   --  60 59  --    ALT 16  --   --  13 17  --    AST 17  --   --  14 18  --    BILITOT 1.0  --   --   0.9 0.8  --     < > = values in this interval not displayed.     Microbiology Results (last 7 days)       Procedure Component Value Units Date/Time    Blood Culture [[id number]]  (Normal) Collected: 08/13/24 1128    Order Status: Completed Specimen: Blood, Venous Updated: 08/15/24 1301     Blood Culture No Growth At 48 Hours    Blood Culture [[id number]]  (Normal) Collected: 08/13/24 1128    Order Status: Completed Specimen: Blood, Venous Updated: 08/15/24 1301     Blood Culture No Growth At 48 Hours    Urine culture [[id number]] Collected: 08/13/24 1216    Order Status: Completed Specimen: Urine Updated: 08/15/24 0722     Urine Culture No Growth             See below for Radiology    Assessment/Plan:      VTE prophylaxis:     Patient condition:  Stable/Fair/Guarded/ Serious/ Critical    Anticipated discharge and Disposition:         All diagnosis and differential diagnosis have been reviewed; assessment and plan has been documented; I have personally reviewed the labs and test results that are presently available; I have reviewed the patients medication list; I have reviewed the consulting providers response and recommendations. I have reviewed or attempted to review medical records based upon their availability    All of the patient's questions have been  addressed and answered. Patient's is agreeable to the above stated plan. I will continue to monitor closely and make adjustments to medical management as needed.    Portions of this note dictated using EMR integrated voice recognition software, and may be subject to voice recognition errors not corrected at proofreading. Please contact writer for clarification if needed.   _____________________________________________________________________    Malnutrition Status:    Scheduled Med:   amLODIPine  10 mg Per NG tube Daily    cefTRIAXone (Rocephin) IV (PEDS and ADULTS)  1 g Intravenous Q24H    levETIRAcetam (Keppra) IV (PEDS and ADULTS)  500 mg Intravenous Q12H     mupirocin   Nasal BID    sodium chloride 0.9%  10 mL Intravenous Q6H      Continuous Infusions:     PRN Meds:    Current Facility-Administered Medications:     0.9% NaCl, , Intravenous, PRN    acetaminophen, 650 mg, Rectal, Q6H PRN    acetaminophen, 650 mg, Oral, Q6H PRN    ALPRAZolam, 0.25 mg, Per NG tube, Q4H PRN    bisacodyL, 10 mg, Rectal, Daily PRN    cloNIDine, 0.1 mg, Per NG tube, Q6H PRN    hydrALAZINE, 10 mg, Intravenous, Q4H PRN    oxyCODONE, 5 mg, Per NG tube, Q6H PRN    QUEtiapine, 50 mg, Per NG tube, Nightly PRN    sodium chloride 0.9%, 10 mL, Intravenous, PRN    Flushing PICC/Midline Protocol, , , Until Discontinued **AND** sodium chloride 0.9%, 10 mL, Intravenous, Q6H **AND** sodium chloride 0.9%, 10 mL, Intravenous, PRN     Radiology:  I have personally reviewed the following imaging and agree with the radiologist.     Fl Modified Barium Swallow Speech  See procedure notes from Speech Pathologist.    This procedure was auto-finalized.      Nain Lizama MD  Department of Hospital Medicine   [facility]   08/15/2024

## 2024-08-15 NOTE — PT/OT/SLP PROGRESS
Occupational Therapy   Treatment    Name: Dell Garrett Jr.  MRN: [id number]  Admitting Diagnosis:  Intraparenchymal hemorrhage of brain       Recommendations:     Recommended therapy intensity at discharge: High Intensity Therapy   Discharge Equipment Recommendations:  to be determined by next level of care  Barriers to discharge:       Assessment:     Dell Garrett Jr. is a 59 y.o. male with a medical diagnosis of Intraparenchymal hemorrhage of brain.  He presents with poor activity tolerance and endurance, Pt. Is a fall risk, recommending High intensity therapy pending progress. Performance deficits affecting function are weakness, impaired self care skills, impaired functional mobility, gait instability, visual deficits, impaired balance, decreased upper extremity function, decreased lower extremity function, decreased safety awareness, abnormal tone, impaired fine motor.     Rehab Prognosis:  Fair; patient would benefit from acute skilled OT services to address these deficits and reach maximum level of function.       Plan:     Patient to be seen 6 x/week to address the above listed problems via self-care/home management, therapeutic activities, therapeutic exercises, neuromuscular re-education  Plan of Care Expires: 09/12/24  Plan of Care Reviewed with: patient    Subjective     Pain/Comfort:       Objective:     Communicated with: RN prior to session.  Patient found HOB elevated with   upon OT entry to room.    General Precautions: Standard, fall    Orthopedic Precautions:N/A  Braces: N/A  Respiratory Status: Room air  Vital Signs: Sp02: 96     Occupational Performance:   (Bed Mobility- Max A)  (Sitting balance- Max A) Leaning in all directions intermittently. Strong posterior lean.   Pt. Performing grooming task (washing face) R UE for excursion to face.  PROM performed to L UE.   Pt. Laid back down and repositioned in bed appropriately.       Therapeutic Positioning    OT interventions performed during the  course of today's session in an effort to prevent and/or reduce acquired pressure injuries:   Therapeutic positioning was provided at the conclusion of session to offload all bony prominences for the prevention and/or reduction of pressure injuries      [facility] 6 Click ADL:      Patient Education:  Patient provided with verbal education education regarding fall prevention, safety awareness, and pressure ulcer prevention.  Additional teaching is warranted.      Patient left HOB elevated with all lines intact and call button in reach.    GOALS:   Multidisciplinary Problems       Occupational Therapy Goals          Problem: Occupational Therapy    Goal Priority Disciplines Outcome Interventions   Occupational Therapy Goal     OT, PT/OT Progressing    Description: Goals to be met by: 9/12/24     Patient will increase functional independence with ADLs by performing:    UE Dressing with Stand-by Assistance.  LE Dressing with mod A  Grooming while seated at sink with min A  Assistance.  Toileting from toilet with mod A for hygiene and clothing management.   Toilet transfer to toilet with mod A  Increased functional strength to 3/5 LUE through therEX, neuromuscular re-ed.  Pt will visually attend to L side of environment with min cues                           Time Tracking:     OT Date of Treatment: 08/15/24  OT Start Time: 0932  OT Stop Time: 0955  OT Total Time (min): 23 min    Billable Minutes:Self Care/Home Management 2    OT/MAC: MAC     Number of MAC visits since last OT visit: 3    8/15/2024

## 2024-08-15 NOTE — PLAN OF CARE
Spoke to sister, Melina over the phone. Texted inpatient rehab choices to her. Informed her that patient is not medically ready for rehab, but that she can make a choice and let us know. CM to follow up

## 2024-08-15 NOTE — NURSING
Nurses Note -- 4 Eyes      8/15/2024   2:50 AM      Skin assessed during: Daily Assessment      [x] No Altered Skin Integrity Present    [x]Prevention Measures Documented      [] Yes- Altered Skin Integrity Present or Discovered   [] LDA Added if Not in Epic (Describe Wound)   [] New Altered Skin Integrity was Present on Admit and Documented in LDA   [] Wound Image Taken    Wound Care Consulted? No    Attending Nurse:  Alla Bethea RN/Staff Member:  DANIEL Diez

## 2024-08-15 NOTE — PROCEDURES
"[facility]  Speech Language Pathology Department  Modified Barium Swallow (MBS) Study    Patient Name:  Dell Garrett Jr.   MRN:  [id number]    Recommendations     General recommendations:  dysphagia therapy and Speech/Language and Cognitive Evaluation  Repeat MBS study: as clinically warranted  Solid texture recommendation:  NPO  Liquid consistency recommendation: NPO   Medications: via NG tube  General precautions: aspiration    History     Dell Garrett Jr. is a 59 y.o. male admitted to ICU with a right thalamic ICH with intraventricular extension after presenting with aphasia, slurred speech and left UE weakness.     Past Medical History:   Diagnosis Date    Crohn's disease     GERD (gastroesophageal reflux disease)      History reviewed. No pertinent surgical history.    A MBS Study was completed to assess the efficiency of his swallow function, rule out aspiration and make recommendations regarding safe dietary consistencies, effective compensatory strategies, and safe eating environment.     Home diet texture/consistency: Regular and thin liquids  Current Method of Nutrition: Tube feeding (NGT)    Patient complaint: "I want some ice water"    Imaging   Results for orders placed during the hospital encounter of 08/10/24    X-Ray Chest 1 View    Narrative  EXAMINATION:  XR CHEST 1 VIEW    CLINICAL HISTORY:  fever;    TECHNIQUE:  Single frontal view of the chest was performed.    COMPARISON:  08/10/2024    FINDINGS:  LINES AND TUBES: Left upper extremity PICC tip projects over the SVC.  Enteric tube courses below the diaphragm.    MEDIASTINUM AND MADINA: Cardiac silhouette is enlarged.    LUNGS: No lobar consolidation. No edema.    PLEURA:No pleural effusion. No pneumothorax.    OTHER: Patient is rotated to the left.    Impression  Enlarged cardiac silhouette without overt edema.      Electronically signed by: Isabella Jacques  Date:    08/13/2024  Time:    12:33    No results found for " this or any previous visit.    No results found for this or any previous visit.    Subjective     Patient lethargic.    Spiritual/Cultural/Baptism Beliefs/Practices that affect care: no  Pain/Comfort: Pain Rating 1: 0/10    Restraints/positioning devices: posey vest    Fluoroscopic Findings     Setup  Upright in bed  Unable to self feed due  Poor head control    Visualization  Lateral view  NG tube in place    Oral Phase:   Reduced lip closure around utensil/straw  Anterior spillage  Bolus holding  Inadequate mastication  Prolonged/disorganized bolus formation  Tongue pumping  Reduced bolus cohesion  Cues required to initiate anterior-posterior transport  Loss of bolus control with liquid trials  Mild-moderate oral residue    Pharyngeal Phase:   Reduced base of tongue retraction  Reduced epiglottic deflection  Reduced hyolaryngeal excursion  Reduced arytenoid/vocal fold closure  Reduced airway protection  Reduced UES opening  Delayed pharyngeal swallow    Consistency Fed by Laryngeal Penetration Aspiration Residue   Thin liquid by spoon SLP During the swallow  Did NOT clear None Trace   Thin liquid by cup SLP Before the swallow  During the swallow  To the vocal folds  Did NOT clear After the swallow Mild   Mildly thick liquid by cup SLP During the swallow  Cleared spontaneously None Mild   Mildly thick liquid by straw SLP During the swallow  To the vocal folds  Did NOT clear After the swallow Mild   Moderately thick liquid by straw SLP During the swallow  Cleared spontaneously None Mild-moderate   Puree SLP None None Mild     Solids attempted at the end of study; however removed from patient's oral cavity by SLP given severity of oral dysphagia and fluctuating awareness of of PO trials.    Cervical Esophageal Phase:   residual material visualized  decreased UES opening    Assessment     Pt exhibited moderate oropharyngeal dysphagia characterized by the findings noted above. Both swallow safety and efficiency are  impaired.     Patient appears to be at high risk for aspiration related pneumonia when considering severity of dysphagia, complicated medical status, reduced airway closure, and weak/ineffective cough.  Prognosis for behavioral swallow rehabilitation is good. Recommend patient complete meal trial of puree/mildly thick liquids with SLP at bedside prior to initiating PO diet.    Goals     Multidisciplinary Problems       SLP Goals          Problem: SLP    Goal Priority Disciplines Outcome   SLP Goal     SLP    Description: LTG: Patient will tolerate safest and least restrictive PO diet without signs/sx of aspiration.  STG: Meal trial of pureed solids and mildly thick liquids via cup without adequate REILLY and no signs/sx of aspiration.  STG: Laryngeal/BOT exercises Min A.                     Education     Patient provided with verbal education regarding ST POC.  Understanding was verbalized, however additional teaching warranted.    Plan     SLP Follow-Up:  Yes    Patient to be seen:  5 x/week   Plan of Care expires:  08/29/24  Plan of Care reviewed with:  patient     Time Tracking     SLP Treatment Date:   08/15/24  Speech Start Time:  1230  Speech Stop Time:  1250     Speech Total Time (min):  20 min    Billable minutes:   Motion Fluoroscopic Evaluation, Video Recording, 20 minutes     08/15/2024

## 2024-08-15 NOTE — PT/OT/SLP PROGRESS
"Physical Therapy Treatment    Patient Name:  Dell Garrett Jr.   MRN:  [id number]    Recommendations:     Discharge therapy intensity: High Intensity Therapy   Discharge Equipment Recommendations: to be determined by next level of care  Barriers to discharge: Impaired mobility    Assessment:     Dell Garrett Jr. is a 59 y.o. 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  He presents with the following impairments/functional limitations: weakness, impaired endurance, impaired balance, decreased upper extremity function, decreased lower extremity function, impaired coordination, decreased safety awareness, impaired cognition, impaired self care skills, impaired functional mobility, impaired sensation, abnormal tone .    Pt lethargic but arousable with stimulation. Perseverated on thirst, frequently asking for water and to rest. Less agitated. Tends to remain in L cervical rotation, performed PROM and AAROM to R. Poor ability to maintain midline with VC's and assistance. No active LUE movement, noted some hip movement LLE when mobilizing to EOB but unable to replicate to command.     Rehab Prognosis: Fair; patient would benefit from acute skilled PT services to address these deficits and reach maximum level of function.    Recent Surgery: * No surgery found *      Plan:     During this hospitalization, patient would benefit from acute PT services 6 x/week to address the identified rehab impairments via gait training, therapeutic activities, therapeutic exercises, neuromuscular re-education and progress toward the following goals:    Plan of Care Expires:  09/12/24    Subjective     Chief Complaint: "mouth is dry"  Patient/Family Comments/goals: to go home  Pain/Comfort:  Pain Rating 1: 0/10      Objective:     Communicated with RN prior to session.  Patient found HOB elevated with Other (comments) (roll belt, vitals monitoring, NGT, " SCD's and pressure relief boots) upon PT entry to room.     General Precautions: Standard, fall, SBP <140  Orthopedic Precautions: N/A  Braces: N/A  Respiratory Status: Room air  Vitals: /72, HR ranged from 55-72 at rest  Skin Integrity: Visible skin intact      Functional Mobility:  Bed Mobility:    Supine to sit with max assist; initiated movement of LE's to EOB and assisted with RUE for trunk flexion  Sit to supine with total assist  Balance:   Predominantly max assist with periods of moderate assist. LOB in all directions but mainly posteriorly. Intermittently pushing through RUE causing L lean, improved with RUE placed in lap. Mildly restless and impulsive t/o.     Therapeutic Activities/Exercises:  RLE LAQ's  PROM LLE knee flex and ext and ankle DF/PF.     Education:  Patient provided with verbal education education regarding PT role/goals/POC.  Additional teaching is warranted.     Patient left HOB elevated with all lines intact, call button in reach, wedge under R side, pressure relief boots, and nurse notified.    GOALS:   Multidisciplinary Problems       Physical Therapy Goals          Problem: Physical Therapy    Goal Priority Disciplines Outcome Goal Variances Interventions   Physical Therapy Goal     PT, PT/OT Progressing     Description: Goals to be met by: 2024     Patient will increase functional independence with mobility by performin. Supine to sit with MInimal Assistance  2. Sit to supine with MInimal Assistance  3. Sit to stand transfer with Minimal Assistance  4. Pt to follow 75% of commands.   5. Gait  x 75 feet with Minimal Assistance using Rolling Walker vs LRAD.                          Time Tracking:     PT Received On: 08/15/24  PT Start Time: 930     PT Stop Time: 953  PT Total Time (min): 23 min     Billable Minutes: Therapeutic Activity 1 unit and Therapeutic Exercise 1 unit    Treatment Type: Treatment  PT/PTA: PTA     Number of PTA visits since last PT visit: 3      08/15/2024

## 2024-08-16 LAB — MAGNESIUM SERPL-MCNC: 2.3 MG/DL (ref 1.6–2.6)

## 2024-08-16 PROCEDURE — 97530 THERAPEUTIC ACTIVITIES: CPT

## 2024-08-16 PROCEDURE — 25000003 PHARM REV CODE 250: Performed by: NURSE PRACTITIONER

## 2024-08-16 PROCEDURE — 36415 COLL VENOUS BLD VENIPUNCTURE: CPT

## 2024-08-16 PROCEDURE — 25000003 PHARM REV CODE 250: Performed by: HOSPITALIST

## 2024-08-16 PROCEDURE — 25000003 PHARM REV CODE 250: Performed by: STUDENT IN AN ORGANIZED HEALTH CARE EDUCATION/TRAINING PROGRAM

## 2024-08-16 PROCEDURE — 25000003 PHARM REV CODE 250: Performed by: INTERNAL MEDICINE

## 2024-08-16 PROCEDURE — A4216 STERILE WATER/SALINE, 10 ML: HCPCS | Performed by: STUDENT IN AN ORGANIZED HEALTH CARE EDUCATION/TRAINING PROGRAM

## 2024-08-16 PROCEDURE — 97112 NEUROMUSCULAR REEDUCATION: CPT | Mod: CQ

## 2024-08-16 PROCEDURE — 83735 ASSAY OF MAGNESIUM: CPT

## 2024-08-16 PROCEDURE — 21400001 HC TELEMETRY ROOM

## 2024-08-16 PROCEDURE — 63600175 PHARM REV CODE 636 W HCPCS: Performed by: INTERNAL MEDICINE

## 2024-08-16 PROCEDURE — 63600175 PHARM REV CODE 636 W HCPCS: Performed by: NURSE PRACTITIONER

## 2024-08-16 RX ORDER — POLYETHYLENE GLYCOL 3350 17 G/17G
17 POWDER, FOR SOLUTION ORAL 2 TIMES DAILY PRN
Status: DISCONTINUED | OUTPATIENT
Start: 2024-08-16 | End: 2024-09-19 | Stop reason: HOSPADM

## 2024-08-16 RX ORDER — TRAMADOL HYDROCHLORIDE 50 MG/1
50 TABLET ORAL EVERY 6 HOURS PRN
Status: DISCONTINUED | OUTPATIENT
Start: 2024-08-16 | End: 2024-09-19 | Stop reason: HOSPADM

## 2024-08-16 RX ADMIN — QUETIAPINE FUMARATE 50 MG: 25 TABLET ORAL at 09:08

## 2024-08-16 RX ADMIN — MUPIROCIN: 20 OINTMENT TOPICAL at 09:08

## 2024-08-16 RX ADMIN — AMLODIPINE BESYLATE 10 MG: 5 TABLET ORAL at 09:08

## 2024-08-16 RX ADMIN — SODIUM CHLORIDE, PRESERVATIVE FREE 10 ML: 5 INJECTION INTRAVENOUS at 12:08

## 2024-08-16 RX ADMIN — SODIUM CHLORIDE, PRESERVATIVE FREE 10 ML: 5 INJECTION INTRAVENOUS at 06:08

## 2024-08-16 RX ADMIN — TRAMADOL HYDROCHLORIDE 50 MG: 50 TABLET, COATED ORAL at 06:08

## 2024-08-16 RX ADMIN — TRAMADOL HYDROCHLORIDE 50 MG: 50 TABLET, COATED ORAL at 01:08

## 2024-08-16 RX ADMIN — POLYETHYLENE GLYCOL 3350 17 G: 17 POWDER, FOR SOLUTION ORAL at 09:08

## 2024-08-16 RX ADMIN — CEFTRIAXONE SODIUM 1 G: 1 INJECTION, POWDER, FOR SOLUTION INTRAMUSCULAR; INTRAVENOUS at 05:08

## 2024-08-16 RX ADMIN — POLYETHYLENE GLYCOL 3350 17 G: 17 POWDER, FOR SOLUTION ORAL at 01:08

## 2024-08-16 RX ADMIN — LEVETIRACETAM 500 MG: 100 INJECTION, SOLUTION INTRAVENOUS at 09:08

## 2024-08-16 NOTE — PROGRESS NOTES
08/16/24 1435   Missed Time Reason   SLP Attempted Eval Date 08/16/24   SLP Attempted Eval Time 1435   Missed Treatment Reason Patient fatigue;Therapist assessment     SLP in to see pt for PO trials however pt not appropriate at this time.     Pt seen sitting in bed, with RN and CNA in to adjust pt in bed prior to PO trials. Pt wakes, vocalizes largely unintelligible speech, and closes eyes. SLP providing verbal and tactile stimuli however pt unable to maintain alertness and consistently follow commands. SLP to continue to follow and treat as appropriate.

## 2024-08-16 NOTE — PT/OT/SLP PROGRESS
Physical Therapy Treatment    Patient Name:  Dell Garrett Jr.   MRN:  [id number]    Recommendations:     Discharge therapy intensity: High Intensity Therapy   Discharge Equipment Recommendations: to be determined by next level of care  Barriers to discharge: Impaired mobility    Assessment:     Dell Garrett Jr. is a 59 y.o. 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  He presents with the following impairments/functional limitations: weakness, impaired endurance, impaired balance, decreased upper extremity function, decreased lower extremity function, impaired coordination, decreased safety awareness, impaired cognition, impaired self care skills, impaired functional mobility, impaired sensation, abnormal tone .    Pt asleep but fairly easy to arouse, tends to keep eyes closed initially requiring cues to open them. Pt frequently asking for water but able to redirect. Tolerated session very well today. Able to progress to transfers and side steps. L knee buckling, no active movement noted.     Rehab Prognosis: Fair; patient would benefit from acute skilled PT services to address these deficits and reach maximum level of function.    Recent Surgery: * No surgery found *      Plan:     During this hospitalization, patient would benefit from acute PT services 6 x/week to address the identified rehab impairments via gait training, therapeutic activities, therapeutic exercises, neuromuscular re-education and progress toward the following goals:    Plan of Care Expires:  09/12/24    Subjective     Chief Complaint: thirsty   Patient/Family Comments/goals: to go home  Pain/Comfort:  Pain Rating 1: other (see comments) (reported a HA and neck pain)  Pain Addressed 1: Reposition      Objective:     Communicated with RN prior to session.  Patient found HOB elevated with Other (comments) (roll belt, vitals monitoring, NGT, SCD's and pressure  relief boots) upon PT entry to room.     General Precautions: Standard, fall, SBP <140  Orthopedic Precautions: N/A  Braces: N/A  Respiratory Status: Room air  Vitals: /65, HR 73, SPO2 98%  Skin Integrity: Visible skin intact      Functional Mobility:  Bed Mobility:    Supine to sit with max assist; activated trunk and assisted with mobility to EOB however requiring max assist 2/2 heavy L lean  Sit to supine with max assist  Scooting with max assist  Balance:   Predominantly max assist for balance with heavy L lateral lean; pushing through RUE causing LOB.  Placed on R elbow to improve midline orientation  ROM and gentle stretching to encourage neutral cervical alignment   Transfers:   Sit<->stand with mod assist; L knee blocked and LUE supported  Pre-gait: HHA and L knee blocked   Lateral weight shifting   Side step to HOB, pt able perform L hip adduction to perform side step. L knee buckling.     Therapeutic Activities/Exercises:      Education:  Patient provided with verbal education education regarding PT role/goals/POC.  Additional teaching is warranted.     Patient left HOB elevated with all lines intact, call button in reach, pressure relief boots, and nurse notified.    GOALS:   Multidisciplinary Problems       Physical Therapy Goals          Problem: Physical Therapy    Goal Priority Disciplines Outcome Goal Variances Interventions   Physical Therapy Goal     PT, PT/OT Progressing     Description: Goals to be met by: 2024     Patient will increase functional independence with mobility by performin. Supine to sit with MInimal Assistance  2. Sit to supine with MInimal Assistance  3. Sit to stand transfer with Minimal Assistance  4. Pt to follow 75% of commands.   5. Gait  x 75 feet with Minimal Assistance using Rolling Walker vs LRAD.                          Time Tracking:     PT Received On: 24  PT Start Time: 1053     PT Stop Time: 1121  PT Total Time (min): 28 min     Billable  Minutes: Neuromuscular Re-education 2 units    Treatment Type: Treatment  PT/PTA: PTA     Number of PTA visits since last PT visit: 4     08/16/2024

## 2024-08-16 NOTE — PROGRESS NOTES
[facility] Hospital Medicine Progress Note        Chief Complaint: Inpatient Follow-up     HPI:    59 y.o. Black or  male with a past medical history of Crohn's disease and GERD. The patient presented to [facility] on 8/10/2024 with aphasia, left arm weakness and fall.  Patient presented to the ED with bradycardia, normocytic anemia, bicarb 22.  CT of the head revealed a 4.2 x 3.8 cm intraparenchymal hemorrhage of the right thalamus capsular region with mild surrounding edema and effacement of the adjacent sulci in sylvian fissure with compression of the right lateral ventricle with an 8 mm midline shift attestation of bleed into the right lateral ventricle and 3rd ventricle with mild dilation of the left lateral ventricle compared to the right suggesting possible early obstructive hydrocephalus.  There was no noted to be sulci hyperdensities along the right temporal lobe reflecting subarachnoid hemorrhage.  CTA of the head and neck revealed extensive intracranial hemorrhage and mild dolichoectasia of the basilar artery.  Neurology and Neurosurgery was consulted and patient was started on Cleviprex drip for blood pressure parameters less than 140/90 and admitted to ICU.  Repeat CT of the head revealed stable bleed with unchanged shift and no new hemorrhages.  Patient was started on 3% saline drip on 08/11/2024.  Cleviprex and saline drip has been discontinued.  Patient was currently has been NG tube and refusing speech therapy.  Repeat CT of the head on 08/11/2024 revealed unchanged appearance of intraparenchymal hematoma and unchanged 6 mm right-to-left midline shift without hydrocephalus.  Patient developed a fever of 100.6° F fairly this morning (08/13/2024).  No leukocytosis on labs this morning.  Patient has been getting Xanax and Seroquel for agitation.  Patient was cleared for downgrade from ICU to regular floor on 08/13/2024.  Neurology and neurosurgery has signed  off.  He is admitted to hospital medicine services for further medical management.    Interval Hx:  Unclear if patient went for MBS yesterday.  Will follow up speech notes.  NGT remain in place and tolerating tube feeds at 80cc/hr.  Vitals stable. Nursing does report some choking with sips of water    Objective/physical exam:  General appearance: Well-developed, well-nourished male in no apparent distress.   HEENT: Atraumatic head. Moist mucous membranes of oral cavity.  Lungs: Clear to auscultation bilaterally.   Heart: Regular rate and rhythm.   Abdomen: Soft, non-distended.  NG tube in place.  Extremities: No cyanosis, clubbing. No deformities.  Skin: No Rash. Warm and dry.  Neuro:  Sleeping.  Opens eyes but closes eyes and seems to be sleeping.  Moves right upper and lower extremities spontaneously.  Does not withdraw left lower extremity from stimuli.  Mittens in place.  Psych:  Uncooperative.    VITAL SIGNS: 24 HRS MIN & MAX LAST   Temp  Min: 98 °F (36.7 °C)  Max: 99.1 °F (37.3 °C) 98.9 °F (37.2 °C)   BP  Min: 119/69  Max: 139/83 119/69   Pulse  Min: 60  Max: 114  (!) 114   Resp  Min: 13  Max: 34 (!) 25   SpO2  Min: 96 %  Max: 99 % 99 %       Recent Labs   Lab 08/12/24  0311 08/13/24 0235 08/14/24  0359   WBC 11.42 9.36 10.79   RBC 4.29* 4.17* 4.23*   HGB 12.9* 12.3* 12.6*   HCT 38.6* 37.9* 38.4*   MCV 90.0 90.9 90.8   MCH 30.1 29.5 29.8   MCHC 33.4 32.5* 32.8*   RDW 13.6 13.6 13.7    288 235   MPV 8.5 8.9 8.5       Recent Labs   Lab 08/12/24  0311 08/12/24 0824 08/12/24 2028 08/13/24  0235 08/14/24  0359 08/15/24  0217 08/16/24  0254      < > 150* 148* 144  --   --    K 3.9  --   --  3.7 3.6  --   --    *  --   --  117* 113*  --   --    CO2 19*  --   --  20* 21*  --   --    BUN 12.2  --   --  19.9 24.6  --   --    CREATININE 0.78  --   --  0.83 0.94  --   --    CALCIUM 9.2  --   --  9.6 9.4  --   --    MG 2.40  --   --  2.40 2.30 2.20 2.30   ALBUMIN 3.6  --   --  3.5 3.5  --   --     ALKPHOS 59  --   --  60 59  --   --    ALT 16  --   --  13 17  --   --    AST 17  --   --  14 18  --   --    BILITOT 1.0  --   --  0.9 0.8  --   --     < > = values in this interval not displayed.          Microbiology Results (last 7 days)       Procedure Component Value Units Date/Time    Blood Culture [[id number]]  (Normal) Collected: 08/13/24 1128    Order Status: Completed Specimen: Blood, Venous Updated: 08/15/24 1301     Blood Culture No Growth At 48 Hours    Blood Culture [[id number]]  (Normal) Collected: 08/13/24 1128    Order Status: Completed Specimen: Blood, Venous Updated: 08/15/24 1301     Blood Culture No Growth At 48 Hours    Urine culture [[id number]] Collected: 08/13/24 1216    Order Status: Completed Specimen: Urine Updated: 08/15/24 0722     Urine Culture No Growth             Radiology:  XR Gastric tube check, non-radiologist performed  START OF REPORT:  Technique/Views: Single AP supine chest and abdomen view.    Comparison: None.    Clinical history: NGT placement.    Findings:  Lines and Tubes: A nasogastric catheter is seen with its tip below the diaphragm in the stomach with its side port just below the gastroesophageal junction. Consider 3-5 cm advancement.  Heart: Normal.  Lungs: The lungs are clear with no focal infiltrate or consolidation.  Diaphragms: A right diaphragmatic hump is present.  Pleura: No pneumothorax or effusions are identified.  Abdomen:  Bowel Gas Pattern: Nonspecific bowel gas pattern.  Bones: The visualized bony structures appear intact.    Impression:  1. A nasogastric catheter is seen with its tip below the diaphragm in the stomach with its side port just below the gastroesophageal junction. Consider 3-5 cm advancement.  2. Details as above.  Fl Modified Barium Swallow Speech  See procedure notes from Speech Pathologist.    This procedure was auto-finalized.        Medications:  Scheduled Meds:   amLODIPine  10 mg Per NG tube Daily    cefTRIAXone (Rocephin) IV  (PEDS and ADULTS)  1 g Intravenous Q24H    levETIRAcetam (Keppra) IV (PEDS and ADULTS)  500 mg Intravenous Q12H    mupirocin   Nasal BID    sodium chloride 0.9%  10 mL Intravenous Q6H     Continuous Infusions:  PRN Meds:.  Current Facility-Administered Medications:     0.9% NaCl, , Intravenous, PRN    acetaminophen, 650 mg, Rectal, Q6H PRN    acetaminophen, 650 mg, Oral, Q6H PRN    ALPRAZolam, 0.25 mg, Per NG tube, Q4H PRN    bisacodyL, 10 mg, Rectal, Daily PRN    cloNIDine, 0.1 mg, Per NG tube, Q6H PRN    diphenhydrAMINE, 25 mg, Oral, Q6H PRN    hydrALAZINE, 10 mg, Intravenous, Q4H PRN    oxyCODONE, 5 mg, Per NG tube, Q6H PRN    QUEtiapine, 50 mg, Per NG tube, Nightly PRN    sodium chloride 0.9%, 10 mL, Intravenous, PRN    Flushing PICC/Midline Protocol, , , Until Discontinued **AND** sodium chloride 0.9%, 10 mL, Intravenous, Q6H **AND** sodium chloride 0.9%, 10 mL, Intravenous, PRN        Assessment/Plan:   Oropharyngeal dysphagia on NG tube feeds   Right thalamic capsular intraparenchymal hematoma with subarachnoid and intraventricular extension and right-to-left midline shift of 6 mm  Normocytic anemia, stable   UTI, POA   UDS with cocaine   Hypertension, controlled  History of Crohn's disease and GERD    Plan for rehab vs. SNF placement upon DC but will need long term enteral nutrition route first  Speech following. MBS yesterday vs today? Follow up recs  Continue PT/OT  Rocephin day 4 of 5 for UTI.  No fevers or leukocytosis.   BP stable.  Continue Norvasc.   Neuro signed off.  Follow up in clinic.  Continue Keppra for seizure prophylaxis      Rocky Vallecillo MD   08/16/2024     All diagnosis and differential diagnosis have been reviewed; assessment and plan has been documented; I have personally reviewed the labs and test results that are presently available; I have reviewed the patients medication list; I have reviewed the consulting providers response and recommendations. I have reviewed or attempted to  review medical records based upon their availability    All of the patient's questions have been  addressed and answered. Patient's is agreeable to the above stated plan. I will continue to monitor closely and make adjustments to medical management as needed.  _____________________________________________________________________

## 2024-08-16 NOTE — PT/OT/SLP PROGRESS
Occupational Therapy   Treatment    Name: Dell Garrett Jr.  MRN: [id number]  Admitting Diagnosis:  Intraparenchymal hemorrhage of brain       Recommendations:     Recommended therapy intensity at discharge: High Intensity Therapy   Discharge Equipment Recommendations:  to be determined by next level of care      Assessment:     Dell Garrett Jr. is a 59 y.o. male with a medical diagnosis o R basal ganglia hemorrhage with R to L shift and intraventricular extension, elevated BP.  He presents with good motivation and pleasant today.  He was fixated on drinking, required redirection.  Anticipate up to chair in upcoming sessions but will require safety devices in place.  Performance deficits affecting function are weakness, impaired self care skills, impaired functional mobility, gait instability, visual deficits, impaired balance, decreased upper extremity function, decreased lower extremity function, decreased safety awareness, abnormal tone, impaired fine motor.     Rehab Prognosis:  Good; patient would benefit from acute skilled OT services to address these deficits and reach maximum level of function.       Plan:     Patient to be seen 6 x/week to address the above listed problems via self-care/home management, therapeutic activities, therapeutic exercises, neuromuscular re-education  Plan of Care Expires: 09/12/24  Plan of Care Reviewed with: patient    Subjective     Pain/Comfort:  Pain Rating 1: 7/10 (headache and neck pain, gentle stretch applied to neck, RN notified of headache)    Objective:     Communicated with: RN prior to session.  Patient found HOB elevated with  (roll belt, NG, vital monitoring, SCD, L heel float) upon OT entry to room.    General Precautions: Standard, fall    Orthopedic Precautions:N/A  Braces: N/A  Respiratory Status: Room air  Vital Signs: 120/65     Occupational Performance:     Bed Mobility:    Patient completed Supine to Sit with moderate assistance  Patient completed Sit to  Supine with maximal assistance   WB through R UE for midline orientation, pt heavy pusher.  Eventually progressed towards mod A static sitting balance.      Functional Mobility/Transfers:  Patient completed Sit <> Stand Transfer with maxx2  with L knee blocked, L knee buckling, good effort with weight shifting, L lateral lean, responded well to tactile cues.  Tends to keep eyes closed, cues to open, able to gaze in L visual field today.  Closes one eye frequently, denied double vision.   Pt continues to prefer L lateral neck rotation, able to actively achieve midline.    Activities of Daily Living:  Feeding:  total NG, NPO  Grooming: stand by assistance wiping face      Therapeutic Exercise:  Gentle PROM LUE performed, 1 finger subluxation present today    Therapeutic Positioning    OT interventions performed during the course of today's session in an effort to prevent and/or reduce acquired pressure injuries:   Therapeutic positioning was provided at the conclusion of session to offload all bony prominences for the prevention and/or reduction of pressure injuries      Patient Education:  Patient provided with verbal education education regarding OT role/goals/POC, fall prevention, safety awareness, Discharge/DME recommendations, and pressure ulcer prevention.  Understanding was verbalized, however additional teaching warranted.      Patient left HOB elevated with all lines intact and positioning devices in place .    GOALS:   Multidisciplinary Problems       Occupational Therapy Goals          Problem: Occupational Therapy    Goal Priority Disciplines Outcome Interventions   Occupational Therapy Goal     OT, PT/OT Progressing    Description: Goals to be met by: 9/12/24     Patient will increase functional independence with ADLs by performing:    UE Dressing with Stand-by Assistance.  LE Dressing with mod A  Grooming while seated at sink with min A  Assistance.  Toileting from toilet with mod A for hygiene and  clothing management.   Toilet transfer to toilet with mod A  Increased functional strength to 3/5 LUE through therEX, neuromuscular re-ed.  Pt will visually attend to L side of environment with min cues                           Time Tracking:     OT Date of Treatment:    OT Start Time: 1053  OT Stop Time: 1121  OT Total Time (min): 28 min    Billable Minutes:Therapeutic Activity 2    OT/MAC: OT     Number of MAC visits since last OT visit: 4    8/16/2024

## 2024-08-16 NOTE — NURSING
Nurses Note -- 4 Eyes      8/16/2024   7:18 AM      Skin assessed during: Daily Assessment      [x] No Altered Skin Integrity Present    [x]Prevention Measures Documented      [] Yes- Altered Skin Integrity Present or Discovered   [] LDA Added if Not in Epic (Describe Wound)   [] New Altered Skin Integrity was Present on Admit and Documented in LDA   [] Wound Image Taken    Wound Care Consulted? No    Attending Nurse:  DANIEL Gold     Second RN/Staff Member:  HAYLEY Del Castillo

## 2024-08-16 NOTE — PLAN OF CARE
Problem: Adult Inpatient Plan of Care  Goal: Plan of Care Review  Outcome: Progressing  Goal: Patient-Specific Goal (Individualized)  Outcome: Progressing  Goal: Absence of Hospital-Acquired Illness or Injury  Outcome: Progressing  Goal: Optimal Comfort and Wellbeing  Outcome: Not Progressing  Goal: Readiness for Transition of Care  Outcome: Not Progressing     Problem: Skin Injury Risk Increased  Goal: Skin Health and Integrity  Outcome: Progressing     Problem: Stroke, Intracerebral Hemorrhage  Goal: Optimal Coping  Intervention: Support Psychosocial Response to Stroke  Flowsheets (Taken 8/16/2024 0929)  Supportive Measures:   relaxation techniques promoted   verbalization of feelings encouraged  Goal: Effective Bowel Elimination  Outcome: Not Progressing  Goal: Optimal Cerebral Tissue Perfusion  Intervention: Protect and Optimize Cerebral Perfusion  Flowsheets (Taken 8/16/2024 0929)  Cerebral Perfusion Promotion:   blood pressure monitored   normothermia promoted  Goal: Optimal Cognitive Function  Outcome: Progressing  Intervention: Optimize Cognitive Function  Flowsheets (Taken 8/16/2024 0929)  Environment Familiarity/Consistency: daily routine followed  Reorientation Measures: clock in view

## 2024-08-16 NOTE — PLAN OF CARE
Spoke to patient's sister, Melina, regarding rehab choices. She states she wants to speak to her friend who is familiar with the facilities. She did verbalize she does not want a NH setting for her brother. Patient did give me verbal permission to speak to his sister about these decisions. Spoke to her about HCPOA and POA paperwork which she is going to pursue. Explained that patient is not ready yet for placement.

## 2024-08-16 NOTE — PLAN OF CARE
Problem: Adult Inpatient Plan of Care  Goal: Plan of Care Review  8/16/2024 1839 by Janice Covarrubias RN  Outcome: Progressing  8/16/2024 0929 by Janice Covarrubias RN  Outcome: Progressing  Goal: Patient-Specific Goal (Individualized)  Outcome: Progressing  Goal: Absence of Hospital-Acquired Illness or Injury  Outcome: Progressing  Goal: Optimal Comfort and Wellbeing  Outcome: Not Progressing  Goal: Readiness for Transition of Care  Outcome: Not Progressing

## 2024-08-17 LAB
ANION GAP SERPL CALC-SCNC: 9 MEQ/L
BASOPHILS # BLD AUTO: 0.03 X10(3)/MCL
BASOPHILS NFR BLD AUTO: 0.4 %
BUN SERPL-MCNC: 20.7 MG/DL (ref 8.4–25.7)
CALCIUM SERPL-MCNC: 9 MG/DL (ref 8.8–10)
CHLORIDE SERPL-SCNC: 106 MMOL/L (ref 98–107)
CO2 SERPL-SCNC: 25 MMOL/L (ref 23–31)
CREAT SERPL-MCNC: 0.77 MG/DL (ref 0.73–1.18)
CREAT/UREA NIT SERPL: 27
EOSINOPHIL # BLD AUTO: 0.07 X10(3)/MCL (ref 0–0.9)
EOSINOPHIL NFR BLD AUTO: 0.9 %
ERYTHROCYTE [DISTWIDTH] IN BLOOD BY AUTOMATED COUNT: 12.8 % (ref 11.5–17)
GFR SERPLBLD CREATININE-BSD FMLA CKD-EPI: >60 ML/MIN/1.73/M2
GLUCOSE SERPL-MCNC: 94 MG/DL (ref 82–115)
HCT VFR BLD AUTO: 36 % (ref 42–52)
HGB BLD-MCNC: 11.6 G/DL (ref 14–18)
IMM GRANULOCYTES # BLD AUTO: 0.03 X10(3)/MCL (ref 0–0.04)
IMM GRANULOCYTES NFR BLD AUTO: 0.4 %
LYMPHOCYTES # BLD AUTO: 1.14 X10(3)/MCL (ref 0.6–4.6)
LYMPHOCYTES NFR BLD AUTO: 14.7 %
MAGNESIUM SERPL-MCNC: 2.3 MG/DL (ref 1.6–2.6)
MCH RBC QN AUTO: 29.7 PG (ref 27–31)
MCHC RBC AUTO-ENTMCNC: 32.2 G/DL (ref 33–36)
MCV RBC AUTO: 92.1 FL (ref 80–94)
MONOCYTES # BLD AUTO: 0.81 X10(3)/MCL (ref 0.1–1.3)
MONOCYTES NFR BLD AUTO: 10.4 %
NEUTROPHILS # BLD AUTO: 5.69 X10(3)/MCL (ref 2.1–9.2)
NEUTROPHILS NFR BLD AUTO: 73.2 %
NRBC BLD AUTO-RTO: 0 %
PLATELET # BLD AUTO: 198 X10(3)/MCL (ref 130–400)
PMV BLD AUTO: 9.8 FL (ref 7.4–10.4)
POTASSIUM SERPL-SCNC: 4.2 MMOL/L (ref 3.5–5.1)
RBC # BLD AUTO: 3.91 X10(6)/MCL (ref 4.7–6.1)
SODIUM SERPL-SCNC: 140 MMOL/L (ref 136–145)
WBC # BLD AUTO: 7.77 X10(3)/MCL (ref 4.5–11.5)

## 2024-08-17 PROCEDURE — 25000003 PHARM REV CODE 250: Performed by: INTERNAL MEDICINE

## 2024-08-17 PROCEDURE — 25000003 PHARM REV CODE 250: Performed by: NURSE PRACTITIONER

## 2024-08-17 PROCEDURE — 85025 COMPLETE CBC W/AUTO DIFF WBC: CPT | Performed by: HOSPITALIST

## 2024-08-17 PROCEDURE — 63600175 PHARM REV CODE 636 W HCPCS: Performed by: INTERNAL MEDICINE

## 2024-08-17 PROCEDURE — 97530 THERAPEUTIC ACTIVITIES: CPT | Mod: CQ

## 2024-08-17 PROCEDURE — 25000003 PHARM REV CODE 250

## 2024-08-17 PROCEDURE — 97112 NEUROMUSCULAR REEDUCATION: CPT | Mod: CQ

## 2024-08-17 PROCEDURE — 63600175 PHARM REV CODE 636 W HCPCS: Performed by: NURSE PRACTITIONER

## 2024-08-17 PROCEDURE — 36415 COLL VENOUS BLD VENIPUNCTURE: CPT | Performed by: HOSPITALIST

## 2024-08-17 PROCEDURE — 83735 ASSAY OF MAGNESIUM: CPT

## 2024-08-17 PROCEDURE — 97535 SELF CARE MNGMENT TRAINING: CPT | Mod: CO

## 2024-08-17 PROCEDURE — 80048 BASIC METABOLIC PNL TOTAL CA: CPT | Performed by: HOSPITALIST

## 2024-08-17 PROCEDURE — 21400001 HC TELEMETRY ROOM

## 2024-08-17 RX ADMIN — QUETIAPINE FUMARATE 50 MG: 25 TABLET ORAL at 08:08

## 2024-08-17 RX ADMIN — ACETAMINOPHEN 325MG 650 MG: 325 TABLET ORAL at 04:08

## 2024-08-17 RX ADMIN — AMLODIPINE BESYLATE 10 MG: 5 TABLET ORAL at 09:08

## 2024-08-17 RX ADMIN — ALPRAZOLAM 0.25 MG: 0.25 TABLET ORAL at 04:08

## 2024-08-17 RX ADMIN — LEVETIRACETAM 500 MG: 100 INJECTION, SOLUTION INTRAVENOUS at 08:08

## 2024-08-17 RX ADMIN — CEFTRIAXONE SODIUM 1 G: 1 INJECTION, POWDER, FOR SOLUTION INTRAMUSCULAR; INTRAVENOUS at 05:08

## 2024-08-17 RX ADMIN — LEVETIRACETAM 500 MG: 100 INJECTION, SOLUTION INTRAVENOUS at 09:08

## 2024-08-17 RX ADMIN — OXYCODONE HYDROCHLORIDE 5 MG: 5 TABLET ORAL at 01:08

## 2024-08-17 NOTE — PLAN OF CARE
Problem: Adult Inpatient Plan of Care  Goal: Plan of Care Review  Outcome: Progressing  Goal: Patient-Specific Goal (Individualized)  Outcome: Progressing  Goal: Absence of Hospital-Acquired Illness or Injury  Outcome: Progressing  Goal: Optimal Comfort and Wellbeing  Outcome: Progressing  Goal: Readiness for Transition of Care  Outcome: Progressing     Problem: Skin Injury Risk Increased  Goal: Skin Health and Integrity  Outcome: Progressing     Problem: Stroke, Intracerebral Hemorrhage  Goal: Optimal Coping  Outcome: Progressing  Goal: Effective Bowel Elimination  Outcome: Progressing  Goal: Optimal Cerebral Tissue Perfusion  Outcome: Progressing  Goal: Optimal Cognitive Function  Outcome: Progressing  Goal: Effective Communication Skills  Outcome: Progressing  Goal: Optimal Functional Ability  Outcome: Progressing  Goal: Optimal Nutrition Intake  Outcome: Progressing  Goal: Optimal Pain Control and Function  Outcome: Progressing  Goal: Effective Oxygenation and Ventilation  Outcome: Progressing  Goal: Improved Sensorimotor Function  Outcome: Progressing  Goal: Safe and Effective Swallow  Outcome: Progressing  Goal: Effective Urinary Elimination  Outcome: Progressing     Problem: Infection  Goal: Absence of Infection Signs and Symptoms  Outcome: Progressing      Composite Graft Text: The defect edges were debeveled with a #15 scalpel blade.  Given the location of the defect, shape of the defect, the proximity to free margins and the fact the defect was full thickness a composite graft was deemed most appropriate.  The defect was outline and then transferred to the donor site.  A full thickness graft was then excised from the donor site. The graft was then placed in the primary defect, oriented appropriately and then sutured into place.  The secondary defect was then repaired using a primary closure.

## 2024-08-17 NOTE — PROGRESS NOTES
[facility] Hospital Medicine Progress Note        Chief Complaint: Inpatient Follow-up     HPI:    59 y.o. Black or  male with a past medical history of Crohn's disease and GERD. The patient presented to [facility] on 8/10/2024 with aphasia, left arm weakness and fall.  Patient presented to the ED with bradycardia, normocytic anemia, bicarb 22.  CT of the head revealed a 4.2 x 3.8 cm intraparenchymal hemorrhage of the right thalamus capsular region with mild surrounding edema and effacement of the adjacent sulci in sylvian fissure with compression of the right lateral ventricle with an 8 mm midline shift attestation of bleed into the right lateral ventricle and 3rd ventricle with mild dilation of the left lateral ventricle compared to the right suggesting possible early obstructive hydrocephalus.  There was no noted to be sulci hyperdensities along the right temporal lobe reflecting subarachnoid hemorrhage.  CTA of the head and neck revealed extensive intracranial hemorrhage and mild dolichoectasia of the basilar artery.  Neurology and Neurosurgery was consulted and patient was started on Cleviprex drip for blood pressure parameters less than 140/90 and admitted to ICU.  Repeat CT of the head revealed stable bleed with unchanged shift and no new hemorrhages.  Patient was started on 3% saline drip on 08/11/2024.  Cleviprex and saline drip has been discontinued.  Patient was currently has been NG tube and refusing speech therapy.  Repeat CT of the head on 08/11/2024 revealed unchanged appearance of intraparenchymal hematoma and unchanged 6 mm right-to-left midline shift without hydrocephalus.  Patient developed a fever of 100.6° F fairly this morning (08/13/2024).  No leukocytosis on labs this morning.  Patient has been getting Xanax and Seroquel for agitation.  Patient was cleared for downgrade from ICU to regular floor on 08/13/2024.  Neurology and neurosurgery has signed  off.  He is admitted to hospital medicine services for further medical management.     Interval Hx:  Patient was continues to work with PT OT and speech therapy.  Remains NPO at this time.  NG tube feeds in place.  Suspect that will end up needing a PEG tube.  Will wait to the weekend to see if any further progress he was made    Objective/physical exam:  General appearance: Well-developed, well-nourished male in no apparent distress.   HEENT: Atraumatic head. Moist mucous membranes of oral cavity.  Lungs: Clear to auscultation bilaterally.   Heart: Regular rate and rhythm.   Abdomen: Soft, non-distended.  NG tube in place.  Extremities: No cyanosis, clubbing. No deformities.  Skin: No Rash. Warm and dry.  Neuro:  Sleeping.  Opens eyes but closes eyes and seems to be sleeping.  Moves right upper and lower extremities spontaneously.  Does not withdraw left lower extremity from stimuli.  Mittens in place.  Psych:  Uncooperative.    VITAL SIGNS: 24 HRS MIN & MAX LAST   Temp  Min: 99.1 °F (37.3 °C)  Max: 100 °F (37.8 °C) 99.2 °F (37.3 °C)   BP  Min: 101/52  Max: 133/75 133/75   Pulse  Min: 57  Max: 69  (!) 59   Resp  Min: 13  Max: 28 15   SpO2  Min: 98 %  Max: 100 % 100 %       Recent Labs   Lab 08/13/24 0235 08/14/24  0359 08/17/24  0420   WBC 9.36 10.79 7.77   RBC 4.17* 4.23* 3.91*   HGB 12.3* 12.6* 11.6*   HCT 37.9* 38.4* 36.0*   MCV 90.9 90.8 92.1   MCH 29.5 29.8 29.7   MCHC 32.5* 32.8* 32.2*   RDW 13.6 13.7 12.8    235 198   MPV 8.9 8.5 9.8       Recent Labs   Lab 08/12/24  0311 08/12/24  0824 08/13/24 0235 08/14/24  0359 08/15/24  0217 08/16/24  0254 08/17/24  0420      < > 148* 144  --   --  140   K 3.9  --  3.7 3.6  --   --  4.2   *  --  117* 113*  --   --  106   CO2 19*  --  20* 21*  --   --  25   BUN 12.2  --  19.9 24.6  --   --  20.7   CREATININE 0.78  --  0.83 0.94  --   --  0.77   CALCIUM 9.2  --  9.6 9.4  --   --  9.0   MG 2.40  --  2.40 2.30 2.20 2.30 2.30   ALBUMIN 3.6  --  3.5 3.5   --   --   --    ALKPHOS 59  --  60 59  --   --   --    ALT 16  --  13 17  --   --   --    AST 17  --  14 18  --   --   --    BILITOT 1.0  --  0.9 0.8  --   --   --     < > = values in this interval not displayed.          Microbiology Results (last 7 days)       Procedure Component Value Units Date/Time    Blood Culture [[id number]]  (Normal) Collected: 08/13/24 1128    Order Status: Completed Specimen: Blood, Venous Updated: 08/16/24 1301     Blood Culture No Growth At 72 Hours    Blood Culture [[id number]]  (Normal) Collected: 08/13/24 1128    Order Status: Completed Specimen: Blood, Venous Updated: 08/16/24 1301     Blood Culture No Growth At 72 Hours    Urine culture [[id number]] Collected: 08/13/24 1216    Order Status: Completed Specimen: Urine Updated: 08/15/24 0722     Urine Culture No Growth             Radiology:  XR Gastric tube check, non-radiologist performed  EXAMINATION:  XR GASTRIC TUBE CHECK, NON-RADIOLOGIST PERFORMED    CLINICAL HISTORY:  NG tube placement;    TECHNIQUE:  AP View(s) of the abdomen was performed.    COMPARISON:  08/13/2024    FINDINGS:  Enteric tube terminates in the stomach.    Electronically signed by: Isabella Jacques  Date:    08/16/2024  Time:    11:17        Medications:  Scheduled Meds:   amLODIPine  10 mg Per NG tube Daily    cefTRIAXone (Rocephin) IV (PEDS and ADULTS)  1 g Intravenous Q24H    levETIRAcetam (Keppra) IV (PEDS and ADULTS)  500 mg Intravenous Q12H    mupirocin   Nasal BID     Continuous Infusions:  PRN Meds:.  Current Facility-Administered Medications:     0.9% NaCl, , Intravenous, PRN    acetaminophen, 650 mg, Rectal, Q6H PRN    acetaminophen, 650 mg, Oral, Q6H PRN    ALPRAZolam, 0.25 mg, Per NG tube, Q4H PRN    bisacodyL, 10 mg, Rectal, Daily PRN    cloNIDine, 0.1 mg, Per NG tube, Q6H PRN    diphenhydrAMINE, 25 mg, Oral, Q6H PRN    hydrALAZINE, 10 mg, Intravenous, Q4H PRN    oxyCODONE, 5 mg, Per NG tube, Q6H PRN    polyethylene glycol, 17 g, Oral, BID PRN     QUEtiapine, 50 mg, Per NG tube, Nightly PRN    traMADoL, 50 mg, Oral, Q6H PRN        Assessment/Plan:   Oropharyngeal dysphagia on NG tube feeds   Right thalamic capsular intraparenchymal hematoma with subarachnoid and intraventricular extension and right-to-left midline shift of 6 mm  Normocytic anemia, stable   UTI, POA   UDS with cocaine   Hypertension, controlled  History of Crohn's disease and GERD    Unable to work much with speech yesterday.  NG tube remains in place with tube feeds.  Suspect that he will end up unit PEG tube.  Plan to consult GI on Monday if no significant improvement.  Rocephin day 5 for UTI.  No leukocytosis or fevers.  Discontinue after today's dose.    Continue Keppra for seizure prophylaxis.  Currently IV formulation while NPO.    Vital signs stable.  Labs great this morning.    SNF placement upon discharge once long-term enteral nutrition he was achieved      Rocky Vallecillo MD   08/17/2024     All diagnosis and differential diagnosis have been reviewed; assessment and plan has been documented; I have personally reviewed the labs and test results that are presently available; I have reviewed the patients medication list; I have reviewed the consulting providers response and recommendations. I have reviewed or attempted to review medical records based upon their availability    All of the patient's questions have been  addressed and answered. Patient's is agreeable to the above stated plan. I will continue to monitor closely and make adjustments to medical management as needed.  _____________________________________________________________________

## 2024-08-17 NOTE — NURSING
Nurses Note -- 4 Eyes      8/17/2024   5:09 PM      Skin assessed during: Daily Assessment      [x] No Altered Skin Integrity Present    [x]Prevention Measures Documented      [] Yes- Altered Skin Integrity Present or Discovered   [] LDA Added if Not in Epic (Describe Wound)   [] New Altered Skin Integrity was Present on Admit and Documented in LDA   [] Wound Image Taken    Wound Care Consulted? No    Attending Nurse:  Renuka cazares RN/Staff Member:  DANIEL Child       Eyes

## 2024-08-17 NOTE — PT/OT/SLP PROGRESS
"Physical Therapy Treatment    Patient Name:  Dell Garrett Jr.   MRN:  [id number]    Recommendations:     Discharge therapy intensity: High Intensity Therapy   Discharge Equipment Recommendations: to be determined by next level of care  Barriers to discharge: Impaired mobility    Assessment:     Dell Garrett Jr. is a 59 y.o. 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  He presents with the following impairments/functional limitations: weakness, impaired endurance, impaired balance, decreased upper extremity function, decreased lower extremity function, impaired coordination, decreased safety awareness, impaired cognition, impaired self care skills, impaired functional mobility, impaired sensation, abnormal tone .    Pt awake and agreeable to tx session. Tolerated session fairly well. Continues with RUE pushers requiring max assist to maintain sitting balance. Unable to follow commands to take step today.     Rehab Prognosis: Fair; patient would benefit from acute skilled PT services to address these deficits and reach maximum level of function.    Recent Surgery: * No surgery found *      Plan:     During this hospitalization, patient would benefit from acute PT services 6 x/week to address the identified rehab impairments via gait training, therapeutic activities, therapeutic exercises, neuromuscular re-education and progress toward the following goals:    Plan of Care Expires:  09/12/24    Subjective     Chief Complaint: "my nose is burning"  Patient/Family Comments/goals: to go home  Pain/Comfort:  Pain Rating 1: other (see comments) (reported his nose is "burning" from NGT)  Pain Addressed 1: Distraction      Objective:     Communicated with RN prior to session.  Patient found HOB elevated with  (Roll belt, vitals monitoring, JUSTUS SCD's, LLE heel floater and NGT, pt removed purewick) upon PT entry to room.     General " Precautions: Standard, fall, SBP <140  Orthopedic Precautions: N/A  Braces: N/A  Respiratory Status: Room air  Vitals: /72, HR 57, SPO2 97%  Skin Integrity: Visible skin intact      Functional Mobility:  Bed Mobility:    Supine to sit with mod assist x 2  Sit to supine with max assist x 2  Scooting anteriorly with max assist  Balance:   Max assist for balance with heavy L lateral lean; pushing through RUE causing LOB.  Placed on R elbow x 4 trials to improve midline orientation. No improvement this date  Active cervical ROM to R with cues  Transfers:   Sit<->stand with mod assist; L knee blocked and LUE supported  Performed weight shifting, unable to follow commands for side step    Therapeutic Activities/Exercises:  Improvement in attending to environment on the R with stimulation    Education:  Patient provided with verbal education education regarding PT role/goals/POC.  Additional teaching is warranted.     Patient left HOB elevated with all lines intact, call button in reach, wedge under R side, pressure relief boots, and nurse notified.    GOALS:   Multidisciplinary Problems       Physical Therapy Goals          Problem: Physical Therapy    Goal Priority Disciplines Outcome Goal Variances Interventions   Physical Therapy Goal     PT, PT/OT Progressing     Description: Goals to be met by: 2024     Patient will increase functional independence with mobility by performin. Supine to sit with MInimal Assistance  2. Sit to supine with MInimal Assistance  3. Sit to stand transfer with Minimal Assistance  4. Pt to follow 75% of commands.   5. Gait  x 75 feet with Minimal Assistance using Rolling Walker vs LRAD.                          Time Tracking:     PT Received On: 24  PT Start Time: 1134     PT Stop Time: 1159  PT Total Time (min): 25 min     Billable Minutes: Therapeutic Activity 1 unit and Neuromuscular Re-education 1 unit    Treatment Type: Treatment  PT/PTA: PTA     Number of PTA  visits since last PT visit: 5 08/17/2024

## 2024-08-17 NOTE — PT/OT/SLP PROGRESS
Occupational Therapy   Treatment    Name: Dell Garrett Jr.  MRN: [id number]  Admitting Diagnosis:  Intraparenchymal hemorrhage of brain       Recommendations:     Discharge Recommendations: High Intensity Therapy  Discharge Equipment Recommendations:   (TBD)  Barriers to discharge:   (ongoing medical needs)    Assessment:     Dell Garrett Jr. is a 59 y.o. male with a medical diagnosis of Intraparenchymal hemorrhage of brain. Performance deficits affecting function are weakness, impaired endurance, impaired self care skills, impaired functional mobility, gait instability, impaired balance, decreased upper extremity function, decreased lower extremity function.     Rehab Prognosis:  Good; patient would benefit from acute skilled OT services to address these deficits and reach maximum level of function.       Plan:     Patient to be seen 6 x/week to address the above listed problems via self-care/home management, therapeutic activities, therapeutic exercises, neuromuscular re-education  Plan of Care Expires: 09/12/24  Plan of Care Reviewed with: patient    Subjective     Chief Complaint: none  Patient/Family Comments/goals: to get better  Pain/Comfort:  Pain Rating 1: 0/10    Objective:     Communicated with: RN prior to session.  Patient found HOB elevated with telemetry, NG tube, SCD (roll belt) upon OT entry to room.    General Precautions: Standard, airborne    Orthopedic Precautions:N/A  Braces: N/A  Respiratory Status: Room air     Occupational Performance:     Bed Mobility:    Patient completed Supine to Sit with moderate assistance  Patient completed Sit to Supine with moderate assistance     Functional Mobility/Transfers:  Patient completed Sit <> Stand Transfer with moderate assistance with LL blocked and LUE supported x 2 attempts in preparation for ADL task     Activities of Daily Living:  Toileting: maximal assistance for pericare while standing     AMPA 6 Click ADL:      Treatment & Education:  Educated  Pt on roles/goals and POC.    Patient left HOB elevated with all lines intact, call button in reach, and RN notified    GOALS:   Multidisciplinary Problems       Occupational Therapy Goals          Problem: Occupational Therapy    Goal Priority Disciplines Outcome Interventions   Occupational Therapy Goal     OT, PT/OT Progressing    Description: Goals to be met by: 9/12/24     Patient will increase functional independence with ADLs by performing:    UE Dressing with Stand-by Assistance.  LE Dressing with mod A  Grooming while seated at sink with min A  Assistance.  Toileting from toilet with mod A for hygiene and clothing management.   Toilet transfer to toilet with mod A  Increased functional strength to 3/5 LUE through therEX, neuromuscular re-ed.  Pt will visually attend to L side of environment with min cues                           Time Tracking:     OT Date of Treatment: 08/17/24  OT Start Time: 1129  OT Stop Time: 1159  OT Total Time (min): 30 min    Billable Minutes:Self Care/Home Management 30    OT/MAC: MAC     Number of MAC visits since last OT visit: 1    8/17/2024

## 2024-08-18 LAB
BACTERIA BLD CULT: NORMAL
BACTERIA BLD CULT: NORMAL
MAGNESIUM SERPL-MCNC: 2.2 MG/DL (ref 1.6–2.6)

## 2024-08-18 PROCEDURE — 25000003 PHARM REV CODE 250: Performed by: INTERNAL MEDICINE

## 2024-08-18 PROCEDURE — 21400001 HC TELEMETRY ROOM

## 2024-08-18 PROCEDURE — 25000003 PHARM REV CODE 250: Performed by: NURSE PRACTITIONER

## 2024-08-18 PROCEDURE — 36415 COLL VENOUS BLD VENIPUNCTURE: CPT

## 2024-08-18 PROCEDURE — 63600175 PHARM REV CODE 636 W HCPCS: Performed by: NURSE PRACTITIONER

## 2024-08-18 PROCEDURE — 83735 ASSAY OF MAGNESIUM: CPT

## 2024-08-18 PROCEDURE — 25000003 PHARM REV CODE 250

## 2024-08-18 RX ADMIN — LEVETIRACETAM 500 MG: 100 INJECTION, SOLUTION INTRAVENOUS at 08:08

## 2024-08-18 RX ADMIN — ACETAMINOPHEN 325MG 650 MG: 325 TABLET ORAL at 08:08

## 2024-08-18 RX ADMIN — AMLODIPINE BESYLATE 10 MG: 5 TABLET ORAL at 08:08

## 2024-08-18 RX ADMIN — QUETIAPINE FUMARATE 50 MG: 25 TABLET ORAL at 08:08

## 2024-08-18 RX ADMIN — ALPRAZOLAM 0.25 MG: 0.25 TABLET ORAL at 08:08

## 2024-08-18 NOTE — NURSING
Attempted to call [facility] [place] [phone], with an update, however the call went to voicemail; message left.

## 2024-08-18 NOTE — NURSING
Nurses Note -- 4 Eyes      8/18/2024   8:32 AM      Skin assessed during: Daily Assessment      [x] No Altered Skin Integrity Present    [x]Prevention Measures Documented      [] Yes- Altered Skin Integrity Present or Discovered   [] LDA Added if Not in Epic (Describe Wound)   [] New Altered Skin Integrity was Present on Admit and Documented in LDA   [] Wound Image Taken    Wound Care Consulted? No    Attending Nurse:  Renuka SANCHEZ     Second RN/Staff Member:  DANIEL Shaikh

## 2024-08-18 NOTE — PROGRESS NOTES
[facility] Hospital Medicine Progress Note        Chief Complaint: Inpatient Follow-up     HPI:   59 y.o. Black or  male with a past medical history of Crohn's disease and GERD. The patient presented to [facility] on 8/10/2024 with aphasia, left arm weakness and fall.  Patient presented to the ED with bradycardia, normocytic anemia, bicarb 22.  CT of the head revealed a 4.2 x 3.8 cm intraparenchymal hemorrhage of the right thalamus capsular region with mild surrounding edema and effacement of the adjacent sulci in sylvian fissure with compression of the right lateral ventricle with an 8 mm midline shift attestation of bleed into the right lateral ventricle and 3rd ventricle with mild dilation of the left lateral ventricle compared to the right suggesting possible early obstructive hydrocephalus.  There was no noted to be sulci hyperdensities along the right temporal lobe reflecting subarachnoid hemorrhage.  CTA of the head and neck revealed extensive intracranial hemorrhage and mild dolichoectasia of the basilar artery.  Neurology and Neurosurgery was consulted and patient was started on Cleviprex drip for blood pressure parameters less than 140/90 and admitted to ICU.  Repeat CT of the head revealed stable bleed with unchanged shift and no new hemorrhages.  Patient was started on 3% saline drip on 08/11/2024.  Cleviprex and saline drip has been discontinued.  Patient was currently has been NG tube and refusing speech therapy.  Repeat CT of the head on 08/11/2024 revealed unchanged appearance of intraparenchymal hematoma and unchanged 6 mm right-to-left midline shift without hydrocephalus.  Patient developed a fever of 100.6° F fairly this morning (08/13/2024).  No leukocytosis on labs this morning.  Patient has been getting Xanax and Seroquel for agitation.  Patient was cleared for downgrade from ICU to regular floor on 08/13/2024.  Neurology and neurosurgery has signed  off.  He is admitted to hospital medicine services for further medical management.     Interval Hx:  NG tube remains in place.  Mental status is somewhat better.  Responding appropriately.  Nurse reports he talks when he wants to.  Not always cooperative with exam or commands.  Speech to re-evaluate today.  Possible MBS tomorrow.       Objective/physical exam:  General appearance: Well-developed, well-nourished male in no apparent distress.   HEENT: Atraumatic head. Moist mucous membranes of oral cavity.  Lungs: Clear to auscultation bilaterally.   Heart: Regular rate and rhythm.   Abdomen: Soft, non-distended.  NG tube in place.  Extremities: No cyanosis, clubbing. No deformities.  Skin: No Rash. Warm and dry.  Neuro:  Sleeping.  Opens eyes but closes eyes and seems to be sleeping.  Moves right upper and lower extremities spontaneously.  Does not withdraw left lower extremity from stimuli.  Mittens in place.  Psych:  Uncooperative.    VITAL SIGNS: 24 HRS MIN & MAX LAST   Temp  Min: 98.6 °F (37 °C)  Max: 99 °F (37.2 °C) 99 °F (37.2 °C)   BP  Min: 120/73  Max: 138/78 127/73   Pulse  Min: 55  Max: 91  91   Resp  Min: 13  Max: 18 16   SpO2  Min: 96 %  Max: 100 % 96 %       Recent Labs   Lab 08/13/24  0235 08/14/24  0359 08/17/24  0420   WBC 9.36 10.79 7.77   RBC 4.17* 4.23* 3.91*   HGB 12.3* 12.6* 11.6*   HCT 37.9* 38.4* 36.0*   MCV 90.9 90.8 92.1   MCH 29.5 29.8 29.7   MCHC 32.5* 32.8* 32.2*   RDW 13.6 13.7 12.8    235 198   MPV 8.9 8.5 9.8       Recent Labs   Lab 08/12/24  0311 08/12/24 0824 08/13/24  0235 08/14/24  0359 08/15/24  0217 08/16/24  0254 08/17/24  0420 08/18/24  0252      < > 148* 144  --   --  140  --    K 3.9  --  3.7 3.6  --   --  4.2  --    *  --  117* 113*  --   --  106  --    CO2 19*  --  20* 21*  --   --  25  --    BUN 12.2  --  19.9 24.6  --   --  20.7  --    CREATININE 0.78  --  0.83 0.94  --   --  0.77  --    CALCIUM 9.2  --  9.6 9.4  --   --  9.0  --    MG 2.40  --  2.40  2.30   < > 2.30 2.30 2.20   ALBUMIN 3.6  --  3.5 3.5  --   --   --   --    ALKPHOS 59  --  60 59  --   --   --   --    ALT 16  --  13 17  --   --   --   --    AST 17  --  14 18  --   --   --   --    BILITOT 1.0  --  0.9 0.8  --   --   --   --     < > = values in this interval not displayed.          Microbiology Results (last 7 days)       Procedure Component Value Units Date/Time    Blood Culture [[id number]]  (Normal) Collected: 08/13/24 1128    Order Status: Completed Specimen: Blood, Venous Updated: 08/17/24 1300     Blood Culture No Growth At 96 Hours    Blood Culture [[id number]]  (Normal) Collected: 08/13/24 1128    Order Status: Completed Specimen: Blood, Venous Updated: 08/17/24 1300     Blood Culture No Growth At 96 Hours    Urine culture [[id number]] Collected: 08/13/24 1216    Order Status: Completed Specimen: Urine Updated: 08/15/24 0722     Urine Culture No Growth             Radiology:  XR Gastric tube check, non-radiologist performed  EXAMINATION:  XR GASTRIC TUBE CHECK, NON-RADIOLOGIST PERFORMED    CLINICAL HISTORY:  NG tube placement;    TECHNIQUE:  AP View(s) of the abdomen was performed.    COMPARISON:  08/13/2024    FINDINGS:  Enteric tube terminates in the stomach.    Electronically signed by: Isabella Jacques  Date:    08/16/2024  Time:    11:17        Medications:  Scheduled Meds:   amLODIPine  10 mg Per NG tube Daily    levETIRAcetam (Keppra) IV (PEDS and ADULTS)  500 mg Intravenous Q12H     Continuous Infusions:  PRN Meds:.  Current Facility-Administered Medications:     0.9% NaCl, , Intravenous, PRN    acetaminophen, 650 mg, Rectal, Q6H PRN    acetaminophen, 650 mg, Oral, Q6H PRN    ALPRAZolam, 0.25 mg, Per NG tube, Q4H PRN    bisacodyL, 10 mg, Rectal, Daily PRN    cloNIDine, 0.1 mg, Per NG tube, Q6H PRN    diphenhydrAMINE, 25 mg, Oral, Q6H PRN    hydrALAZINE, 10 mg, Intravenous, Q4H PRN    oxyCODONE, 5 mg, Per NG tube, Q6H PRN    polyethylene glycol, 17 g, Oral, BID PRN    QUEtiapine, 50  mg, Per NG tube, Nightly PRN    traMADoL, 50 mg, Oral, Q6H PRN        Assessment/Plan:   Oropharyngeal dysphagia on NG tube feeds   Right thalamic capsular intraparenchymal hematoma with subarachnoid and intraventricular extension and right-to-left midline shift of 6 mm  Normocytic anemia, stable   UTI, POA, completed antibiotics   UDS with cocaine   Hypertension, controlled  History of Crohn's disease and GERD    Patient was completed 5 days of IV Rocephin for UTI.  Off of all antibiotics.    Remains on Norvasc 10 mg for blood pressure support.    Keppra 500 q.12 procedure prophylaxis.    Will follow up speech therapy evaluation today and tomorrow.  If not cleared for oral intake plan to consult GI tomorrow for PEG eval  Will need SNF placement upon discharge      Rocky Vallecillo MD   08/18/2024     All diagnosis and differential diagnosis have been reviewed; assessment and plan has been documented; I have personally reviewed the labs and test results that are presently available; I have reviewed the patients medication list; I have reviewed the consulting providers response and recommendations. I have reviewed or attempted to review medical records based upon their availability    All of the patient's questions have been  addressed and answered. Patient's is agreeable to the above stated plan. I will continue to monitor closely and make adjustments to medical management as needed.  _____________________________________________________________________

## 2024-08-18 NOTE — NURSING
Nurses Note -- 4 Eyes      8/17/2024   11:36 PM      Skin assessed during: Q Shift Change      [x] No Altered Skin Integrity Present    [x]Prevention Measures Documented      [] Yes- Altered Skin Integrity Present or Discovered   [] LDA Added if Not in Epic (Describe Wound)   [] New Altered Skin Integrity was Present on Admit and Documented in LDA   [] Wound Image Taken    Wound Care Consulted? No    Attending Nurse:  Earnest Antoine RN    Second RN/Staff Member:  Sherry Eubanks RN

## 2024-08-19 LAB
ANION GAP SERPL CALC-SCNC: 9 MEQ/L
BASOPHILS # BLD AUTO: 0.04 X10(3)/MCL
BASOPHILS NFR BLD AUTO: 0.5 %
BUN SERPL-MCNC: 22 MG/DL (ref 8.4–25.7)
CALCIUM SERPL-MCNC: 9.1 MG/DL (ref 8.8–10)
CHLORIDE SERPL-SCNC: 102 MMOL/L (ref 98–107)
CO2 SERPL-SCNC: 24 MMOL/L (ref 23–31)
CREAT SERPL-MCNC: 0.87 MG/DL (ref 0.73–1.18)
CREAT/UREA NIT SERPL: 25
EOSINOPHIL # BLD AUTO: 0.13 X10(3)/MCL (ref 0–0.9)
EOSINOPHIL NFR BLD AUTO: 1.6 %
ERYTHROCYTE [DISTWIDTH] IN BLOOD BY AUTOMATED COUNT: 12 % (ref 11.5–17)
GFR SERPLBLD CREATININE-BSD FMLA CKD-EPI: >60 ML/MIN/1.73/M2
GLUCOSE SERPL-MCNC: 127 MG/DL (ref 82–115)
HCT VFR BLD AUTO: 36.3 % (ref 42–52)
HGB BLD-MCNC: 12.1 G/DL (ref 14–18)
IMM GRANULOCYTES # BLD AUTO: 0.03 X10(3)/MCL (ref 0–0.04)
IMM GRANULOCYTES NFR BLD AUTO: 0.4 %
LYMPHOCYTES # BLD AUTO: 1.27 X10(3)/MCL (ref 0.6–4.6)
LYMPHOCYTES NFR BLD AUTO: 15.3 %
MAGNESIUM SERPL-MCNC: 2.2 MG/DL (ref 1.6–2.6)
MCH RBC QN AUTO: 29.7 PG (ref 27–31)
MCHC RBC AUTO-ENTMCNC: 33.3 G/DL (ref 33–36)
MCV RBC AUTO: 89.2 FL (ref 80–94)
MONOCYTES # BLD AUTO: 0.94 X10(3)/MCL (ref 0.1–1.3)
MONOCYTES NFR BLD AUTO: 11.3 %
NEUTROPHILS # BLD AUTO: 5.88 X10(3)/MCL (ref 2.1–9.2)
NEUTROPHILS NFR BLD AUTO: 70.9 %
NRBC BLD AUTO-RTO: 0 %
PLATELET # BLD AUTO: 208 X10(3)/MCL (ref 130–400)
PMV BLD AUTO: 8.8 FL (ref 7.4–10.4)
POTASSIUM SERPL-SCNC: 4.2 MMOL/L (ref 3.5–5.1)
RBC # BLD AUTO: 4.07 X10(6)/MCL (ref 4.7–6.1)
SODIUM SERPL-SCNC: 135 MMOL/L (ref 136–145)
WBC # BLD AUTO: 8.29 X10(3)/MCL (ref 4.5–11.5)

## 2024-08-19 PROCEDURE — 97530 THERAPEUTIC ACTIVITIES: CPT

## 2024-08-19 PROCEDURE — 80048 BASIC METABOLIC PNL TOTAL CA: CPT | Performed by: HOSPITALIST

## 2024-08-19 PROCEDURE — 92526 ORAL FUNCTION THERAPY: CPT

## 2024-08-19 PROCEDURE — 97164 PT RE-EVAL EST PLAN CARE: CPT

## 2024-08-19 PROCEDURE — 85025 COMPLETE CBC W/AUTO DIFF WBC: CPT | Performed by: HOSPITALIST

## 2024-08-19 PROCEDURE — 25000003 PHARM REV CODE 250: Performed by: INTERNAL MEDICINE

## 2024-08-19 PROCEDURE — 97168 OT RE-EVAL EST PLAN CARE: CPT

## 2024-08-19 PROCEDURE — 21400001 HC TELEMETRY ROOM

## 2024-08-19 PROCEDURE — 25000003 PHARM REV CODE 250: Performed by: HOSPITALIST

## 2024-08-19 PROCEDURE — 83735 ASSAY OF MAGNESIUM: CPT

## 2024-08-19 PROCEDURE — 63600175 PHARM REV CODE 636 W HCPCS: Performed by: NURSE PRACTITIONER

## 2024-08-19 PROCEDURE — 36415 COLL VENOUS BLD VENIPUNCTURE: CPT | Performed by: HOSPITALIST

## 2024-08-19 PROCEDURE — 25000003 PHARM REV CODE 250: Performed by: NURSE PRACTITIONER

## 2024-08-19 RX ORDER — QUETIAPINE FUMARATE 100 MG/1
100 TABLET, FILM COATED ORAL NIGHTLY PRN
Status: DISCONTINUED | OUTPATIENT
Start: 2024-08-19 | End: 2024-08-27

## 2024-08-19 RX ADMIN — QUETIAPINE FUMARATE 100 MG: 100 TABLET ORAL at 08:08

## 2024-08-19 RX ADMIN — ALPRAZOLAM 0.25 MG: 0.25 TABLET ORAL at 08:08

## 2024-08-19 RX ADMIN — LEVETIRACETAM 500 MG: 100 INJECTION, SOLUTION INTRAVENOUS at 08:08

## 2024-08-19 RX ADMIN — AMLODIPINE BESYLATE 10 MG: 5 TABLET ORAL at 08:08

## 2024-08-19 NOTE — PROGRESS NOTES
Inpatient Nutrition Assessment    Admit Date: 8/10/2024   Total duration of encounter: 9 days   Patient Age: 59 y.o.    Nutrition Recommendation/Prescription     Tube feeding:  Fibersource HN goal rate 80 ml/hr to provide (calculations based on estimated 20 hr/d run time)   1920 kcal  86 g protein  1296 ml free water, will require additional fluid source, can be given as 30 ml/hr water flush when appropriate    Communication of Recommendations: reviewed with nurse    Nutrition Assessment     Malnutrition Assessment/Nutrition-Focused Physical Exam    Malnutrition Context: acute illness or injury (08/12/24 1142)  Malnutrition Level: moderate (08/12/24 1142)  Energy Intake (Malnutrition):  (does not meet criteria) (08/12/24 1142)  Weight Loss (Malnutrition):  (does not meet criteria) (08/12/24 1142)  Subcutaneous Fat (Malnutrition): mild depletion (08/12/24 1142)     Upper Arm Region (Subcutaneous Fat Loss): mild depletion     Muscle Mass (Malnutrition): mild depletion (08/12/24 1142)     Clavicle Bone Region (Muscle Loss): mild depletion                             A minimum of two characteristics is recommended for diagnosis of either severe or non-severe malnutrition.    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 with left-sided weakness and suppressed alertness  Elevated blood pressure, no previous dx of HTN   Hypercholesterolemia   Elevated creatinine, resolved    Relevant Medical History: Crohn's disease, GERD, and chronic tobacco use     Scheduled Medications:  amLODIPine, 10 mg, Daily  levETIRAcetam (Keppra) IV (PEDS and ADULTS), 500 mg, Q12H    Continuous Infusions: none       PRN Medications:   0.9% NaCl, , PRN  acetaminophen, 650  mg, Q6H PRN  acetaminophen, 650 mg, Q6H PRN  ALPRAZolam, 0.25 mg, Q4H PRN  bisacodyL, 10 mg, Daily PRN  cloNIDine, 0.1 mg, Q6H PRN  diphenhydrAMINE, 25 mg, Q6H PRN  hydrALAZINE, 10 mg, Q4H PRN  oxyCODONE, 5 mg, Q6H PRN  polyethylene glycol, 17 g, BID PRN  QUEtiapine, 100 mg, Nightly PRN  traMADoL, 50 mg, Q6H PRN    Calorie Containing IV Medications: no significant kcals from medications at this time    Recent Labs   Lab 08/12/24  1515 08/12/24  2028 08/13/24  0235 08/14/24  0359 08/15/24  0217 08/16/24  0254 08/17/24  0420 08/18/24  0252 08/19/24  0230   * 150* 148* 144  --   --  140  --  135*   K  --   --  3.7 3.6  --   --  4.2  --  4.2   CALCIUM  --   --  9.6 9.4  --   --  9.0  --  9.1   PHOS  --   --  3.3  --   --   --   --   --   --    MG  --   --  2.40 2.30 2.20 2.30 2.30 2.20 2.20   CO2  --   --  20* 21*  --   --  25  --  24   BUN  --   --  19.9 24.6  --   --  20.7  --  22.0   CREATININE  --   --  0.83 0.94  --   --  0.77  --  0.87   EGFRNORACEVR  --   --  >60 >60  --   --  >60  --  >60   GLUCOSE  --   --  116* 127*  --   --  94  --  127*   BILITOT  --   --  0.9 0.8  --   --   --   --   --    ALKPHOS  --   --  60 59  --   --   --   --   --    ALT  --   --  13 17  --   --   --   --   --    AST  --   --  14 18  --   --   --   --   --    ALBUMIN  --   --  3.5 3.5  --   --   --   --   --    WBC  --   --  9.36 10.79  --   --  7.77  --  8.29   HGB  --   --  12.3* 12.6*  --   --  11.6*  --  12.1*   HCT  --   --  37.9* 38.4*  --   --  36.0*  --  36.3*     Nutrition Orders:  Diet NPO  Tube Feedings/Formulas 80; 1,600; Fibersource HN; NG; 30; Every hour    Appetite/Oral Intake: NPO/not applicable  Factors Affecting Nutritional Intake: impaired cognitive status/motor control and NPO  Social Needs Impacting Access to Food: none identified  Food/Congregational/Cultural Preferences: none reported  Food Allergies: no known food allergies  Last Bowel Movement: 08/10/24  Wound(s): no pressure injuries documented at this time  "    Comments    8/12/24 Patient confused, sister at bedside. Patient's sister reports good appetite/intake prior to admission, regular diet at home, denies weight loss. Patient reports liking vanilla/strawberry Ensure. He is currently NPO, had not been able to participate in SLP evaluation. Tube feeding recommendation provided if oral intake remains not feasible.    8/14/24 Patient remains NPO, started on tube feeding, no longer receiving calories from Cleviprex, will change to standard polymeric formula.    8/19/24 Tube feeding tolerated at goal.    Anthropometrics    Height: 5' 7" (170.2 cm), Height Method: Measured  Last Weight: 65.3 kg (143 lb 15.4 oz) (08/10/24 1346), Weight Method: Bed Scale  BMI (Calculated): 22.5  BMI Classification: normal (BMI 18.5-24.9)        Ideal Body Weight (IBW), Male: 148 lb     % Ideal Body Weight, Male (lb): 97.27 %                          Usual Weight Provided By: family/caregiver denies unintentional weight loss    Wt Readings from Last 5 Encounters:   08/10/24 65.3 kg (143 lb 15.4 oz)   05/12/24 65.8 kg (145 lb)   09/25/23 67.1 kg (148 lb)   08/30/23 67.1 kg (148 lb)   08/21/23 63.5 kg (140 lb)     Weight Change(s) Since Admission: none, new admit  Wt Readings from Last 1 Encounters:   08/10/24 1346 65.3 kg (143 lb 15.4 oz)   08/10/24 0743 65.3 kg (143 lb 15.4 oz)   Admit Weight: 65.3 kg (143 lb 15.4 oz) (08/10/24 0743), Weight Method: Bed Scale    Estimated Needs    Weight Used For Calorie Calculations: 65.3 kg (143 lb 15.4 oz)  Energy Calorie Requirements (kcal): 1984, 1.4 stress factor  Energy Need Method: [facility]  Weight Used For Protein Calculations: 65.3 kg (143 lb 15.4 oz)  Protein Requirements: 79-98 g, 1.2-1.5 g/kg  Fluid Requirements (mL): 1984, 1 ml/kcal      Enteral Nutrition     Formula: Fibersource HN  Rate/Volume: 80 ml/hr  Water Flushes: 30 ml/hr  Additives/Modulars: none at this time  Route: nasogastric tube  Method: continuous  Total Nutrition " Provided by Tube Feeding, Additives, and Flushes:  Calories Provided  1920 kcal/d, 97% needs   Protein Provided  86 g/d, 100% needs   Fluid Provided  1896 ml/d, 96% needs   Continuous feeding calculations based on estimated 20 hr/d run time unless otherwise stated.     Parenteral Nutrition Patient not receiving parenteral nutrition support at this time.    Evaluation of Received Nutrient Intake    Calories: meeting estimated needs  Protein: meeting estimated needs    Patient Education Not applicable.    Nutrition Diagnosis     PES: Inadequate energy intake related to inability to consume sufficient nutrients as evidenced by less than 80% needs met. (resolved)  PES: Moderate acute disease or injury related malnutrition related to acute illness as evidenced by mild fat depletion and mild muscle depletion. (active)    Nutrition Interventions     Intervention(s): modified composition of enteral nutrition and collaboration with other providers  Goal: Meet greater than 80% of nutritional needs by follow-up. (goal met)    Nutrition Goals & Monitoring     Dietitian will monitor: food and beverage intake, energy intake, enteral nutrition intake, weight, electrolyte/renal panel, and beliefs/attitudes  Discharge planning: too early to determine; pending clinical course  Nutrition Risk/Follow-Up: moderate (follow-up in 3-5 days)   Please consult if re-assessment needed sooner.

## 2024-08-19 NOTE — PROGRESS NOTES
[facility] Hospital Medicine Progress Note        Chief Complaint: Inpatient Follow-up     HPI:   59 y.o. Black or  male with a past medical history of Crohn's disease and GERD. The patient presented to [facility] on 8/10/2024 with aphasia, left arm weakness and fall.  Patient presented to the ED with bradycardia, normocytic anemia, bicarb 22.  CT of the head revealed a 4.2 x 3.8 cm intraparenchymal hemorrhage of the right thalamus capsular region with mild surrounding edema and effacement of the adjacent sulci in sylvian fissure with compression of the right lateral ventricle with an 8 mm midline shift attestation of bleed into the right lateral ventricle and 3rd ventricle with mild dilation of the left lateral ventricle compared to the right suggesting possible early obstructive hydrocephalus.  There was no noted to be sulci hyperdensities along the right temporal lobe reflecting subarachnoid hemorrhage.  CTA of the head and neck revealed extensive intracranial hemorrhage and mild dolichoectasia of the basilar artery.  Neurology and Neurosurgery was consulted and patient was started on Cleviprex drip for blood pressure parameters less than 140/90 and admitted to ICU.  Repeat CT of the head revealed stable bleed with unchanged shift and no new hemorrhages.  Patient was started on 3% saline drip on 08/11/2024.  Cleviprex and saline drip has been discontinued.  Patient was currently has been NG tube and refusing speech therapy.  Repeat CT of the head on 08/11/2024 revealed unchanged appearance of intraparenchymal hematoma and unchanged 6 mm right-to-left midline shift without hydrocephalus.  Patient developed a fever of 100.6° F fairly this morning (08/13/2024).  No leukocytosis on labs this morning.  Patient has been getting Xanax and Seroquel for agitation.  Patient was cleared for downgrade from ICU to regular floor on 08/13/2024.  Neurology and neurosurgery has signed  off.  He is admitted to hospital medicine services for further medical management.     Interval Hx:  No significant changes overnight.  Nursing at bedside.  Reports that he was little agitated but better now.  NG tube remains in place.  No notes from speech therapy yesterday     Objective/physical exam:  General appearance: Well-developed, well-nourished male in no apparent distress.   HEENT: Atraumatic head. Moist mucous membranes of oral cavity.  Lungs: Clear to auscultation bilaterally.   Heart: Regular rate and rhythm.   Abdomen: Soft, non-distended.  NG tube in place.  Extremities: No cyanosis, clubbing. No deformities.  Skin: No Rash. Warm and dry.  Neuro:  Sleeping.  Opens eyes but closes eyes and seems to be sleeping.  Moves right upper and lower extremities spontaneously.  Does not withdraw left lower extremity from stimuli.  Mittens in place.  Psych:  Uncooperative.  VITAL SIGNS: 24 HRS MIN & MAX LAST   Temp  Min: 97.2 °F (36.2 °C)  Max: 98.4 °F (36.9 °C) 97.3 °F (36.3 °C)   BP  Min: 90/73  Max: 137/94 127/68   Pulse  Min: 55  Max: 94  81   Resp  Min: 9  Max: 23 (!) 9   SpO2  Min: 96 %  Max: 100 % 100 %       Recent Labs   Lab 08/14/24  0359 08/17/24  0420 08/19/24  0230   WBC 10.79 7.77 8.29   RBC 4.23* 3.91* 4.07*   HGB 12.6* 11.6* 12.1*   HCT 38.4* 36.0* 36.3*   MCV 90.8 92.1 89.2   MCH 29.8 29.7 29.7   MCHC 32.8* 32.2* 33.3   RDW 13.7 12.8 12.0    198 208   MPV 8.5 9.8 8.8       Recent Labs   Lab 08/13/24  0235 08/14/24  0359 08/15/24  0217 08/17/24  0420 08/18/24  0252 08/19/24  0230   * 144  --  140  --  135*   K 3.7 3.6  --  4.2  --  4.2   * 113*  --  106  --  102   CO2 20* 21*  --  25  --  24   BUN 19.9 24.6  --  20.7  --  22.0   CREATININE 0.83 0.94  --  0.77  --  0.87   CALCIUM 9.6 9.4  --  9.0  --  9.1   MG 2.40 2.30   < > 2.30 2.20 2.20   ALBUMIN 3.5 3.5  --   --   --   --    ALKPHOS 60 59  --   --   --   --    ALT 13 17  --   --   --   --    AST 14 18  --   --   --   --     BILITOT 0.9 0.8  --   --   --   --     < > = values in this interval not displayed.          Microbiology Results (last 7 days)       Procedure Component Value Units Date/Time    Blood Culture [[id number]]  (Normal) Collected: 08/13/24 1128    Order Status: Completed Specimen: Blood, Venous Updated: 08/18/24 1300     Blood Culture No Growth at 5 days    Blood Culture [[id number]]  (Normal) Collected: 08/13/24 1128    Order Status: Completed Specimen: Blood, Venous Updated: 08/18/24 1300     Blood Culture No Growth at 5 days    Urine culture [[id number]] Collected: 08/13/24 1216    Order Status: Completed Specimen: Urine Updated: 08/15/24 0722     Urine Culture No Growth             Radiology:  XR Gastric tube check, non-radiologist performed  EXAMINATION:  XR GASTRIC TUBE CHECK, NON-RADIOLOGIST PERFORMED    CLINICAL HISTORY:  NG tube placement;    TECHNIQUE:  AP View(s) of the abdomen was performed.    COMPARISON:  08/13/2024    FINDINGS:  Enteric tube terminates in the stomach.    Electronically signed by: Isabella Jacques  Date:    08/16/2024  Time:    11:17        Medications:  Scheduled Meds:   amLODIPine  10 mg Per NG tube Daily    levETIRAcetam (Keppra) IV (PEDS and ADULTS)  500 mg Intravenous Q12H     Continuous Infusions:  PRN Meds:.  Current Facility-Administered Medications:     0.9% NaCl, , Intravenous, PRN    acetaminophen, 650 mg, Rectal, Q6H PRN    acetaminophen, 650 mg, Oral, Q6H PRN    ALPRAZolam, 0.25 mg, Per NG tube, Q4H PRN    bisacodyL, 10 mg, Rectal, Daily PRN    cloNIDine, 0.1 mg, Per NG tube, Q6H PRN    diphenhydrAMINE, 25 mg, Oral, Q6H PRN    hydrALAZINE, 10 mg, Intravenous, Q4H PRN    oxyCODONE, 5 mg, Per NG tube, Q6H PRN    polyethylene glycol, 17 g, Oral, BID PRN    QUEtiapine, 50 mg, Per NG tube, Nightly PRN    traMADoL, 50 mg, Oral, Q6H PRN        Assessment/Plan:   Oropharyngeal dysphagia on NG tube feeds   Right thalamic capsular intraparenchymal hematoma with subarachnoid and  intraventricular extension and right-to-left midline shift of 6 mm  Normocytic anemia, stable   UTI, POA, completed antibiotics   UDS with cocaine   Hypertension, controlled  History of Crohn's disease and GERD       Patient was completed antibiotics for UTI.    Blood pressure stable.    Keppra 500 mg q.12 for seizure prophylaxis.    Speech therapy following for NPO status.  NG tube remains in place.  If he was not do well with the evaluation today plan to consult GI for PEG placement.  Currently healing Seroquel 50 mg at night for agitation.  Will bump up to 100 mg today      Rocky Vallecillo MD   08/19/2024     All diagnosis and differential diagnosis have been reviewed; assessment and plan has been documented; I have personally reviewed the labs and test results that are presently available; I have reviewed the patients medication list; I have reviewed the consulting providers response and recommendations. I have reviewed or attempted to review medical records based upon their availability    All of the patient's questions have been  addressed and answered. Patient's is agreeable to the above stated plan. I will continue to monitor closely and make adjustments to medical management as needed.  _____________________________________________________________________

## 2024-08-19 NOTE — PLAN OF CARE
Sent referral for inpatient rehab to [facility]. Note to Shirin that patient is not ready for rehab yet, but that this is first choice.

## 2024-08-19 NOTE — PT/OT/SLP RE-EVAL
"Occupational Therapy   Re-evaluation    Name: Dell Garrett Jr.  MRN: [id number]  Admitting Diagnosis: CVA  Recent Surgery: * No surgery found *      Recommendations:     Discharge therapy intensity: High Intensity Therapy   Discharge Equipment Recommendations:  to be determined by next level of care  Barriers to discharge:       Assessment:     Dell Garrett Jr. is a 59 y.o. male with a medical diagnosis of R basal ganglia hemorrhage with R to L shift with intraventricular extension, hypertension.  He presents more awake and with improved trunk control, decreased pushing with RUE.  Pt with improved head/neck rotation with active R side rotation today, tends to prefer L rotation at rest.  Scap mob performed, LUE remains flaccid, sling brought to room.  Remains with R gaze preference but improved ability to gaze into L field.     Sling left in room for upcoming sessions due to progress with functional mobility.       He presents with the following performance deficits affecting function: weakness, impaired endurance, impaired self care skills, impaired functional mobility, gait instability, impaired balance, visual deficits, impaired cognition, decreased coordination, decreased upper extremity function, decreased lower extremity function, decreased safety awareness, abnormal tone.    Rehab Prognosis: Good; patient would benefit from acute skilled OT services to address these deficits and reach maximum level of function.       Plan:     Patient to be seen 6 x/week to address the above listed problems via self-care/home management, therapeutic activities, therapeutic exercises, neuromuscular re-education  Plan of Care Expires: 09/12/24  Plan of Care Reviewed with: patient, sibling    Subjective     Chief Complaint: "Im cold"  Patient/Family Comments/goals: "get better"    Pain/Comfort:  Pain Rating 1: 0/10    Patients cultural, spiritual, Quaker conflicts given the current situation: no    Objective:     OT " communicated with RN prior to session.      Patient was found HOB elevated with  (vital monitoring, NG, SCD, heel float) upon OT entry to room.    General Precautions: Standard, airborne  Orthopedic Precautions: N/A  Braces: N/A    Vital Signs: 1145/78 HR 73    Bed Mobility:    Patient completed Supine to Sit with moderate assistance  Patient completed Sit to Supine with moderate assistance    Functional Mobility/Transfers:  Patient completed Sit <> Stand Transfer with minimum assistance  with  hand-held assist   Functional Mobility: good effort with use of RUE, decreased coordination and sequencing noted, decreased trunk control, L lateral lean present in standing.  Progressed from max a static sitting balance to min A, decreased pushing today with RUE.    Activities of Daily Living:  Grooming: stand by assistance   Lower Body Dressing: minimum assistance donning and doffing R sock while seated UIC, max A L sock    AMPAC 6 Click ADL:  AMPAC Total Score: 12    Functional Cognition:  Dysarthric but able to state needs    Visual Perceptual Skills:  R gaze preference but able to gaze into L field today to auditory stimuli, improving.  He does intermittently close R eye but continues to deny double vision    Upper Extremity Function:  Right Upper Extremity:   WFL    Left Upper Extremity:  0/5      Therapeutic Positioning  Risk for acquired pressure injuries is increased due to relative decrease in mobility d/t hospitalization  and impaired mobility.    OT interventions performed during the course of today's session in an effort to prevent and/or reduce acquired pressure injuries:   Therapeutic positioning was provided at the conclusion of session to offload all bony prominences for the prevention and/or reduction of pressure injuries    Skin assessment: all bony prominences were assessed    Findings: no redness or breakdown noted    OT recommendations for therapeutic positioning throughout hospitalization:   Follow  [facility] Pressure Injury Prevention Protocol      Patient Education:  Patient and family were provided with verbal education education regarding OT role/goals/POC, fall prevention, safety awareness, Discharge/DME recommendations, and pressure ulcer prevention.  Understanding was verbalized, however additional teaching warranted.     Patient left with lines attached UIC, posey vest, chair alarm, wedge under knees.      OT returned later to assist with back to bed, left with sister present, L heel float, pt declines R but actively moves and fidges with RLE, ScD applied once back to bed.        GOALS:   Multidisciplinary Problems       Occupational Therapy Goals          Problem: Occupational Therapy    Goal Priority Disciplines Outcome Interventions   Occupational Therapy Goal     OT, PT/OT Progressing    Description: Goals to be met by:  9/19/24    Patient will increase functional independence with ADLs by performing:    UE Dressing with Stand-by Assistance.  LE Dressing with mod A  Grooming while seated at sink with min A  Assistance.  Toileting from toilet with mod A for hygiene and clothing management.   Toilet transfer to toilet with mod A  Increased functional strength to 3/5 LUE through therEX, neuromuscular re-ed.  Pt will visually attend to L side of environment with no cues                           History:     Past Medical History:   Diagnosis Date    Crohn's disease     GERD (gastroesophageal reflux disease)        History reviewed. No pertinent surgical history.    Time Tracking:     OT Date of Treatment:    OT Start Time: 0930  OT Stop Time: 1007  OT Total Time (min): 37 min  TherACT: 0772-0955 21 min, 1 therAct charge    Billable Minutes:Re-eval 1    8/19/2024

## 2024-08-19 NOTE — PT/OT/SLP PROGRESS
[facility]  Speech Language Pathology Department  Dysphagia Therapy Progress Note    Patient Name:  Dell Garrett Jr.   MRN:  [id number]    Recommendations     General recommendations:  dysphagia therapy and Speech/Language and Cognitive Evaluation  Solid texture recommendation:  Puree Diet - IDDSI Level 4  Liquid consistency recommendation: Mildly thick liquids - IDDSI Level 2   Medications: crushed in puree  Aspiration precautions: small bites/sips, slow rate, alternate solids/liquids, NO straws, upright for PO intake, and supervision with meals    Discharge therapy intensity: High Intensity Therapy   Barriers to safe discharge:  acuity of illness, severity of impairment, limited insight into deficits, and level of skilled assistance needed    Subjective     Patient awake, alert, and cooperative.  Spiritual/Cultural/Sabianist Beliefs/Practices that affect care: no    Pain/Comfort: Pain Rating 1: 0/10    Objective     Improved mentation and participation today. SLP following up for continued assessment of dysphagia.    Therapeutic PO Trials:  Consistency Amount Fed By Oral Symptoms Pharyngeal Symptoms   Mildly thick liquid by cup ~3 oz SLP Bolus holding None   Puree Multiple trials SLP Bolus holding  Prolonged bolus formation/mastication None     Assessment     Improved toleration of PO intake and REILLY. Initiate PO diet. ST to continue to follow for dysphagia therapy and cognitive-linguistic evaluation.     Goals     Multidisciplinary Problems       SLP Goals          Problem: SLP    Goal Priority Disciplines Outcome   SLP Goal     SLP    Description: LTG: Patient will tolerate safest and least restrictive PO diet without signs/sx of aspiration.  STG: Meal trial of pureed solids and mildly thick liquids via cup without adequate REILLY and no signs/sx of aspiration. -met  STG: Minced/moist solids with adequate bolus preparation/mastication and without signs/sx of aspiration.  STG:  Laryngeal/BOT exercises Min A.                     Patient Education     Patient provided with verbal education regarding ST POC.  Additional teaching is warranted.    Plan     Will continue to follow and tx as appropriate.    SLP Follow-Up:  Yes   Patient to be seen:  5 x/week   Plan of Care expires:  08/29/24  Plan of Care reviewed with:  patient       Time Tracking     SLP Treatment Date:   08/19/24  Speech Start Time:  1300  Speech Stop Time:  1320     Speech Total Time (min):  20 min    Billable minutes:  Treatment of Swallow Dysfunction, 20 minutes       08/19/2024

## 2024-08-19 NOTE — PLAN OF CARE
Problem: Adult Inpatient Plan of Care  Goal: Plan of Care Review  Outcome: Progressing  Goal: Absence of Hospital-Acquired Illness or Injury  Outcome: Progressing  Goal: Optimal Comfort and Wellbeing  Outcome: Progressing  Goal: Readiness for Transition of Care  Outcome: Progressing     Problem: Skin Injury Risk Increased  Goal: Skin Health and Integrity  Outcome: Progressing     Problem: Stroke, Intracerebral Hemorrhage  Goal: Optimal Coping  Outcome: Progressing  Goal: Effective Bowel Elimination  Outcome: Progressing  Goal: Optimal Cerebral Tissue Perfusion  Outcome: Progressing  Goal: Optimal Cognitive Function  Outcome: Progressing  Goal: Effective Communication Skills  Outcome: Progressing  Goal: Optimal Functional Ability  Outcome: Progressing  Goal: Optimal Nutrition Intake  Outcome: Progressing  Goal: Optimal Pain Control and Function  Outcome: Progressing  Goal: Effective Oxygenation and Ventilation  Outcome: Progressing  Goal: Improved Sensorimotor Function  Outcome: Progressing  Goal: Safe and Effective Swallow  Outcome: Progressing  Goal: Effective Urinary Elimination  Outcome: Progressing     Problem: Infection  Goal: Absence of Infection Signs and Symptoms  Outcome: Progressing

## 2024-08-19 NOTE — NURSING
Nurses Note -- 4 Eyes      8/19/2024   8:00 AM      Skin assessed during: Daily Assessment      [x] No Altered Skin Integrity Present    [x]Prevention Measures Documented      [] Yes- Altered Skin Integrity Present or Discovered   [] LDA Added if Not in Epic (Describe Wound)   [] New Altered Skin Integrity was Present on Admit and Documented in LDA   [] Wound Image Taken    Wound Care Consulted? No    Attending Nurse:  DANIEL Sánchez    Second RN/Staff Member:  DANIEL Wilson

## 2024-08-19 NOTE — NURSING
Nurses Note -- 4 Eyes      8/19/2024   6:10 AM      Skin assessed during: Q Shift Change      [x] No Altered Skin Integrity Present    [x]Prevention Measures Documented      [] Yes- Altered Skin Integrity Present or Discovered   [] LDA Added if Not in Epic (Describe Wound)   [] New Altered Skin Integrity was Present on Admit and Documented in LDA   [] Wound Image Taken    Wound Care Consulted? No    Attending Nurse:  Isabela Bethea RN/Staff Member:  lula zamora rn

## 2024-08-19 NOTE — PT/OT/SLP RE-EVAL
Physical Therapy Re-Evaluation    Patient Name:  Dell Garrett Jr.   MRN:  [id number]    Recommendations:     Discharge therapy intensity: High Intensity Therapy   Discharge Equipment Recommendations: to be determined by next level of care   Barriers to discharge:  medical dx, impaired mobility, decreased independence     Assessment:     Dell Garrett Jr. is a 59 y.o. 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 He presents with the following impairments/functional limitations: weakness, gait instability, decreased upper extremity function, impaired endurance, impaired balance, decreased lower extremity function, impaired self care skills, impaired functional mobility, impaired coordination.  Patient tolerated PT re-eval well. Pt with good progress over the course of his stay thus far since initial eval was performed. Today pt with less pushing with the R UE while in a sitting position, but does intermittently have poor trunk control with L lateral  and posterior lean. L side remains significantly weak. Pt able to stand with Leyla and stand pivot/step transfer with modA; pre-gait attempted but unsuccessful 2/2 decreased coordination and poor motor planning. Pt able to sit UIC for about 2 hours; PT returned to t/f pt b2b. Will continue progressing as able. Remains a great high intensity therapy candidate    Rehab Prognosis: Good; patient would benefit from acute skilled PT services to address these deficits and reach maximum level of function.    Recent Surgery: * No surgery found *      Plan:     During this hospitalization, patient would benefit from acute PT services 6 x/week to address the identified rehab impairments via gait training, therapeutic activities, therapeutic exercises, neuromuscular re-education and progress toward the following goals:    Plan of Care Expires:  09/12/24    PT/PTA conference to discuss PT POC and  patient's progression towards goals held with Elyse Monahan PTA.     Subjective     Chief Complaint: hungry, cold   Patient/Family Comments/goals: to get stronger  Pain/Comfort:  Pain Rating 1: 0/10    Patients cultural, spiritual, Caodaism conflicts given the current situation: no    Objective:     Communicated with NSG prior to session.  Patient found HOB elevated with blood pressure cuff, pulse ox (continuous), telemetry, NG tube, peripheral IV, SCD, R mitt  upon PT entry to room.    General Precautions: Standard, fall (SBP<140)  Orthopedic Precautions:N/A   Braces: N/A  Respiratory Status: Room air  Blood Pressure: 115/78  HR: 77      Exams:  Cognitive Exam:  Patient is oriented to Person and Place  RLE Strength: 5/5  LLE Strength: 0/5  Skin integrity: Visible skin intact      Functional Mobility:  Bed Mobility:     Supine to Sit: moderate assistance  Sit to Supine: moderate assistance  Transfers:     Sit to/from Stand:  minimum assistance with hand-held assist  X3 trials  On trial one, pt able to performed 3 marches with the R LE  Bed to/from Chair: moderate assistance and of 2 persons with  hand-held assist  using  Stand Pivot  When transferring to the chair, pt able to take a few steps with the R LE and the PT facilitated advancement of the LLE  When transferring back to bed, pt able to grab bed rail and pivot but did require max VC/TC to properly complete transfer   Pre-Gait: pt positioned with R U [place] hallway rail; able to advance and step with R LE when LLE is blocked; poor sequencing and motor planning despite max VC/TC; decreased trunk control with strong L lateral lean with excessive forward flexion; chair close in tow; only able to take 2 steps with RLE, positioned back sitting in chair 2/2 safety concerns  Balance: pt initially maxA for static sitting balance; with R UE positioned on bed rail or holding on to therapist, then pt able to hold midline better; remains with poor trunk control and L  lateral lean but no major pushing with R UE today        Treatment & Education:  Patient provided with verbal education  regarding PT role/goals/POC, fall prevention, safety awareness, discharge/DME recommendations, and pressure ulcer prevention.  Understanding was verbalized, however additional teaching warranted.     Patient left HOB elevated with all lines intact, call button in reach, chair alarm on, kimberly pad in place, RN  notified, and posey vest donned .    Pt sat UIC for 2 hours    PT returns and performs stand pivot transfer back to bed. Pt then left with HOB elevated, all lines in tact, call button in reach, bed alarm on, L pressure relief boot donned, SCDs donned, R mitten donned, RN notified and sister present      GOALS:   Multidisciplinary Problems       Physical Therapy Goals          Problem: Physical Therapy    Goal Priority Disciplines Outcome Goal Variances Interventions   Physical Therapy Goal     PT, PT/OT Progressing     Description: Goals to be met by: 2024     Patient will increase functional independence with mobility by performin. Supine to sit with MInimal Assistance  2. Sit to supine with MInimal Assistance  3. Sit to stand transfer with Minimal Assistance  4. Pt to follow 75% of commands.   5. Gait  x 75 feet with Minimal Assistance using Rolling Walker vs LRAD.                          History:     Past Medical History:   Diagnosis Date    Crohn's disease     GERD (gastroesophageal reflux disease)        History reviewed. No pertinent surgical history.    Time Tracking:     PT Received On: 24  PT Start Time: 933     PT Stop Time: 1003  PT Total Time (min): 30 min   PT start Time: 1036  PT stop Time: 1150  PT Total Time: 14 min    Billable Minutes: Re-eval 1 and Therapeutic Activity 1      2024

## 2024-08-19 NOTE — PLAN OF CARE
Problem: Occupational Therapy  Goal: Occupational Therapy Goal  Description: Goals to be met by:  9/19/24    Patient will increase functional independence with ADLs by performing:    UE Dressing with Stand-by Assistance.  LE Dressing with mod A  Grooming while seated at sink with min A  Assistance.  Toileting from toilet with mod A for hygiene and clothing management.   Toilet transfer to toilet with mod A  Increased functional strength to 3/5 LUE through therEX, neuromuscular re-ed.  Pt will visually attend to L side of environment with no cues      Outcome: Progressing

## 2024-08-19 NOTE — NURSING
Patient requests to change his point of contact from So (daughter) to his godchild Ghazala Rosa. Clarified with patient twice that this is what he wanted and he verbalized yes. Patient oriented.

## 2024-08-20 PROCEDURE — 97530 THERAPEUTIC ACTIVITIES: CPT | Mod: CQ

## 2024-08-20 PROCEDURE — 25000003 PHARM REV CODE 250: Performed by: INTERNAL MEDICINE

## 2024-08-20 PROCEDURE — 25000003 PHARM REV CODE 250: Performed by: HOSPITALIST

## 2024-08-20 PROCEDURE — 97535 SELF CARE MNGMENT TRAINING: CPT | Mod: CO

## 2024-08-20 PROCEDURE — 21400001 HC TELEMETRY ROOM

## 2024-08-20 RX ORDER — LEVETIRACETAM 500 MG/1
500 TABLET ORAL 2 TIMES DAILY
Status: DISCONTINUED | OUTPATIENT
Start: 2024-08-20 | End: 2024-09-19 | Stop reason: HOSPADM

## 2024-08-20 RX ADMIN — AMLODIPINE BESYLATE 10 MG: 5 TABLET ORAL at 08:08

## 2024-08-20 RX ADMIN — LEVETIRACETAM 500 MG: 500 TABLET, FILM COATED ORAL at 08:08

## 2024-08-20 RX ADMIN — LEVETIRACETAM 500 MG: 500 TABLET, FILM COATED ORAL at 09:08

## 2024-08-20 RX ADMIN — QUETIAPINE FUMARATE 100 MG: 100 TABLET ORAL at 07:08

## 2024-08-20 NOTE — PT/OT/SLP PROGRESS
Occupational Therapy   Treatment    Name: Dell Garrett Jr.  MRN: [id number]  Admitting Diagnosis:  Intraparenchymal hemorrhage of brain       Recommendations:     Recommended therapy intensity at discharge: Moderate Intensity Therapy   Discharge Equipment Recommendations:  to be determined by next level of care  Barriers to discharge:       Assessment:     Dell Garrett Jr. is a 59 y.o. male with a medical diagnosis of Intraparenchymal hemorrhage of brain.  He presents with poor safety awareness, increased assistance for mobility during session with constant cueing required for redirection, pt. Is easily distracted. Pt. Is a high fall risk at this time recommending Mod intensity therapy. Performance deficits affecting function are weakness, impaired endurance, impaired self care skills, impaired functional mobility, impaired balance.     Rehab Prognosis:  Fair; patient would benefit from acute skilled OT services to address these deficits and reach maximum level of function.       Plan:     Patient to be seen 6 x/week to address the above listed problems via self-care/home management, therapeutic activities, therapeutic exercises  Plan of Care Expires: 09/12/24  Plan of Care Reviewed with: patient    Subjective     Pain/Comfort:       Objective:     Communicated with: RN prior to session.  Patient found HOB elevated with   upon OT entry to room.    General Precautions: Standard, fall    Orthopedic Precautions:N/A  Braces: N/A  Respiratory Status: Room air  Vital Signs: Blood Pressure: 121/79     Occupational Performance:   (Bed Mobility- Max A)  (Static sitting balance- Mod A)  Pt. Performing grooming task (oral hygiene/washing face) using L UE for excursion.   Givmohr sling applied to R UE for support.   Pt. Requiring Max A during stand pivot t/f from EOB to BS chair.   Left UIC with posey vest and all needs within reach.    Therapeutic Positioning    OT interventions performed during the course of today's session  in an effort to prevent and/or reduce acquired pressure injuries:   Therapeutic positioning was provided at the conclusion of session to offload all bony prominences for the prevention and/or reduction of pressure injuries    [facility] 6 Click ADL:      Patient Education:  Patient provided with verbal education education regarding fall prevention and safety awareness.  Additional teaching is warranted.      Patient left up in chair with all lines intact and call button in reach.    GOALS:   Multidisciplinary Problems       Occupational Therapy Goals          Problem: Occupational Therapy    Goal Priority Disciplines Outcome Interventions   Occupational Therapy Goal     OT, PT/OT Progressing    Description: Goals to be met by:  9/19/24    Patient will increase functional independence with ADLs by performing:    UE Dressing with Stand-by Assistance.  LE Dressing with mod A  Grooming while seated at sink with min A  Assistance.  Toileting from toilet with mod A for hygiene and clothing management.   Toilet transfer to toilet with mod A  Increased functional strength to 3/5 LUE through therEX, neuromuscular re-ed.  Pt will visually attend to L side of environment with no cues                           Time Tracking:     OT Date of Treatment: 08/20/24  OT Start Time: 0930  OT Stop Time: 0945  OT Total Time (min): 15 min    Billable Minutes:Self Care/Home Management 1    OT/MAC: MAC     Number of MAC visits since last OT visit: 1    8/20/2024

## 2024-08-20 NOTE — PT/OT/SLP PROGRESS
[facility]  Speech Language Pathology Department  Diet Tolerance Follow-up    Patient Name:  Dell Garrett Jr.   MRN:  [id number]    Recommendations     General recommendations:  dysphagia therapy and Speech/Language and Cognitive Evaluation  Solid texture recommendation:  Puree Diet - IDDSI Level 4  Liquid consistency recommendation: Mildly thick liquids - IDDSI Level 2   Medications: crushed in puree  Swallow strategies/precautions: small bites/sips, slow rate, NO straws, upright for PO intake, supervision with meals, and feed only when fully alert  Precautions: aspiration    Diet Tolerance     Nursing reports no difficulty regarding diet tolerance.    Plan     SLP Follow-Up:  Yes      Time Tracking     SLP Treatment Date: 08/20/2024

## 2024-08-20 NOTE — PT/OT/SLP PROGRESS
Physical Therapy Treatment    Patient Name:  Dell Garrett Jr.   MRN:  [id number]    Recommendations:     Discharge therapy intensity: High Intensity Therapy   Discharge Equipment Recommendations: to be determined by next level of care  Barriers to discharge: Impaired mobility    Assessment:     Dell Garrett Jr. is a 59 y.o. 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  He presents with the following impairments/functional limitations: weakness, gait instability, decreased upper extremity function, impaired endurance, impaired balance, decreased lower extremity function, impaired self care skills, impaired functional mobility, impaired coordination .    Pt drowsy but arousable. Tolerated t/f to chair well however received call ~ 1 hour later that pt was sliding out of chair, removed posey vest and wedge. Assisted pt back to bed.  Overall, making progress.  Remains a good candidate for high intensity therapy.     Rehab Prognosis: Good; patient would benefit from acute skilled PT services to address these deficits and reach maximum level of function.    Recent Surgery: * No surgery found *      Plan:     During this hospitalization, patient would benefit from acute PT services 6 x/week to address the identified rehab impairments via gait training, therapeutic activities, therapeutic exercises, neuromuscular re-education and progress toward the following goals:    Plan of Care Expires:  09/12/24    Subjective     Chief Complaint:  none stated  Patient/Family Comments/goals: to go home  Pain/Comfort:  Pain Rating 1: 0/10      Objective:     Communicated with RN prior to session.  Patient found HOB elevated with pulse ox (continuous), telemetry, bed alarm, peripheral IV, blood pressure cuff, SCD upon PT entry to room.     General Precautions: Standard, fall, SBP <140  Orthopedic Precautions: N/A  Braces: N/A  Respiratory Status: Room  air  Vitals: /71  Skin Integrity: Visible skin intact      Functional Mobility:  Bed Mobility:    Supine to sit with mod assist x 2  Sit to supine with max assist x 2  Scooting anteriorly with max assist  Balance:   Max to mod assist for static sitting balance 2/2 L lateral lean  Donned sling while seated at EOB  Transfers:   Sit<->stand with mod assist x 2  Stand pivot with mod assist x 2; L knee buckling during transfer bed->recliner.   Squat pivot back to bed with max assist    Therapeutic Activities/Exercises:      Education:  Patient provided with verbal education education regarding PT role/goals/POC.  Additional teaching is warranted.     Pt left sitting in recliner with chair alarm, cushion, posey vest, door left open and wedge under knees to prevent sliding. Pillow to support Ue's. Returned to t/f pt back to bed.     Patient left HOB elevated with all lines intact, call button in reach, pressure relief boots, and HIDALGO present.    GOALS:   Multidisciplinary Problems       Physical Therapy Goals          Problem: Physical Therapy    Goal Priority Disciplines Outcome Goal Variances Interventions   Physical Therapy Goal     PT, PT/OT Progressing     Description: Goals to be met by: 2024     Patient will increase functional independence with mobility by performin. Supine to sit with MInimal Assistance  2. Sit to supine with MInimal Assistance  3. Sit to stand transfer with Minimal Assistance  4. Pt to follow 75% of commands.   5. Gait  x 75 feet with Minimal Assistance using Rolling Walker vs LRAD.                          Time Tracking:     PT Received On: 24  PT Start Time: 920     PT Stop Time: 941  PT Start Time: 5     PT Stop Time: 1045  PT Total Time (min): 21 min +10= 31 minutes total    Billable Minutes: Therapeutic Activity 2 units    Treatment Type: Treatment  PT/PTA: PTA     Number of PTA visits since last PT visit: 1     2024

## 2024-08-20 NOTE — NURSING
Nurses Note -- 4 Eyes      8/19/2024   10:01 PM      Skin assessed during: Q Shift Change      [x] No Altered Skin Integrity Present    [x]Prevention Measures Documented      [] Yes- Altered Skin Integrity Present or Discovered   [] LDA Added if Not in Epic (Describe Wound)   [] New Altered Skin Integrity was Present on Admit and Documented in LDA   [] Wound Image Taken    Wound Care Consulted? No    Attending Nurse:  Lea Bethea RN/Staff Member:  DANIEL Regalado

## 2024-08-20 NOTE — PROGRESS NOTES
[facility] Hospital Medicine Progress Note        Chief Complaint: Inpatient Follow-up     HPI:   59 y.o. Black or  male with a past medical history of Crohn's disease and GERD. The patient presented to [facility] on 8/10/2024 with aphasia, left arm weakness and fall.  Patient presented to the ED with bradycardia, normocytic anemia, bicarb 22.  CT of the head revealed a 4.2 x 3.8 cm intraparenchymal hemorrhage of the right thalamus capsular region with mild surrounding edema and effacement of the adjacent sulci in sylvian fissure with compression of the right lateral ventricle with an 8 mm midline shift attestation of bleed into the right lateral ventricle and 3rd ventricle with mild dilation of the left lateral ventricle compared to the right suggesting possible early obstructive hydrocephalus.  There was no noted to be sulci hyperdensities along the right temporal lobe reflecting subarachnoid hemorrhage.  CTA of the head and neck revealed extensive intracranial hemorrhage and mild dolichoectasia of the basilar artery.  Neurology and Neurosurgery was consulted and patient was started on Cleviprex drip for blood pressure parameters less than 140/90 and admitted to ICU.  Repeat CT of the head revealed stable bleed with unchanged shift and no new hemorrhages.  Patient was started on 3% saline drip on 08/11/2024.  Cleviprex and saline drip has been discontinued.  Patient was currently has been NG tube and refusing speech therapy.  Repeat CT of the head on 08/11/2024 revealed unchanged appearance of intraparenchymal hematoma and unchanged 6 mm right-to-left midline shift without hydrocephalus.  Patient developed a fever of 100.6° F fairly this morning (08/13/2024).  No leukocytosis on labs this morning.  Patient has been getting Xanax and Seroquel for agitation.  Patient was cleared for downgrade from ICU to regular floor on 08/13/2024.  Neurology and neurosurgery has signed  off.  He is admitted to hospital medicine services for further medical management.     Interval Hx:  NG tube has been discontinued.  He was cleared for a modified diet.  Did well with increase in Seroquel last night.     Objective/physical exam:  General appearance: Well-developed, well-nourished male in no apparent distress.   HEENT: Atraumatic head. Moist mucous membranes of oral cavity.  Lungs: Clear to auscultation bilaterally.   Heart: Regular rate and rhythm.   Abdomen: Soft, non-distended.  NG tube in place.  Extremities: No cyanosis, clubbing. No deformities.  Skin: No Rash. Warm and dry.  Neuro:  Sleeping.  Opens eyes but closes eyes and seems to be sleeping.  Moves right upper and lower extremities spontaneously.  Does not withdraw left lower extremity from stimuli.  Mittens in place.  Psych:  Uncooperative.  VITAL SIGNS: 24 HRS MIN & MAX LAST   Temp  Min: 98.2 °F (36.8 °C)  Max: 98.8 °F (37.1 °C) 98.4 °F (36.9 °C)   BP  Min: 103/71  Max: 133/83 108/66   Pulse  Min: 57  Max: 81  68   Resp  Min: 13  Max: 20 16   SpO2  Min: 96 %  Max: 100 % 98 %       Recent Labs   Lab 08/14/24  0359 08/17/24  0420 08/19/24  0230   WBC 10.79 7.77 8.29   RBC 4.23* 3.91* 4.07*   HGB 12.6* 11.6* 12.1*   HCT 38.4* 36.0* 36.3*   MCV 90.8 92.1 89.2   MCH 29.8 29.7 29.7   MCHC 32.8* 32.2* 33.3   RDW 13.7 12.8 12.0    198 208   MPV 8.5 9.8 8.8       Recent Labs   Lab 08/14/24  0359 08/15/24  0217 08/17/24  0420 08/18/24  0252 08/19/24  0230     --  140  --  135*   K 3.6  --  4.2  --  4.2   *  --  106  --  102   CO2 21*  --  25  --  24   BUN 24.6  --  20.7  --  22.0   CREATININE 0.94  --  0.77  --  0.87   CALCIUM 9.4  --  9.0  --  9.1   MG 2.30   < > 2.30 2.20 2.20   ALBUMIN 3.5  --   --   --   --    ALKPHOS 59  --   --   --   --    ALT 17  --   --   --   --    AST 18  --   --   --   --    BILITOT 0.8  --   --   --   --     < > = values in this interval not displayed.          Microbiology Results (last 7 days)        Procedure Component Value Units Date/Time    Blood Culture [[id number]]  (Normal) Collected: 08/13/24 1128    Order Status: Completed Specimen: Blood, Venous Updated: 08/18/24 1300     Blood Culture No Growth at 5 days    Blood Culture [[id number]]  (Normal) Collected: 08/13/24 1128    Order Status: Completed Specimen: Blood, Venous Updated: 08/18/24 1300     Blood Culture No Growth at 5 days    Urine culture [[id number]] Collected: 08/13/24 1216    Order Status: Completed Specimen: Urine Updated: 08/15/24 0722     Urine Culture No Growth             Radiology:  XR Gastric tube check, non-radiologist performed  EXAMINATION:  XR GASTRIC TUBE CHECK, NON-RADIOLOGIST PERFORMED    CLINICAL HISTORY:  NG tube placement;    TECHNIQUE:  AP View(s) of the abdomen was performed.    COMPARISON:  08/13/2024    FINDINGS:  Enteric tube terminates in the stomach.    Electronically signed by: Isabella Jacques  Date:    08/16/2024  Time:    11:17        Medications:  Scheduled Meds:   amLODIPine  10 mg Per NG tube Daily    levETIRAcetam (Keppra) IV (PEDS and ADULTS)  500 mg Intravenous Q12H     Continuous Infusions:  PRN Meds:.  Current Facility-Administered Medications:     0.9% NaCl, , Intravenous, PRN    acetaminophen, 650 mg, Rectal, Q6H PRN    acetaminophen, 650 mg, Oral, Q6H PRN    ALPRAZolam, 0.25 mg, Per NG tube, Q4H PRN    bisacodyL, 10 mg, Rectal, Daily PRN    cloNIDine, 0.1 mg, Per NG tube, Q6H PRN    diphenhydrAMINE, 25 mg, Oral, Q6H PRN    hydrALAZINE, 10 mg, Intravenous, Q4H PRN    oxyCODONE, 5 mg, Per NG tube, Q6H PRN    polyethylene glycol, 17 g, Oral, BID PRN    QUEtiapine, 100 mg, Per NG tube, Nightly PRN    traMADoL, 50 mg, Oral, Q6H PRN        Assessment/Plan:   Oropharyngeal dysphagia on NG tube feeds   Right thalamic capsular intraparenchymal hematoma with subarachnoid and intraventricular extension and right-to-left midline shift of 6 mm  Normocytic anemia, stable   UTI, POA, completed antibiotics   UDS  with cocaine   Hypertension, controlled  History of Crohn's disease and GERD     Appreciate speech recommendations.  Modified diet in place.    Okay to pursue rehab placement at this point.  Will transition Keppra to oral formulation.    Blood pressure controlled with Norvasc.    Continue Seroquel at night for agitation.      Rocky Vallecillo MD   08/20/2024     All diagnosis and differential diagnosis have been reviewed; assessment and plan has been documented; I have personally reviewed the labs and test results that are presently available; I have reviewed the patients medication list; I have reviewed the consulting providers response and recommendations. I have reviewed or attempted to review medical records based upon their availability    All of the patient's questions have been  addressed and answered. Patient's is agreeable to the above stated plan. I will continue to monitor closely and make adjustments to medical management as needed.  _____________________________________________________________________

## 2024-08-21 LAB — MAGNESIUM SERPL-MCNC: 2.2 MG/DL (ref 1.6–2.6)

## 2024-08-21 PROCEDURE — 92523 SPEECH SOUND LANG COMPREHEN: CPT

## 2024-08-21 PROCEDURE — 36415 COLL VENOUS BLD VENIPUNCTURE: CPT

## 2024-08-21 PROCEDURE — 83735 ASSAY OF MAGNESIUM: CPT

## 2024-08-21 PROCEDURE — 97530 THERAPEUTIC ACTIVITIES: CPT | Mod: CQ

## 2024-08-21 PROCEDURE — 97535 SELF CARE MNGMENT TRAINING: CPT | Mod: CO

## 2024-08-21 PROCEDURE — 21400001 HC TELEMETRY ROOM

## 2024-08-21 PROCEDURE — 25000003 PHARM REV CODE 250: Performed by: HOSPITALIST

## 2024-08-21 PROCEDURE — 25000003 PHARM REV CODE 250: Performed by: INTERNAL MEDICINE

## 2024-08-21 RX ADMIN — QUETIAPINE FUMARATE 100 MG: 100 TABLET ORAL at 08:08

## 2024-08-21 RX ADMIN — LEVETIRACETAM 500 MG: 500 TABLET, FILM COATED ORAL at 09:08

## 2024-08-21 RX ADMIN — TRAMADOL HYDROCHLORIDE 50 MG: 50 TABLET, COATED ORAL at 09:08

## 2024-08-21 RX ADMIN — AMLODIPINE BESYLATE 10 MG: 5 TABLET ORAL at 09:08

## 2024-08-21 RX ADMIN — LEVETIRACETAM 500 MG: 500 TABLET, FILM COATED ORAL at 08:08

## 2024-08-21 NOTE — PT/OT/SLP PROGRESS
Occupational Therapy   Treatment    Name: Dell Garrett Jr.  MRN: [id number]  Admitting Diagnosis:  Intraparenchymal hemorrhage of brain       Recommendations:     Recommended therapy intensity at discharge: Moderate Intensity Therapy   Discharge Equipment Recommendations:  to be determined by next level of care  Barriers to discharge:       Assessment:     Dell Garrett Jr. is a 59 y.o. male with a medical diagnosis of Intraparenchymal hemorrhage of brain.  He presents with profound weakness with L UE, strong L lateral lean during session unable to correct, poor safety and body awareness, recommending Mod intensity therapy pending progress. Performance deficits affecting function are weakness, impaired endurance, impaired self care skills, impaired functional mobility, impaired balance.     Rehab Prognosis:  Fair; patient would benefit from acute skilled OT services to address these deficits and reach maximum level of function.       Plan:     Patient to be seen 6 x/week to address the above listed problems via therapeutic activities, therapeutic exercises, self-care/home management  Plan of Care Expires: 09/12/24  Plan of Care Reviewed with: patient    Subjective     Pain/Comfort:       Objective:     Communicated with: RN prior to session.  Patient found HOB elevated with   upon OT entry to room.    General Precautions: Standard, fall    Orthopedic Precautions:N/A  Braces: N/A  Respiratory Status: Room air  Vital Signs: Blood Pressure: 106/83     Occupational Performance:   (Bed Mobility- Max A)  (Static sitting balance- Max A) L lateral lean unable to correct, poor body awareness.  Pt. Performing grooming task (washing face) using R UE for excursion on command.   PROM performed to L UE.  Pt. Laid back down and repositioned in bed appropriately .      Therapeutic Positioning    OT interventions performed during the course of today's session in an effort to prevent and/or reduce acquired pressure injuries:    Therapeutic positioning was provided at the conclusion of session to offload all bony prominences for the prevention and/or reduction of pressure injuries      [facility] 6 Click ADL:      Patient Education:  Patient provided with verbal education education regarding fall prevention, safety awareness, and pressure ulcer prevention.  Additional teaching is warranted.      Patient left HOB elevated with all lines intact and call button in reach.    GOALS:   Multidisciplinary Problems       Occupational Therapy Goals          Problem: Occupational Therapy    Goal Priority Disciplines Outcome Interventions   Occupational Therapy Goal     OT, PT/OT Progressing    Description: Goals to be met by:  9/19/24    Patient will increase functional independence with ADLs by performing:    UE Dressing with Stand-by Assistance.  LE Dressing with mod A  Grooming while seated at sink with min A  Assistance.  Toileting from toilet with mod A for hygiene and clothing management.   Toilet transfer to toilet with mod A  Increased functional strength to 3/5 LUE through therEX, neuromuscular re-ed.  Pt will visually attend to L side of environment with no cues                           Time Tracking:     OT Date of Treatment: 08/21/24  OT Start Time: 1414  OT Stop Time: 1437  OT Total Time (min): 23 min    Billable Minutes:Self Care/Home Management 2    OT/MAC: MAC     Number of MAC visits since last OT visit: 2    8/21/2024

## 2024-08-21 NOTE — PROGRESS NOTES
[facility] Hospital Medicine Progress Note        Chief Complaint: Inpatient Follow-up     HPI:   59 y.o. Black or  male with a past medical history of Crohn's disease and GERD. The patient presented to [facility] on 8/10/2024 with aphasia, left arm weakness and fall.  Patient presented to the ED with bradycardia, normocytic anemia, bicarb 22.  CT of the head revealed a 4.2 x 3.8 cm intraparenchymal hemorrhage of the right thalamus capsular region with mild surrounding edema and effacement of the adjacent sulci in sylvian fissure with compression of the right lateral ventricle with an 8 mm midline shift attestation of bleed into the right lateral ventricle and 3rd ventricle with mild dilation of the left lateral ventricle compared to the right suggesting possible early obstructive hydrocephalus.  There was no noted to be sulci hyperdensities along the right temporal lobe reflecting subarachnoid hemorrhage.  CTA of the head and neck revealed extensive intracranial hemorrhage and mild dolichoectasia of the basilar artery.  Neurology and Neurosurgery was consulted and patient was started on Cleviprex drip for blood pressure parameters less than 140/90 and admitted to ICU.  Repeat CT of the head revealed stable bleed with unchanged shift and no new hemorrhages.  Patient was started on 3% saline drip on 08/11/2024.  Cleviprex and saline drip has been discontinued.  Patient was currently has been NG tube and refusing speech therapy.  Repeat CT of the head on 08/11/2024 revealed unchanged appearance of intraparenchymal hematoma and unchanged 6 mm right-to-left midline shift without hydrocephalus.  Patient developed a fever of 100.6° F fairly this morning (08/13/2024).  No leukocytosis on labs this morning.  Patient has been getting Xanax and Seroquel for agitation.  Patient was cleared for downgrade from ICU to regular floor on 08/13/2024.  Neurology and neurosurgery has signed  off.  He is admitted to hospital medicine services for further medical management.     Interval Hx:  No significant changes overnight.  Tolerating oral diet.  Worked with therapy yesterday.  Recommending high-intensity treatment     Objective/physical exam:  General appearance: Well-developed, well-nourished male in no apparent distress.   HEENT: Atraumatic head. Moist mucous membranes of oral cavity.  Lungs: Clear to auscultation bilaterally.   Heart: Regular rate and rhythm.   Abdomen: Soft, non-distended.  NG tube   Extremities: No cyanosis, clubbing. No deformities.  Skin: No Rash. Warm and dry.  Neuro:  Awake alert and oriented    VITAL SIGNS: 24 HRS MIN & MAX LAST   Temp  Min: 98.1 °F (36.7 °C)  Max: 98.9 °F (37.2 °C) 98.6 °F (37 °C)   BP  Min: 103/67  Max: 122/79 103/67   Pulse  Min: 62  Max: 88  88   Resp  Min: 14  Max: 32 18   SpO2  Min: 95 %  Max: 100 % 96 %       Recent Labs   Lab 08/17/24  0420 08/19/24  0230   WBC 7.77 8.29   RBC 3.91* 4.07*   HGB 11.6* 12.1*   HCT 36.0* 36.3*   MCV 92.1 89.2   MCH 29.7 29.7   MCHC 32.2* 33.3   RDW 12.8 12.0    208   MPV 9.8 8.8       Recent Labs   Lab 08/17/24  0420 08/18/24  0252 08/19/24  0230     --  135*   K 4.2  --  4.2     --  102   CO2 25  --  24   BUN 20.7  --  22.0   CREATININE 0.77  --  0.87   CALCIUM 9.0  --  9.1   MG 2.30 2.20 2.20          Microbiology Results (last 7 days)       Procedure Component Value Units Date/Time    Blood Culture [[id number]]  (Normal) Collected: 08/13/24 1128    Order Status: Completed Specimen: Blood, Venous Updated: 08/18/24 1300     Blood Culture No Growth at 5 days    Blood Culture [[id number]]  (Normal) Collected: 08/13/24 1128    Order Status: Completed Specimen: Blood, Venous Updated: 08/18/24 1300     Blood Culture No Growth at 5 days    Urine culture [[id number]] Collected: 08/13/24 1216    Order Status: Completed Specimen: Urine Updated: 08/15/24 0722     Urine Culture No Growth              Radiology:  XR Gastric tube check, non-radiologist performed  EXAMINATION:  XR GASTRIC TUBE CHECK, NON-RADIOLOGIST PERFORMED    CLINICAL HISTORY:  NG tube placement;    TECHNIQUE:  AP View(s) of the abdomen was performed.    COMPARISON:  08/13/2024    FINDINGS:  Enteric tube terminates in the stomach.    Electronically signed by: Isabella Jacques  Date:    08/16/2024  Time:    11:17        Medications:  Scheduled Meds:   amLODIPine  10 mg Per NG tube Daily    levETIRAcetam  500 mg Oral BID     Continuous Infusions:  PRN Meds:.  Current Facility-Administered Medications:     0.9% NaCl, , Intravenous, PRN    acetaminophen, 650 mg, Rectal, Q6H PRN    acetaminophen, 650 mg, Oral, Q6H PRN    ALPRAZolam, 0.25 mg, Per NG tube, Q4H PRN    bisacodyL, 10 mg, Rectal, Daily PRN    cloNIDine, 0.1 mg, Per NG tube, Q6H PRN    diphenhydrAMINE, 25 mg, Oral, Q6H PRN    hydrALAZINE, 10 mg, Intravenous, Q4H PRN    oxyCODONE, 5 mg, Per NG tube, Q6H PRN    polyethylene glycol, 17 g, Oral, BID PRN    QUEtiapine, 100 mg, Per NG tube, Nightly PRN    traMADoL, 50 mg, Oral, Q6H PRN        Assessment/Plan:   Oropharyngeal dysphagia  Right thalamic capsular intraparenchymal hematoma with subarachnoid and intraventricular extension and right-to-left midline shift of 6 mm  Normocytic anemia, stable   UTI, POA, completed antibiotics   UDS with cocaine   Hypertension, controlled  History of Crohn's disease and GERD    Continue PT OT and speech therapy.    Modified diet   Medically stable for rehab placement.    Blood pressure controlled with Norvasc.    Keppra for seizure prophylaxis   Seroquel at night for agitation  CM notified me that [facility] has denied rehab admit due to lack of discharge disposition.  Patient has evidently been evicted form home.  Now looking into SNF with long term placement to follow.       Rocky Vallecillo MD   08/21/2024     All diagnosis and differential diagnosis have been reviewed; assessment and plan has been  documented; I have personally reviewed the labs and test results that are presently available; I have reviewed the patients medication list; I have reviewed the consulting providers response and recommendations. I have reviewed or attempted to review medical records based upon their availability    All of the patient's questions have been  addressed and answered. Patient's is agreeable to the above stated plan. I will continue to monitor closely and make adjustments to medical management as needed.  _____________________________________________________________________

## 2024-08-21 NOTE — PLAN OF CARE
[facility] has denied. [facility] denied. Sent referral to [facility] and still pursuing SNF choices.

## 2024-08-21 NOTE — PT/OT/SLP PROGRESS
Physical Therapy Treatment    Patient Name:  Dell Garrett Jr.   MRN:  [id number]    Recommendations:     Discharge therapy intensity: Moderate Intensity Therapy   Discharge Equipment Recommendations: to be determined by next level of care  Barriers to discharge: Impaired mobility    Assessment:     Dell Garrett Jr. is a 59 y.o. 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  He presents with the following impairments/functional limitations: weakness, gait instability, decreased upper extremity function, impaired endurance, impaired balance, decreased lower extremity function, impaired self care skills, impaired functional mobility, impaired coordination .    Pt distracted requiring max cues to remain on task and correct posture and balance. Focused on sitting balance and self correction with VC's but unable to maintain focus on task today. Max assist transfers, unable to progress to side step. Pt appeared too distracted to sit safely in recliner at this time.      Rehab Prognosis: Good; patient would benefit from acute skilled PT services to address these deficits and reach maximum level of function.    Recent Surgery: * No surgery found *      Plan:     During this hospitalization, patient would benefit from acute PT services 6 x/week to address the identified rehab impairments via gait training, therapeutic activities, therapeutic exercises, neuromuscular re-education and progress toward the following goals:    Plan of Care Expires:  09/12/24    Subjective     Chief Complaint:  none stated  Patient/Family Comments/goals: to go home  Pain/Comfort:  Pain Rating 1: 0/10      Objective:     Communicated with RN prior to session.  Patient found HOB elevated with telemetry, pressure relief boots, peripheral IV, blood pressure cuff, bed alarm upon PT entry to room.     General Precautions: Standard, fall, SBP <140  Orthopedic  Precautions: N/A  Braces: N/A  Respiratory Status: Room air  Vitals: /83, HR 68  Skin Integrity: Visible skin intact      Functional Mobility:  Bed Mobility:    Supine<->sit with max assist x 2. Unable to focus on task to increase participation  Balance:   Max assist for balance. LOB in multiple directions but predominantly posterior L lateral LOB  Focused on finding midline with visual cues but unable to maintain focus longer than a few seconds. Will trial mirror at next tx session.   Able to gaze across midline  Transfers:   Sit<->stand with max assist with L knee blocked and LUE supported   X 2 trials  Stood ~ 10 seconds each trial with Max assist for balance 2/2 L lateral lean    Therapeutic Activities/Exercises:      Education:  Patient provided with verbal education education regarding PT role/goals/POC.  Additional teaching is warranted.       Patient left HOB elevated with all lines intact, call button in reach, wedge under R side, and pressure relief boots.    GOALS:   Multidisciplinary Problems       Physical Therapy Goals          Problem: Physical Therapy    Goal Priority Disciplines Outcome Goal Variances Interventions   Physical Therapy Goal     PT, PT/OT Progressing     Description: Goals to be met by: 2024     Patient will increase functional independence with mobility by performin. Supine to sit with MInimal Assistance  2. Sit to supine with MInimal Assistance  3. Sit to stand transfer with Minimal Assistance  4. Pt to follow 75% of commands.   5. Gait  x 75 feet with Minimal Assistance using Rolling Walker vs LRAD.                          Time Tracking:     PT Received On: 24  PT Start Time: 1415     PT Stop Time: 1438  PT Total Time (min): 23 min     Billable Minutes: Therapeutic Activity 2 units    Treatment Type: Treatment  PT/PTA: PTA     Number of PTA visits since last PT visit: 2     2024

## 2024-08-21 NOTE — PT/OT/SLP EVAL
[facility]  Speech Language Pathology Department  Cognitive-Communication Evaluation    Patient Name:  Dell Garrett Jr.   MRN:  [id number]    Recommendations     General recommendations:  dysphagia therapy and cognitive-linguistic therapy  Communication strategies:  provide increased time to answer and go to room if call light pushed    Discharge therapy intensity: Moderate Intensity Therapy  Barriers to safe discharge: limited insight into deficits    History     Dell Garrett Jr. is a 59 y.o. male with a medical diagnosis of R basal ganglia hemorrhage with R to L shift with intraventricular extension, hypertension.     Past Medical History:   Diagnosis Date    Crohn's disease     GERD (gastroesophageal reflux disease)      History reviewed. No pertinent surgical history.    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    Subjective     Patient awake, alert, and cooperative.    Spiritual/Cultural/Gnosticist Beliefs/Practices that affect care: no    Pain/Comfort: Pain Rating 1: 0/10    Objective     SPEECH PRODUCTION  Phoneme Production: imprecise consonant production and vowel distortion  Voice Quality: harsh  Voice Production: adequate  Speech Rate: slow  Loudness: reduced  Resonance: adequate  Prosody: equal stress  Speech Intelligibility  Known Context: Greater that 90%  Unknown Context: 75%-90%    AUDITORY COMPREHENSION  Following Directions:  1-Step: Within Functional Limits  2-Step: Within Functional Limits  Yes/No Questions:  Simple: Within Functional Limits  Complex: Within Functional Limits    VERBAL EXPRESSION  Automatic Speech:  Functional needs: Within Functional Limits  Confrontation Naming  Objects: Within Functional Limits  Wh- Questions:  Object function: Within Functional Limits    COGNITION  Orientation:  Person: yes  Place:  yes  Time: yes  Situation: yes   Attention:  Sustained: Mild impairment  Selective: Moderate impairment  Memory:  Short Term: Mild impairment  Long Term: Mild impairment  Problem Solving  Functional simple: Moderate impairment  Organization:  Sequencing: Moderate impairment  Executive Function:  Awareness: Impaired  Cognitive endurance: Impaired  Information processing: Impaired  Planning: Impaired  Reasoning: Impaired  Self monitoring: Impaired    Assessment     Patient presents with mild to moderate cognitive-linguistic impairments warranting further ST intervention.    Goals     Multidisciplinary Problems       SLP Goals          Problem: SLP    Goal Priority Disciplines Outcome   SLP Goal     SLP    Description: LTG: Patient will tolerate safest and least restrictive PO diet without signs/sx of aspiration.  STG: Meal trial of pureed solids and mildly thick liquids via cup without adequate REILLY and no signs/sx of aspiration. -met  STG: Minced/moist solids with adequate bolus preparation/mastication and without signs/sx of aspiration.  STG: Laryngeal/BOT exercises Min A.    LTG: Patient will improve cognitive-linguistic skills in order to return to PLOF.  STG: Dysarthria drills at the phrase level with 90% accuracy.  STG: Problem solving/reasoning tasks with 90% accuracy.  STG: Short term memory tasks with 90% accuracy.                     Patient Education     Patient provided with verbal education regarding ST POC.  Understanding was verbalized, however additional teaching warranted.    Plan     SLP Follow-Up:  Yes   Patient to be seen:  5 x/week   Plan of Care expires:  09/04/24  Plan of Care reviewed with:  patient      Time Tracking     SLP Treatment Date:   08/21/24  Speech Start Time:  1035  Speech Stop Time:  1055     Speech Total Time (min):  20 min    Billable minutes:  Evaluation of Speech Sound Production with Comprehension and Expression, 20 minutes     08/21/2024

## 2024-08-21 NOTE — PLAN OF CARE
Loc[facility] completed. Pasrr faxed. Awaiting 142.    [facility] not able to accept this patient due to not being able to meet the patient's needs. Sent referral to the remaining facilities in the patient's insurance network that are also in the [place] area: [facility], [facility], and [facility] for review for SNF to long term placement. Awaiting response at this time.

## 2024-08-21 NOTE — PLAN OF CARE
Spoke to sister and sent referral to [facility] via [facility]

## 2024-08-21 NOTE — PLAN OF CARE
[facility] rehab will not accept patient at this time. No clear discharge plan. Spoke to sister today over the phone. She states because he didn't pay rent, he got evicted. She says he will not have the 24 hour care at home that he will most likely need. Talked to her about SNF to long term placement. She will discuss with him. Sent her choices via [facility].

## 2024-08-22 LAB — MAGNESIUM SERPL-MCNC: 2.3 MG/DL (ref 1.6–2.6)

## 2024-08-22 PROCEDURE — 92526 ORAL FUNCTION THERAPY: CPT

## 2024-08-22 PROCEDURE — 21400001 HC TELEMETRY ROOM

## 2024-08-22 PROCEDURE — 25000003 PHARM REV CODE 250: Performed by: HOSPITALIST

## 2024-08-22 PROCEDURE — 36415 COLL VENOUS BLD VENIPUNCTURE: CPT

## 2024-08-22 PROCEDURE — 97530 THERAPEUTIC ACTIVITIES: CPT | Mod: CO

## 2024-08-22 PROCEDURE — 83735 ASSAY OF MAGNESIUM: CPT

## 2024-08-22 RX ADMIN — LEVETIRACETAM 500 MG: 500 TABLET, FILM COATED ORAL at 08:08

## 2024-08-22 RX ADMIN — TRAMADOL HYDROCHLORIDE 50 MG: 50 TABLET, COATED ORAL at 08:08

## 2024-08-22 RX ADMIN — QUETIAPINE FUMARATE 100 MG: 100 TABLET ORAL at 08:08

## 2024-08-22 NOTE — PLAN OF CARE
Explained to patient's sister, Melina that we have received multiple denials for facilities in this area and that we will have to search further out from [place]. She understood and said that she spoke to him about it and he wants to get out of this area. Notified BHARAT Swann to check for facilities in network around [facility] since  his niece works in [place].

## 2024-08-22 NOTE — PLAN OF CARE
Attempted to return call to [facility] with [employer] at [phone] ext [phone]. Left voicemail.

## 2024-08-22 NOTE — PROGRESS NOTES
[facility] Hospital Medicine Progress Note        Chief Complaint: Inpatient Follow-up     HPI:   59 y.o.  male with a past medical history of Crohn's disease and GERD. The patient presented to [facility] on 8/10/2024 with aphasia, left arm weakness and fall.  Patient presented to the ED with bradycardia, normocytic anemia, bicarb 22.  CT of the head revealed a 4.2 x 3.8 cm intraparenchymal hemorrhage of the right thalamus capsular region with mild surrounding edema and effacement of the adjacent sulci in sylvian fissure with compression of the right lateral ventricle with an 8 mm midline shift attestation of bleed into the right lateral ventricle and 3rd ventricle with mild dilation of the left lateral ventricle compared to the right suggesting possible early obstructive hydrocephalus.  There was no noted to be sulci hyperdensities along the right temporal lobe reflecting subarachnoid hemorrhage.  CTA of the head and neck revealed extensive intracranial hemorrhage and mild dolichoectasia of the basilar artery.  Neurology and Neurosurgery was consulted and patient was started on Cleviprex drip for blood pressure parameters less than 140/90 and admitted to ICU.  Repeat CT of the head revealed stable bleed with unchanged shift and no new hemorrhages.  Patient was started on 3% saline drip on 08/11/2024.  Cleviprex and saline drip has been discontinued.  Patient was currently has been NG tube and refusing speech therapy.  Repeat CT of the head on 08/11/2024 revealed unchanged appearance of intraparenchymal hematoma and unchanged 6 mm right-to-left midline shift without hydrocephalus.  Patient developed a fever of 100.6° F on morning 08/13/2024.  No leukocytosis.  Patient has been getting Xanax and Seroquel for agitation.  Patient was cleared for downgrade from ICU to regular floor on 08/13/2024.  Neurology and neurosurgery has signed off.  He is admitted to [facility] medicine  services for further medical management.     Interval Hx:  8/21/24-No significant changes overnight.  Tolerating oral diet.  Worked with therapy yesterday.  Recommending high-intensity treatment    8/22/*24  dr asencio - no new issues , getting cleaned by cna's .  working on snf  placement      Objective/physical exam:  General appearance: Well-developed, well-nourished male in no apparent distress.   HEENT: Atraumatic head. Moist mucous membranes of oral cavity.  Lungs: Clear to auscultation bilaterally.   Heart: Regular rate and rhythm.   Abdomen: Soft, non-distended.  NG tube   Extremities: No cyanosis, clubbing. No deformities.  Skin: No Rash. Warm and dry.  Neuro:  Awake alert and oriented    VITAL SIGNS: 24 HRS MIN & MAX LAST   Temp  Min: 97.8 °F (36.6 °C)  Max: 99 °F (37.2 °C) 98.8 °F (37.1 °C)   BP  Min: 98/76  Max: 116/75 107/75   Pulse  Min: 63  Max: 111  97   Resp  Min: 12  Max: 30 (!) 23   SpO2  Min: 98 %  Max: 100 % 98 %       Recent Labs   Lab 08/17/24  0420 08/19/24  0230   WBC 7.77 8.29   RBC 3.91* 4.07*   HGB 11.6* 12.1*   HCT 36.0* 36.3*   MCV 92.1 89.2   MCH 29.7 29.7   MCHC 32.2* 33.3   RDW 12.8 12.0    208   MPV 9.8 8.8       Recent Labs   Lab 08/17/24  0420 08/18/24  0252 08/19/24  0230 08/21/24  0617 08/22/24  0306     --  135*  --   --    K 4.2  --  4.2  --   --      --  102  --   --    CO2 25  --  24  --   --    BUN 20.7  --  22.0  --   --    CREATININE 0.77  --  0.87  --   --    CALCIUM 9.0  --  9.1  --   --    MG 2.30   < > 2.20 2.20 2.30    < > = values in this interval not displayed.          Microbiology Results (last 7 days)       Procedure Component Value Units Date/Time    Blood Culture [[id number]]  (Normal) Collected: 08/13/24 1128    Order Status: Completed Specimen: Blood, Venous Updated: 08/18/24 1300     Blood Culture No Growth at 5 days    Blood Culture [[id number]]  (Normal) Collected: 08/13/24 1128    Order Status: Completed Specimen: Blood,  Venous Updated: 08/18/24 1300     Blood Culture No Growth at 5 days             Radiology:  XR Gastric tube check, non-radiologist performed  EXAMINATION:  XR GASTRIC TUBE CHECK, NON-RADIOLOGIST PERFORMED    CLINICAL HISTORY:  NG tube placement;    TECHNIQUE:  AP View(s) of the abdomen was performed.    COMPARISON:  08/13/2024    FINDINGS:  Enteric tube terminates in the stomach.    Electronically signed by: Isabella Jacques  Date:    08/16/2024  Time:    11:17        Medications:  Scheduled Meds:   amLODIPine  10 mg Per NG tube Daily    levETIRAcetam  500 mg Oral BID     Continuous Infusions:  PRN Meds:.  Current Facility-Administered Medications:     0.9% NaCl, , Intravenous, PRN    acetaminophen, 650 mg, Rectal, Q6H PRN    acetaminophen, 650 mg, Oral, Q6H PRN    ALPRAZolam, 0.25 mg, Per NG tube, Q4H PRN    bisacodyL, 10 mg, Rectal, Daily PRN    cloNIDine, 0.1 mg, Per NG tube, Q6H PRN    diphenhydrAMINE, 25 mg, Oral, Q6H PRN    hydrALAZINE, 10 mg, Intravenous, Q4H PRN    oxyCODONE, 5 mg, Per NG tube, Q6H PRN    polyethylene glycol, 17 g, Oral, BID PRN    QUEtiapine, 100 mg, Per NG tube, Nightly PRN    traMADoL, 50 mg, Oral, Q6H PRN        Assessment/Plan:   Oropharyngeal dysphagia  Right thalamic capsular intraparenchymal hematoma with subarachnoid and intraventricular extension and right-to-left midline shift of 6 mm  Normocytic anemia, stable   UTI, POA, completed antibiotics   UDS with cocaine   Hypertension, controlled  History of Crohn's disease and GERD    Continue PT OT and speech therapy.    Modified diet   Medically stable for rehab placement.    Blood pressure controlled with Norvasc.    Keppra for seizure prophylaxis   Seroquel at night for agitation  CM notified me that [facility] has denied rehab admit due to lack of discharge disposition.  Patient has evidently been evicted form home.  Now looking into SNF with long term placement to follow.       Abhijeet Hoyt MD   08/22/2024     All diagnosis and  differential diagnosis have been reviewed; assessment and plan has been documented; I have personally reviewed the labs and test results that are presently available; I have reviewed the patients medication list; I have reviewed the consulting providers response and recommendations. I have reviewed or attempted to review medical records based upon their availability    All of the patient's questions have been  addressed and answered. Patient's is agreeable to the above stated plan. I will continue to monitor closely and make adjustments to medical management as needed.  _____________________________________________________________________

## 2024-08-22 NOTE — PLAN OF CARE
Clinical updates sent to [facility], [facility], and [facility]. Will follow up with facilities for decision on this referral.     Referral faced via [facility] since no response has been received in [facility] at this time.     [facility], [facility] and [facility] have all denied this patient's referral due to previous drug use.

## 2024-08-22 NOTE — NURSING
Nurses Note -- 4 Eyes      8/22/2024   8:30 AM      Skin assessed during: Daily Assessment      [x] No Altered Skin Integrity Present    []Prevention Measures Documented      [] Yes- Altered Skin Integrity Present or Discovered   [] LDA Added if Not in Epic (Describe Wound)   [] New Altered Skin Integrity was Present on Admit and Documented in LDA   [] Wound Image Taken    Wound Care Consulted? No    Attending Nurse:  Kvng Bethea RN/Staff Member:  Elyse RONQUILLO RN

## 2024-08-22 NOTE — PT/OT/SLP PROGRESS
Occupational Therapy   Treatment    Name: Dell Garrett Jr.  MRN: [id number]  Admitting Diagnosis:  Intraparenchymal hemorrhage of brain       Recommendations:     Recommended therapy intensity at discharge: Moderate Intensity Therapy   Discharge Equipment Recommendations:  to be determined by next level of care  Barriers to discharge:       Assessment:     Dell Garrett Jr. is a 59 y.o. male with a medical diagnosis of Intraparenchymal hemorrhage of brain.  He presents with poor participation, poor safety awareness, and increased L lean. Pt. Is a high fall risk, recommending Mod intensity therapy pending progress.Performance deficits affecting function are weakness, impaired endurance, impaired self care skills, impaired functional mobility, impaired balance.     Rehab Prognosis:  Fair; patient would benefit from acute skilled OT services to address these deficits and reach maximum level of function.       Plan:     Patient to be seen 6 x/week to address the above listed problems via self-care/home management, therapeutic activities, therapeutic exercises  Plan of Care Expires: 09/12/24  Plan of Care Reviewed with: patient    Subjective     Pain/Comfort:       Objective:     Communicated with: RN prior to session.  Patient found HOB elevated with   upon OT entry to room.    General Precautions: Standard, fall    Orthopedic Precautions:N/A  Braces: N/A  Respiratory Status: Room air  Vital Signs: Blood Pressure: 125/75     Occupational Performance:   (Bed Mobility- Max A)  PROM performed to L UE  Poor safety awareness and body awareness EOB.Strong L lateral lean noted.  (Sit to stand- Max A) L lateral lean HHA. Pt. Declining further mobility and ADL tasks.   Pt. Laid back down and repositioned in bed appropriately.      Therapeutic Positioning    OT interventions performed during the course of today's session in an effort to prevent and/or reduce acquired pressure injuries:   Therapeutic positioning was provided at the  conclusion of session to offload all bony prominences for the prevention and/or reduction of pressure injuries      AMPA 6 Click ADL:      Patient Education:  Patient provided with verbal education education regarding fall prevention, safety awareness, and pressure ulcer prevention.  Additional teaching is warranted.      Patient left HOB elevated with all lines intact and call button in reach.    GOALS:   Multidisciplinary Problems       Occupational Therapy Goals          Problem: Occupational Therapy    Goal Priority Disciplines Outcome Interventions   Occupational Therapy Goal     OT, PT/OT Progressing    Description: Goals to be met by:  9/19/24    Patient will increase functional independence with ADLs by performing:    UE Dressing with Stand-by Assistance.  LE Dressing with mod A  Grooming while seated at sink with min A  Assistance.  Toileting from toilet with mod A for hygiene and clothing management.   Toilet transfer to toilet with mod A  Increased functional strength to 3/5 LUE through therEX, neuromuscular re-ed.  Pt will visually attend to L side of environment with no cues                           Time Tracking:     OT Date of Treatment: 08/22/24  OT Start Time: 0910  OT Stop Time: 0935  OT Total Time (min): 25 min    Billable Minutes:Therapeutic Activity 2    OT/MAC: MAC     Number of MAC visits since last OT visit: 3    8/22/2024

## 2024-08-22 NOTE — PT/OT/SLP PROGRESS
"[facility]  Speech Language Pathology Department  Dysphagia Therapy Progress Note    Patient Name:  Dell Garrett Jr.   MRN:  [id number]    Recommendations     General recommendations:  dysphagia therapy and cognitive-linguistic therapy  Solid texture recommendation:  Puree Diet - IDDSI Level 4  Liquid consistency recommendation: Mildly thick liquids - IDDSI Level 2   Medications: crushed in puree  Aspiration precautions: small bites/sips, slow rate, upright for PO intake, and supervision with meals    Discharge therapy intensity: Moderate Intensity Therapy   Barriers to safe discharge:  acuity of illness and limited insight into deficits    Subjective     Patient awake and alert.  Spiritual/Cultural/Voodoo Beliefs/Practices that affect care: no    Pain/Comfort: Pain Rating 1: 0/10    Objective     Therapeutic Activities:  Pt refused laryngeal/BOT exercises; however agreeable to effortful swallows with each PO trial.    Therapeutic PO Trials:  Consistency Amount Fed By Oral Symptoms Pharyngeal Symptoms   Thin liquid by spoon Multiple trials SLP None Throat clear after the swallow     Patient also refused solid trials - "I don't want that".    Assessment     Pt continues to present with oropharyngeal dysphagia requiring diet modification to improve swallow safety and efficiency.    Goals     Multidisciplinary Problems       SLP Goals          Problem: SLP    Goal Priority Disciplines Outcome   SLP Goal     SLP    Description: LTG: Patient will tolerate safest and least restrictive PO diet without signs/sx of aspiration.  STG: Meal trial of pureed solids and mildly thick liquids via cup without adequate REILLY and no signs/sx of aspiration. -met  STG: Minced/moist solids with adequate bolus preparation/mastication and without signs/sx of aspiration.  STG: Laryngeal/BOT exercises Min A.    LTG: Patient will improve cognitive-linguistic skills in order to return to [facility].  STG: Dysarthria " drills at the phrase level with 90% accuracy.  STG: Problem solving/reasoning tasks with 90% accuracy.  STG: Short term memory tasks with 90% accuracy.                     Patient Education     Patient provided with verbal education regarding ST POC.  Understanding was verbalized, however additional teaching warranted.    Plan     Will continue to follow and tx as appropriate.    SLP Follow-Up:  Yes   Patient to be seen:  5 x/week   Plan of Care expires:  09/04/24  Plan of Care reviewed with:  patient       Time Tracking     SLP Treatment Date:   08/22/24  Speech Start Time:  1100  Speech Stop Time:  1115     Speech Total Time (min):  15 min    Billable minutes:  Treatment of Swallow Dysfunction, 15 minutes       08/22/2024

## 2024-08-22 NOTE — PLAN OF CARE
Sent referrals for SNF to long term placement to facilities in the [place] area, per daughter's request.    [facility] - out of network  2. [facility] - out of network  3. [place] - can't meet the patient's needs  4. [facility] - Care exceeds capacity  5. [facility] - Care exceeds capacity  6. [facility] - care exceeds capacity  7. [facility] - care exceeds capacity  8. [facility] - unable to meet needs  9. [facility]- Care exceeds capacity  10. [facility] - drug use  11. [facility] - drug use  12. [facility] - drug use  13. [facility] - Care exceeds capacity  14. [facility] -  Care exceeds capacity  15. [place] - Out of network  16. [place] - unable to meet patient's needs  17. [facility] - insufficient funding  18. [facility] - unable to meet needs  19. [facility] - Cannot meet needs  20. [facility] - not able to do single case agreement  21. [facility] - other - LIZ seeking SNF  22. [facility] - No bed available  23. [facility] - Unable to do single case agreement  24. [place] - Unable to meet needs  25. [facility] - Unable to meet needs  26. [facility] - unable to meet needs  27. [facility] - unable to meet needs  28. [facility] - unable to meet needs AND unable to do single case agreement  29. [facility] - unable to do a single case agreement  30. [facility] - unable to accept any admissions at this time.  31. [facility] - No response or call back received on this referral. As it has now been two full weeks with no response from the faciltiy despite multiple attempts on my part, I will consider that a denial.  32. [facility] - No response or call back received on this referral. As it has now been two full weeks with no response from the faciltiy despite multiple attempts on my part, I will consider that a  denial.  33. [facility] - No answer. Tried reaching admission's coordinator Yvonne multiple times in the last week with no answer or call back. Will accept this as a denial on their part.  34. [facility] - unable to meet the patient's needs  35. [facility] - Care Needs Exceed Current Capacity   36. [facility] - Care Needs Exceed Current Capacity   37. [facility] - Care Needs Exceed Current Capacity   38. [facility] - Care Needs Exceed Current Capacity   39. [facility] - Care Needs Exceed Current Capacity   40. [facility] - Care Needs Exceed Current Capacity   41. [facility] - Care Needs Exceed Current Capacity   42. [place] - Care Needs Exceed Current Capacity   43. [facility] - Care Needs Exceed Current Capacity   44. [place] - Care Needs Exceed Current Capacity   45. [facility]- Denied due to drug use  46. [place]- Denied due to drug use.  47. [place]- Denied due to drug use.        Awaiting response at this time from: [facility], [facility], and [facility].  Will continue to reach out to facilities and update this list as responses are received.     Received a return call from Radah at [facility]. She stated that since we have received multiple in network denials, they will agree to a single case agreement once an accepting facility is found. She advised me send referrals to out of network facilities at this time. Referrals sent to SNF to long term facilities in a 20 mile radius of [place]. Will continue to update list with facility responses.     9/16 - Spoke to Radha at [facility] regarding this patient NH placement. She emailed me a list of in network facilities for the entire [facility]. Will send referrals to facilities from this list and continue to follow up.

## 2024-08-22 NOTE — PT/OT/SLP PROGRESS
"Physical Therapy      Patient Name:  Dell Garrett Jr.   MRN:  [id number]    Pt with blankets over his head upon arrival. Pt declined to mobilize at this time unless the blanket can remain wrapped around his R leg; reporting his "bones" hurt. Unable to distract and encourage pt to mobilize to EOB at this time. Will follow up if schedule permits.     "

## 2024-08-23 LAB — MAGNESIUM SERPL-MCNC: 2.3 MG/DL (ref 1.6–2.6)

## 2024-08-23 PROCEDURE — 83735 ASSAY OF MAGNESIUM: CPT

## 2024-08-23 PROCEDURE — 97535 SELF CARE MNGMENT TRAINING: CPT

## 2024-08-23 PROCEDURE — 36415 COLL VENOUS BLD VENIPUNCTURE: CPT

## 2024-08-23 PROCEDURE — 97110 THERAPEUTIC EXERCISES: CPT

## 2024-08-23 PROCEDURE — 97530 THERAPEUTIC ACTIVITIES: CPT | Mod: CQ

## 2024-08-23 PROCEDURE — 21400001 HC TELEMETRY ROOM

## 2024-08-23 PROCEDURE — 92526 ORAL FUNCTION THERAPY: CPT

## 2024-08-23 PROCEDURE — 25000003 PHARM REV CODE 250: Performed by: HOSPITALIST

## 2024-08-23 PROCEDURE — 97112 NEUROMUSCULAR REEDUCATION: CPT | Mod: CQ

## 2024-08-23 RX ADMIN — LEVETIRACETAM 500 MG: 500 TABLET, FILM COATED ORAL at 09:08

## 2024-08-23 RX ADMIN — QUETIAPINE FUMARATE 100 MG: 100 TABLET ORAL at 09:08

## 2024-08-23 NOTE — PT/OT/SLP PROGRESS
Occupational Therapy   Treatment    Name: Dell Garrett Jr.  MRN: [id number]  Admitting Diagnosis:  Intraparenchymal hemorrhage of brain       Recommendations:     Recommended therapy intensity at discharge: Moderate Intensity Therapy   Discharge Equipment Recommendations:  to be determined by next level of care  Barriers to discharge:       Assessment:     Dell Garrett Jr. is a 59 y.o. male with a medical diagnosis of Intraparenchymal hemorrhage of brain. Performance deficits affecting function are weakness, impaired endurance, impaired self care skills, impaired functional mobility, impaired balance.     Rehab Prognosis:  Fair; patient would benefit from acute skilled OT services to address these deficits and reach maximum level of function.       Plan:     Patient to be seen 6 x/week to address the above listed problems via self-care/home management, therapeutic activities, therapeutic exercises  Plan of Care Expires: 09/12/24  Plan of Care Reviewed with: patient    Subjective     Pain/Comfort:  Pain Rating 1: 0/10    Objective:     Communicated with: nursing prior to session.  Patient found HOB elevated with blood pressure cuff upon OT entry to room.    General Precautions: Standard, fall    Orthopedic Precautions:N/A  Braces: N/A  Respiratory Status: Room air  Vital Signs: Blood Pressure: 99/67     Occupational Performance:     Bed Mobility:    Patient completed Supine to Sit with maximal assistance and 2 persons  Patient completed Sit to Supine with maximal assistance and 2 persons     Functional Mobility/Transfers:  Patient completed Sit <> Stand Transfer with moderate assistance and of 2 persons  with  hand-held assist   Functional Mobility: Able to take 2 lateral steps to HOB with difficulty advancing R LE, knee buckling.  Pt required MAX A to maintain sitting balance at midline. Able to maintain balance while propped on R forearm    Activities of Daily Living:  Upper Body Dressing: maximal assistance to  adjust gown that was inside out upon arrival    Therapeutic Exercise:  Pt engaged in 2 x 5 reps LUE PROM to maintain joint integrity. Little activation to participate in exercises.    Therapeutic Positioning    OT interventions performed during the course of today's session in an effort to prevent and/or reduce acquired pressure injuries:   Education was provided on benefits of and recommendations for therapeutic positioning  Therapeutic positioning was provided at the conclusion of session to offload all bony prominences for the prevention and/or reduction of pressure injuries    [facility] 6 Click ADL: 16    Patient Education:  Patient provided with verbal education education regarding fall prevention and safety awareness.  Additional teaching is warranted.      Patient left HOB elevated with all lines intact and call button in reach.    GOALS:   Multidisciplinary Problems       Occupational Therapy Goals          Problem: Occupational Therapy    Goal Priority Disciplines Outcome Interventions   Occupational Therapy Goal     OT, PT/OT Progressing    Description: Goals to be met by:  9/19/24    Patient will increase functional independence with ADLs by performing:    UE Dressing with Stand-by Assistance.  LE Dressing with mod A  Grooming while seated at sink with min A  Assistance.  Toileting from toilet with mod A for hygiene and clothing management.   Toilet transfer to toilet with mod A  Increased functional strength to 3/5 LUE through therEX, neuromuscular re-ed.  Pt will visually attend to L side of environment with no cues                           Time Tracking:     OT Date of Treatment: 08/23/24  OT Start Time: 1145  OT Stop Time: 1208  OT Total Time (min): 23 min    Billable Minutes:Self Care/Home Management 1 unit  Therapeutic Exercise 1 unit    OT/MAC: OT     Number of MAC visits since last OT visit: 4    8/23/2024

## 2024-08-23 NOTE — PLAN OF CARE
Left voicemail for  with patient's insurance provider, Toni [employer]. Emailed a copy of my most updated ongoing list of referrals and their responses. Awaiting call back from her at this time.

## 2024-08-23 NOTE — PROGRESS NOTES
Inpatient Nutrition Assessment    Admit Date: 8/10/2024   Total duration of encounter: 13 days   Patient Age: 59 y.o.    Nutrition Recommendation/Prescription     Regular diet as tolerated, consistency per SLP, will add double portions.  Add Boost Very High Calorie (provides 530 kcal, 22 g protein per serving) BID.    Communication of Recommendations: reviewed with nurse    Nutrition Assessment     Malnutrition Assessment/Nutrition-Focused Physical Exam    Malnutrition Context: acute illness or injury (08/12/24 1142)  Malnutrition Level: moderate (08/12/24 1142)  Energy Intake (Malnutrition):  (does not meet criteria) (08/12/24 1142)  Weight Loss (Malnutrition):  (does not meet criteria) (08/12/24 1142)  Subcutaneous Fat (Malnutrition): mild depletion (08/12/24 1142)     Upper Arm Region (Subcutaneous Fat Loss): mild depletion     Muscle Mass (Malnutrition): mild depletion (08/12/24 1142)     Clavicle Bone Region (Muscle Loss): mild depletion                             A minimum of two characteristics is recommended for diagnosis of either severe or non-severe malnutrition.    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 with left-sided weakness and suppressed alertness  Elevated blood pressure, no previous dx of HTN   Hypercholesterolemia   Elevated creatinine, resolved    Relevant Medical History: Crohn's disease, GERD, and chronic tobacco use     Scheduled Medications:  amLODIPine, 10 mg, Daily  levETIRAcetam, 500 mg, BID    Continuous Infusions: none       PRN Medications:   0.9% NaCl, , PRN  acetaminophen, 650 mg, Q6H PRN  acetaminophen, 650 mg, Q6H PRN  ALPRAZolam, 0.25 mg, Q4H PRN  bisacodyL, 10 mg, Daily PRN  cloNIDine, 0.1 mg, Q6H  PRN  diphenhydrAMINE, 25 mg, Q6H PRN  hydrALAZINE, 10 mg, Q4H PRN  oxyCODONE, 5 mg, Q6H PRN  polyethylene glycol, 17 g, BID PRN  QUEtiapine, 100 mg, Nightly PRN  traMADoL, 50 mg, Q6H PRN    Calorie Containing IV Medications: no significant kcals from medications at this time    Recent Labs   Lab 08/17/24  0420 08/18/24  0252 08/19/24  0230 08/21/24  0617 08/22/24  0306 08/23/24  0345     --  135*  --   --   --    K 4.2  --  4.2  --   --   --    CALCIUM 9.0  --  9.1  --   --   --    MG 2.30 2.20 2.20 2.20 2.30 2.30   CO2 25  --  24  --   --   --    BUN 20.7  --  22.0  --   --   --    CREATININE 0.77  --  0.87  --   --   --    EGFRNORACEVR >60  --  >60  --   --   --    GLUCOSE 94  --  127*  --   --   --    WBC 7.77  --  8.29  --   --   --    HGB 11.6*  --  12.1*  --   --   --    HCT 36.0*  --  36.3*  --   --   --      Nutrition Orders:  Diet Minced & Moist (IDDSI Level 5) No Straws, Supervision with Meals; Mildly Thick Liquids (IDDSI Level 2)  Tube Feedings/Formulas 80; 1,600; Fibersource HN; NG; 30; Every hour    Appetite/Oral Intake: good/% of meals  Factors Affecting Nutritional Intake: none identified  Social Needs Impacting Access to Food: none identified  Food/Christianity/Cultural Preferences: none reported  Food Allergies: no known food allergies  Last Bowel Movement: 08/22/24  Wound(s): no pressure injuries documented at this time     Comments    8/12/24 Patient confused, sister at bedside. Patient's sister reports good appetite/intake prior to admission, regular diet at home, denies weight loss. Patient reports liking vanilla/strawberry Ensure. He is currently NPO, had not been able to participate in SLP evaluation. Tube feeding recommendation provided if oral intake remains not feasible.    8/14/24 Patient remains NPO, started on tube feeding, no longer receiving calories from Cleviprex, will change to standard polymeric formula.    8/19/24 Tube feeding tolerated at goal.    8/23/24 Dysphagia diet,  "no longer on tube feeding, nurse reports patient eating 100% and asking for more food, will add double portions and Boost.    Anthropometrics    Height: 5' 7" (170.2 cm), Height Method: Measured  Last Weight: 65.3 kg (143 lb 15.4 oz) (08/10/24 1346), Weight Method: Bed Scale  BMI (Calculated): 22.5  BMI Classification: normal (BMI 18.5-24.9)        Ideal Body Weight (IBW), Male: 148 lb     % Ideal Body Weight, Male (lb): 97.27 %                          Usual Weight Provided By: family/caregiver denies unintentional weight loss    Wt Readings from Last 5 Encounters:   08/10/24 65.3 kg (143 lb 15.4 oz)   05/12/24 65.8 kg (145 lb)   09/25/23 67.1 kg (148 lb)   08/30/23 67.1 kg (148 lb)   08/21/23 63.5 kg (140 lb)     Weight Change(s) Since Admission: none, new admit  Wt Readings from Last 1 Encounters:   08/10/24 1346 65.3 kg (143 lb 15.4 oz)   08/10/24 0743 65.3 kg (143 lb 15.4 oz)   Admit Weight: 65.3 kg (143 lb 15.4 oz) (08/10/24 0743), Weight Method: Bed Scale    Estimated Needs    Weight Used For Calorie Calculations: 65.3 kg (143 lb 15.4 oz)  Energy Calorie Requirements (kcal): 1984, 1.4 stress factor  [facility] [place] [facility]  Weight Used For Protein Calculations: 65.3 kg (143 lb 15.4 oz)  Protein Requirements: 79-98 g, 1.2-1.5 g/kg  Fluid Requirements (mL): 1984, 1 ml/kcal      Enteral Nutrition Patient not receiving enteral nutrition support at this time.    Parenteral Nutrition Patient not receiving parenteral nutrition support at this time.    Evaluation of Received Nutrient Intake    Calories: meeting estimated needs  Protein: meeting estimated needs    Patient Education Not applicable.    Nutrition Diagnosis     PES: Inadequate energy intake related to inability to consume sufficient nutrients as evidenced by less than 80% needs met. (resolved)  PES: Moderate acute disease or injury related malnutrition related to acute illness as evidenced by mild fat depletion and mild muscle depletion. " (active)    Nutrition Interventions     Intervention(s): modified composition of enteral nutrition and collaboration with other providers  Goal: Meet greater than 80% of nutritional needs by follow-up. (goal met)    Nutrition Goals & Monitoring     Dietitian will monitor: food and beverage intake, energy intake, enteral nutrition intake, weight, electrolyte/renal panel, and beliefs/attitudes  Discharge planning:  regular diet with texture modifications per SLP  Nutrition Risk/Follow-Up: moderate (follow-up in 3-5 days)   Please consult if re-assessment needed sooner.

## 2024-08-23 NOTE — PROGRESS NOTES
[facility] Hospital Medicine Progress Note        Chief Complaint: Inpatient Follow-up for     HPI:     Interval Hx:   Afebrile.  Hemodynamically stable.  Currently awaiting placement.  Doing well on room air.  Objective/physical exam:  Vitals:    08/22/24 2015 08/23/24 0000 08/23/24 0400 08/23/24 0834   BP:  90/66 118/81 99/67   BP Location:       Patient Position:       Pulse:  69 78    Resp: 12 16 18    Temp:       TempSrc:       SpO2:  (!) 94% (!) 94%    Weight:       Height:         General: In no acute distress, afebrile  Respiratory: Clear to auscultation bilaterally  Cardiovascular: S1, S2, no appreciable murmur  Abdomen: Soft, nontender, BS +  MSK: Warm, no lower extremity edema, no clubbing or cyanosis  Neurologic: Alert and oriented x4    Lab Results   Component Value Date     (L) 08/19/2024    K 4.2 08/19/2024     08/19/2024    CO2 24 08/19/2024    BUN 22.0 08/19/2024    CREATININE 0.87 08/19/2024    CALCIUM 9.1 08/19/2024    EGFRNONAA >60 06/11/2021      Lab Results   Component Value Date    ALT 17 08/14/2024    AST 18 08/14/2024    ALKPHOS 59 08/14/2024    BILITOT 0.8 08/14/2024      Lab Results   Component Value Date    WBC 8.29 08/19/2024    HGB 12.1 (L) 08/19/2024    HCT 36.3 (L) 08/19/2024    MCV 89.2 08/19/2024     08/19/2024           Medications:   amLODIPine  10 mg Per NG tube Daily    levETIRAcetam  500 mg Oral BID        Current Facility-Administered Medications:     0.9% NaCl, , Intravenous, PRN    acetaminophen, 650 mg, Rectal, Q6H PRN    acetaminophen, 650 mg, Oral, Q6H PRN    ALPRAZolam, 0.25 mg, Per NG tube, Q4H PRN    bisacodyL, 10 mg, Rectal, Daily PRN    cloNIDine, 0.1 mg, Per NG tube, Q6H PRN    diphenhydrAMINE, 25 mg, Oral, Q6H PRN    hydrALAZINE, 10 mg, Intravenous, Q4H PRN    oxyCODONE, 5 mg, Per NG tube, Q6H PRN    polyethylene glycol, 17 g, Oral, BID PRN    QUEtiapine, 100 mg, Per NG tube, Nightly PRN    traMADoL, 50 mg, Oral,  Q6H PRN     Assessment/Plan:    Oropharyngeal dysphagia  Right thalamic capsular intraparenchymal hematoma with subarachnoid and intraventricular extension and right-to-left midline shift of 6 mm  Normocytic anemia, stable   UTI, POA, completed antibiotics   UDS with cocaine   Hypertension, controlled  History of Crohn's disease and GERD    Plan:   -continue PT OT as tolerated   -awaiting placement   -continue Keppra  -continue current medical management      Tawanda Dahl MD

## 2024-08-23 NOTE — PT/OT/SLP PROGRESS
[facility]  Speech Language Pathology Department  Dysphagia Therapy Progress Note    Patient Name:  Dell Garrett Jr.   MRN:  [id number]    Recommendations     General recommendations:  dysphagia therapy and cognitive-linguistic therapy  Solid texture recommendation:  Minced & Moist Diet - IDDSI Level 5  Liquid consistency recommendation: Mildly thick liquids - IDDSI Level 2   Medications: crushed in puree  Aspiration precautions: small bites/sips, slow rate, NO straws, upright for PO intake, and supervision with all meals    Discharge therapy intensity: Moderate Intensity Therapy   Barriers to safe discharge:  acuity of illness and limited insight into deficits    Subjective     Patient awake, alert, and distracted.  Spiritual/Cultural/Restorationist Beliefs/Practices that affect care: no    Pain/Comfort: Pain Rating 1: 0/10      Objective     Therapeutic PO Trials:  Consistency Amount Fed By Oral Symptoms Pharyngeal Symptoms   Minced & Moist solid Multiple trials Self with assistance Prolonged bolus formation/mastication  Pocketing in L buccal cavity    Oral residue cleared with liquid wash None   Mildly thick liquid by cup Multiple trials Self with assistance Decreased lip closure  Anterior spillage None     Problem Solving: Decreased insight into deficits and requires maximum cues to identify problems/solutions re: safety, medical situation, etc.     Assessment     Pt continues to present with cognitive deficits and  oropharyngeal dysphagia requiring diet modification to improve swallow safety and efficiency. Appropriate to upgrade PO diet to minced/moist solids.    Goals     Multidisciplinary Problems       SLP Goals          Problem: SLP    Goal Priority Disciplines Outcome   SLP Goal     SLP    Description: LTG: Patient will tolerate safest and least restrictive PO diet without signs/sx of aspiration.  STG: Meal trial of pureed solids and mildly thick liquids via cup without adequate  REILLY and no signs/sx of aspiration. -met  STG: Minced/moist solids with adequate bolus preparation/mastication and without signs/sx of aspiration. -met  STG: Soft/bite sized solids with adequate bolus preparation/mastication and without signs/sx of aspiration.  STG: Laryngeal/BOT exercises Min A.    LTG: Patient will improve cognitive-linguistic skills in order to return to PLOF.  STG: Dysarthria drills at the phrase level with 90% accuracy.  STG: Problem solving/reasoning tasks with 90% accuracy.  STG: Short term memory tasks with 90% accuracy.                     Patient Education     Patient provided with verbal education regarding ST POC.  Understanding was verbalized, however additional teaching warranted.    Plan     Will continue to follow and tx as appropriate.    SLP Follow-Up:  Yes   Patient to be seen:  5 x/week   Plan of Care expires:  09/04/24  Plan of Care reviewed with:  patient       Time Tracking     SLP Treatment Date:   08/23/24  Speech Start Time:  1030  Speech Stop Time:  1040     Speech Total Time (min):  10 min    Billable minutes:  Treatment of Swallow Dysfunction, 10 minutes       08/23/2024

## 2024-08-23 NOTE — PLAN OF CARE
Problem: Adult Inpatient Plan of Care  Goal: Plan of Care Review  Flowsheets (Taken 8/23/2024 0016)  Plan of Care Reviewed With:   patient   sibling  Goal: Patient-Specific Goal (Individualized)  Outcome: Progressing     Problem: Stroke, Intracerebral Hemorrhage  Goal: Effective Bowel Elimination  Outcome: Progressing  Goal: Optimal Cerebral Tissue Perfusion  Outcome: Progressing  Goal: Optimal Cognitive Function  Outcome: Progressing  Goal: Effective Communication Skills  Outcome: Progressing  Goal: Optimal Functional Ability  Outcome: Progressing  Goal: Optimal Nutrition Intake  Outcome: Progressing  Goal: Effective Oxygenation and Ventilation  Outcome: Progressing  Goal: Improved Sensorimotor Function  Outcome: Progressing  Goal: Safe and Effective Swallow  Outcome: Progressing  Goal: Effective Urinary Elimination  Outcome: Progressing

## 2024-08-23 NOTE — PT/OT/SLP PROGRESS
Physical Therapy Treatment    Patient Name:  Dell Garrett Jr.   MRN:  [id number]    Recommendations:     Discharge therapy intensity: Moderate Intensity Therapy   Discharge Equipment Recommendations: to be determined by next level of care  Barriers to discharge: Impaired mobility    Assessment:     Dell Garrett Jr. is a 59 y.o. 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  He presents with the following impairments/functional limitations: weakness, gait instability, decreased upper extremity function, impaired endurance, impaired balance, decreased lower extremity function, impaired self care skills, impaired functional mobility, impaired coordination .       Rehab Prognosis: Good; patient would benefit from acute skilled PT services to address these deficits and reach maximum level of function.    Recent Surgery: * No surgery found *      Plan:     During this hospitalization, patient would benefit from acute PT services 6 x/week to address the identified rehab impairments via gait training, therapeutic activities, therapeutic exercises, neuromuscular re-education and progress toward the following goals:    Plan of Care Expires:  09/12/24    Subjective     Chief Complaint:  none stated  Patient/Family Comments/goals: to walk  Pain/Comfort:  Pain Rating 1: 0/10      Objective:     Communicated with RN prior to session.  Patient found HOB elevated with telemetry, pressure relief boots, peripheral IV, blood pressure cuff, bed alarm upon PT entry to room.     General Precautions: Standard, fall, SBP <140  Orthopedic Precautions: N/A  Braces: N/A  Respiratory Status: Room air  Skin Integrity: Visible skin intact      Functional Mobility:  Bed Mobility:    Supine<->sit with max assist x 2  Balance:   Attending to environment on L side, tends to keep R eye closed reporting blurry vision  CGA for balance when leaning onto R forearm  however requiring max assist to maintain midline once positioned there with frequent impulsive movement requiring close guarding  Difficulty focusing on task  Transfers:   Sit<->stand with mod assist x 2  Major L knee buckling x 1  X 2 trials with HHA  Pre-gait:   Side step to HOB with MAX cues to focus on task, assist to advance RLE, L knee buckling requiring max assist.   X 2 steps    Therapeutic Activities/Exercises:      Education:  Patient provided with verbal education education regarding PT role/goals/POC.  Additional teaching is warranted.       Patient left HOB elevated with all lines intact, call button in reach, pressure relief boots, and nurse notified.    GOALS:   Multidisciplinary Problems       Physical Therapy Goals          Problem: Physical Therapy    Goal Priority Disciplines Outcome Goal Variances Interventions   Physical Therapy Goal     PT, PT/OT Progressing     Description: Goals to be met by: 2024     Patient will increase functional independence with mobility by performin. Supine to sit with MInimal Assistance  2. Sit to supine with MInimal Assistance  3. Sit to stand transfer with Minimal Assistance  4. Pt to follow 75% of commands.   5. Gait  x 75 feet with Minimal Assistance using Rolling Walker vs LRAD.                          Time Tracking:     PT Received On: 24  PT Start Time: 1145     PT Stop Time: 1208  PT Total Time (min): 23 min     Billable Minutes: Therapeutic Activity 1 unit and Neuromuscular Re-education 1 unit    Treatment Type: Treatment  PT/PTA: PTA     Number of PTA visits since last PT visit: 3     2024

## 2024-08-24 LAB — MAGNESIUM SERPL-MCNC: 2.2 MG/DL (ref 1.6–2.6)

## 2024-08-24 PROCEDURE — 25000003 PHARM REV CODE 250: Performed by: HOSPITALIST

## 2024-08-24 PROCEDURE — 36415 COLL VENOUS BLD VENIPUNCTURE: CPT

## 2024-08-24 PROCEDURE — 83735 ASSAY OF MAGNESIUM: CPT

## 2024-08-24 PROCEDURE — 21400001 HC TELEMETRY ROOM

## 2024-08-24 PROCEDURE — 97530 THERAPEUTIC ACTIVITIES: CPT | Mod: CQ

## 2024-08-24 PROCEDURE — 25000003 PHARM REV CODE 250: Performed by: INTERNAL MEDICINE

## 2024-08-24 RX ORDER — HALOPERIDOL 5 MG/ML
5 INJECTION INTRAMUSCULAR EVERY 6 HOURS PRN
Status: DISCONTINUED | OUTPATIENT
Start: 2024-08-24 | End: 2024-09-19 | Stop reason: HOSPADM

## 2024-08-24 RX ADMIN — AMLODIPINE BESYLATE 10 MG: 5 TABLET ORAL at 08:08

## 2024-08-24 RX ADMIN — OXYCODONE HYDROCHLORIDE 5 MG: 5 TABLET ORAL at 09:08

## 2024-08-24 RX ADMIN — LEVETIRACETAM 500 MG: 500 TABLET, FILM COATED ORAL at 08:08

## 2024-08-24 RX ADMIN — TRAMADOL HYDROCHLORIDE 50 MG: 50 TABLET, COATED ORAL at 08:08

## 2024-08-24 RX ADMIN — QUETIAPINE FUMARATE 100 MG: 100 TABLET ORAL at 08:08

## 2024-08-24 RX ADMIN — OXYCODONE HYDROCHLORIDE 5 MG: 5 TABLET ORAL at 07:08

## 2024-08-24 NOTE — NURSING
Nurses Note -- 4 Eyes      8/24/2024   9:58 AM      Skin assessed during: Transfer      [x] No Altered Skin Integrity Present    []Prevention Measures Documented      [] Yes- Altered Skin Integrity Present or Discovered   [] LDA Added if Not in Epic (Describe Wound)   [] New Altered Skin Integrity was Present on Admit and Documented in LDA   [] Wound Image Taken    Wound Care Consulted? No    Attending Nurse:  Ashley Bethea RN/Staff Member:  Rasheeda joiner

## 2024-08-24 NOTE — PROGRESS NOTES
[facility] Hospital Medicine Progress Note        Chief Complaint: Inpatient Follow-up for     HPI:   59 y.o.  male with a past medical history of Crohn's disease and GERD. The patient presented to [facility] on 8/10/2024 with aphasia, left arm weakness and fall.  Patient presented to the ED with bradycardia, normocytic anemia, bicarb 22.  CT of the head revealed a 4.2 x 3.8 cm intraparenchymal hemorrhage of the right thalamus capsular region with mild surrounding edema and effacement of the adjacent sulci in sylvian fissure with compression of the right lateral ventricle with an 8 mm midline shift attestation of bleed into the right lateral ventricle and 3rd ventricle with mild dilation of the left lateral ventricle compared to the right suggesting possible early obstructive hydrocephalus.  There was no noted to be sulci hyperdensities along the right temporal lobe reflecting subarachnoid hemorrhage.  CTA of the head and neck revealed extensive intracranial hemorrhage and mild dolichoectasia of the basilar artery.  Neurology and Neurosurgery was consulted and patient was started on Cleviprex drip for blood pressure parameters less than 140/90 and admitted to ICU.  Repeat CT of the head revealed stable bleed with unchanged shift and no new hemorrhages.  Patient was started on 3% saline drip on 08/11/2024.  Cleviprex and saline drip has been discontinued.  Patient was currently has been NG tube and refusing speech therapy.  Repeat CT of the head on 08/11/2024 revealed unchanged appearance of intraparenchymal hematoma and unchanged 6 mm right-to-left midline shift without hydrocephalus.  Patient developed a fever of 100.6° F on morning 08/13/2024.  No leukocytosis.  Patient has been getting Xanax and Seroquel for agitation.  Patient was cleared for downgrade from ICU to regular floor on 08/13/2024.  Neurology and neurosurgery has signed off.  He is admitted to hospital  medicine services for further medical management.  He currently is awaiting placement      Interval Hx:   Hemodynamically stable.  Complains of pain and asking for analgesics.  P.o. intake is inadequate.    Objective/physical exam:  Vitals:    08/23/24 1600 08/23/24 2000 08/24/24 0000 08/24/24 0400   BP: 116/79 119/85 97/78 111/72   BP Location:  Right arm Right arm Right arm   Patient Position:  Lying Lying Lying   Pulse: 60 80 90 74   Resp: 16 16 19 19   Temp: 97.4 °F (36.3 °C) 98 °F (36.7 °C) 98.1 °F (36.7 °C) 97.9 °F (36.6 °C)   TempSrc: Oral Oral Oral Oral   SpO2: 99% 98% 98% 98%   Weight:       Height:         General: In no acute distress, afebrile  Respiratory: Clear to auscultation bilaterally  Cardiovascular: S1, S2, no appreciable murmur  Abdomen: Soft, nontender, BS +  MSK: Warm, no lower extremity edema, no clubbing or cyanosis  Neurologic: Alert and oriented x4    Lab Results   Component Value Date     (L) 08/19/2024    K 4.2 08/19/2024     08/19/2024    CO2 24 08/19/2024    BUN 22.0 08/19/2024    CREATININE 0.87 08/19/2024    CALCIUM 9.1 08/19/2024    EGFRNONAA >60 06/11/2021      Lab Results   Component Value Date    ALT 17 08/14/2024    AST 18 08/14/2024    ALKPHOS 59 08/14/2024    BILITOT 0.8 08/14/2024      Lab Results   Component Value Date    WBC 8.29 08/19/2024    HGB 12.1 (L) 08/19/2024    HCT 36.3 (L) 08/19/2024    MCV 89.2 08/19/2024     08/19/2024           Medications:   amLODIPine  10 mg Per NG tube Daily    levETIRAcetam  500 mg Oral BID        Current Facility-Administered Medications:     0.9% NaCl, , Intravenous, PRN    acetaminophen, 650 mg, Rectal, Q6H PRN    acetaminophen, 650 mg, Oral, Q6H PRN    ALPRAZolam, 0.25 mg, Per NG tube, Q4H PRN    bisacodyL, 10 mg, Rectal, Daily PRN    cloNIDine, 0.1 mg, Per NG tube, Q6H PRN    diphenhydrAMINE, 25 mg, Oral, Q6H PRN    hydrALAZINE, 10 mg, Intravenous, Q4H PRN    oxyCODONE, 5 mg, Per NG tube, Q6H PRN    polyethylene  glycol, 17 g, Oral, BID PRN    QUEtiapine, 100 mg, Per NG tube, Nightly PRN    traMADoL, 50 mg, Oral, Q6H PRN     Assessment/Plan:    Oropharyngeal dysphagia  Right thalamic capsular intraparenchymal hematoma with subarachnoid and intraventricular extension and right-to-left midline shift of 6 mm  Normocytic anemia, stable   UTI, POA, completed antibiotics   UDS with cocaine   Hypertension, controlled  History of Crohn's disease and GERD  Moderate malnutrition    Plan:   -continue analgesics as needed  -PT OT.  Currently awaiting SNF placement   -we will continue Keppra.  Agree with Seroquel for nighttime to help with agitation and confusion  -counseled on cocaine abstinence    SCDs      Tawanda Dahl MD

## 2024-08-24 NOTE — PT/OT/SLP PROGRESS
Physical Therapy Treatment    Patient Name:  Dell Garrett Jr.   MRN:  [id number]    Recommendations:     Discharge therapy intensity: Moderate Intensity Therapy   Discharge Equipment Recommendations: to be determined by next level of care  Barriers to discharge: Impaired mobility and Ongoing medical needs    Assessment:     Dell Garrett Jr. is a 59 y.o. male admitted with a medical diagnosis of R basal ganglia hemorrhage with R to L shift and intraventricular extension, elevated BP .  He presents with the following impairments/functional limitations: weakness, impaired endurance, impaired self care skills, impaired functional mobility, impaired balance, decreased lower extremity function, decreased upper extremity function, decreased coordination, decreased safety awareness .    Rehab Prognosis: Good; patient would benefit from acute skilled PT services to address these deficits and reach maximum level of function.    Recent Surgery: * No surgery found *      Plan:     During this hospitalization, patient would benefit from acute PT services 6 x/week to address the identified rehab impairments via gait training, therapeutic activities, neuromuscular re-education and progress toward the following goals:    Plan of Care Expires:  09/12/24    Subjective     Chief Complaint:   Patient/Family Comments/goals: to get better  Pain/Comfort:         Objective:     Communicated with pts nurse prior to session.  Patient found HOB elevated with telemetry, blood pressure cuff upon PT entry to room.     General Precautions: Standard, fall  Orthopedic Precautions: N/A  Braces: N/A  Respiratory Status: Room air  Blood Pressure: 117/72  Skin Integrity: Visible skin intact      Functional Mobility:  Bed Mobility:     Rolling Left:  moderate assistance and of 2 persons  Rolling Right: moderate assistance and of 2 persons  Scooting: moderate assistance, maximal assistance, and of 2 persons  Supine to Sit: moderate assistance and of 2  persons  Sit to Supine: moderate assistance and of 2 persons  Transfers:     Sit to Stand:  moderate assistance and of 2 persons with hand-held assist and L knee blocked x 3 trials  Balance: sitting EOB min to CGA, standing Mod A     Therapeutic Activities/Exercises:  In standing w/ B HHA, worked on wt shift, hip extension and posture.  Poor trunk control and L knee buckled requiring knee to be blocked.      Education:  Patient provided with verbal education and demonstrations education regarding PT role/goals/POC, fall prevention, and safety awareness.  Additional teaching is warranted.     Patient left HOB elevated with all lines intact, call button in reach, and nurse notified    GOALS:   Multidisciplinary Problems       Physical Therapy Goals          Problem: Physical Therapy    Goal Priority Disciplines Outcome Goal Variances Interventions   Physical Therapy Goal     PT, PT/OT Progressing     Description: Goals to be met by: 2024     Patient will increase functional independence with mobility by performin. Supine to sit with MInimal Assistance  2. Sit to supine with MInimal Assistance  3. Sit to stand transfer with Minimal Assistance  4. Pt to follow 75% of commands.   5. Gait  x 75 feet with Minimal Assistance using Rolling Walker vs LRAD.                          Time Tracking:     PT Received On: 24  PT Start Time: 1034     PT Stop Time: 1057  PT Total Time (min): 23 min     Billable Minutes: Therapeutic Activity 23    Treatment Type: Treatment  PT/PTA: PTA     Number of PTA visits since last PT visit: 4     2024

## 2024-08-25 LAB — MAGNESIUM SERPL-MCNC: 2 MG/DL (ref 1.6–2.6)

## 2024-08-25 PROCEDURE — 21400001 HC TELEMETRY ROOM

## 2024-08-25 PROCEDURE — 83735 ASSAY OF MAGNESIUM: CPT

## 2024-08-25 PROCEDURE — 25000003 PHARM REV CODE 250: Performed by: INTERNAL MEDICINE

## 2024-08-25 PROCEDURE — 36415 COLL VENOUS BLD VENIPUNCTURE: CPT

## 2024-08-25 PROCEDURE — 25000003 PHARM REV CODE 250: Performed by: HOSPITALIST

## 2024-08-25 RX ADMIN — TRAMADOL HYDROCHLORIDE 50 MG: 50 TABLET, COATED ORAL at 09:08

## 2024-08-25 RX ADMIN — AMLODIPINE BESYLATE 10 MG: 5 TABLET ORAL at 08:08

## 2024-08-25 RX ADMIN — OXYCODONE HYDROCHLORIDE 5 MG: 5 TABLET ORAL at 09:08

## 2024-08-25 RX ADMIN — LEVETIRACETAM 500 MG: 500 TABLET, FILM COATED ORAL at 09:08

## 2024-08-25 RX ADMIN — LEVETIRACETAM 500 MG: 500 TABLET, FILM COATED ORAL at 08:08

## 2024-08-25 RX ADMIN — QUETIAPINE FUMARATE 100 MG: 100 TABLET ORAL at 09:08

## 2024-08-25 RX ADMIN — TRAMADOL HYDROCHLORIDE 50 MG: 50 TABLET, COATED ORAL at 08:08

## 2024-08-25 NOTE — NURSING
Nurses Note -- 4 Eyes      8/25/2024   1:08 AM      Skin assessed during: Daily Assessment      [x] No Altered Skin Integrity Present    []Prevention Measures Documented      [] Yes- Altered Skin Integrity Present or Discovered   [] LDA Added if Not in Epic (Describe Wound)   [] New Altered Skin Integrity was Present on Admit and Documented in LDA   [] Wound Image Taken    Wound Care Consulted? No    Attending Nurse:  Rosemary Bethea RN/Staff Member: DANIEL Nelson

## 2024-08-25 NOTE — NURSING
Nurses Note -- 4 Eyes      8/25/2024   11:36 AM      Skin assessed during: Daily Assessment      [x] No Altered Skin Integrity Present    []Prevention Measures Documented      [] Yes- Altered Skin Integrity Present or Discovered   [] LDA Added if Not in Epic (Describe Wound)   [] New Altered Skin Integrity was Present on Admit and Documented in LDA   [] Wound Image Taken    Wound Care Consulted? No    Attending Nurse:  Tessa Bethea RN/Staff Member:  Rosemary

## 2024-08-25 NOTE — PROGRESS NOTES
[facility] Hospital Medicine Progress Note        Chief Complaint: Inpatient Follow-up for hemorrhagic CVA    HPI:   59 y.o.  male with a past medical history of Crohn's disease and GERD. The patient presented to [facility] on 8/10/2024 with aphasia, left arm weakness and fall.  Patient presented to the ED with bradycardia, normocytic anemia, bicarb 22.  CT of the head revealed a 4.2 x 3.8 cm intraparenchymal hemorrhage of the right thalamus capsular region with mild surrounding edema and effacement of the adjacent sulci in sylvian fissure with compression of the right lateral ventricle with an 8 mm midline shift attestation of bleed into the right lateral ventricle and 3rd ventricle with mild dilation of the left lateral ventricle compared to the right suggesting possible early obstructive hydrocephalus.  There was no noted to be sulci hyperdensities along the right temporal lobe reflecting subarachnoid hemorrhage.  CTA of the head and neck revealed extensive intracranial hemorrhage and mild dolichoectasia of the basilar artery.  Neurology and Neurosurgery was consulted and patient was started on Cleviprex drip for blood pressure parameters less than 140/90 and admitted to ICU.  Repeat CT of the head revealed stable bleed with unchanged shift and no new hemorrhages.  Patient was started on 3% saline drip on 08/11/2024.  Cleviprex and saline drip has been discontinued.  Patient was currently has been NG tube and refusing speech therapy.  Repeat CT of the head on 08/11/2024 revealed unchanged appearance of intraparenchymal hematoma and unchanged 6 mm right-to-left midline shift without hydrocephalus.  Patient developed a fever of 100.6° F on morning 08/13/2024.  No leukocytosis.  Patient has been getting Xanax and Seroquel for agitation.  Patient was cleared for downgrade from ICU to regular floor on 08/13/2024.  Neurology and neurosurgery has signed off.    He is admitted to  hospital medicine services for further medical management.  He had persistent left sided weakness. He was clared by  for a dysphagia diet.     CM working on placement.     Interval Hx:   Patient today awake and comfortable. Spilled coffee over himself. He does severe left UE and LE weakness. He is tolerating a dysphagia diet. Has been afebrile.     Case was discussed with patient's nurse and  on the floor.    Objective/physical exam:  General: In no acute distress  Chest: Clear to auscultation bilaterally  Heart: RRR, +S1, S2, no appreciable murmur  Abdomen: Soft, nontender, BS +  MSK: Warm, no lower extremity edema, no clubbing or cyanosis  Neurologic: Strength 1/5 left UE and LE     VITAL SIGNS: 24 HRS MIN & MAX LAST   Temp  Min: 97.8 °F (36.6 °C)  Max: 100.1 °F (37.8 °C) 99.6 °F (37.6 °C)   BP  Min: 101/68  Max: 123/86 120/78   Pulse  Min: 79  Max: 96  82   Resp  Min: 16  Max: 18 17   SpO2  Min: 91 %  Max: 100 % 100 %     I have reviewed the following labs:  Recent Labs   Lab 08/19/24  0230   WBC 8.29   RBC 4.07*   HGB 12.1*   HCT 36.3*   MCV 89.2   MCH 29.7   MCHC 33.3   RDW 12.0      MPV 8.8     Recent Labs   Lab 08/19/24  0230 08/21/24  0617 08/23/24  0345 08/24/24  0318 08/25/24  0655   *  --   --   --   --    K 4.2  --   --   --   --      --   --   --   --    CO2 24  --   --   --   --    BUN 22.0  --   --   --   --    CREATININE 0.87  --   --   --   --    CALCIUM 9.1  --   --   --   --    MG 2.20   < > 2.30 2.20 2.00    < > = values in this interval not displayed.     Microbiology Results (last 7 days)       Procedure Component Value Units Date/Time    Blood Culture [[id number]]  (Normal) Collected: 08/13/24 1128    Order Status: Completed Specimen: Blood, Venous Updated: 08/18/24 1300     Blood Culture No Growth at 5 days    Blood Culture [[id number]]  (Normal) Collected: 08/13/24 1128    Order Status: Completed Specimen: Blood, Venous Updated: 08/18/24 1300     Blood  Culture No Growth at 5 days             See below for Radiology    Assessment/Plan:  Oropharyngeal dysphagia: improved   Right thalamic capsular intraparenchymal hematoma with subarachnoid and intraventricular extension and right-to-left midline shift of 6 mm  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   Normocytic anemia, stable   UTI, POA, completed antibiotics   UDS with cocaine   Hypertension, controlled  History of Crohn's disease and GERD  Moderate malnutrition    Plan:  Patient doing well and looks comfortable  Continue strict aspiration, fall and decubitus precautions    Current meds reviewed  BP stable     Continue OT/PT    STOPPED daily mag check    VTE prophylaxis: SCD    Patient condition:  Fair    Anticipated discharge and Disposition:   Rehab/SNF      All diagnosis and differential diagnosis have been reviewed; assessment and plan has been documented; I have personally reviewed the labs and test results that are presently available; I have reviewed the patients medication list; I have reviewed the consulting providers response and recommendations. I have reviewed or attempted to review medical records based upon their availability    All of the patient's questions have been  addressed and answered. Patient's is agreeable to the above stated plan. I will continue to monitor closely and make adjustments to medical management as needed.    Portions of this note dictated using EMR integrated voice recognition software, and may be subject to voice recognition errors not corrected at proofreading. Please contact writer for clarification if needed.   _____________________________________________________________________    Malnutrition Status:    Scheduled Med:   amLODIPine  10 mg Per NG tube Daily    levETIRAcetam  500 mg Oral BID      Continuous Infusions:     PRN Meds:    Current Facility-Administered Medications:     0.9% NaCl, , Intravenous, PRN    acetaminophen, 650 mg, Rectal, Q6H PRN    acetaminophen, 650  mg, Oral, Q6H PRN    ALPRAZolam, 0.25 mg, Per NG tube, Q4H PRN    bisacodyL, 10 mg, Rectal, Daily PRN    cloNIDine, 0.1 mg, Per NG tube, Q6H PRN    diphenhydrAMINE, 25 mg, Oral, Q6H PRN    haloperidol lactate, 5 mg, Intravenous, Q6H PRN    hydrALAZINE, 10 mg, Intravenous, Q4H PRN    oxyCODONE, 5 mg, Per NG tube, Q6H PRN    polyethylene glycol, 17 g, Oral, BID PRN    QUEtiapine, 100 mg, Per NG tube, Nightly PRN    traMADoL, 50 mg, Oral, Q6H PRN     Radiology:  I have personally reviewed the following imaging and agree with the radiologist.     XR Gastric tube check, non-radiologist performed  EXAMINATION:  XR GASTRIC TUBE CHECK, NON-RADIOLOGIST PERFORMED    CLINICAL HISTORY:  NG tube placement;    TECHNIQUE:  AP View(s) of the abdomen was performed.    COMPARISON:  08/13/2024    FINDINGS:  Enteric tube terminates in the stomach.    Electronically signed by: Isabella Jacques  Date:    08/16/2024  Time:    11:17      Denilson Bojorquez MD  Department of Hospital Medicine   [facility]   08/25/2024

## 2024-08-26 LAB
ANION GAP SERPL CALC-SCNC: 9 MEQ/L
BASOPHILS # BLD AUTO: 0.05 X10(3)/MCL
BASOPHILS NFR BLD AUTO: 0.4 %
BUN SERPL-MCNC: 20.1 MG/DL (ref 8.4–25.7)
CALCIUM SERPL-MCNC: 9.1 MG/DL (ref 8.8–10)
CHLORIDE SERPL-SCNC: 102 MMOL/L (ref 98–107)
CO2 SERPL-SCNC: 25 MMOL/L (ref 23–31)
CREAT SERPL-MCNC: 0.94 MG/DL (ref 0.73–1.18)
CREAT/UREA NIT SERPL: 21
EOSINOPHIL # BLD AUTO: 0.16 X10(3)/MCL (ref 0–0.9)
EOSINOPHIL NFR BLD AUTO: 1.4 %
ERYTHROCYTE [DISTWIDTH] IN BLOOD BY AUTOMATED COUNT: 12.1 % (ref 11.5–17)
GFR SERPLBLD CREATININE-BSD FMLA CKD-EPI: >60 ML/MIN/1.73/M2
GLUCOSE SERPL-MCNC: 100 MG/DL (ref 82–115)
HCT VFR BLD AUTO: 36.7 % (ref 42–52)
HGB BLD-MCNC: 11.9 G/DL (ref 14–18)
IMM GRANULOCYTES # BLD AUTO: 0.06 X10(3)/MCL (ref 0–0.04)
IMM GRANULOCYTES NFR BLD AUTO: 0.5 %
LYMPHOCYTES # BLD AUTO: 1.01 X10(3)/MCL (ref 0.6–4.6)
LYMPHOCYTES NFR BLD AUTO: 8.9 %
MCH RBC QN AUTO: 29.2 PG (ref 27–31)
MCHC RBC AUTO-ENTMCNC: 32.4 G/DL (ref 33–36)
MCV RBC AUTO: 90 FL (ref 80–94)
MONOCYTES # BLD AUTO: 0.79 X10(3)/MCL (ref 0.1–1.3)
MONOCYTES NFR BLD AUTO: 6.9 %
NEUTROPHILS # BLD AUTO: 9.34 X10(3)/MCL (ref 2.1–9.2)
NEUTROPHILS NFR BLD AUTO: 81.9 %
NRBC BLD AUTO-RTO: 0 %
PLATELET # BLD AUTO: 384 X10(3)/MCL (ref 130–400)
PMV BLD AUTO: 8.6 FL (ref 7.4–10.4)
POTASSIUM SERPL-SCNC: 5.1 MMOL/L (ref 3.5–5.1)
RBC # BLD AUTO: 4.08 X10(6)/MCL (ref 4.7–6.1)
SODIUM SERPL-SCNC: 136 MMOL/L (ref 136–145)
WBC # BLD AUTO: 11.41 X10(3)/MCL (ref 4.5–11.5)

## 2024-08-26 PROCEDURE — 25000003 PHARM REV CODE 250: Performed by: INTERNAL MEDICINE

## 2024-08-26 PROCEDURE — 36415 COLL VENOUS BLD VENIPUNCTURE: CPT | Performed by: INTERNAL MEDICINE

## 2024-08-26 PROCEDURE — 25000003 PHARM REV CODE 250: Performed by: HOSPITALIST

## 2024-08-26 PROCEDURE — 85025 COMPLETE CBC W/AUTO DIFF WBC: CPT | Performed by: INTERNAL MEDICINE

## 2024-08-26 PROCEDURE — 97535 SELF CARE MNGMENT TRAINING: CPT | Mod: CO

## 2024-08-26 PROCEDURE — 21400001 HC TELEMETRY ROOM

## 2024-08-26 PROCEDURE — 97530 THERAPEUTIC ACTIVITIES: CPT

## 2024-08-26 PROCEDURE — 97530 THERAPEUTIC ACTIVITIES: CPT | Mod: CO

## 2024-08-26 PROCEDURE — 92507 TX SP LANG VOICE COMM INDIV: CPT

## 2024-08-26 PROCEDURE — 80048 BASIC METABOLIC PNL TOTAL CA: CPT | Performed by: INTERNAL MEDICINE

## 2024-08-26 RX ADMIN — LEVETIRACETAM 500 MG: 500 TABLET, FILM COATED ORAL at 08:08

## 2024-08-26 RX ADMIN — AMLODIPINE BESYLATE 10 MG: 5 TABLET ORAL at 08:08

## 2024-08-26 RX ADMIN — QUETIAPINE FUMARATE 100 MG: 100 TABLET ORAL at 08:08

## 2024-08-26 RX ADMIN — OXYCODONE HYDROCHLORIDE 5 MG: 5 TABLET ORAL at 08:08

## 2024-08-26 NOTE — PT/OT/SLP PROGRESS
Occupational Therapy   Treatment    Name: Dell Garrett Jr.  MRN: [id number]  Admitting Diagnosis:  Intraparenchymal hemorrhage of brain       Recommendations:     Recommended therapy intensity at discharge: Moderate Intensity Therapy   Discharge Equipment Recommendations:  to be determined by next level of care  Barriers to discharge:       Assessment:     Dell Garrett Jr. is a 59 y.o. male with a medical diagnosis of Intraparenchymal hemorrhage of brain.  He presents with L side weak with pushing noted through R UE into L. Patient with little insight into deficits and unable to correct/redirect patient due to him being argumentative. . Performance deficits affecting function are weakness, impaired endurance, impaired self care skills, impaired functional mobility, impaired balance.     Rehab Prognosis:  Fair; patient would benefit from acute skilled OT services to address these deficits and reach maximum level of function.       Plan:     Patient to be seen 6 x/week to address the above listed problems via self-care/home management, therapeutic activities, therapeutic exercises  Plan of Care Expires: 09/12/24  Plan of Care Reviewed with: patient    Subjective     Pain/Comfort:       Objective:     Communicated with: RN prior to session.  Patient found HOB elevated with   upon OT entry to room.    General Precautions: Standard, fall    Orthopedic Precautions:N/A  Braces: N/A  Respiratory Status: Room air  Vital Signs: Blood Pressure: 115/78     Occupational Performance:     Bed Mobility:    Patient completed Supine to Sit with maximal assistance     Activities of Daily Living:  Feeding:  supervision upon entry patient sitting up in bed feeding self using spoon, noted sizable amount of food in L cheek.   Grooming: minimum assistance patient sitting EOB requiring total A for sitting balance while washing face using R UE requiring VC to wash L side of face.   Upper Body Dressing: total assistance donning/doffing  gown    Therapeutic Activities:  Patient sitting to EOB participating in WB through R UE and performing head shakes into L FOV for scanning and attending to L    Patient Education:  Patient provided with verbal education education regarding OT role/goals/POC.  Understanding was verbalized.      Patient left sitting edge of bed with  PT present.    GOALS:   Multidisciplinary Problems       Occupational Therapy Goals          Problem: Occupational Therapy    Goal Priority Disciplines Outcome Interventions   Occupational Therapy Goal     OT, PT/OT Progressing    Description: Goals to be met by:  9/19/24    Patient will increase functional independence with ADLs by performing:    UE Dressing with Stand-by Assistance.  LE Dressing with mod A  Grooming while seated at sink with min A  Assistance.  Toileting from toilet with mod A for hygiene and clothing management.   Toilet transfer to toilet with mod A  Increased functional strength to 3/5 LUE through therEX, neuromuscular re-ed.  Pt will visually attend to L side of environment with no cues                           Time Tracking:     OT Date of Treatment: 08/26/24  OT Start Time: 1434  OT Stop Time: 1500  OT Total Time (min): 26 min    Billable Minutes:Therapeutic Activity 2    OT/MAC: MAC     Number of MAC visits since last OT visit: 5    8/26/2024

## 2024-08-26 NOTE — PLAN OF CARE
Sent referrals for SNF to long term placement to facilities in the [place] area, per daughter's request. Awaiting response from the following facilties: [facility], [facility], [place], [place], [place], [place], [facility], [place], [facility], [facility], [facility], [facility], [facility], and [facility].     [place] - out of network  [facility] - out of network  [place] - can't meet the patient's needs  [facility] - Care exceeds capacity  [facility] - Care exceeds capacity  [facility] - care exceeds capacity  [facility] - care exceeds capacity  [facility] - unable to meet needs  [facility]- Care exceeds capacity  [facility] - drug use  [facility] - drug use  [facility] - drug use  [facility] - Care exceeds capacity  [facility] - care exceeds capacity  [facility] - out of network  [place] - unable to meet pt needs       [facility] - attempted to reach out for update on referral, but no answer. Will continue to reach out.  [facility] - referral under review;  for admissions coordinator  [facility] - currently under review

## 2024-08-26 NOTE — NURSING
Nurses Note -- 4 Eyes      8/26/2024   7:26 AM      Skin assessed during: Q Shift Change      [x] No Altered Skin Integrity Present    []Prevention Measures Documented      [] Yes- Altered Skin Integrity Present or Discovered   [] LDA Added if Not in Epic (Describe Wound)   [] New Altered Skin Integrity was Present on Admit and Documented in LDA   [] Wound Image Taken    Wound Care Consulted? No    Attending Nurse:  DANIEL Bain    Second RN/Staff Member:  DANIEL Zamorano

## 2024-08-26 NOTE — PT/OT/SLP PROGRESS
Physical Therapy Treatment    Patient Name:  Dell Garrett Jr.   MRN:  [id number]    Recommendations:     Discharge therapy intensity: Moderate Intensity Therapy   Discharge Equipment Recommendations: to be determined by next level of care  Barriers to discharge: Impaired mobility and Ongoing medical needs    Assessment:     Dell Garrett Jr. is a 59 y.o. 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  He presents with the following impairments/functional limitations: weakness, impaired endurance, impaired self care skills, impaired functional mobility, gait instability, impaired balance, impaired cognition, decreased lower extremity function, decreased upper extremity function Pt with fair tolerance to PT treatment. Pt able to side step 4 steps to L towards head of bed with maxAx2 for upright posture, weightshift, and LLE advancement. ModA-maxA for static sitting EOB with significant pushing to L. Requires frequent redirection to task; able to perform task with average of 2-3 redirections.    Rehab Prognosis: Good; patient would benefit from acute skilled PT services to address these deficits and reach maximum level of function.    Recent Surgery: * No surgery found *      Plan:     During this hospitalization, patient would benefit from acute PT services 6 x/week to address the identified rehab impairments via gait training, therapeutic activities, therapeutic exercises, neuromuscular re-education and progress toward the following goals:    Plan of Care Expires:  09/12/24    Subjective     Chief Complaint: tired  Patient/Family Comments/goals: to get stronger  Pain/Comfort:  Pain Rating 1: 0/10      Objective:     Communicated with NSG prior to session.  Patient found sitting edge of bed with telemetry upon PT entry to room with handoff from OT.    General Precautions: Standard, fall  Orthopedic Precautions: N/A  Braces:  N/A  Respiratory Status: Room air      Functional Mobility:  Bed Mobility:     Sit to Supine: maximal assistance  Transfers:     Sit to Stand:  moderate assistance and of 2 persons with hand-held assist  Gait: 4 sidesteps to L to head of bed with maxAx2 for weightshift, LLE advancement, and upright posture; significant L lateral lean with frequent redirection to task.   Balance: ModA-maxA for static sitting balance; significant pushing to L with no change after weightbearing on R elbow.  ModA progressing to SBA when leaning on R elbow.     Therapeutic Activities/Exercises:  R elbow weightbearing to reduce L lateral pushing x 4 minutes     Education:  Patient provided with verbal education education regarding PT role/goals/POC.  Additional teaching is warranted.     Patient left HOB elevated with all lines intact, call button in reach, pressure relief boots, and bed alarm on    GOALS:   Multidisciplinary Problems       Physical Therapy Goals          Problem: Physical Therapy    Goal Priority Disciplines Outcome Goal Variances Interventions   Physical Therapy Goal     PT, PT/OT Progressing     Description: Goals to be met by: 2024     Patient will increase functional independence with mobility by performin. Supine to sit with MInimal Assistance  2. Sit to supine with MInimal Assistance  3. Sit to stand transfer with Minimal Assistance  4. Pt to follow 75% of commands.   5. Gait  x 75 feet with Minimal Assistance using Rolling Walker vs LRAD.                          Time Tracking:     PT Received On: 24  PT Start Time: 1455     PT Stop Time: 1525  PT Total Time (min): 30 min     Billable Minutes: Therapeutic Activity 2    Treatment Type: Treatment  PT/PTA: PT     Number of PTA visits since last PT visit: 5     2024

## 2024-08-26 NOTE — PROGRESS NOTES
[facility] Hospital Medicine Progress Note        Chief Complaint: Inpatient Follow-up for hemorrhagic CVA    HPI:   59 y.o.  male with a past medical history of Crohn's disease and GERD. The patient presented to [facility] on 8/10/2024 with aphasia, left arm weakness and fall.  Patient presented to the ED with bradycardia, normocytic anemia, bicarb 22.  CT of the head revealed a 4.2 x 3.8 cm intraparenchymal hemorrhage of the right thalamus capsular region with mild surrounding edema and effacement of the adjacent sulci in sylvian fissure with compression of the right lateral ventricle with an 8 mm midline shift attestation of bleed into the right lateral ventricle and 3rd ventricle with mild dilation of the left lateral ventricle compared to the right suggesting possible early obstructive hydrocephalus.  There was no noted to be sulci hyperdensities along the right temporal lobe reflecting subarachnoid hemorrhage.  CTA of the head and neck revealed extensive intracranial hemorrhage and mild dolichoectasia of the basilar artery.  Neurology and Neurosurgery was consulted and patient was started on Cleviprex drip for blood pressure parameters less than 140/90 and admitted to ICU.  Repeat CT of the head revealed stable bleed with unchanged shift and no new hemorrhages.  Patient was started on 3% saline drip on 08/11/2024.  Cleviprex and saline drip has been discontinued.  Patient was currently has been NG tube and refusing speech therapy.  Repeat CT of the head on 08/11/2024 revealed unchanged appearance of intraparenchymal hematoma and unchanged 6 mm right-to-left midline shift without hydrocephalus.  Patient developed a fever of 100.6° F on morning 08/13/2024.  No leukocytosis.  Patient has been getting Xanax and Seroquel for agitation.  Patient was cleared for downgrade from ICU to regular floor on 08/13/2024.  Neurology and neurosurgery has signed off.    He is admitted to  hospital medicine services for further medical management.  He had persistent left sided weakness. He was clared by ST for a dysphagia diet.     CM working on placement.     Interval Hx:   Patient today awake and comfortable.  Requesting for his Rinvoq for Crohn's disease to be continued as he was associating his abdominal pain with not receiving this medication.  This medication is non formulary, I have educated him that he would need to bring from home.    Case was discussed with patient's nurse and  on the floor.    Objective/physical exam:  General: In no acute distress  Chest: Clear to auscultation bilaterally  Heart: RRR, +S1, S2, no appreciable murmur  Abdomen: Soft, nontender, BS +  MSK: Warm, no lower extremity edema, no clubbing or cyanosis  Neurologic: Strength 1/5 left UE and LE     VITAL SIGNS: 24 HRS MIN & MAX LAST   Temp  Min: 97.3 °F (36.3 °C)  Max: 99.7 °F (37.6 °C) 97.3 °F (36.3 °C)   BP  Min: 108/70  Max: 120/78 108/70   Pulse  Min: 65  Max: 94  76   Resp  Min: 16  Max: 18 16   SpO2  Min: 94 %  Max: 100 % 96 %     I have reviewed the following labs:  Recent Labs   Lab 08/26/24  0439   WBC 11.41   RBC 4.08*   HGB 11.9*   HCT 36.7*   MCV 90.0   MCH 29.2   MCHC 32.4*   RDW 12.1      MPV 8.6     Recent Labs   Lab 08/23/24  0345 08/24/24  0318 08/25/24  0655 08/26/24  0439   NA  --   --   --  136   K  --   --   --  5.1   CL  --   --   --  102   CO2  --   --   --  25   BUN  --   --   --  20.1   CREATININE  --   --   --  0.94   CALCIUM  --   --   --  9.1   MG 2.30 2.20 2.00  --      Microbiology Results (last 7 days)       ** No results found for the last 168 hours. 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   Normocytic anemia, stable   UTI, POA, completed  antibiotics   UDS with cocaine   Hypertension, controlled  History of Crohn's disease and GERD- on Rinvoq at home   Moderate malnutrition    Plan:  Patient doing well and looks comfortable  Continue strict aspiration, fall and decubitus precautions    Current meds reviewed  BP stable     Continue OT/PT    STOPPED daily mag check    VTE prophylaxis: SCD    Patient condition:  Fair    Anticipated discharge and Disposition:   Rehab/SNF      All diagnosis and differential diagnosis have been reviewed; assessment and plan has been documented; I have personally reviewed the labs and test results that are presently available; I have reviewed the patients medication list; I have reviewed the consulting providers response and recommendations. I have reviewed or attempted to review medical records based upon their availability    All of the patient's questions have been  addressed and answered. Patient's is agreeable to the above stated plan. I will continue to monitor closely and make adjustments to medical management as needed.    Portions of this note dictated using EMR integrated voice recognition software, and may be subject to voice recognition errors not corrected at proofreading. Please contact writer for clarification if needed.   _____________________________________________________________________    Malnutrition Status:    Scheduled Med:   amLODIPine  10 mg Per NG tube Daily    levETIRAcetam  500 mg Oral BID      Continuous Infusions:     PRN Meds:    Current Facility-Administered Medications:     0.9% NaCl, , Intravenous, PRN    acetaminophen, 650 mg, Rectal, Q6H PRN    acetaminophen, 650 mg, Oral, Q6H PRN    ALPRAZolam, 0.25 mg, Per NG tube, Q4H PRN    bisacodyL, 10 mg, Rectal, Daily PRN    cloNIDine, 0.1 mg, Per NG tube, Q6H PRN    diphenhydrAMINE, 25 mg, Oral, Q6H PRN    haloperidol lactate, 5 mg, Intravenous, Q6H PRN    hydrALAZINE, 10 mg, Intravenous, Q4H PRN    oxyCODONE, 5 mg, Per NG tube, Q6H PRN     polyethylene glycol, 17 g, Oral, BID PRN    QUEtiapine, 100 mg, Per NG tube, Nightly PRN    traMADoL, 50 mg, Oral, Q6H PRN     Radiology:  I have personally reviewed the following imaging and agree with the radiologist.     X-Ray Knee 1 or 2 View Left  Narrative: EXAMINATION:  XR KNEE 1 OR 2 VIEW LEFT    CLINICAL HISTORY:  pain;    TECHNIQUE:  Two-view    COMPARISON:  Right knee radiographs August 30, 2023.    FINDINGS:  There is mild degenerative narrowing of the medial compartment of the left knee.  No significant subchondral sclerosis or osteophytes.  No acute fracture or dislocation.  Impression: Left knee medial compartment mild degenerative narrowing.    Electronically signed by: Garland Salazar  Date:    08/25/2024  Time:    14:11      Thairy G Reyes, MD  Department of Hospital Medicine   [facility]   08/26/2024

## 2024-08-26 NOTE — NURSING
Nurses Note -- 4 Eyes      8/25/2024   7:28 PM      Skin assessed during: Daily Assessment      [x] No Altered Skin Integrity Present    []Prevention Measures Documented      [] Yes- Altered Skin Integrity Present or Discovered   [] LDA Added if Not in Epic (Describe Wound)   [] New Altered Skin Integrity was Present on Admit and Documented in LDA   [] Wound Image Taken    Wound Care Consulted? No    Attending Nurse:  Rosemary Bethea RN/Staff Member:  Tessa

## 2024-08-26 NOTE — PT/OT/SLP PROGRESS
[facility]  Speech Language Pathology Department  Therapy Progress Note    Patient Name:  Dell Garrett Jr.   MRN:  [id number]    Recommendations     General recommendations:  dysphagia therapy and cognitive-linguistic therapy  Communication strategies:  go to room if call light pushed    Discharge therapy intensity: Moderate Intensity Therapy   Barriers to safe discharge: limited insight into deficits and safety awareness    Subjective     Patient awake, alert, and cooperative.    Pain/Comfort: Pain Rating 1: 0/10  Spiritual/Cultural/Moravian Beliefs/Practices that affect care: no    Objective     Problem Solving: Decreased insight into deficits and requires maximum cues to identify problems/solutions re: safety, medical situation, etc.     Dysarthria Drills: Educated patient on compensatory strategies for improved speech intelligibility. Able to use skills at the phrase level with 30% accuracy.     Assessment     Pt continues to present with cognitive deficits and  oropharyngeal dysphagia requiring diet modification to improve swallow safety and efficiency.     Goals     Multidisciplinary Problems       SLP Goals          Problem: SLP    Goal Priority Disciplines Outcome   SLP Goal     SLP    Description: LTG: Patient will tolerate safest and least restrictive PO diet without signs/sx of aspiration.  STG: Meal trial of pureed solids and mildly thick liquids via cup without adequate REILLY and no signs/sx of aspiration. -met  STG: Minced/moist solids with adequate bolus preparation/mastication and without signs/sx of aspiration. -met  STG: Soft/bite sized solids with adequate bolus preparation/mastication and without signs/sx of aspiration.  STG: Laryngeal/BOT exercises Min A.    LTG: Patient will improve cognitive-linguistic skills in order to return to PLOF.  STG: Dysarthria drills at the phrase level with 90% accuracy.  STG: Problem solving/reasoning tasks with 90% accuracy.  STG: Short  term memory tasks with 90% accuracy.                     Patient Education     Patient provided with verbal education regarding ST POC.  Understanding was verbalized, however additional teaching warranted.    Plan     Will continue to follow and tx as appropriate.    SLP Follow-Up:  Yes   Patient to be seen:  5 x/week   Plan of Care expires:  09/04/24  Plan of Care reviewed with:  patient     Time Tracking     SLP Treatment Date:   08/26/24  Speech Start Time:  0940  Speech Stop Time:  1000     Speech Total Time (min):  20 min    Billable minutes:  Speech/Language Therapy, 20 minutes     08/26/2024

## 2024-08-27 PROCEDURE — 97164 PT RE-EVAL EST PLAN CARE: CPT

## 2024-08-27 PROCEDURE — 21400001 HC TELEMETRY ROOM

## 2024-08-27 PROCEDURE — 92526 ORAL FUNCTION THERAPY: CPT

## 2024-08-27 PROCEDURE — 25000003 PHARM REV CODE 250: Performed by: INTERNAL MEDICINE

## 2024-08-27 PROCEDURE — 25000003 PHARM REV CODE 250: Performed by: HOSPITALIST

## 2024-08-27 PROCEDURE — 97168 OT RE-EVAL EST PLAN CARE: CPT

## 2024-08-27 PROCEDURE — 97530 THERAPEUTIC ACTIVITIES: CPT

## 2024-08-27 PROCEDURE — 25000003 PHARM REV CODE 250: Performed by: NURSE PRACTITIONER

## 2024-08-27 RX ORDER — AMLODIPINE BESYLATE 5 MG/1
10 TABLET ORAL DAILY
Status: DISCONTINUED | OUTPATIENT
Start: 2024-08-27 | End: 2024-09-01

## 2024-08-27 RX ORDER — QUETIAPINE FUMARATE 100 MG/1
100 TABLET, FILM COATED ORAL NIGHTLY PRN
Status: DISCONTINUED | OUTPATIENT
Start: 2024-08-27 | End: 2024-08-28

## 2024-08-27 RX ORDER — CLONIDINE HYDROCHLORIDE 0.1 MG/1
0.1 TABLET ORAL EVERY 6 HOURS PRN
Status: DISCONTINUED | OUTPATIENT
Start: 2024-08-27 | End: 2024-09-19 | Stop reason: HOSPADM

## 2024-08-27 RX ORDER — OXYCODONE HYDROCHLORIDE 5 MG/1
5 TABLET ORAL EVERY 6 HOURS PRN
Status: DISCONTINUED | OUTPATIENT
Start: 2024-08-27 | End: 2024-09-19 | Stop reason: HOSPADM

## 2024-08-27 RX ORDER — ALPRAZOLAM 0.25 MG/1
0.25 TABLET ORAL EVERY 4 HOURS PRN
Status: DISCONTINUED | OUTPATIENT
Start: 2024-08-27 | End: 2024-09-19 | Stop reason: HOSPADM

## 2024-08-27 RX ADMIN — OXYCODONE HYDROCHLORIDE 5 MG: 5 TABLET ORAL at 07:08

## 2024-08-27 RX ADMIN — TRAMADOL HYDROCHLORIDE 50 MG: 50 TABLET, COATED ORAL at 09:08

## 2024-08-27 RX ADMIN — ALPRAZOLAM 0.25 MG: 0.25 TABLET ORAL at 08:08

## 2024-08-27 RX ADMIN — LEVETIRACETAM 500 MG: 500 TABLET, FILM COATED ORAL at 09:08

## 2024-08-27 RX ADMIN — LEVETIRACETAM 500 MG: 500 TABLET, FILM COATED ORAL at 08:08

## 2024-08-27 RX ADMIN — AMLODIPINE BESYLATE 10 MG: 5 TABLET ORAL at 09:08

## 2024-08-27 RX ADMIN — DIPHENHYDRAMINE HYDROCHLORIDE 25 MG: 25 CAPSULE ORAL at 08:08

## 2024-08-27 RX ADMIN — QUETIAPINE FUMARATE 100 MG: 100 TABLET ORAL at 08:08

## 2024-08-27 RX ADMIN — ALPRAZOLAM 0.25 MG: 0.25 TABLET ORAL at 09:08

## 2024-08-27 NOTE — PROGRESS NOTES
[facility] Hospital Medicine Progress Note        Chief Complaint: Inpatient Follow-up    HPI:     59-year-old male with significant history of Crohn's disease, GERD, chronic tobacco use/substance use-cocaine presented to the ED with complaints of acute onset left-sided weakness, facial droop and aphasia along with sudden onset headache.  Imaging in the ED revealed intraparenchymal hemorrhage of right thalamus with surrounding edema, compression of right lateral ventricle with 8 mm midline shift, early obstructive hydrocephalus, possible subarachnoid hemorrhage. patient admitted to ICU, neurology and neurosurgery services were consulted.  CT angiogram with mild dolichoectasia of basilar artery , echocardiogram negative for bubble study, EF intact, grade 2 diastolic dysfunction. Conservative management, recommended to keep BP at goal, Keppra for seizure prevention, close neuro checks, 3% saline initiated.  Follow up CT with stable findings, therapy services consulted.  Holding anticoagulation, antiplatelets.  Hypertonic saline discontinued, initiate high-intensity statin, patient downgraded to hospital medicine services, case management consulted for placement, neurology recommended to continue to avoid antiplatelets.  Cleared for p.o. intake by speech, tolerating well.  Patient continues to participate with therapy services, case management actively working on placement    Interval Hx:   Patient seen at bedside, hemodynamics are stable, comfortably sitting in the couch, complaining of left-sided weakness, no change in neuro status compared to admit, no acute events overnight    Objective/physical exam:  General: In no acute distress, afebrile  Chest: Clear to auscultation bilaterally  Heart: S1, S2, no appreciable murmur  Abdomen: Soft, nontender, BS +  MSK: Warm, no lower extremity edema, no clubbing or cyanosis  Neurologic:  Awake, alert  VITAL SIGNS: 24 HRS MIN & MAX LAST   Temp   Min: 97.3 °F (36.3 °C)  Max: 99.4 °F (37.4 °C) 98.6 °F (37 °C)   BP  Min: 107/81  Max: 119/73 119/73   Pulse  Min: 76  Max: 94  79   Resp  Min: 17  Max: 18 17   SpO2  Min: 96 %  Max: 98 % 97 %       Recent Labs   Lab 08/26/24  0439   WBC 11.41   RBC 4.08*   HGB 11.9*   HCT 36.7*   MCV 90.0   MCH 29.2   MCHC 32.4*   RDW 12.1      MPV 8.6         Recent Labs   Lab 08/25/24  0655 08/26/24  0439   NA  --  136   K  --  5.1   CL  --  102   CO2  --  25   BUN  --  20.1   CREATININE  --  0.94   CALCIUM  --  9.1   MG 2.00  --           Microbiology Results (last 7 days)       ** No results found for the last 168 hours. **             Scheduled Med:   amLODIPine  10 mg Per NG tube Daily    levETIRAcetam  500 mg Oral BID          Assessment/Plan:    Acute intra parenchymal hemorrhage-right thalamic capsular with edema/midline shift/intraventricular extension/early obstructive hydrocephalus  Acute subarachnoid hemorrhage   Left-sided weakness/partial aphasia secondary to above  Oropharyngeal dysphagia secondary to above-cleared for modified diet  New diagnosis essential HTN-stable  History of Crohn's disease/GERD  Chronic tobacco use/substance use  Prophylaxis    Neurology and neurosurgery signed off   Per latest neurology note recommending to continue to avoid antiplatelets  Repeat CT with stable findings   CT angiogram with no AVMs or aneurysm  Negative bubble study  Continue aggressive therapy services   [facility] for seizure prevention   BP at goal on amlodipine   DVT prophylaxis-bilateral SCDs    Placement is challenging   Case management actively working on it, discussed with case management   They have requested assistance from his       Filomena Summers MD   08/27/2024

## 2024-08-27 NOTE — PLAN OF CARE
Sent updates to [facility], [facility], and [facility]. Will continue to follow up with facilities for decision on these referrals.    Will also continue to reach out to  for [facility] for assistance in placing this patient.     Spoke to CHIKI Lazo at [facility]. She stated that since we have received multiple in network denials, they will agree to a single case agreement once an accepting facility is found. She advised me send referrals to out of network facilities at this time. Referrals sent to SNF to long term facilities in a 20 mile radius of [place]. Currently awaiting response from: [facility], [facility], [facility], [facility], [facility], [facility], [facility], [facility], [facility], [facility], and [facility].    Please see note from 8/22 to see detailed list of referrals sent so far and response to each referral.

## 2024-08-27 NOTE — PT/OT/SLP RE-EVAL
Occupational Therapy   Re-evaluation    Name: Dell Garrett Jr.  MRN: [id number]  Admitting Diagnosis: CVA  Recent Surgery: * No surgery found *      Recommendations:     Discharge therapy intensity: Moderate Intensity Therapy   Discharge Equipment Recommendations:  to be determined by next level of care  Barriers to discharge:       Assessment:     Dell Garrett Jr. is a 59 y.o. male with a medical diagnosis of R basal ganglia hemorrhage with R to L shift and intraventricular extension, elevated BP.  He presents with the following performance deficits affecting function: weakness, impaired endurance, impaired sensation, impaired self care skills, impaired functional mobility, gait instability, impaired balance, decreased safety awareness, decreased lower extremity function, decreased upper extremity function, decreased coordination, impaired cognition, visual deficits.      Pt tolerated OT evaluation fairly well, pleasant and motivated to move around. Pt required Max A x2 for bed mobility and Min-Max A for sitting balance EOB due to left lateral pushing. Pt able to stand with Min A x2 HHA, unable to progress with stepping. Pt requesting back to bed due to L knee pain. Pt remains with flaccid LUE and left neglect. Pt unable to track into left visual field despite cues and keeps Right eye closed due to reports of it hurting. Recommending moderate intensity therapy upon discharge.    Rehab Prognosis: Good; patient would benefit from acute skilled OT services to address these deficits and reach maximum level of function.       Plan:     Patient to be seen 6 x/week to address the above listed problems via self-care/home management, therapeutic activities, therapeutic exercises, neuromuscular re-education  Plan of Care Expires: 09/27/24  Plan of Care Reviewed with: patient    Subjective     Chief Complaint: L knee pain  Patient/Family Comments/goals: to get better    Pain/Comfort:  Pain Rating 1: other (see comments)  (reports pain in LLE and L shoulder; did not rate)  Pain Addressed 1: Reposition, Distraction, Cessation of Activity, Nurse notified    Patients cultural, spiritual, Mandaen conflicts given the current situation: no    Objective:     OT communicated with RN prior to session.      Patient was found HOB elevated with telemetry, peripheral IV, PureWick upon OT entry to room.    General Precautions: Standard, fall  Orthopedic Precautions: N/A  Braces: N/A    Vital Signs: Blood Pressure: 110/70  HR: 71    Bed Mobility:    Patient completed Supine to Sit with maximal assistance x2  Patient completed Sit to Supine with maximal assistance x2    Functional Mobility/Transfers:  Patient completed Sit <> Stand Transfer with minimum assistance x2 with hand-held assist     Activities of Daily Living:  Upper Body Dressing: maximal assistance    Lower Body Dressing: maximal assistance for donning socks    Functional Cognition:  Safety Awareness: Impaired.      Visual Perceptual Skills:  Visual Disturbances Identified: left neglect  Pursuits: did not track into left visual field, keeps R eye closed majority of session due to reported pain    Upper Extremity Function:  Right Upper Extremity:   Strength: WFL  Sensation: WFL    Left Upper Extremity:  Range of Motion: WFL  Strength: 0/5  Sensation: Impaired.      Balance:   Static sitting balance: min-max A  Dynamic sitting balance:max A seated EOB    Therapeutic Positioning  Risk for acquired pressure injuries is increased due to impaired mobility.    OT interventions performed during the course of today's session in an effort to prevent and/or reduce acquired pressure injuries:   Education was provided on benefits of and recommendations for therapeutic positioning  Therapeutic positioning was provided at the conclusion of session to offload all bony prominences for the prevention and/or reduction of pressure injuries    Skin assessment: all bony prominences were assessed     Findings: no redness or breakdown noted    OT recommendations for therapeutic positioning throughout hospitalization:   Follow [facility] Pressure Injury Prevention Protocol  Geomat recommended for sacral protection while UIC    Patient Education:  Patient provided with verbal education education regarding OT role/goals/POC, fall prevention, safety awareness, and Discharge/DME recommendations.  Understanding was verbalized, however additional teaching warranted.     Patient left left sidelying with all lines intact, call button in reach, wedge under R side, pressure relief boots, and bed alarm on and LUE elevated on pillows.    GOALS:   Multidisciplinary Problems       Occupational Therapy Goals          Problem: Occupational Therapy    Goal Priority Disciplines Outcome Interventions   Occupational Therapy Goal     OT, PT/OT Progressing    Description: Goals to be met by:  9/27/24    Patient will increase functional independence with ADLs by performing:    UE Dressing with Stand-by Assistance.  LE Dressing with mod A  Grooming while seated at sink with min A  Assistance.  Toileting from toilet with mod A for hygiene and clothing management.   Toilet transfer to toilet with mod A  Increased functional strength to 3/5 LUE through therEX, neuromuscular re-ed.  Pt will visually attend to L side of environment with no cues                       History:     Past Medical History:   Diagnosis Date    Crohn's disease     GERD (gastroesophageal reflux disease)        History reviewed. No pertinent surgical history.    Time Tracking:     OT Date of Treatment: 08/27/24  OT Start Time: 1444  OT Stop Time: 1506  OT Total Time (min): 22 min    Billable Minutes:Re-eval 22 mins    8/27/2024

## 2024-08-27 NOTE — NURSING
Nurses Note -- 4 Eyes      8/27/2024   12:50 AM      Skin assessed during: Q Shift Change      [x] No Altered Skin Integrity Present    [x]Prevention Measures Documented      [] Yes- Altered Skin Integrity Present or Discovered   [] LDA Added if Not in Epic (Describe Wound)   [] New Altered Skin Integrity was Present on Admit and Documented in LDA   [] Wound Image Taken    Wound Care Consulted? No    Attending Nurse:  Kacie Bethea RN/Staff Member:  DANIEL Zamorano

## 2024-08-27 NOTE — PT/OT/SLP RE-EVAL
Physical Therapy Re-Evaluation    Patient Name:  Dell Garrett Jr.   MRN:  [id number]    Recommendations:     Discharge therapy intensity: Moderate Intensity Therapy   Discharge Equipment Recommendations: to be determined by next level of care   Barriers to discharge: Impaired mobility and Ongoing medical needs    Assessment:     Dell Garrett Jr. is a 59 y.o. 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  He presents with the following impairments/functional limitations: weakness, impaired endurance, impaired self care skills, impaired functional mobility, gait instability, impaired balance, impaired cognition, decreased lower extremity function, decreased upper extremity function .  Good tolerance to PT re-eval. He has made variable progress over course of stay since last re-eval. Appropriate for moderate intensity therapy on d/c. Pt continues to demonstrate significant pushing with RUE in sitting with poor trunk control and L lateral lean. Pt able to stand with handrail with Leyla and transfer with modA using stand-pivot. Attempted ambulation today, but pt unable to maintain adequate trunk control during weightshifting activities with maxA.     Rehab Prognosis: Good; patient would benefit from acute skilled PT services to address these deficits and reach maximum level of function.    Recent Surgery: * No surgery found *      Plan:     During this hospitalization, patient would benefit from acute PT services 6 x/week to address the identified rehab impairments via gait training, therapeutic activities, therapeutic exercises, neuromuscular re-education and progress toward the following goals:    Plan of Care Expires:  09/12/24    PT/PTA conference to discuss PT POC and patient's progression towards goals held with Elyse Monahan PTA.     Subjective     Chief Complaint: cold  Patient/Family Comments/goals: to get  stronger  Pain/Comfort:  Pain Rating 1: 0/10    Patients cultural, spiritual, Methodist conflicts given the current situation: no    Objective:     Communicated with NSG prior to session.  Patient found supine with telemetry, peripheral IV  upon PT entry to room.    General Precautions: Standard, fall  Orthopedic Precautions:N/A   Braces: N/A  Respiratory Status: Room air  Blood Pressure: NT       Exams:  Cognitive Exam:  Patient is oriented to Person and Place  RLE Strength: grossly 5/5  LLE Strength: grossly 0/5; some hip extension in standing   Skin integrity: Visible skin intact      Functional Mobility:  Bed Mobility:     Scooting: maximal assistance  Supine to Sit: moderate assistance  Transfers:     Sit to Stand:  minimum assistance with hand-held assist  Bed to Chair: moderate assistance with  no AD  using  Stand Pivot  Gait: attempted gait with RUE on handrail; LLE knee block with minimal buckling in standing. Pt unable to maintain trunk control during weighshift onto LLE. Unable to advance RLE independently. VC to maintain upright trunk posture.   Balance: maxA for static sitting balance with RUE positioned on bed rail or holding on to therapist; progressed to Leyla for a few seconds before pushing with RUE again       AM-PAC 6 CLICK MOBILITY  Total Score:10       Treatment & Education:  Patient provided with verbal education education regarding PT role/goals/POC, fall prevention, safety awareness, and discharge/DME recommendations.  Additional teaching is warranted.     Patient left up in chair with all lines intact, call button in reach, chair alarm on, RN notified, and Eleuterio belt in place .    GOALS:   Multidisciplinary Problems       Physical Therapy Goals          Problem: Physical Therapy    Goal Priority Disciplines Outcome Goal Variances Interventions   Physical Therapy Goal     PT, PT/OT Progressing     Description: Goals to be met by: 9/12/2024     Patient will increase functional independence  with mobility by performin. Supine to sit with MInimal Assistance  2. Sit to supine with MInimal Assistance  3. Sit to stand transfer with Minimal Assistance  4. Pt to follow 75% of commands.   5. Gait  x 75 feet with Minimal Assistance using Rolling Walker vs LRAD.                          History:     Past Medical History:   Diagnosis Date    Crohn's disease     GERD (gastroesophageal reflux disease)        History reviewed. No pertinent surgical history.    Time Tracking:     PT Received On: 24  PT Start Time: 924     PT Stop Time: 1004  PT Total Time (min): 40 min     Billable Minutes: Re-eval 1 and Therapeutic Activity 1       2024

## 2024-08-27 NOTE — PT/OT/SLP PROGRESS
[facility]  Speech Language Pathology Department  Dysphagia Therapy Progress Note    Patient Name:  Dell Garrett Jr.   MRN:  [id number]  Admitting Diagnosis: ***    Recommendations     General recommendations:  {SLP Recs:83650}  Solid texture recommendation:  Minced & Moist Diet - IDDSI Level 5  Liquid consistency recommendation: Mildly thick liquids - IDDSI Level 2   Medications: {medications:29020}  Aspiration precautions: {swallow strategies:77624}    Discharge therapy intensity: Moderate Intensity Therapy   Barriers to safe discharge:  {barriers:01264}    Subjective     Patient {affect:17496}.  Spiritual/Cultural/Tenriism Beliefs/Practices that affect care: no    Pain/Comfort: Pain Rating 1: 0/10    Respiratory Status:  {respiratory status:71022}    Objective     Oral Musculature  {[facility] Oral Mech:14384}    Therapeutic Activities:  Pt completed {dys ex:62988} exercises x*** with {cues:01017} cues.  Pt tolerated thermal stimulation to the anterior faucial pillars *** with *** swallow responses.  Laryngeal excursion ***.  Delay ***.    Therapeutic PO Trials:  Consistency Amount Fed By Oral Symptoms Pharyngeal Symptoms   {Consistency:65380} *** {Fed by:10072} {bse oral phase:96625} {bse pharyngeal symptoms:83062}   {Consistency:86213} *** {Fed by:78172} {bse oral phase:82084} {bse pharyngeal symptoms:04526}   {Consistency:85181} *** {Fed by:97493} {bse oral phase:22840} {bse pharyngeal symptoms:10383}   {Consistency:80815} *** {Fed by:47834} {bse oral phase:98623} {bse pharyngeal symptoms:21350}   {Consistency:98325} *** {Fed by:46729} {bse oral phase:22618} {bse pharyngeal symptoms:52956}   {Consistency:51281} *** {Fed by:03264} {bse oral phase:02068} {bse pharyngeal symptoms:53763}   {Consistency:83460} *** {Fed by:46627} {bse oral phase:85330} {bse pharyngeal symptoms:05381}     Assessment     Pt continues to present with {eldysphagia:03883} dysphagia {elporecs:45468}    Goals      Multidisciplinary Problems       SLP Goals          Problem: SLP    Goal Priority Disciplines Outcome   SLP Goal     SLP    Description: LTG: Patient will tolerate safest and least restrictive PO diet without signs/sx of aspiration.  STG: Meal trial of pureed solids and mildly thick liquids via cup without adequate REILLY and no signs/sx of aspiration. -met  STG: Minced/moist solids with adequate bolus preparation/mastication and without signs/sx of aspiration. -met  STG: Soft/bite sized solids with adequate bolus preparation/mastication and without signs/sx of aspiration.  STG: Laryngeal/BOT exercises Min A.    LTG: Patient will improve cognitive-linguistic skills in order to return to PLOF.  STG: Dysarthria drills at the phrase level with 90% accuracy.  STG: Problem solving/reasoning tasks with 90% accuracy.  STG: Short term memory tasks with 90% accuracy.                     Patient Education     {SLP pt education:59908}    Plan     Will continue to follow and tx as appropriate.    SLP Follow-Up:  Yes   Patient to be seen:  5 x/week   Plan of Care expires:  09/04/24  Plan of Care reviewed with:  patient       Time Tracking     SLP Treatment Date:   08/27/24  Speech Start Time:  1230  Speech Stop Time:  1245     Speech Total Time (min):  15 min    Billable minutes:  {olghslpminutes:83792}       08/27/2024

## 2024-08-27 NOTE — PLAN OF CARE
Problem: Occupational Therapy  Goal: Occupational Therapy Goal  Description: Goals to be met by:  9/27/24    Patient will increase functional independence with ADLs by performing:    UE Dressing with Stand-by Assistance.  LE Dressing with mod A  Grooming while seated at sink with min A  Assistance.  Toileting from toilet with mod A for hygiene and clothing management.   Toilet transfer to toilet with mod A  Increased functional strength to 3/5 LUE through therEX, neuromuscular re-ed.  Pt will visually attend to L side of environment with no cues  Outcome: Progressing

## 2024-08-28 PROCEDURE — 25000003 PHARM REV CODE 250: Performed by: INTERNAL MEDICINE

## 2024-08-28 PROCEDURE — 97535 SELF CARE MNGMENT TRAINING: CPT

## 2024-08-28 PROCEDURE — 21400001 HC TELEMETRY ROOM

## 2024-08-28 PROCEDURE — 25000003 PHARM REV CODE 250

## 2024-08-28 PROCEDURE — 97530 THERAPEUTIC ACTIVITIES: CPT | Mod: CQ

## 2024-08-28 PROCEDURE — 92526 ORAL FUNCTION THERAPY: CPT

## 2024-08-28 PROCEDURE — 25000003 PHARM REV CODE 250: Performed by: HOSPITALIST

## 2024-08-28 PROCEDURE — 97112 NEUROMUSCULAR REEDUCATION: CPT

## 2024-08-28 RX ORDER — GABAPENTIN 100 MG/1
100 CAPSULE ORAL 3 TIMES DAILY
Status: DISCONTINUED | OUTPATIENT
Start: 2024-08-28 | End: 2024-09-19 | Stop reason: HOSPADM

## 2024-08-28 RX ORDER — TRAZODONE HYDROCHLORIDE 100 MG/1
100 TABLET ORAL NIGHTLY PRN
Status: DISCONTINUED | OUTPATIENT
Start: 2024-08-28 | End: 2024-09-19 | Stop reason: HOSPADM

## 2024-08-28 RX ORDER — SERTRALINE HYDROCHLORIDE 50 MG/1
50 TABLET, FILM COATED ORAL DAILY
Status: DISCONTINUED | OUTPATIENT
Start: 2024-08-28 | End: 2024-09-19 | Stop reason: HOSPADM

## 2024-08-28 RX ORDER — TAMSULOSIN HYDROCHLORIDE 0.4 MG/1
0.4 CAPSULE ORAL DAILY
Status: DISCONTINUED | OUTPATIENT
Start: 2024-08-28 | End: 2024-09-19 | Stop reason: HOSPADM

## 2024-08-28 RX ORDER — RISPERIDONE 0.25 MG/1
0.5 TABLET ORAL 2 TIMES DAILY
Status: DISCONTINUED | OUTPATIENT
Start: 2024-08-28 | End: 2024-08-29

## 2024-08-28 RX ADMIN — AMLODIPINE BESYLATE 10 MG: 5 TABLET ORAL at 08:08

## 2024-08-28 RX ADMIN — SERTRALINE HYDROCHLORIDE 50 MG: 50 TABLET ORAL at 03:08

## 2024-08-28 RX ADMIN — OXYCODONE HYDROCHLORIDE 5 MG: 5 TABLET ORAL at 03:08

## 2024-08-28 RX ADMIN — TRAZODONE HYDROCHLORIDE 100 MG: 100 TABLET ORAL at 09:08

## 2024-08-28 RX ADMIN — TRAMADOL HYDROCHLORIDE 50 MG: 50 TABLET, COATED ORAL at 08:08

## 2024-08-28 RX ADMIN — LEVETIRACETAM 500 MG: 500 TABLET, FILM COATED ORAL at 09:08

## 2024-08-28 RX ADMIN — GABAPENTIN 100 MG: 100 CAPSULE ORAL at 03:08

## 2024-08-28 RX ADMIN — TAMSULOSIN HYDROCHLORIDE 0.4 MG: 0.4 CAPSULE ORAL at 03:08

## 2024-08-28 RX ADMIN — RISPERIDONE 0.5 MG: 0.25 TABLET, FILM COATED ORAL at 09:08

## 2024-08-28 RX ADMIN — LEVETIRACETAM 500 MG: 500 TABLET, FILM COATED ORAL at 08:08

## 2024-08-28 RX ADMIN — GABAPENTIN 100 MG: 100 CAPSULE ORAL at 09:08

## 2024-08-28 NOTE — PT/OT/SLP PROGRESS
Occupational Therapy   Treatment    Name: Dell Garrett Jr.  MRN: [id number]  Admitting Diagnosis:  Intraparenchymal hemorrhage of brain       Recommendations:     Recommended therapy intensity at discharge: Moderate Intensity Therapy   Discharge Equipment Recommendations:  to be determined by next level of care  Barriers to discharge:   (medical needs and increased physical assist required)    Assessment:     Dell Garrett Jr. is a 59 y.o. male with a medical diagnosis of Intraparenchymal hemorrhage of brain.  He presents with Diagnoses of Acute focal neurological deficit and Stroke were pertinent to this visit.  . Performance deficits affecting function are weakness, impaired endurance, visual deficits, abnormal tone, impaired cognition, impaired self care skills, impaired sensation, decreased coordination, impaired coordination, decreased upper extremity function, impaired fine motor, impaired functional mobility, decreased lower extremity function, gait instability, impaired balance, pain, decreased safety awareness.     Rehab Prognosis:  Good; patient would benefit from acute skilled OT services to address these deficits and reach maximum level of function.       Plan:     Patient to be seen 6 x/week to address the above listed problems via self-care/home management, therapeutic activities, therapeutic exercises, neuromuscular re-education  Plan of Care Expires: 09/27/28  Plan of Care Reviewed with: patient    Subjective     Pain/Comfort:  Pain Rating 1:  (did not rate)  Location - Side 1: Left  Location - Orientation 1: generalized  Location 1: shoulder  Pain Addressed 1: Reposition, Distraction, Nurse notified, Cessation of Activity  Pain Rating Post-Intervention 1:  (didnot rate)    Objective:     Communicated with: nsg prior to session.  Patient found supine with telemetry, PureWick, peripheral IV, pulse ox (continuous) upon OT entry to room.    General Precautions: Standard, fall, aspiration    Orthopedic  Precautions:N/A  Braces: N/A  Respiratory Status: Room air  Vital Signs: Blood Pressure: 109/81     Occupational Performance:     Bed Mobility:    Patient completed Scooting/Bridging with moderate assistance  Patient completed Supine to Sit with moderate assistance  Patient completed Sit to Supine with moderate assistance     Functional Mobility/Transfers:  Patient completed Sit <> Stand Transfer with moderate assistance and of 2 persons  with  hand-held assist   Functional Mobility: Mod A of 2 lateral steps to HOB with HHA of 2; pt requires maximal verbal and tactile cues for performance; assist required with weight shifting laterally and advancement of LLE; L lateral lean in stance     Activities of Daily Living:  Feeding:  contact guard assistance and minimum assistance drip thickened liquid from cup; spillage noted from L 2/2 impaired closure; cues to sip from R side to decrease spillage; cues for safety awareness with small amounts of liquid at a time   Lower Body Dressing: moderate assistance pt able to cesar R socks with supported long sitting in bed; pt requires verbal and visual cues for performance and use of rian tech ; pt unable to cesar L sock at this time despite technique   Toileting: total assistance purewick in place; attempts made to assist pt with urinal; pt significantly distracted and refusing use of urinal and/or simulated toileting task     Therapeutic Activities:  Pt with significantly impaired cognition; perseverating over using the bathroom; poor attention span to task; easily agitated and L neglect noted; pt requires maximal redirection to tasks at times, as well as verbal and tactile cues     Increased L lateral pushing noted while seated and in stance , and also with L neglect; R lateral forearm leaning performed in efforts to decrease pushing; visual scanning and attention tasks to L performed during forearm leaning; pt able to turn to L with maximal cues and identify some objects in L  visual field      Lateral forearm press ups performed   While seated, R trunk extension performed and reaching axs with RUE in order to decrease pushing ; pt with poor attention to task and significant distractibility     Stood x 2 trials from EOB with HHA of 2- supporting LUE; performance as above       Supine bridging performed; Pt requires assist to flex B knees and place feet into bed, but able to hold in flexed position for bridging       Therapeutic Positioning    OT interventions performed during the course of today's session in an effort to prevent and/or reduce acquired pressure injuries:   Education was provided on benefits of and recommendations for therapeutic positioning      [facility] 6 Click ADL: 16    Patient Education:  Patient provided with verbal education and demonstrations education regarding OT role/goals/POC, safety awareness, and home exercise program .  Additional teaching is warranted.      Patient left supine with all lines intact, call button in reach, bed alarm on, and nsg notified.    GOALS:   Multidisciplinary Problems       Occupational Therapy Goals          Problem: Occupational Therapy    Goal Priority Disciplines Outcome Interventions   Occupational Therapy Goal     OT, PT/OT Progressing    Description: Goals to be met by:  9/27/24    Patient will increase functional independence with ADLs by performing:    UE Dressing with Stand-by Assistance.  LE Dressing with mod A  Grooming while seated at sink with min A  Assistance.  Toileting from toilet with mod A for hygiene and clothing management.   Toilet transfer to toilet with mod A  Increased functional strength to 3/5 LUE through therEX, neuromuscular re-ed.  Pt will visually attend to L side of environment with no cues                       Time Tracking:     OT Date of Treatment: 08/28/24  OT Start Time: 1105  OT Stop Time: 1138  OT Total Time (min): 33 min    Billable Minutes:Self Care/Home Management 13  Neuromuscular Re-education  20    OT/MAC: OT     Number of MAC visits since last OT visit: 1    8/28/2024

## 2024-08-28 NOTE — PROGRESS NOTES
Inpatient Nutrition Assessment    Admit Date: 8/10/2024   Total duration of encounter: 18 days   Patient Age: 59 y.o.    Nutrition Recommendation/Prescription     Regular diet as tolerated, consistency per SLP, will add double portions.  Continue Boost Very High Calorie (provides 530 kcal, 22 g protein per serving) BID.    Communication of Recommendations: reviewed with nurse    Nutrition Assessment     Malnutrition Assessment/Nutrition-Focused Physical Exam    Malnutrition Context: acute illness or injury (08/12/24 1142)  Malnutrition Level: moderate (08/12/24 1142)  Energy Intake (Malnutrition):  (does not meet criteria) (08/12/24 1142)  Weight Loss (Malnutrition):  (does not meet criteria) (08/12/24 1142)  Subcutaneous Fat (Malnutrition): mild depletion (08/12/24 1142)     Upper Arm Region (Subcutaneous Fat Loss): mild depletion     Muscle Mass (Malnutrition): mild depletion (08/12/24 1142)     Clavicle Bone Region (Muscle Loss): mild depletion                             A minimum of two characteristics is recommended for diagnosis of either severe or non-severe malnutrition.    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 with left-sided weakness and suppressed alertness  Elevated blood pressure, no previous dx of HTN   Hypercholesterolemia   Elevated creatinine, resolved    Relevant Medical History: Crohn's disease, GERD, and chronic tobacco use     Scheduled Medications:  amLODIPine, 10 mg, Daily  gabapentin, 100 mg, TID  levETIRAcetam, 500 mg, BID  risperiDONE, 0.5 mg, BID  sertraline, 50 mg, Daily  tamsulosin, 0.4 mg, Daily    Continuous Infusions: none       PRN Medications:   0.9% NaCl, , PRN  acetaminophen, 650 mg, Q6H  PRN  acetaminophen, 650 mg, Q6H PRN  ALPRAZolam, 0.25 mg, Q4H PRN  bisacodyL, 10 mg, Daily PRN  cloNIDine, 0.1 mg, Q6H PRN  diphenhydrAMINE, 25 mg, Q6H PRN  haloperidol lactate, 5 mg, Q6H PRN  hydrALAZINE, 10 mg, Q4H PRN  oxyCODONE, 5 mg, Q6H PRN  polyethylene glycol, 17 g, BID PRN  traMADoL, 50 mg, Q6H PRN  traZODone, 100 mg, Nightly PRN    Calorie Containing IV Medications: no significant kcals from medications at this time    Recent Labs   Lab 08/22/24  0306 08/23/24  0345 08/24/24  0318 08/25/24  0655 08/26/24  0439   NA  --   --   --   --  136   K  --   --   --   --  5.1   CALCIUM  --   --   --   --  9.1   MG 2.30 2.30 2.20 2.00  --    CO2  --   --   --   --  25   BUN  --   --   --   --  20.1   CREATININE  --   --   --   --  0.94   EGFRNORACEVR  --   --   --   --  >60   GLUCOSE  --   --   --   --  100   WBC  --   --   --   --  11.41   HGB  --   --   --   --  11.9*   HCT  --   --   --   --  36.7*     Nutrition Orders:  Diet Minced & Moist (IDDSI Level 5) No Straws, Supervision with Meals, Double Portions; Mildly Thick Liquids (IDDSI Level 2)  Dietary nutrition supplements BID; Boost Very High Calorie Nutritional Drink - Any flavor    Appetite/Oral Intake: good/% of meals  Factors Affecting Nutritional Intake: none identified  Social Needs Impacting Access to Food: none identified  Food/Zoroastrianism/Cultural Preferences: none reported  Food Allergies: no known food allergies  Last Bowel Movement: 08/22/24  Wound(s): no pressure injuries documented at this time     Comments    8/12/24 Patient confused, sister at bedside. Patient's sister reports good appetite/intake prior to admission, regular diet at home, denies weight loss. Patient reports liking vanilla/strawberry Ensure. He is currently NPO, had not been able to participate in SLP evaluation. Tube feeding recommendation provided if oral intake remains not feasible.    8/14/24 Patient remains NPO, started on tube feeding, no longer receiving calories from  "Cleviprex, will change to standard polymeric formula.    8/19/24 Tube feeding tolerated at goal.    8/23/24 Dysphagia diet, no longer on tube feeding, nurse reports patient eating 100% and asking for more food, will add double portions and Boost.    8/28/24: RN reports pt has been eating fairly well, nursing has been feeding him his Boost supplements BID. Noted last BM was 8/22.     Anthropometrics    Height: 5' 7" (170.2 cm), Height Method: Measured  Last Weight: 65.3 kg (143 lb 15.4 oz) (08/10/24 1346), Weight Method: Bed Scale  BMI (Calculated): 22.5  BMI Classification: normal (BMI 18.5-24.9)        Ideal Body Weight (IBW), Male: 148 lb     % Ideal Body Weight, Male (lb): 97.27 %                          Usual Weight Provided By: family/caregiver denies unintentional weight loss    Wt Readings from Last 5 Encounters:   08/10/24 65.3 kg (143 lb 15.4 oz)   05/12/24 65.8 kg (145 lb)   09/25/23 67.1 kg (148 lb)   08/30/23 67.1 kg (148 lb)   08/21/23 63.5 kg (140 lb)     Weight Change(s) Since Admission: none, new admit  Wt Readings from Last 1 Encounters:   08/10/24 1346 65.3 kg (143 lb 15.4 oz)   08/10/24 0743 65.3 kg (143 lb 15.4 oz)   Admit Weight: 65.3 kg (143 lb 15.4 oz) (08/10/24 0743), Weight Method: Bed Scale    Estimated Needs    Weight Used For Calorie Calculations: 65.3 kg (143 lb 15.4 oz)  Energy Calorie Requirements (kcal): 1984, 1.4 stress factor  Energy Need Method: Knott- Jeor  Weight Used For Protein Calculations: 65.3 kg (143 lb 15.4 oz)  Protein Requirements: 79-98 g, 1.2-1.5 g/kg  Fluid Requirements (mL): 1984, 1 ml/kcal      Enteral Nutrition Patient not receiving enteral nutrition support at this time.    Parenteral Nutrition Patient not receiving parenteral nutrition support at this time.    Evaluation of Received Nutrient Intake    Calories: meeting estimated needs  Protein: meeting estimated needs    Patient Education Not applicable.    Nutrition Diagnosis     PES: Inadequate energy " intake related to inability to consume sufficient nutrients as evidenced by less than 80% needs met. (resolved)  PES: Moderate acute disease or injury related malnutrition related to acute illness as evidenced by mild fat depletion and mild muscle depletion. (active)    Nutrition Interventions     Intervention(s): modified composition of enteral nutrition and collaboration with other providers  Goal: Meet greater than 80% of nutritional needs by follow-up. (goal met)    Nutrition Goals & Monitoring     Dietitian will monitor: food and beverage intake, energy intake, enteral nutrition intake, weight, electrolyte/renal panel, and beliefs/attitudes  Discharge planning:  regular diet with texture modifications per SLP  Nutrition Risk/Follow-Up: moderate (follow-up in 3-5 days)   Please consult if re-assessment needed sooner.

## 2024-08-28 NOTE — PT/OT/SLP PROGRESS
Physical Therapy Treatment    Patient Name:  Dell Garrett Jr.   MRN:  [id number]    Recommendations:     Discharge therapy intensity: Moderate Intensity Therapy   Discharge Equipment Recommendations: to be determined by next level of care  Barriers to discharge: Impaired mobility and Ongoing medical needs    Assessment:     Dell Garrett Jr. is a 59 y.o. male admitted with a medical diagnosis of R basal ganglia hemorrhage with R to L shift and intraventricular extension, elevated BP .  He presents with the following impairments/functional limitations: weakness, impaired endurance, impaired cognition, impaired self care skills, decreased upper extremity function, decreased lower extremity function, impaired functional mobility, gait instability, decreased safety awareness, impaired balance, impaired cardiopulmonary response to activity .    Rehab Prognosis: Good; patient would benefit from acute skilled PT services to address these deficits and reach maximum level of function.    Recent Surgery: * No surgery found *      Plan:     During this hospitalization, patient would benefit from acute PT services 6 x/week to address the identified rehab impairments via gait training, therapeutic activities and progress toward the following goals:    Plan of Care Expires:  09/12/24    Subjective     Chief Complaint:   Patient/Family Comments/goals:   Pain/Comfort:         Objective:     Communicated with nurse prior to session.  Patient found HOB elevated with telemetry, pulse ox (continuous), PureWick, peripheral IV, bed alarm upon PT entry to room.     General Precautions: Standard, fall  Orthopedic Precautions: N/A  Braces: N/A  Respiratory Status: Room air  Blood Pressure: 109/81  Skin Integrity: Visible skin intact      Functional Mobility:  Bed Mobility:     Supine to Sit: moderate assistance  Sit to Supine: moderate assistance  Transfers:     Sit to Stand:  moderate assistance and of 2 persons with hand-held assist  Gait:  pt performed lateral stepping towards HOB with modAx2 and HHA with assistance for lateral weight shifting, and LLE advancement.     Therapeutic Activities/Exercises:  Pt sat EOB x6 minutes with modA and noted pushing to L lateral side. Pt required assistance to achieve R elbow weight bearing. Noted increased impulsivity and distracted throughout.     Education:  Patient provided with verbal education education regarding fall prevention and safety awareness.  Additional teaching is warranted.     Patient left HOB elevated with all lines intact, call button in reach, and bed alarm on    GOALS:   Multidisciplinary Problems       Physical Therapy Goals          Problem: Physical Therapy    Goal Priority Disciplines Outcome Goal Variances Interventions   Physical Therapy Goal     PT, PT/OT Progressing     Description: Goals to be met by: 2024     Patient will increase functional independence with mobility by performin. Supine to sit with MInimal Assistance  2. Sit to supine with MInimal Assistance  3. Sit to stand transfer with Minimal Assistance  4. Pt to follow 75% of commands.   5. Gait  x 75 feet with Minimal Assistance using Rolling Walker vs LRAD.                          Time Tracking:     PT Received On: 24  PT Start Time: 1104     PT Stop Time: 1138  PT Total Time (min): 34 min     Billable Minutes: Therapeutic Activity 34    Treatment Type: Treatment  PT/PTA: PTA     Number of PTA visits since last PT visit: 2024

## 2024-08-28 NOTE — CONSULTS
"8/28/2024  Dell Garrett Jr.   1965   [id number]            Psychiatry Initial Consult Note    Date of Admission: 8/10/2024  7:37 AM    Chief Complaint: Impulsive Behavior    SUBJECTIVE:   History of Present Illness:   Dell Garrett Jr. is a 59 y.o. male with a past medical history that includes Crohn's disease, GERD, and substance use who presented to ED with left-sided weakness, facial droop, aphasia, and sudden onset headache.   Imaging in the ED revealed intraparenchymal hemorrhage of right thalamus with surrounding edema, compression of right lateral ventricle with 8 mm midline shift, early obstructive hydrocephalus, possible subarachnoid hemorrhage. CT angiogram with mild dolichoectasia of basilar artery , echocardiogram negative for bubble study, EF intact, grade 2 diastolic dysfunction. Reported to have irritability and episodes of impulsive behavior that has required security presence.    Seen at the bedside where he is overall cooperative, but provides a vague history. He denies previous psychiatric hospitalizations, but states "I was institutionalized". When asked what institution he reports "[facility]" and then states he was at a half-way. Reports previously being seen at [facility] with last visit "a while ago", but denies being treated for any specific symptoms and denies previously taking psychiatric medications except quetiapine "for sleep". He does admit to frequent irritability and displays a mildly irritable affect at this time. Denies episodes of depressed or anxious mood in last three months and denies a history of episodes consistent with sharif/hypomania. Circumstantial thought process with paranoid ideations present. Reports that staff is starving him. When asked about hallucinations he denies, but refers to "the demons out there" which appear to be staff. Also reports that he is able to "know what is going to happen" before it happens. He denies issues with sleep or appetite. Denies " suicidal ideations or homicidal ideations.      Past Psychiatric History:   Previous Psychiatric Hospitalizations: Denies   Previous Medication Trials: Quetiapine  Previous Suicide Attempts: Denies   Outpatient psychiatrist: None    Current Medications:   Home Psychiatric Meds: None    Past Medical/Surgical History:   Past Medical History:   Diagnosis Date    Crohn's disease     GERD (gastroesophageal reflux disease)      History reviewed. No pertinent surgical history.    Family Psychiatric History:   Denies     Allergies:   Review of patient's allergies indicates:   Allergen Reactions    Acetaminophen      Other reaction(s): chronns    Tramadol     Ibuprofen Nausea Only       Substance Abuse History:   Tobacco: Reports occasional use  Alcohol: Denies  Illicit Substances: History of cocaine use  Treatment: Denies        Scheduled Meds:    amLODIPine  10 mg Oral Daily    gabapentin  100 mg Oral TID    levETIRAcetam  500 mg Oral BID      PRN Meds:   Current Facility-Administered Medications:     0.9% NaCl, , Intravenous, PRN    acetaminophen, 650 mg, Rectal, Q6H PRN    acetaminophen, 650 mg, Oral, Q6H PRN    ALPRAZolam, 0.25 mg, Oral, Q4H PRN    bisacodyL, 10 mg, Rectal, Daily PRN    cloNIDine, 0.1 mg, Oral, Q6H PRN    diphenhydrAMINE, 25 mg, Oral, Q6H PRN    haloperidol lactate, 5 mg, Intravenous, Q6H PRN    hydrALAZINE, 10 mg, Intravenous, Q4H PRN    oxyCODONE, 5 mg, Oral, Q6H PRN    polyethylene glycol, 17 g, Oral, BID PRN    QUEtiapine, 100 mg, Oral, Nightly PRN    traMADoL, 50 mg, Oral, Q6H PRN   Psychotherapeutics (From admission, onward)      Start     Stop Route Frequency Ordered    08/27/24 0844  QUEtiapine tablet 100 mg         -- Oral Nightly PRN 08/27/24 0845    08/27/24 0836  ALPRAZolam tablet 0.25 mg         -- Oral Every 4 hours PRN 08/27/24 0839    08/24/24 1933  haloperidol lactate injection 5 mg         -- IV Every 6 hours PRN 08/24/24 1834              Social History:  Housing Status: Reports being  "homeless  Relationship Status/Sexual Orientation: Single   Children: 1  Education: 9th grade   Employment Status/Info: Not working    history: Denies  History of physical/sexual abuse: Denies   Access to gun: Denies       Legal History:   Past Charges/Incarcerations: Yes   Pending charges: Denies      OBJECTIVE:       Vitals   Vitals:    08/28/24 1100   BP: 123/77   Pulse: 75   Resp: 18   Temp: 97.7 °F (36.5 °C)        Labs/Imaging/Studies:   No results found for this or any previous visit (from the past 48 hour(s)).   No results found for: "PHENYTOIN", "PHENOBARB", "VALPROATE", "CBMZ"        Psychiatric Mental Status Exam:  General Appearance: appears stated age, dressed in hospital garb, lying in bed, in no acute distress  Arousal: alert  Behavior: cooperative, appropriate eye-contact  Movements and Motor Activity: no tics, no tremors, no akathisia, no dystonia, no evidence of tardive dyskinesia  Orientation: oriented to person, place, and time  Speech: normal rate, normal volume  Mood: Irritable  Affect: mood-congruent, irritable  Thought Process: circumstantial  Associations: no loosening of associations  Thought Content and Perceptions: no suicidal ideation, no homicidal ideation, + paranoid ideation, + delusions, no hallucinations  Recent and Remote Memory: mild impairments noted; per interview/observation with patient  Attention and Concentration: attentive to conversation; per interview/observation with patient  Fund of Knowledge: vocabulary appropriate; based on history, vocabulary, fund of knowledge, syntax, grammar, and content  Insight: questionable; based on understanding of severity of illness and HPI  Judgment: impaired; based on patient's behavior and HPI          ASSESSMENT/PLAN:   Diagnoses:  SCHIZOPHRENIA AND OTHER PSYCHOTIC DISORDERS; Psychotic Disorder NOS  and MOOD DISORDERS; Mood Disorder NOS (F39)       Past Medical History:   Diagnosis Date    Crohn's disease     GERD " (gastroesophageal reflux disease)           Problem lists and Management Plans:  Medication Management  Risperidone 0.5mg PO BID  Sertraline 50mg PO QD  Discontinue PRN quetiapine  Trazodone 50mg PO QHS PRN insomnia  Legal  PEC not recommended at this time.  Will continue to follow        Enzo Larkin

## 2024-08-28 NOTE — PT/OT/SLP PROGRESS
[facility]  Speech Language Pathology Department  Dysphagia Therapy Progress Note    Patient Name:  Dell Garrett Jr.   MRN:  [id number]    Recommendations     General recommendations:  dysphagia therapy and cognitive-linguistic therapy  Solid texture recommendation:  Minced & Moist Diet - IDDSI Level 5  Liquid consistency recommendation: Mildly thick liquids - IDDSI Level 2   Medications: crushed in puree  Aspiration precautions: small bites/sips, slow rate, and upright for PO intake    Discharge therapy intensity: Moderate Intensity Therapy   Barriers to safe discharge:  safety awareness and level of skilled assistance needed    Subjective     Patient awake and alert.  Spiritual/Cultural/Pentecostal Beliefs/Practices that affect care: no    Pain/Comfort: Pain Rating 1: 0/10    Objective     Patient requesting peanut butter sandwich. SLP explained diet restrictions given oral dysphagia. Patient refused majority of education; will need continued reinforcement with ST.    Therapeutic PO Trials:  Consistency Amount Fed By Oral Symptoms Pharyngeal Symptoms   Mildly thick liquid by straw 4 oz Self None None   Chewable solid Multiple trials Self Prolonged bolus formation/mastication  Slowed oral transit time  Oral residue None     Assessment     Pt continues to present with oropharyngeal dysphagia requiring diet modification to improve swallow safety and efficiency.    Goals     Multidisciplinary Problems       SLP Goals          Problem: SLP    Goal Priority Disciplines Outcome   SLP Goal     SLP    Description: LTG: Patient will tolerate safest and least restrictive PO diet without signs/sx of aspiration.  STG: Meal trial of pureed solids and mildly thick liquids via cup without adequate REILLY and no signs/sx of aspiration. -met  STG: Minced/moist solids with adequate bolus preparation/mastication and without signs/sx of aspiration. -met  STG: Soft/bite sized solids with adequate bolus  preparation/mastication and without signs/sx of aspiration.  STG: Laryngeal/BOT exercises Min A.    LTG: Patient will improve cognitive-linguistic skills in order to return to PLOF.  STG: Dysarthria drills at the phrase level with 90% accuracy.  STG: Problem solving/reasoning tasks with 90% accuracy.  STG: Short term memory tasks with 90% accuracy.                     Patient Education     Patient provided with verbal education regarding ST POC.  Understanding was verbalized, however additional teaching warranted.    Plan     Will continue to follow and tx as appropriate.    SLP Follow-Up:  Yes   Patient to be seen:  5 x/week   Plan of Care expires:  09/04/24  Plan of Care reviewed with:  patient       Time Tracking     SLP Treatment Date:   08/28/24  Speech Start Time:  1500  Speech Stop Time:  1510     Speech Total Time (min):  10 min    Billable minutes:  Treatment of Swallow Dysfunction, 10 minutes       08/28/2024

## 2024-08-28 NOTE — PROGRESS NOTES
[facility] Hospital Medicine Progress Note        Chief Complaint: Inpatient Follow-up    HPI:     59-year-old male with significant history of Crohn's disease, GERD, chronic tobacco use/substance use-cocaine presented to the ED with complaints of acute onset left-sided weakness, facial droop and aphasia along with sudden onset headache.  Imaging in the ED revealed intraparenchymal hemorrhage of right thalamus with surrounding edema, compression of right lateral ventricle with 8 mm midline shift, early obstructive hydrocephalus, possible subarachnoid hemorrhage. patient admitted to ICU, neurology and neurosurgery services were consulted.  CT angiogram with mild dolichoectasia of basilar artery , echocardiogram negative for bubble study, EF intact, grade 2 diastolic dysfunction. Conservative management, recommended to keep BP at goal, Keppra for seizure prevention, close neuro checks, 3% saline initiated.  Follow up CT with stable findings, therapy services consulted.  Holding anticoagulation, antiplatelets.  Hypertonic saline discontinued, initiate high-intensity statin, patient downgraded to hospital medicine services, case management consulted for placement, neurology recommended to continue to avoid antiplatelets.  Cleared for p.o. intake by speech, tolerating well.  Patient continues to participate with therapy services, case management actively working on placement.  Resumed home amlodipine, labs stable.    Interval Hx:   Patient seen at bedside, complaining of pain secondary to urinary retention, patient reports he is unable to urinate, hemodynamics stable today, nursing staff reports impulsive behavior/inappropriate behavior    Objective/physical exam:  General: In no acute distress, afebrile  Chest: Clear to auscultation bilaterally  Heart: S1, S2, no appreciable murmur  Abdomen: Soft, nontender, BS +  MSK: Warm, no lower extremity edema, no clubbing or cyanosis  Neurologic:   Awake, alert, intermittent impulsivity  VITAL SIGNS: 24 HRS MIN & MAX LAST   Temp  Min: 98.6 °F (37 °C)  Max: 98.9 °F (37.2 °C) 98.8 °F (37.1 °C)   BP  Min: 107/64  Max: 121/76 110/66   Pulse  Min: 76  Max: 82  76   Resp  Min: 18  Max: 18 18   SpO2  Min: 97 %  Max: 99 % 98 %       Recent Labs   Lab 08/26/24  0439   WBC 11.41   RBC 4.08*   HGB 11.9*   HCT 36.7*   MCV 90.0   MCH 29.2   MCHC 32.4*   RDW 12.1      MPV 8.6         Recent Labs   Lab 08/25/24  0655 08/26/24  0439   NA  --  136   K  --  5.1   CL  --  102   CO2  --  25   BUN  --  20.1   CREATININE  --  0.94   CALCIUM  --  9.1   MG 2.00  --           Microbiology Results (last 7 days)       ** No results found for the last 168 hours. **             Scheduled Med:   amLODIPine  10 mg Oral Daily    levETIRAcetam  500 mg Oral BID          Assessment/Plan:    Acute intra parenchymal hemorrhage-right thalamic capsular with edema/midline shift/intraventricular extension/early obstructive hydrocephalus  Acute subarachnoid hemorrhage   Left-sided weakness/partial aphasia secondary to above  Oropharyngeal dysphagia secondary to above-cleared for modified diet  New diagnosis essential HTN-stable  Impulsive behavior   Urinary retention  History of Crohn's disease/GERD  Chronic tobacco use/substance use  Prophylaxis    Neurology and neurosurgery signed off   Per latest neurology note recommending to continue to avoid antiplatelets  Repeat CT with stable findings   CT angiogram with no AVMs or aneurysm  Negative bubble study  Continue aggressive therapy services   [facility] for seizure prevention   BP at goal on amlodipine   Urinary retention noted, bladder scan-245 mL   Tamsulosin added, will recheck  In case of continued retention will insert [place]  Psych consulted for impulsive behavior  They have added Zoloft, risperidone and p.r.n. trazodone  DVT prophylaxis-bilateral SCDs    Placement is challenging   Case management actively working on it, discussed with case  management   They have requested assistance from his       Filomena Summers MD   08/28/2024

## 2024-08-28 NOTE — PLAN OF CARE
CM phoned psych consult to [facility]  for impulsive behavior . CM spoke to Junior Pacheco.at [facility]

## 2024-08-28 NOTE — PLAN OF CARE
Problem: Adult Inpatient Plan of Care  Goal: Plan of Care Review  Outcome: Progressing  Goal: Patient-Specific Goal (Individualized)  Outcome: Progressing  Goal: Absence of Hospital-Acquired Illness or Injury  Outcome: Progressing  Goal: Optimal Comfort and Wellbeing  Outcome: Progressing  Goal: Readiness for Transition of Care  Outcome: Progressing     Problem: Stroke, Intracerebral Hemorrhage  Goal: Optimal Cerebral Tissue Perfusion  Outcome: Progressing     Problem: Stroke, Intracerebral Hemorrhage  Goal: Effective Communication Skills  Outcome: Progressing     Problem: Stroke, Intracerebral Hemorrhage  Goal: Effective Urinary Elimination  Outcome: Progressing     Problem: Stroke, Intracerebral Hemorrhage  Goal: Safe and Effective Swallow  Outcome: Progressing

## 2024-08-28 NOTE — NURSING
Nurses Note -- 4 Eyes      8/27/2024   10:45 PM      Skin assessed during: Q Shift Change      [x] No Altered Skin Integrity Present    [x]Prevention Measures Documented      [] Yes- Altered Skin Integrity Present or Discovered   [] LDA Added if Not in Epic (Describe Wound)   [] New Altered Skin Integrity was Present on Admit and Documented in LDA   [] Wound Image Taken    Wound Care Consulted? No    Attending Nurse:  Charline Bethea RN/Staff Member:  Edi GILL RN

## 2024-08-29 LAB
ANION GAP SERPL CALC-SCNC: 8 MEQ/L
BUN SERPL-MCNC: 24.1 MG/DL (ref 8.4–25.7)
CALCIUM SERPL-MCNC: 9.8 MG/DL (ref 8.8–10)
CHLORIDE SERPL-SCNC: 102 MMOL/L (ref 98–107)
CO2 SERPL-SCNC: 28 MMOL/L (ref 23–31)
CREAT SERPL-MCNC: 0.93 MG/DL (ref 0.73–1.18)
CREAT/UREA NIT SERPL: 26
GFR SERPLBLD CREATININE-BSD FMLA CKD-EPI: >60 ML/MIN/1.73/M2
GLUCOSE SERPL-MCNC: 117 MG/DL (ref 82–115)
POTASSIUM SERPL-SCNC: 4.8 MMOL/L (ref 3.5–5.1)
SODIUM SERPL-SCNC: 138 MMOL/L (ref 136–145)

## 2024-08-29 PROCEDURE — 25000003 PHARM REV CODE 250: Performed by: INTERNAL MEDICINE

## 2024-08-29 PROCEDURE — 21400001 HC TELEMETRY ROOM

## 2024-08-29 PROCEDURE — 80048 BASIC METABOLIC PNL TOTAL CA: CPT | Performed by: INTERNAL MEDICINE

## 2024-08-29 PROCEDURE — 25000003 PHARM REV CODE 250: Performed by: HOSPITALIST

## 2024-08-29 PROCEDURE — 36415 COLL VENOUS BLD VENIPUNCTURE: CPT | Performed by: INTERNAL MEDICINE

## 2024-08-29 PROCEDURE — 25000003 PHARM REV CODE 250

## 2024-08-29 RX ORDER — RISPERIDONE 1 MG/ML
1 SOLUTION ORAL 2 TIMES DAILY
Status: DISCONTINUED | OUTPATIENT
Start: 2024-08-29 | End: 2024-09-19 | Stop reason: HOSPADM

## 2024-08-29 RX ADMIN — SERTRALINE HYDROCHLORIDE 50 MG: 50 TABLET ORAL at 08:08

## 2024-08-29 RX ADMIN — GABAPENTIN 100 MG: 100 CAPSULE ORAL at 08:08

## 2024-08-29 RX ADMIN — TAMSULOSIN HYDROCHLORIDE 0.4 MG: 0.4 CAPSULE ORAL at 08:08

## 2024-08-29 RX ADMIN — LEVETIRACETAM 500 MG: 500 TABLET, FILM COATED ORAL at 08:08

## 2024-08-29 RX ADMIN — RISPERIDONE 1 MG: 1 SOLUTION ORAL at 12:08

## 2024-08-29 RX ADMIN — RISPERIDONE 0.5 MG: 0.25 TABLET, FILM COATED ORAL at 08:08

## 2024-08-29 RX ADMIN — GABAPENTIN 100 MG: 100 CAPSULE ORAL at 02:08

## 2024-08-29 RX ADMIN — ALPRAZOLAM 0.25 MG: 0.25 TABLET ORAL at 08:08

## 2024-08-29 RX ADMIN — TRAZODONE HYDROCHLORIDE 100 MG: 100 TABLET ORAL at 08:08

## 2024-08-29 RX ADMIN — OXYCODONE HYDROCHLORIDE 5 MG: 5 TABLET ORAL at 12:08

## 2024-08-29 RX ADMIN — AMLODIPINE BESYLATE 10 MG: 5 TABLET ORAL at 08:08

## 2024-08-29 RX ADMIN — RISPERIDONE 1 MG: 1 SOLUTION ORAL at 08:08

## 2024-08-29 NOTE — PLAN OF CARE
Problem: Adult Inpatient Plan of Care  Goal: Plan of Care Review  Outcome: Not Progressing  Goal: Patient-Specific Goal (Individualized)  Outcome: Not Progressing  Goal: Absence of Hospital-Acquired Illness or Injury  Outcome: Not Progressing  Goal: Optimal Comfort and Wellbeing  Outcome: Not Progressing  Goal: Readiness for Transition of Care  Outcome: Not Progressing     Problem: Skin Injury Risk Increased  Goal: Skin Health and Integrity  Outcome: Not Progressing     Problem: Stroke, Intracerebral Hemorrhage  Goal: Optimal Coping  Outcome: Not Progressing  Goal: Effective Bowel Elimination  Outcome: Not Progressing  Goal: Optimal Cerebral Tissue Perfusion  Outcome: Not Progressing  Goal: Optimal Cognitive Function  Outcome: Not Progressing  Goal: Effective Communication Skills  Outcome: Not Progressing  Goal: Optimal Functional Ability  Outcome: Not Progressing  Goal: Optimal Nutrition Intake  Outcome: Not Progressing  Goal: Optimal Pain Control and Function  Outcome: Not Progressing  Goal: Effective Oxygenation and Ventilation  Outcome: Not Progressing  Goal: Improved Sensorimotor Function  Outcome: Not Progressing  Goal: Safe and Effective Swallow  Outcome: Not Progressing  Goal: Effective Urinary Elimination  Outcome: Not Progressing     Problem: Infection  Goal: Absence of Infection Signs and Symptoms  Outcome: Not Progressing

## 2024-08-29 NOTE — PROGRESS NOTES
[facility] Hospital Medicine Progress Note        Chief Complaint: Inpatient Follow-up    HPI:     59-year-old male with significant history of Crohn's disease, GERD, chronic tobacco use/substance use-cocaine presented to the ED with complaints of acute onset left-sided weakness, facial droop and aphasia along with sudden onset headache.  Imaging in the ED revealed intraparenchymal hemorrhage of right thalamus with surrounding edema, compression of right lateral ventricle with 8 mm midline shift, early obstructive hydrocephalus, possible subarachnoid hemorrhage. patient admitted to ICU, neurology and neurosurgery services were consulted.  CT angiogram with mild dolichoectasia of basilar artery , echocardiogram negative for bubble study, EF intact, grade 2 diastolic dysfunction. Conservative management, recommended to keep BP at goal, Keppra for seizure prevention, close neuro checks, 3% saline initiated.  Follow up CT with stable findings, therapy services consulted.  Holding anticoagulation, antiplatelets.  Hypertonic saline discontinued, initiate high-intensity statin, patient downgraded to hospital medicine services, case management consulted for placement, neurology recommended to continue to avoid antiplatelets.  Cleared for p.o. intake by speech, tolerating well.  Patient continues to participate with therapy services, case management actively working on placement.  Resumed home amlodipine, labs stable.  Psych consulted on 08/28 for impulsive behavior, initiated risperidone, Zoloft and p.r.n. trazodone   Concern for urinary retention, closely monitoring with bladder scan, added tamsulosin    Interval Hx:     Patient seen at bedside, still with impulsive behavior, was rude, disrespectful and inappropriate with nursing staff today morning, refused to take medications, had to call security, myself, security staff had long conversation with the patient about expected behavior.   Hemodynamics stable, able to void without much difficulty now       Objective/physical exam:  General: In no acute distress, afebrile  Chest: Clear to auscultation bilaterally  Heart: S1, S2, no appreciable murmur  Abdomen: Soft, nontender, BS +  MSK: Warm, no lower extremity edema, no clubbing or cyanosis  Neurologic:  Awake, alert, intermittent impulsivity  VITAL SIGNS: 24 HRS MIN & MAX LAST   Temp  Min: 97.3 °F (36.3 °C)  Max: 98.5 °F (36.9 °C) 98 °F (36.7 °C)   BP  Min: 97/61  Max: 123/77 97/61   Pulse  Min: 57  Max: 92  80   Resp  Min: 16  Max: 20 20   SpO2  Min: 96 %  Max: 98 % 97 %       Recent Labs   Lab 08/26/24  0439   WBC 11.41   RBC 4.08*   HGB 11.9*   HCT 36.7*   MCV 90.0   MCH 29.2   MCHC 32.4*   RDW 12.1      MPV 8.6         Recent Labs   Lab 08/25/24  0655 08/26/24  0439   NA  --  136   K  --  5.1   CL  --  102   CO2  --  25   BUN  --  20.1   CREATININE  --  0.94   CALCIUM  --  9.1   MG 2.00  --           Microbiology Results (last 7 days)       ** No results found for the last 168 hours. **             Scheduled Med:   amLODIPine  10 mg Oral Daily    gabapentin  100 mg Oral TID    levETIRAcetam  500 mg Oral BID    risperiDONE  0.5 mg Oral BID    sertraline  50 mg Oral Daily    tamsulosin  0.4 mg Oral Daily          Assessment/Plan:    Acute intra parenchymal hemorrhage-right thalamic capsular with edema/midline shift/intraventricular extension/early obstructive hydrocephalus  Acute subarachnoid hemorrhage   Left-sided weakness/partial aphasia secondary to above  Oropharyngeal dysphagia secondary to above-cleared for modified diet  New diagnosis essential HTN-stable  Impulsive behavior   Urinary retention-improved today  History of Crohn's disease/GERD  Chronic tobacco use/substance use  Prophylaxis    Neurology and neurosurgery signed off   Per latest neurology note recommending to continue to avoid antiplatelets  Repeat CT with stable findings   CT angiogram with no AVMs or  aneurysm  Negative bubble study  PT/OT  Keppra for seizure prevention   BP at goal on amlodipine   Urinary retention improved after adding tamsulosin on 08/28   Check BMP  Psych on board for impulsive behavior  They have added Zoloft, risperidone and p.r.n. trazodone as of 8/28  DVT prophylaxis-bilateral SCDs        Patient with frequent impulsive behavior   Inappropriate with medical staff   Had to call security today morning, myself/security had long conversation regarding appropriate behavior  Patient became calm   I also spoke to his sister and explained treatment plan of care in detail, case management actively working on placement, challenging      Filomena Summers MD   08/29/2024

## 2024-08-29 NOTE — PLAN OF CARE
Problem: Adult Inpatient Plan of Care  Goal: Plan of Care Review  Outcome: Progressing  Goal: Patient-Specific Goal (Individualized)  Outcome: Progressing  Goal: Absence of Hospital-Acquired Illness or Injury  Outcome: Progressing  Goal: Optimal Comfort and Wellbeing  Outcome: Progressing  Goal: Readiness for Transition of Care  Outcome: Progressing     Problem: Skin Injury Risk Increased  Goal: Skin Health and Integrity  Outcome: Progressing     Problem: Stroke, Intracerebral Hemorrhage  Goal: Effective Bowel Elimination  Outcome: Progressing     Problem: Stroke, Intracerebral Hemorrhage  Goal: Effective Communication Skills  Outcome: Progressing     Problem: Stroke, Intracerebral Hemorrhage  Goal: Safe and Effective Swallow  Outcome: Progressing     Problem: Infection  Goal: Absence of Infection Signs and Symptoms  Outcome: Progressing

## 2024-08-29 NOTE — PT/OT/SLP PROGRESS
Physical Therapy      Patient Name:  Dell Garrett Jr.   MRN:  [id number]    Patient not seen today secondary to Increased agitation. Upon entering room, food and drink scattered on floor and patient loudly requesting for security to be called. Assisted pt with dialing sisters number for emotional support. Security present and pt not appropriate for mobilization at this time 2/2 agitation.  Will follow-up.

## 2024-08-29 NOTE — PROGRESS NOTES
8/29/2024  Dell Garrett Jr.   1965   [id number]        Psychiatry Progress Note       SUBJECTIVE:   Dell Garrett Jr. is a 59 y.o. male with a past medical history that includes Crohn's disease, GERD, and substance use who presented to ED with left-sided weakness, facial droop, aphasia, and sudden onset headache.   Imaging in the ED revealed intraparenchymal hemorrhage of right thalamus with surrounding edema, compression of right lateral ventricle with 8 mm midline shift, early obstructive hydrocephalus, possible subarachnoid hemorrhage. CT angiogram with mild dolichoectasia of basilar artery , echocardiogram negative for bubble study, EF intact, grade 2 diastolic dysfunction. Reported to have irritability and episodes of impulsive behavior that has required security presence.     Had episode of agitation this morning requiring security presence. At time of interview he is resting quietly in bed without evidence of agitation and is cooperative with interview. He admits to being angry this morning with staff and this appears to be partly due to some paranoia he has concerning the nursing staff. He states he prefers to drink medications and does not like them crushed up. Informed him I could change risperidone to liquid but other medications would have to remain in pill form. Thought process is circumstantial at times and with paranoid though content. He denies suicidal ideations, homicidal ideations, or hallucinations. Does report appropriate sleep last night.       Current Medications:   Scheduled Meds:    amLODIPine  10 mg Oral Daily    gabapentin  100 mg Oral TID    levETIRAcetam  500 mg Oral BID    risperiDONE  0.5 mg Oral BID    sertraline  50 mg Oral Daily    tamsulosin  0.4 mg Oral Daily      PRN Meds:   Current Facility-Administered Medications:     0.9% NaCl, , Intravenous, PRN    acetaminophen, 650 mg, Rectal, Q6H PRN    acetaminophen, 650 mg, Oral, Q6H PRN    ALPRAZolam, 0.25 mg, Oral, Q4H PRN     bisacodyL, 10 mg, Rectal, Daily PRN    cloNIDine, 0.1 mg, Oral, Q6H PRN    diphenhydrAMINE, 25 mg, Oral, Q6H PRN    haloperidol lactate, 5 mg, Intravenous, Q6H PRN    hydrALAZINE, 10 mg, Intravenous, Q4H PRN    oxyCODONE, 5 mg, Oral, Q6H PRN    polyethylene glycol, 17 g, Oral, BID PRN    traMADoL, 50 mg, Oral, Q6H PRN    traZODone, 100 mg, Oral, Nightly PRN   Psychotherapeutics (From admission, onward)      Start     Stop Route Frequency Ordered    08/28/24 2100  risperiDONE tablet 0.5 mg         -- Oral 2 times daily 08/28/24 1425    08/28/24 1525  traZODone tablet 100 mg         -- Oral Nightly PRN 08/28/24 1425    08/28/24 1430  sertraline tablet 50 mg         -- Oral Daily 08/28/24 1425    08/27/24 0836  ALPRAZolam tablet 0.25 mg         -- Oral Every 4 hours PRN 08/27/24 0839    08/24/24 1933  haloperidol lactate injection 5 mg         -- IV Every 6 hours PRN 08/24/24 1834            Allergies:   Review of patient's allergies indicates:   Allergen Reactions    Acetaminophen      Other reaction(s): chronns    Tramadol     Ibuprofen Nausea Only        OBJECTIVE:   Vitals   Vitals:    08/29/24 0731   BP: 109/70   Pulse: 81   Resp:    Temp: 98.2 °F (36.8 °C)        Labs/Imaging/Studies:   No results found for this or any previous visit (from the past 36 hour(s)).       Psychiatric Mental Status Exam:  General Appearance: appears stated age, dressed in hospital garb, lying in bed, in no acute distress  Arousal: alert  Behavior: cooperative, polite, appropriate eye-contact  Movements and Motor Activity: no tics, no tremors, no akathisia, no dystonia, no evidence of tardive dyskinesia  Orientation: oriented to person, place, and time  Speech: normal rate, rhythm, volume, tone and pitch  Mood: Near euthymic  Affect: reactive, full-range  Thought Process: circumstantial  Associations: no loosening of associations  Thought Content and Perceptions: no suicidal ideation, no homicidal ideation, + paranoid ideation, no  hallucinations  Recent and Remote Memory: mild impairments noted; per interview/observation with patient  Attention and Concentration: attentive to conversation; per interview/observation with patient  Fund of Knowledge: vocabulary appropriate; based on history, vocabulary, fund of knowledge, syntax, grammar, and content  Insight: questionable; based on understanding of severity of illness and HPI  Judgment: impaired; based on patient's behavior and HPI    ASSESSMENT/PLAN:   Problems Addressed/Diagnoses:  Psychotic Disorder NOS     Mood Disorder NOS (F39)     Past Medical History:   Diagnosis Date    Crohn's disease     GERD (gastroesophageal reflux disease)         Plan:  Medication Management  Risperidone 1mg PO BID  Sertraline 50mg PO QD  Trazodone 50mg PO QHS PRN insomnia  Legal  PEC not recommended at this time.  Will continue to follow        Enzo Larkin

## 2024-08-29 NOTE — NURSING
Nurses Note -- 4 Eyes      8/29/2024   3:53 PM      Skin assessed during: Daily Assessment      [x] No Altered Skin Integrity Present    []Prevention Measures Documented      [] Yes- Altered Skin Integrity Present or Discovered   [] LDA Added if Not in Epic (Describe Wound)   [] New Altered Skin Integrity was Present on Admit and Documented in LDA   [] Wound Image Taken    Wound Care Consulted? No    Attending Nurse:  BRE Zarate    Second RN/Staff Member:  DANIEL Olivier

## 2024-08-29 NOTE — NURSING
"Upon passing medications patient agreed to take them crushed. When mixing it he shouted "I am not taking it like that, I got Crohn's disease. I know my body you don't." So, refused to take medicine then stated "You are the devil." I set him up to eat and patient still refusing medication.   0910 Patient was yelling I went back in the room and he started yelling "Get out my room. I don't want you in here. I want a new nurse. Call security." I left the room and called security.   "

## 2024-08-29 NOTE — NURSING
Nurses Note -- 4 Eyes      8/28/2024   10:30 PM      Skin assessed during: Q Shift Change      [x] No Altered Skin Integrity Present    [x]Prevention Measures Documented      [] Yes- Altered Skin Integrity Present or Discovered   [] LDA Added if Not in Epic (Describe Wound)   [] New Altered Skin Integrity was Present on Admit and Documented in LDA   [] Wound Image Taken    Wound Care Consulted? No    Attending Nurse:  Charline Bethea RN/Staff Member:  Edi GILL RN

## 2024-08-30 LAB
ALBUMIN SERPL-MCNC: 2.9 G/DL (ref 3.4–4.8)
ALBUMIN/GLOB SERPL: 0.8 RATIO (ref 1.1–2)
ALP SERPL-CCNC: 64 UNIT/L (ref 40–150)
ALT SERPL-CCNC: 27 UNIT/L (ref 0–55)
ANION GAP SERPL CALC-SCNC: 7 MEQ/L
AST SERPL-CCNC: 22 UNIT/L (ref 5–34)
BASOPHILS # BLD AUTO: 0.02 X10(3)/MCL
BASOPHILS NFR BLD AUTO: 0.4 %
BILIRUB SERPL-MCNC: 0.4 MG/DL
BUN SERPL-MCNC: 23.6 MG/DL (ref 8.4–25.7)
CALCIUM SERPL-MCNC: 9.4 MG/DL (ref 8.8–10)
CHLORIDE SERPL-SCNC: 105 MMOL/L (ref 98–107)
CO2 SERPL-SCNC: 25 MMOL/L (ref 23–31)
CREAT SERPL-MCNC: 0.9 MG/DL (ref 0.73–1.18)
CREAT/UREA NIT SERPL: 26
EOSINOPHIL # BLD AUTO: 0.07 X10(3)/MCL (ref 0–0.9)
EOSINOPHIL NFR BLD AUTO: 1.3 %
ERYTHROCYTE [DISTWIDTH] IN BLOOD BY AUTOMATED COUNT: 12 % (ref 11.5–17)
GFR SERPLBLD CREATININE-BSD FMLA CKD-EPI: >60 ML/MIN/1.73/M2
GLOBULIN SER-MCNC: 3.6 GM/DL (ref 2.4–3.5)
GLUCOSE SERPL-MCNC: 105 MG/DL (ref 82–115)
HCT VFR BLD AUTO: 33.2 % (ref 42–52)
HGB BLD-MCNC: 10.7 G/DL (ref 14–18)
IMM GRANULOCYTES # BLD AUTO: 0.01 X10(3)/MCL (ref 0–0.04)
IMM GRANULOCYTES NFR BLD AUTO: 0.2 %
LYMPHOCYTES # BLD AUTO: 1.38 X10(3)/MCL (ref 0.6–4.6)
LYMPHOCYTES NFR BLD AUTO: 26.5 %
MCH RBC QN AUTO: 29.8 PG (ref 27–31)
MCHC RBC AUTO-ENTMCNC: 32.2 G/DL (ref 33–36)
MCV RBC AUTO: 92.5 FL (ref 80–94)
MONOCYTES # BLD AUTO: 0.5 X10(3)/MCL (ref 0.1–1.3)
MONOCYTES NFR BLD AUTO: 9.6 %
NEUTROPHILS # BLD AUTO: 3.23 X10(3)/MCL (ref 2.1–9.2)
NEUTROPHILS NFR BLD AUTO: 62 %
NRBC BLD AUTO-RTO: 0 %
PLATELET # BLD AUTO: 333 X10(3)/MCL (ref 130–400)
PMV BLD AUTO: 8.5 FL (ref 7.4–10.4)
POTASSIUM SERPL-SCNC: 4.7 MMOL/L (ref 3.5–5.1)
PROT SERPL-MCNC: 6.5 GM/DL (ref 5.8–7.6)
RBC # BLD AUTO: 3.59 X10(6)/MCL (ref 4.7–6.1)
SODIUM SERPL-SCNC: 137 MMOL/L (ref 136–145)
WBC # BLD AUTO: 5.21 X10(3)/MCL (ref 4.5–11.5)

## 2024-08-30 PROCEDURE — 25000003 PHARM REV CODE 250: Performed by: INTERNAL MEDICINE

## 2024-08-30 PROCEDURE — 97530 THERAPEUTIC ACTIVITIES: CPT | Mod: CQ

## 2024-08-30 PROCEDURE — 97535 SELF CARE MNGMENT TRAINING: CPT | Mod: CO

## 2024-08-30 PROCEDURE — 21400001 HC TELEMETRY ROOM

## 2024-08-30 PROCEDURE — 36415 COLL VENOUS BLD VENIPUNCTURE: CPT | Performed by: INTERNAL MEDICINE

## 2024-08-30 PROCEDURE — 97116 GAIT TRAINING THERAPY: CPT | Mod: CQ

## 2024-08-30 PROCEDURE — 92526 ORAL FUNCTION THERAPY: CPT

## 2024-08-30 PROCEDURE — 25000003 PHARM REV CODE 250: Performed by: HOSPITALIST

## 2024-08-30 PROCEDURE — 85025 COMPLETE CBC W/AUTO DIFF WBC: CPT | Performed by: INTERNAL MEDICINE

## 2024-08-30 PROCEDURE — 25000003 PHARM REV CODE 250

## 2024-08-30 PROCEDURE — 80053 COMPREHEN METABOLIC PANEL: CPT | Performed by: INTERNAL MEDICINE

## 2024-08-30 RX ADMIN — GABAPENTIN 100 MG: 100 CAPSULE ORAL at 08:08

## 2024-08-30 RX ADMIN — RISPERIDONE 1 MG: 1 SOLUTION ORAL at 09:08

## 2024-08-30 RX ADMIN — GABAPENTIN 100 MG: 100 CAPSULE ORAL at 02:08

## 2024-08-30 RX ADMIN — GABAPENTIN 100 MG: 100 CAPSULE ORAL at 09:08

## 2024-08-30 RX ADMIN — TRAZODONE HYDROCHLORIDE 100 MG: 100 TABLET ORAL at 08:08

## 2024-08-30 RX ADMIN — OXYCODONE HYDROCHLORIDE 5 MG: 5 TABLET ORAL at 08:08

## 2024-08-30 RX ADMIN — LEVETIRACETAM 500 MG: 500 TABLET, FILM COATED ORAL at 08:08

## 2024-08-30 RX ADMIN — LEVETIRACETAM 500 MG: 500 TABLET, FILM COATED ORAL at 09:08

## 2024-08-30 RX ADMIN — SERTRALINE HYDROCHLORIDE 50 MG: 50 TABLET ORAL at 09:08

## 2024-08-30 RX ADMIN — RISPERIDONE 1 MG: 1 SOLUTION ORAL at 08:08

## 2024-08-30 RX ADMIN — OXYCODONE HYDROCHLORIDE 5 MG: 5 TABLET ORAL at 12:08

## 2024-08-30 RX ADMIN — AMLODIPINE BESYLATE 10 MG: 5 TABLET ORAL at 09:08

## 2024-08-30 RX ADMIN — TAMSULOSIN HYDROCHLORIDE 0.4 MG: 0.4 CAPSULE ORAL at 09:08

## 2024-08-30 NOTE — PLAN OF CARE
Spoke to Oly at [facility]; referral has not been reviewed yet; she will review today and will return my call. Spoke to Yvonne at [facility], and will review today. Reached out to admissions coordinator at [facility], left on hold with no answer.     Will continue to follow up. Please see note from 8/22 for complete list of referrals sent and responses.

## 2024-08-30 NOTE — PROGRESS NOTES
8/30/2024  Dell Garrett Jr.   1965   [id number]        Psychiatry Progress Note     SUBJECTIVE:   Dell Garrett Jr. is a 59 y.o. male with a past medical history that includes Crohn's disease, GERD, and substance use who presented to ED with left-sided weakness, facial droop, aphasia, and sudden onset headache.   Imaging in the ED revealed intraparenchymal hemorrhage of right thalamus with surrounding edema, compression of right lateral ventricle with 8 mm midline shift, early obstructive hydrocephalus, possible subarachnoid hemorrhage. CT angiogram with mild dolichoectasia of basilar artery , echocardiogram negative for bubble study, EF intact, grade 2 diastolic dysfunction. Reported to have irritability and episodes of impulsive behavior that has required security presence.    Seen at the bedside where he is polite and cooperative with interview. Reports euthymic mood at this time and denies feeling depressed/sad, anxious/nervous, or irritable/angry. Affect is mildly irritable at this time. Nursing staff reports no inappropriate behaviors today or since security presence required yesterday. Has been adherent with medications. Goal-directed thought process. Not verbalizing paranoia today. Denies suicidal ideations, homicidal ideations, or auditory/visual hallucinations. Reports no issues with sleep last night or with appetite today.          Current Medications:   Scheduled Meds:    amLODIPine  10 mg Oral Daily    gabapentin  100 mg Oral TID    levETIRAcetam  500 mg Oral BID    risperidone 1 mg/ml  1 mg Oral BID    sertraline  50 mg Oral Daily    tamsulosin  0.4 mg Oral Daily      PRN Meds:   Current Facility-Administered Medications:     0.9% NaCl, , Intravenous, PRN    acetaminophen, 650 mg, Rectal, Q6H PRN    acetaminophen, 650 mg, Oral, Q6H PRN    ALPRAZolam, 0.25 mg, Oral, Q4H PRN    bisacodyL, 10 mg, Rectal, Daily PRN    cloNIDine, 0.1 mg, Oral, Q6H PRN    diphenhydrAMINE, 25 mg, Oral, Q6H PRN     haloperidol lactate, 5 mg, Intravenous, Q6H PRN    hydrALAZINE, 10 mg, Intravenous, Q4H PRN    oxyCODONE, 5 mg, Oral, Q6H PRN    polyethylene glycol, 17 g, Oral, BID PRN    traMADoL, 50 mg, Oral, Q6H PRN    traZODone, 100 mg, Oral, Nightly PRN   Psychotherapeutics (From admission, onward)      Start     Stop Route Frequency Ordered    08/29/24 1200  risperidone 1 mg/ml oral solution 1 mg         -- Oral 2 times daily 08/29/24 1059    08/28/24 1525  traZODone tablet 100 mg         -- Oral Nightly PRN 08/28/24 1425    08/28/24 1430  sertraline tablet 50 mg         -- Oral Daily 08/28/24 1425    08/27/24 0836  ALPRAZolam tablet 0.25 mg         -- Oral Every 4 hours PRN 08/27/24 0839    08/24/24 1933  haloperidol lactate injection 5 mg         -- IV Every 6 hours PRN 08/24/24 1834            Allergies:   Review of patient's allergies indicates:   Allergen Reactions    Acetaminophen      Other reaction(s): chronns    Tramadol     Ibuprofen Nausea Only        OBJECTIVE:   Vitals   Vitals:    08/30/24 1220   BP:    Pulse:    Resp: 18   Temp:         Labs/Imaging/Studies:   Recent Results (from the past 36 hour(s))   Basic Metabolic Panel    Collection Time: 08/29/24  1:23 PM   Result Value Ref Range    Sodium 138 136 - 145 mmol/L    Potassium 4.8 3.5 - 5.1 mmol/L    Chloride 102 98 - 107 mmol/L    CO2 28 23 - 31 mmol/L    Glucose 117 (H) 82 - 115 mg/dL    Blood Urea Nitrogen 24.1 8.4 - 25.7 mg/dL    Creatinine 0.93 0.73 - 1.18 mg/dL    BUN/Creatinine Ratio 26     Calcium 9.8 8.8 - 10.0 mg/dL    Anion Gap 8.0 mEq/L    eGFR >60 mL/min/1.73/m2   Comprehensive Metabolic Panel    Collection Time: 08/30/24  3:07 AM   Result Value Ref Range    Sodium 137 136 - 145 mmol/L    Potassium 4.7 3.5 - 5.1 mmol/L    Chloride 105 98 - 107 mmol/L    CO2 25 23 - 31 mmol/L    Glucose 105 82 - 115 mg/dL    Blood Urea Nitrogen 23.6 8.4 - 25.7 mg/dL    Creatinine 0.90 0.73 - 1.18 mg/dL    Calcium 9.4 8.8 - 10.0 mg/dL    Protein Total 6.5 5.8 -  7.6 gm/dL    Albumin 2.9 (L) 3.4 - 4.8 g/dL    Globulin 3.6 (H) 2.4 - 3.5 gm/dL    Albumin/Globulin Ratio 0.8 (L) 1.1 - 2.0 ratio    Bilirubin Total 0.4 <=1.5 mg/dL    ALP 64 40 - 150 unit/L    ALT 27 0 - 55 unit/L    AST 22 5 - 34 unit/L    eGFR >60 mL/min/1.73/m2    Anion Gap 7.0 mEq/L    BUN/Creatinine Ratio 26    CBC with Differential    Collection Time: 08/30/24  3:07 AM   Result Value Ref Range    WBC 5.21 4.50 - 11.50 x10(3)/mcL    RBC 3.59 (L) 4.70 - 6.10 x10(6)/mcL    Hgb 10.7 (L) 14.0 - 18.0 g/dL    Hct 33.2 (L) 42.0 - 52.0 %    MCV 92.5 80.0 - 94.0 fL    MCH 29.8 27.0 - 31.0 pg    MCHC 32.2 (L) 33.0 - 36.0 g/dL    RDW 12.0 11.5 - 17.0 %    Platelet 333 130 - 400 x10(3)/mcL    MPV 8.5 7.4 - 10.4 fL    Neut % 62.0 %    Lymph % 26.5 %    Mono % 9.6 %    Eos % 1.3 %    Basophil % 0.4 %    Lymph # 1.38 0.6 - 4.6 x10(3)/mcL    Neut # 3.23 2.1 - 9.2 x10(3)/mcL    Mono # 0.50 0.1 - 1.3 x10(3)/mcL    Eos # 0.07 0 - 0.9 x10(3)/mcL    Baso # 0.02 <=0.2 x10(3)/mcL    IG# 0.01 0 - 0.04 x10(3)/mcL    IG% 0.2 %    NRBC% 0.0 %        Psychiatric Mental Status Exam:  General Appearance: appears stated age, dressed in hospital garb, lying in bed, in no acute distress  Arousal: alert  Behavior: cooperative, polite  Movements and Motor Activity: no tics, no tremors, no akathisia, no dystonia, no evidence of tardive dyskinesia  Orientation: oriented to person, place, and time  Speech: normal rate, rhythm, volume, tone and pitch  Mood: Euthymic  Affect: reactive, irritable  Thought Process: goal-directed  Associations: no loosening of associations  Thought Content and Perceptions: no suicidal or homicidal ideation, no auditory or visual hallucinations, no paranoid ideation, no ideas of reference, no evidence of delusions or psychosis  Recent and Remote Memory: mild impairments noted; per interview/observation with patient  Attention and Concentration: attentive to conversation; per interview/observation with patient  Fund of  Knowledge: vocabulary appropriate; based on history, vocabulary, fund of knowledge, syntax, grammar, and content  Insight: questionable; based on understanding of severity of illness and HPI  Judgment: questionable; based on patient's behavior and HPI    ASSESSMENT/PLAN:   Problems Addressed/Diagnoses:  Psychotic Disorder NOS    Mood Disorder NOS (F39)        Past Medical History:   Diagnosis Date    Crohn's disease     GERD (gastroesophageal reflux disease)         Plan:  Medication Management  Risperidone 1mg PO BID  Sertraline 50mg PO QD  Trazodone 50mg PO QHS PRN insomnia  Legal  PEC not recommended at this time.  Will  sign-off; please reconsult as needed.           Enzo Larkin

## 2024-08-30 NOTE — PT/OT/SLP PROGRESS
[facility]  Speech Language Pathology Department  Dysphagia Therapy Progress Note    Patient Name:  Dell Garrett Jr.   MRN:  [id number]  Admitting Diagnosis: Intraparenchymal hemorrhage of brain    Recommendations     General recommendations:  dysphagia therapy and cognitive-linguistic therapy  Solid texture recommendation:  Minced & Moist Diet - IDDSI Level 5  Liquid consistency recommendation: Mildly thick liquids - IDDSI Level 2   Medications: crushed in puree  Aspiration precautions: small bites/sips, slow rate, and upright for PO intake    Discharge therapy intensity: Moderate Intensity Therapy   Barriers to safe discharge:  safety awareness and level of skilled assistance needed    Subjective     Patient awake, alert, and cooperative.  Spiritual/Cultural/Confucianism Beliefs/Practices that affect care: no    Pain/Comfort:  0/10    Respiratory Status:  room air    Objective     Therapeutic Activities:  Pt completed base of tongue and laryngeal exercises x50 with minimal-moderate cues.    Assessment     Pt continues to present with oropharyngeal dysphagia requiring diet modification to reduce the risk of aspiration.    Goals     Multidisciplinary Problems       SLP Goals          Problem: SLP    Goal Priority Disciplines Outcome   SLP Goal     SLP    Description: LTG: Patient will tolerate safest and least restrictive PO diet without signs/sx of aspiration.  STG: Meal trial of pureed solids and mildly thick liquids via cup without adequate REILLY and no signs/sx of aspiration. -met  STG: Minced/moist solids with adequate bolus preparation/mastication and without signs/sx of aspiration. -met  STG: Soft/bite sized solids with adequate bolus preparation/mastication and without signs/sx of aspiration.  STG: Laryngeal/BOT exercises Min A.    LTG: Patient will improve cognitive-linguistic skills [facility]  STG: Dysarthria drills at the phrase level with 90% accuracy.  STG:  Problem solving/reasoning tasks with 90% accuracy.  STG: Short term memory tasks with 90% accuracy.                     Patient Education     Patient and family were provided with verbal education regarding SLP POC.  Understanding was verbalized, however additional teaching warranted.    Plan     Will continue to follow and tx as appropriate.    SLP Follow-Up:  Yes   Patient to be seen:  5 x/week   Plan of Care expires:  09/04/24  Plan of Care reviewed with:  patient       Time Tracking     SLP Treatment Date:   08/30/24  Speech Start Time:  1102  Speech Stop Time:  1112     Speech Total Time (min):  10 min    Billable minutes:  Treatment of Swallow Dysfunction, 10 minutes       08/30/2024

## 2024-08-30 NOTE — PROGRESS NOTES
Inpatient Nutrition Assessment    Admit Date: 8/10/2024   Total duration of encounter: 20 days   Patient Age: 59 y.o.    Nutrition Recommendation/Prescription     Continue Continue current diet (Diet Minced & Moist (IDDSI Level 5) No Straws, Supervision with Meals, Double Portions; Mildly Thick Liquids (IDDSI Level 2)) as tolerated.  Continue Boost Very High Calorie (provides 530 kcal, 22 g protein per serving) BID.    Communication of Recommendations: reviewed with nurse    Nutrition Assessment     Malnutrition Assessment/Nutrition-Focused Physical Exam    Malnutrition Context: acute illness or injury (08/12/24 1142)  Malnutrition Level: moderate (08/12/24 1142)  Energy Intake (Malnutrition):  (does not meet criteria) (08/12/24 1142)  Weight Loss (Malnutrition):  (does not meet criteria) (08/12/24 1142)  Subcutaneous Fat (Malnutrition): mild depletion (08/12/24 1142)     Upper Arm Region (Subcutaneous Fat Loss): mild depletion     Muscle Mass (Malnutrition): mild depletion (08/12/24 1142)     Clavicle Bone Region (Muscle Loss): mild depletion                             A minimum of two characteristics is recommended for diagnosis of either severe or non-severe malnutrition.    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 with left-sided weakness and suppressed alertness  Elevated blood pressure, no previous dx of HTN   Hypercholesterolemia   Elevated creatinine, resolved    Relevant Medical History: Crohn's disease, GERD, and chronic tobacco use     Scheduled Medications:  amLODIPine, 10 mg, Daily  gabapentin, 100 mg, TID  levETIRAcetam, 500 mg, BID  risperidone 1 mg/ml, 1 mg, BID  sertraline, 50 mg, Daily  tamsulosin, 0.4 mg,  Daily    Continuous Infusions: none       PRN Medications:   0.9% NaCl, , PRN  acetaminophen, 650 mg, Q6H PRN  acetaminophen, 650 mg, Q6H PRN  ALPRAZolam, 0.25 mg, Q4H PRN  bisacodyL, 10 mg, Daily PRN  cloNIDine, 0.1 mg, Q6H PRN  diphenhydrAMINE, 25 mg, Q6H PRN  haloperidol lactate, 5 mg, Q6H PRN  hydrALAZINE, 10 mg, Q4H PRN  oxyCODONE, 5 mg, Q6H PRN  polyethylene glycol, 17 g, BID PRN  traMADoL, 50 mg, Q6H PRN  traZODone, 100 mg, Nightly PRN    Calorie Containing IV Medications: no significant kcals from medications at this time    Recent Labs   Lab 08/24/24  0318 08/25/24  0655 08/26/24  0439 08/29/24  1323 08/30/24  0307   NA  --   --  136 138 137   K  --   --  5.1 4.8 4.7   CALCIUM  --   --  9.1 9.8 9.4   MG 2.20 2.00  --   --   --    CO2  --   --  25 28 25   BUN  --   --  20.1 24.1 23.6   CREATININE  --   --  0.94 0.93 0.90   EGFRNORACEVR  --   --  >60 >60 >60   GLUCOSE  --   --  100 117* 105   BILITOT  --   --   --   --  0.4   ALKPHOS  --   --   --   --  64   ALT  --   --   --   --  27   AST  --   --   --   --  22   ALBUMIN  --   --   --   --  2.9*   WBC  --   --  11.41  --  5.21   HGB  --   --  11.9*  --  10.7*   HCT  --   --  36.7*  --  33.2*     Nutrition Orders:  Diet Minced & Moist (IDDSI Level 5) No Straws, Supervision with Meals, Double Portions; Mildly Thick Liquids (IDDSI Level 2)  Dietary nutrition supplements BID; Boost Very High Calorie Nutritional Drink - Any flavor    Appetite/Oral Intake: good/% of meals  Factors Affecting Nutritional Intake: none identified  Social Needs Impacting Access to Food: none identified  Food/[place]/Cultural Preferences: none reported  Food Allergies: no known food allergies  Last Bowel Movement: 08/28/24  Wound(s): no pressure injuries documented at this time     Comments    8/12/24 Patient confused, sister at bedside. Patient's sister reports good appetite/intake prior to admission, regular diet at home, denies weight loss. Patient reports liking  "vanilla/strawberry Ensure. He is currently NPO, had not been able to participate in SLP evaluation. Tube feeding recommendation provided if oral intake remains not feasible.    8/14/24 Patient remains NPO, started on tube feeding, no longer receiving calories from Cleviprex, will change to standard polymeric formula.    8/19/24 Tube feeding tolerated at goal.    8/23/24 Dysphagia diet, no longer on tube feeding, nurse reports patient eating 100% and asking for more food, will add double portions and Boost.    8/28/24: RN reports pt has been eating fairly well, nursing has been feeding him his Boost supplements BID. Noted last BM was 8/22.     8/30/24: Spoke with rn, pt eating well and enjoying his Boost supplements.     Anthropometrics    Height: 5' 7" (170.2 cm), Height Method: Measured  Last Weight: 65.3 kg (143 lb 15.4 oz) (08/10/24 1346), Weight Method: Bed Scale  BMI (Calculated): 22.5  BMI Classification: normal (BMI 18.5-24.9)        Ideal Body Weight (IBW), Male: 148 lb     % Ideal Body Weight, Male (lb): 97.27 %                          Usual Weight Provided By: family/caregiver denies unintentional weight loss    Wt Readings from Last 5 Encounters:   08/10/24 65.3 kg (143 lb 15.4 oz)   05/12/24 65.8 kg (145 lb)   09/25/23 67.1 kg (148 lb)   08/30/23 67.1 kg (148 lb)   08/21/23 63.5 kg (140 lb)     Weight Change(s) Since Admission: none, new admit  Wt Readings from Last 1 Encounters:   08/10/24 1346 65.3 kg (143 lb 15.4 oz)   08/10/24 0743 65.3 kg (143 lb 15.4 oz)   Admit Weight: 65.3 kg (143 lb 15.4 oz) (08/10/24 0743), Weight Method: Bed Scale    Estimated Needs    Weight Used For Calorie Calculations: 65.3 kg (143 lb 15.4 oz)  Energy Calorie Requirements (kcal): 1984, 1.4 stress factor  Energy Need Method: [place]-St Jeor  Weight Used For Protein Calculations: 65.3 kg (143 lb 15.4 oz)  Protein Requirements: 79-98 g, 1.2-1.5 g/kg  Fluid Requirements (mL): 1984, 1 ml/kcal      Enteral Nutrition Patient not " receiving enteral nutrition support at this time.    Parenteral Nutrition Patient not receiving parenteral nutrition support at this time.    Evaluation of Received Nutrient Intake    Calories: meeting estimated needs  Protein: meeting estimated needs    Patient Education Not applicable.    Nutrition Diagnosis     PES: Inadequate energy intake related to inability to consume sufficient nutrients as evidenced by less than 80% needs met. (resolved)  PES: Moderate acute disease or injury related malnutrition related to acute illness as evidenced by mild fat depletion and mild muscle depletion. (active)    Nutrition Interventions     Intervention(s): modified composition of enteral nutrition and collaboration with other providers  Goal: Meet greater than 80% of nutritional needs by follow-up. (goal progressing)    Nutrition Goals & Monitoring     Dietitian will monitor: food and beverage intake, energy intake, enteral nutrition intake, weight, electrolyte/renal panel, and beliefs/attitudes  Discharge planning:  regular diet with texture modifications per SLP  Nutrition Risk/Follow-Up: moderate (follow-up in 3-5 days)   Please consult if re-assessment needed sooner.

## 2024-08-30 NOTE — PT/OT/SLP PROGRESS
Physical Therapy Treatment    Patient Name:  Dell Garrett Jr.   MRN:  [id number]    Recommendations:     Discharge therapy intensity: Moderate Intensity Therapy   Discharge Equipment Recommendations: to be determined by next level of care  Barriers to discharge: Decreased caregiver support, Impaired mobility, and Ongoing medical needs    Assessment:     Dell Garrett Jr. is a 59 y.o. male admitted with a medical diagnosis of R basal ganglia hemorrhage with R to L shift and intraventricular extension, elevated BP .  He presents with the following impairments/functional limitations: weakness, impaired endurance, impaired functional mobility, impaired cognition, decreased upper extremity function, impaired self care skills, decreased lower extremity function, gait instability, decreased safety awareness, impaired balance, impaired cardiopulmonary response to activity .    Rehab Prognosis: Good; patient would benefit from acute skilled PT services to address these deficits and reach maximum level of function.    Recent Surgery: * No surgery found *      Plan:     During this hospitalization, patient would benefit from acute PT services 5 x/week to address the identified rehab impairments via gait training, therapeutic activities and progress toward the following goals:    Plan of Care Expires:  09/12/24    Subjective     Chief Complaint:   Patient/Family Comments/goals:   Pain/Comfort:         Objective:     Communicated with nurse prior to session.  Patient found HOB elevated with telemetry, pulse ox (continuous), peripheral IV, PureWick upon PT entry to room.     General Precautions: Standard, fall  Orthopedic Precautions: N/A  Braces: N/A  Respiratory Status: Room air  Blood Pressure:   Skin Integrity: Visible skin intact      Functional Mobility:  Bed Mobility:     Supine to Sit: moderate assistance  Sit to Supine: moderate assistance  Transfers:     Sit to Stand:  moderate assistance and of 2 persons with R HR and L  knee blocked into extension  Bed to Chair: moderate assistance and of 2 persons with  no AD  using  Stand Pivot  Gait: pt amb x7ft with R HR and modAx2 with assistance of LLE advancement, upright posture during stance phase on RLE and lateral weight shifting.         Education:  Patient provided with verbal education education regarding fall prevention and safety awareness.  Additional teaching is warranted.     Patient left HOB elevated with all lines intact, call button in reach, and bed alarm on    GOALS:   Multidisciplinary Problems       Physical Therapy Goals          Problem: Physical Therapy    Goal Priority Disciplines Outcome Goal Variances Interventions   Physical Therapy Goal     PT, PT/OT Progressing     Description: Goals to be met by: 2024     Patient will increase functional independence with mobility by performin. Supine to sit with MInimal Assistance  2. Sit to supine with MInimal Assistance  3. Sit to stand transfer with Minimal Assistance  4. Pt to follow 75% of commands.   5. Gait  x 75 feet with Minimal Assistance using Rolling Walker vs LRAD.                          Time Tracking:     PT Received On: 24  PT Start Time: 1349     PT Stop Time: 1425  PT Total Time (min): 36 min     Billable Minutes: Gait Training 20 and Therapeutic Activity 16    Treatment Type: Treatment  PT/PTA: PTA     Number of PTA visits since last PT visit: 2     2024

## 2024-08-30 NOTE — NURSING
Nurses Note -- 4 Eyes      8/29/2024   10:25 PM      Skin assessed during: Q Shift Change      [x] No Altered Skin Integrity Present    [x]Prevention Measures Documented      [] Yes- Altered Skin Integrity Present or Discovered   [] LDA Added if Not in Epic (Describe Wound)   [] New Altered Skin Integrity was Present on Admit and Documented in LDA   [] Wound Image Taken    Wound Care Consulted? No    Attending Nurse:  Charline Bethea RN/Staff Member: Tessa asencio LPN

## 2024-08-30 NOTE — PROGRESS NOTES
[facility] Hospital Medicine Progress Note        Chief Complaint: Inpatient Follow-up    HPI:     59-year-old male with significant history of Crohn's disease, GERD, chronic tobacco use/substance use-cocaine presented to the ED with complaints of acute onset left-sided weakness, facial droop and aphasia along with sudden onset headache.  Imaging in the ED revealed intraparenchymal hemorrhage of right thalamus with surrounding edema, compression of right lateral ventricle with 8 mm midline shift, early obstructive hydrocephalus, possible subarachnoid hemorrhage. patient admitted to ICU, neurology and neurosurgery services were consulted.  CT angiogram with mild dolichoectasia of basilar artery , echocardiogram negative for bubble study, EF intact, grade 2 diastolic dysfunction. Conservative management, recommended to keep BP at goal, Keppra for seizure prevention, close neuro checks, 3% saline initiated.  Follow up CT with stable findings, therapy services consulted.  Holding anticoagulation, antiplatelets.  Hypertonic saline discontinued, initiate high-intensity statin, patient downgraded to hospital medicine services, case management consulted for placement, neurology recommended to continue to avoid antiplatelets.  Cleared for p.o. intake by speech, tolerating well.  Patient continues to participate with therapy services, case management actively working on placement.  Resumed home amlodipine, labs stable.  Psych consulted on 08/28 for impulsive behavior, initiated risperidone, Zoloft and p.r.n. trazodone   Concern for urinary retention, closely monitoring with bladder scan, added tamsulosin    Interval Hx:     Patient seen at bedside, still with impulsive behavior, was rude, disrespectful and inappropriate with nursing staff today morning, refused to take medications, had to call security, myself, security staff had long conversation with the patient about expected behavior.   Hemodynamics stable, able to void without much difficulty now    August 30, 2024-no complaints, no fever, no shortness for breath,  applied 30 different skilled nursing facility, but all denied it secondary to history of drug use, behavior issue, Medicaid       Objective/physical exam:  General: In no acute distress, afebrile  Chest: Clear to auscultation bilaterally  Heart: S1, S2, no appreciable murmur  Abdomen: Soft, nontender, BS +  MSK: Warm, no lower extremity edema, no clubbing or cyanosis  Neurologic:  Awake, alert, intermittent impulsivity  VITAL SIGNS: 24 HRS MIN & MAX LAST   Temp  Min: 97.5 °F (36.4 °C)  Max: 98.6 °F (37 °C) 97.6 °F (36.4 °C)   BP  Min: 92/59  Max: 111/73 100/68   Pulse  Min: 61  Max: 77  77   Resp  Min: 18  Max: 20 18   SpO2  Min: 94 %  Max: 98 % 97 %       Recent Labs   Lab 08/30/24  0307   WBC 5.21   RBC 3.59*   HGB 10.7*   HCT 33.2*   MCV 92.5   MCH 29.8   MCHC 32.2*   RDW 12.0      MPV 8.5         Recent Labs   Lab 08/25/24  0655 08/26/24  0439 08/30/24  0307   NA  --    < > 137   K  --    < > 4.7   CL  --    < > 105   CO2  --    < > 25   BUN  --    < > 23.6   CREATININE  --    < > 0.90   CALCIUM  --    < > 9.4   MG 2.00  --   --    ALBUMIN  --   --  2.9*   ALKPHOS  --   --  64   ALT  --   --  27   AST  --   --  22   BILITOT  --   --  0.4    < > = values in this interval not displayed.          Microbiology Results (last 7 days)       ** No results found for the last 168 hours. **             Scheduled Med:   amLODIPine  10 mg Oral Daily    gabapentin  100 mg Oral TID    levETIRAcetam  500 mg Oral BID    risperidone 1 mg/ml  1 mg Oral BID    sertraline  50 mg Oral Daily    tamsulosin  0.4 mg Oral Daily          Assessment/Plan:    Acute intra parenchymal hemorrhage-right thalamic capsular with edema/midline shift/intraventricular extension/early obstructive hydrocephalus  Acute subarachnoid hemorrhage   Left-sided weakness/partial aphasia secondary to  above  Oropharyngeal dysphagia secondary to above-cleared for modified diet  New diagnosis essential HTN-stable  Impulsive behavior   Urinary retention-improved today  History of Crohn's disease/GERD  Chronic tobacco use/substance use  Prophylaxis    Neurology and neurosurgery signed off   Per latest neurology note recommending to continue to avoid antiplatelets  Repeat CT with stable findings   CT angiogram with no AVMs or aneurysm  Negative bubble study  PT/OT  Keppra for seizure prevention   BP at goal on amlodipine   Urinary retention improved after adding tamsulosin on 08/28   Check BMP  Psych on board for impulsive behavior  They have added Zoloft, risperidone and p.r.n. trazodone as of 8/28  DVT prophylaxis-bilateral SCDs        Patient with frequent impulsive behavior   Inappropriate with medical staff   Consult  for skilled nursing facility-denied by 30 different skilled nursing facility      Stacey Watkins MD   08/30/2024

## 2024-08-30 NOTE — PLAN OF CARE
Problem: Adult Inpatient Plan of Care  Goal: Plan of Care Review  Outcome: Progressing  Goal: Patient-Specific Goal (Individualized)  Outcome: Progressing  Goal: Absence of Hospital-Acquired Illness or Injury  Outcome: Progressing  Goal: Optimal Comfort and Wellbeing  Outcome: Progressing  Goal: Readiness for Transition of Care  Outcome: Progressing     Problem: Skin Injury Risk Increased  Goal: Skin Health and Integrity  Outcome: Progressing     Problem: Stroke, Intracerebral Hemorrhage  Goal: Optimal Coping  Outcome: Progressing     Problem: Stroke, Intracerebral Hemorrhage  Goal: Optimal Cerebral Tissue Perfusion  Outcome: Progressing     Problem: Stroke, Intracerebral Hemorrhage  Goal: Optimal Cognitive Function  Outcome: Progressing     Problem: Stroke, Intracerebral Hemorrhage  Goal: Effective Communication Skills  Outcome: Progressing

## 2024-08-30 NOTE — PT/OT/SLP PROGRESS
Physical Therapy      Patient Name:  Dell Garrett Jr.   MRN:  [id number]    PT/PTA conference to discuss PT POC and patient's progression towards goals held with Julio Kay PTA. Updating Pt's POC to 5x/week to reflect current status/participation. Will continue to follow.

## 2024-08-30 NOTE — PT/OT/SLP PROGRESS
Occupational Therapy   Treatment    Name: Dell Garrett Jr.  MRN: [id number]  Admitting Diagnosis:  Intraparenchymal hemorrhage of brain       Recommendations:     Recommended therapy intensity at discharge: Moderate Intensity Therapy   Discharge Equipment Recommendations:  to be determined by next level of care  Barriers to discharge:       Assessment:     Dell Garrett Jr. is a 59 y.o. male with a medical diagnosis of Intraparenchymal hemorrhage of brain.  He presents with motivation to participate in therapy services this date. Patient was pleasant and able to follow commands this date. Performance deficits affecting function are weakness, impaired endurance, visual deficits, abnormal tone, impaired cognition, impaired self care skills, impaired sensation, decreased coordination, impaired coordination, decreased upper extremity function, impaired fine motor, impaired functional mobility, decreased lower extremity function, gait instability, impaired balance, pain, decreased safety awareness.     Rehab Prognosis:  Fair; patient would benefit from acute skilled OT services to address these deficits and reach maximum level of function.       Plan:     Patient to be seen 6 x/week to address the above listed problems via self-care/home management, therapeutic activities, therapeutic exercises, neuromuscular re-education  Plan of Care Expires: 09/27/28  Plan of Care Reviewed with: patient    Subjective     Pain/Comfort:       Objective:     Communicated with: RN prior to session.  Patient found HOB elevated with   upon OT entry to room.    General Precautions: Standard, fall, aspiration    Orthopedic Precautions:N/A  Braces: N/A     Occupational Performance:     Bed Mobility:    Patient completed Supine to Sit with maximal assistance and 2 persons  Patient completed Sit to Supine with maximal assistance and 2 persons     Functional Mobility/Transfers:  Patient completed Bed <> Chair Transfer using Squat Pivot technique  with maximal assistance with hand-held assist  Functional Mobility: patient transferred into and out of chair requiring max A 2/2 poor trunk control and stong L lateral lean    Activities of Daily Living:  Feeding:  supervision patient able to scoop food and perform excursion to mouth for feeding x2  Grooming: minimum assistance patient performing oral care using toothette seated in chair at sink requiring VC to attend to L side of mouth    Patient Education:  Patient provided with verbal education education regarding OT role/goals/POC.  Understanding was verbalized.      Patient left HOB elevated with all lines intact and call button in reach.    GOALS:   Multidisciplinary Problems       Occupational Therapy Goals          Problem: Occupational Therapy    Goal Priority Disciplines Outcome Interventions   Occupational Therapy Goal     OT, PT/OT Progressing    Description: Goals to be met by:  9/27/24    Patient will increase functional independence with ADLs by performing:    UE Dressing with Stand-by Assistance.  LE Dressing with mod A  Grooming while seated at sink with min A  Assistance.  Toileting from toilet with mod A for hygiene and clothing management.   Toilet transfer to toilet with mod A  Increased functional strength to 3/5 LUE through therEX, neuromuscular re-ed.  Pt will visually attend to L side of environment with no cues                       Time Tracking:     OT Date of Treatment: 08/30/24  OT Start Time: 1348  OT Stop Time: 1430  OT Total Time (min): 42 min    Billable Minutes:Self Care/Home Management 3    OT/MAC: MAC     Number of MAC visits since last OT visit: 2    8/30/2024

## 2024-08-31 PROCEDURE — 25000003 PHARM REV CODE 250

## 2024-08-31 PROCEDURE — 25000003 PHARM REV CODE 250: Performed by: INTERNAL MEDICINE

## 2024-08-31 PROCEDURE — 21400001 HC TELEMETRY ROOM

## 2024-08-31 PROCEDURE — 25000003 PHARM REV CODE 250: Performed by: HOSPITALIST

## 2024-08-31 RX ORDER — DICLOFENAC SODIUM 10 MG/G
2 GEL TOPICAL 3 TIMES DAILY
Status: DISCONTINUED | OUTPATIENT
Start: 2024-08-31 | End: 2024-09-16

## 2024-08-31 RX ADMIN — GABAPENTIN 100 MG: 100 CAPSULE ORAL at 02:08

## 2024-08-31 RX ADMIN — DICLOFENAC SODIUM 2 G: 10 GEL TOPICAL at 09:08

## 2024-08-31 RX ADMIN — SERTRALINE HYDROCHLORIDE 50 MG: 50 TABLET ORAL at 08:08

## 2024-08-31 RX ADMIN — TAMSULOSIN HYDROCHLORIDE 0.4 MG: 0.4 CAPSULE ORAL at 08:08

## 2024-08-31 RX ADMIN — OXYCODONE HYDROCHLORIDE 5 MG: 5 TABLET ORAL at 08:08

## 2024-08-31 RX ADMIN — GABAPENTIN 100 MG: 100 CAPSULE ORAL at 08:08

## 2024-08-31 RX ADMIN — DICLOFENAC SODIUM 2 G: 10 GEL TOPICAL at 02:08

## 2024-08-31 RX ADMIN — LEVETIRACETAM 500 MG: 500 TABLET, FILM COATED ORAL at 09:08

## 2024-08-31 RX ADMIN — RISPERIDONE 1 MG: 1 SOLUTION ORAL at 09:08

## 2024-08-31 RX ADMIN — LEVETIRACETAM 500 MG: 500 TABLET, FILM COATED ORAL at 08:08

## 2024-08-31 RX ADMIN — GABAPENTIN 100 MG: 100 CAPSULE ORAL at 09:08

## 2024-08-31 RX ADMIN — RISPERIDONE 1 MG: 1 SOLUTION ORAL at 08:08

## 2024-08-31 RX ADMIN — TRAZODONE HYDROCHLORIDE 100 MG: 100 TABLET ORAL at 09:08

## 2024-08-31 NOTE — PROGRESS NOTES
[facility] Hospital Medicine Progress Note        Chief Complaint: Inpatient Follow-up    HPI:     59-year-old male with significant history of Crohn's disease, GERD, chronic tobacco use/substance use-cocaine presented to the ED with complaints of acute onset left-sided weakness, facial droop and aphasia along with sudden onset headache.  Imaging in the ED revealed intraparenchymal hemorrhage of right thalamus with surrounding edema, compression of right lateral ventricle with 8 mm midline shift, early obstructive hydrocephalus, possible subarachnoid hemorrhage. patient admitted to ICU, neurology and neurosurgery services were consulted.  CT angiogram with mild dolichoectasia of basilar artery , echocardiogram negative for bubble study, EF intact, grade 2 diastolic dysfunction. Conservative management, recommended to keep BP at goal, Keppra for seizure prevention, close neuro checks, 3% saline initiated.  Follow up CT with stable findings, therapy services consulted.  Holding anticoagulation, antiplatelets.  Hypertonic saline discontinued, initiate high-intensity statin, patient downgraded to hospital medicine services, case management consulted for placement, neurology recommended to continue to avoid antiplatelets.  Cleared for p.o. intake by speech, tolerating well.  Patient continues to participate with therapy services, case management actively working on placement.  Resumed home amlodipine, labs stable.  Psych consulted on 08/28 for impulsive behavior, initiated risperidone, Zoloft and p.r.n. trazodone   Concern for urinary retention, closely monitoring with bladder scan, added tamsulosin    Interval Hx:     Patient seen at bedside, still with impulsive behavior, was rude, disrespectful and inappropriate with nursing staff today morning, refused to take medications, had to call security, myself, security staff had long conversation with the patient about expected behavior.   Hemodynamics stable, able to void without much difficulty now    August 30, 2024-no complaints, no fever, no shortness for breath,  applied 30 different skilled nursing facility, but all denied it secondary to history of drug use, behavior issue, Medicaid    August 31, 2024-patient is doing well, no fever, no shortness for breath, waiting for skilled nursing facility        Objective/physical exam:  General: In no acute distress, afebrile  Chest: Clear to auscultation bilaterally  Heart: S1, S2, no appreciable murmur  Abdomen: Soft, nontender, BS +  MSK: Warm, no lower extremity edema, no clubbing or cyanosis  Neurologic:  Awake, alert, intermittent impulsivity  VITAL SIGNS: 24 HRS MIN & MAX LAST   Temp  Min: 97.6 °F (36.4 °C)  Max: 98.5 °F (36.9 °C) 97.6 °F (36.4 °C)   BP  Min: 99/60  Max: 113/67 99/60   Pulse  Min: 69  Max: 96  69   Resp  Min: 17  Max: 18 18   SpO2  Min: 95 %  Max: 99 % 97 %       Recent Labs   Lab 08/30/24  0307   WBC 5.21   RBC 3.59*   HGB 10.7*   HCT 33.2*   MCV 92.5   MCH 29.8   MCHC 32.2*   RDW 12.0      MPV 8.5         Recent Labs   Lab 08/25/24  0655 08/26/24  0439 08/30/24  0307   NA  --    < > 137   K  --    < > 4.7   CL  --    < > 105   CO2  --    < > 25   BUN  --    < > 23.6   CREATININE  --    < > 0.90   CALCIUM  --    < > 9.4   MG 2.00  --   --    ALBUMIN  --   --  2.9*   ALKPHOS  --   --  64   ALT  --   --  27   AST  --   --  22   BILITOT  --   --  0.4    < > = values in this interval not displayed.          Microbiology Results (last 7 days)       ** No results found for the last 168 hours. **             Scheduled Med:   amLODIPine  10 mg Oral Daily    diclofenac sodium  2 g Topical (Top) TID    gabapentin  100 mg Oral TID    levETIRAcetam  500 mg Oral BID    risperidone 1 mg/ml  1 mg Oral BID    sertraline  50 mg Oral Daily    tamsulosin  0.4 mg Oral Daily          Assessment/Plan:    Acute intra parenchymal hemorrhage-right thalamic capsular with edema/midline  shift/intraventricular extension/early obstructive hydrocephalus  Acute subarachnoid hemorrhage   Left-sided weakness/partial aphasia secondary to above  Oropharyngeal dysphagia secondary to above-cleared for modified diet  New diagnosis essential HTN-stable  Impulsive behavior   Urinary retention-improved today  History of Crohn's disease/GERD  Chronic tobacco use/substance use  Prophylaxis    Neurology and neurosurgery signed off   Per latest neurology note recommending to continue to avoid antiplatelets  Repeat CT with stable findings   CT angiogram with no AVMs or aneurysm  Negative bubble study  PT/OT  Keppra for seizure prevention   BP at goal on amlodipine   Urinary retention improved after adding tamsulosin on 08/28   Check BMP  Psych on board for impulsive behavior  They have added Zoloft, risperidone and p.r.n. trazodone as of 8/28  DVT prophylaxis-bilateral SCDs        Patient with frequent impulsive behavior   Inappropriate with medical staff   Consult  for skilled nursing facility-denied by 30 different skilled nursing facility      Stacey Watkins MD   08/31/2024

## 2024-08-31 NOTE — PLAN OF CARE
Problem: Adult Inpatient Plan of Care  Goal: Plan of Care Review  Outcome: Progressing  Goal: Patient-Specific Goal (Individualized)  Outcome: Progressing  Goal: Absence of Hospital-Acquired Illness or Injury  Outcome: Progressing  Goal: Optimal Comfort and Wellbeing  Outcome: Progressing  Goal: Readiness for Transition of Care  Outcome: Progressing     Problem: Skin Injury Risk Increased  Goal: Skin Health and Integrity  Outcome: Progressing     Problem: Stroke, Intracerebral Hemorrhage  Goal: Optimal Coping  Outcome: Progressing  Goal: Effective Bowel Elimination  Outcome: Progressing  Goal: Optimal Cerebral Tissue Perfusion  Outcome: Progressing  Goal: Optimal Cognitive Function  Outcome: Progressing  Goal: Effective Communication Skills  Outcome: Progressing  Goal: Optimal Functional Ability  Outcome: Progressing  Goal: Optimal Nutrition Intake  Outcome: Progressing  Goal: Optimal Pain Control and Function  Outcome: Progressing  Goal: Effective Oxygenation and Ventilation  Outcome: Progressing  Goal: Improved Sensorimotor Function  Outcome: Progressing  Goal: Safe and Effective Swallow  Outcome: Progressing  Goal: Effective Urinary Elimination  Outcome: Progressing

## 2024-08-31 NOTE — NURSING
Nurses Note -- 4 Eyes      8/31/2024   5:23 AM      Skin assessed during: Daily Assessment      [x] No Altered Skin Integrity Present    []Prevention Measures Documented      [] Yes- Altered Skin Integrity Present or Discovered   [] LDA Added if Not in Epic (Describe Wound)   [] New Altered Skin Integrity was Present on Admit and Documented in LDA   [] Wound Image Taken    Wound Care Consulted? No    Attending Nurse:  Rosemary Bethea RN/Staff Member: Tessa

## 2024-09-01 PROCEDURE — 25000003 PHARM REV CODE 250: Performed by: HOSPITALIST

## 2024-09-01 PROCEDURE — 25000003 PHARM REV CODE 250

## 2024-09-01 PROCEDURE — 21400001 HC TELEMETRY ROOM

## 2024-09-01 PROCEDURE — 25000003 PHARM REV CODE 250: Performed by: INTERNAL MEDICINE

## 2024-09-01 RX ORDER — OLOPATADINE HYDROCHLORIDE 1 MG/ML
1 SOLUTION/ DROPS OPHTHALMIC 2 TIMES DAILY
Status: DISCONTINUED | OUTPATIENT
Start: 2024-09-01 | End: 2024-09-01

## 2024-09-01 RX ORDER — ATORVASTATIN CALCIUM 40 MG/1
40 TABLET, FILM COATED ORAL NIGHTLY
Status: DISCONTINUED | OUTPATIENT
Start: 2024-09-01 | End: 2024-09-19 | Stop reason: HOSPADM

## 2024-09-01 RX ORDER — OLOPATADINE HYDROCHLORIDE 1 MG/ML
1 SOLUTION/ DROPS OPHTHALMIC 2 TIMES DAILY
Status: DISCONTINUED | OUTPATIENT
Start: 2024-09-01 | End: 2024-09-19 | Stop reason: HOSPADM

## 2024-09-01 RX ADMIN — DICLOFENAC SODIUM 2 G: 10 GEL TOPICAL at 02:09

## 2024-09-01 RX ADMIN — OXYCODONE HYDROCHLORIDE 5 MG: 5 TABLET ORAL at 02:09

## 2024-09-01 RX ADMIN — GABAPENTIN 100 MG: 100 CAPSULE ORAL at 02:09

## 2024-09-01 RX ADMIN — RISPERIDONE 1 MG: 1 SOLUTION ORAL at 08:09

## 2024-09-01 RX ADMIN — GABAPENTIN 100 MG: 100 CAPSULE ORAL at 08:09

## 2024-09-01 RX ADMIN — TAMSULOSIN HYDROCHLORIDE 0.4 MG: 0.4 CAPSULE ORAL at 08:09

## 2024-09-01 RX ADMIN — OLOPATADINE HYDROCHLORIDE 1 DROP: 1 SOLUTION OPHTHALMIC at 11:09

## 2024-09-01 RX ADMIN — DICLOFENAC SODIUM 2 G: 10 GEL TOPICAL at 08:09

## 2024-09-01 RX ADMIN — ALPRAZOLAM 0.25 MG: 0.25 TABLET ORAL at 08:09

## 2024-09-01 RX ADMIN — SERTRALINE HYDROCHLORIDE 50 MG: 50 TABLET ORAL at 08:09

## 2024-09-01 RX ADMIN — LEVETIRACETAM 500 MG: 500 TABLET, FILM COATED ORAL at 08:09

## 2024-09-01 RX ADMIN — ATORVASTATIN CALCIUM 40 MG: 40 TABLET, FILM COATED ORAL at 08:09

## 2024-09-01 RX ADMIN — ACETAMINOPHEN 325MG 650 MG: 325 TABLET ORAL at 08:09

## 2024-09-01 RX ADMIN — OXYCODONE HYDROCHLORIDE 5 MG: 5 TABLET ORAL at 03:09

## 2024-09-01 RX ADMIN — TRAZODONE HYDROCHLORIDE 100 MG: 100 TABLET ORAL at 08:09

## 2024-09-01 NOTE — NURSING
Nurses Note -- 4 Eyes      9/1/2024   12:39 AM      Skin assessed during: Q Shift Change      [x] No Altered Skin Integrity Present    [x]Prevention Measures Documented      [] Yes- Altered Skin Integrity Present or Discovered   [] LDA Added if Not in Epic (Describe Wound)   [] New Altered Skin Integrity was Present on Admit and Documented in LDA   [] Wound Image Taken    Wound Care Consulted? No    Attending Nurse:  Kacie Bethea RN/Staff Member:  DANIEL Zarate

## 2024-09-01 NOTE — PROGRESS NOTES
[facility] Hospital Medicine Progress Note        Chief Complaint: Inpatient Follow-up     HPI:      59-year-old male with significant history of Crohn's disease, GERD, chronic tobacco use/substance use-cocaine presented to the ED with complaints of acute onset left-sided weakness, facial droop and aphasia along with sudden onset headache.  Imaging in the ED revealed intraparenchymal hemorrhage of right thalamus with surrounding edema, compression of right lateral ventricle with 8 mm midline shift, early obstructive hydrocephalus, possible subarachnoid hemorrhage. patient admitted to ICU, neurology and neurosurgery services were consulted.  CT angiogram with mild dolichoectasia of basilar artery , echocardiogram negative for bubble study, EF intact, grade 2 diastolic dysfunction. Conservative management, recommended to keep BP at goal, Keppra for seizure prevention, close neuro checks, 3% saline initiated.  Follow up CT with stable findings, therapy services consulted.  Holding anticoagulation, antiplatelets.  Hypertonic saline discontinued, initiate high-intensity statin, patient downgraded to hospital medicine services, case management consulted for placement, neurology recommended to continue to avoid antiplatelets.  Cleared for p.o. intake by speech, tolerating well.  Patient continues to participate with therapy services, case management actively working on placement.  Resumed home amlodipine, labs stable.  Psych consulted on 08/28 for impulsive behavior, initiated risperidone, Zoloft and p.r.n. trazodone   Concern for urinary retention, closely monitoring with bladder scan, added tamsulosin     Interval Hx:      Patient seen at bedside, still with impulsive behavior, was rude, disrespectful and inappropriate with nursing staff today morning, refused to take medications, had to call security, myself, security staff had long conversation with the patient about expected behavior.   Hemodynamics stable, able to void without much difficulty now     September 1, 2024 - patient seen and examined at bedside, spoke to sister over the phone   Patient asking when he can go home unfortunately he is unable to live by himself  He has been denied by multiple facilities  Vitals reviewed with blood pressure on the lower side of normal Will discontinue amlodipine 10 mg   Sister asking for eyedrops given dry eyes      Objective/physical exam:  General: In no acute distress, afebrile  Chest: Clear to auscultation bilaterally  Heart: S1, S2, no appreciable murmur  Abdomen: Soft, nontender, BS +  MSK: Warm, no lower extremity edema, no clubbing or cyanosis  Neurologic:  Awake, alert, intermittent impulsivity    Assessment/Plan:   low normal bp   Acute intra parenchymal hemorrhage-right thalamic capsular with edema/midline shift/intraventricular extension/early obstructive hydrocephalus  Acute subarachnoid hemorrhage   Left-sided weakness/partial aphasia secondary to above  Oropharyngeal dysphagia secondary to above-cleared for modified diet  New diagnosis essential HTN-stable  Impulsive behavior   Urinary retention-improved today  History of Crohn's disease/GERD  Chronic tobacco use/substance use  Prophylaxis     D/c amlodipine  Eye drops bid olopatadine   Neurology and neurosurgery signed off   Per latest neurology note recommending to continue to avoid antiplatelets  Repeat CT with stable findings   CT angiogram with no AVMs or aneurysm  Negative bubble study  PT/OT  Keppra for seizure prevention   Urinary retention improved after adding tamsulosin on 08/28   Psych on board for impulsive behavior  They have added Zoloft, risperidone and p.r.n. trazodone as of 8/28  DVT prophylaxis-bilateral SCDs           Patient with frequent impulsive behavior   Inappropriate with medical staff   Consult  for skilled nursing facility-denied by 30 different skilled nursing facility              All diagnosis and  differential diagnosis have been reviewed; assessment and plan has been documented; I have personally reviewed the labs and test results that are presently available; I have reviewed the patients medication list; I have reviewed the consulting providers response and recommendations. I have reviewed or attempted to review medical records based upon their availability    All of the patient's questions have been  addressed and answered. Patient's is agreeable to the above stated plan. I will continue to monitor closely and make adjustments to medical management as needed.    Portions of this note dictated using EMR integrated voice recognition software, and may be subject to voice recognition errors not corrected at proofreading. Please contact writer for clarification if needed.     VITAL SIGNS: 24 HRS MIN & MAX LAST   Temp  Min: 97.7 °F (36.5 °C)  Max: 98.4 °F (36.9 °C) 98.4 °F (36.9 °C)   BP  Min: 102/63  Max: 116/68 110/71   Pulse  Min: 56  Max: 69  69   Resp  Min: 18  Max: 18 18   SpO2  Min: 93 %  Max: 97 % (!) 93 %     I have reviewed the following labs:  Recent Labs   Lab 08/12/24 0441 08/13/24  0433 08/14/24  0500   WBC 7.19 5.66 5.53   RBC 4.95 4.46* 4.39*   HGB 14.8 13.6* 13.4*   HCT 43.5 39.0* 38.4*   MCV 87.9 87.4 87.5   MCH 29.9 30.5 30.5   MCHC 34.0 34.9 34.9   RDW 12.8 12.7 12.7    206 200   MPV 9.0 9.0 9.4     Recent Labs   Lab 08/12/24  0441 08/13/24  0433 08/14/24  0500 08/15/24  0508   * 133* 132* 135*   K 3.5 3.4* 3.3* 4.1   CL 97* 101 98 101   CO2 24 22* 23 22*   BUN 20.0 19.6 16.7 15.4   CREATININE 1.05 1.00 0.99 0.92   CALCIUM 9.3 9.0 8.9 9.2   MG 2.60 2.10 1.80 2.00   ALBUMIN 3.6 3.4 3.3*  --    ALKPHOS 112 103 97  --    ALT 22 19 20  --    AST 24 20 25  --    BILITOT 0.8 0.7 0.7  --      Microbiology Results (last 7 days)       ** No results found for the last 168 hours. **          _____________________________________________________________________    Malnutrition  Status:    Scheduled Med:   apixaban  5 mg Oral BID    aspirin  81 mg Oral Daily    atorvastatin  40 mg Oral QHS    diltiaZEM  360 mg Oral Daily    docusate sodium  50 mg Oral BID    ergocalciferol  50,000 Units Oral Q7 Days    guaiFENesin  600 mg Oral BID    insulin glargine U-100  15 Units Subcutaneous QHS    QUEtiapine  150 mg Oral Nightly      Continuous Infusions:     PRN Meds:    Current Facility-Administered Medications:     acetaminophen, 1,000 mg, Oral, Q6H PRN    aluminum-magnesium hydroxide-simethicone, 30 mL, Oral, QID PRN    bisacodyL, 10 mg, Rectal, Daily PRN    dextrose 10%, 12.5 g, Intravenous, PRN    dextrose 10%, 25 g, Intravenous, PRN    glucagon (human recombinant), 1 mg, Intramuscular, PRN    glucose, 16 g, Oral, PRN    glucose, 24 g, Oral, PRN    guaiFENesin 100 mg/5 ml, 200 mg, Oral, Q4H PRN    HYDROcodone-acetaminophen, 1 tablet, Oral, Q4H PRN    insulin aspart U-100, 1-10 Units, Subcutaneous, QID (AC + HS) PRN    melatonin, 6 mg, Oral, Nightly PRN    naloxone, 0.02 mg, Intravenous, PRN    ondansetron, 4 mg, Intravenous, Q4H PRN    prochlorperazine, 5 mg, Intravenous, Q6H PRN    senna-docusate 8.6-50 mg, 1 tablet, Oral, BID PRN    sodium chloride 0.9%, 10 mL, Intravenous, PRN     Radiology:  I have personally reviewed the following imaging and agree with the radiologist.     Cardiac catheterization  Procedure performed in the Invasive Lab    - See Procedure Log link below for nursing documentation    - See OpNote on Surgeries Tab for physician findings    - See Imaging Tab for radiologist dictation      Nain Lizama MD  Department of Hospital Medicine   [facility]   08/18/2024

## 2024-09-01 NOTE — NURSING
Nurses Note -- 4 Eyes      9/1/2024   7:26 AM      Skin assessed during: Q Shift Change      [x] No Altered Skin Integrity Present    [x]Prevention Measures Documented      [] Yes- Altered Skin Integrity Present or Discovered   [] LDA Added if Not in Epic (Describe Wound)   [] New Altered Skin Integrity was Present on Admit and Documented in LDA   [] Wound Image Taken    Wound Care Consulted? No    Attending Nurse:  Tessa Bethea RN/Staff Member: Kacie

## 2024-09-02 PROCEDURE — 25000003 PHARM REV CODE 250

## 2024-09-02 PROCEDURE — 97530 THERAPEUTIC ACTIVITIES: CPT | Mod: CO

## 2024-09-02 PROCEDURE — 25000003 PHARM REV CODE 250: Performed by: INTERNAL MEDICINE

## 2024-09-02 PROCEDURE — 21400001 HC TELEMETRY ROOM

## 2024-09-02 PROCEDURE — 25000003 PHARM REV CODE 250: Performed by: HOSPITALIST

## 2024-09-02 RX ADMIN — GABAPENTIN 100 MG: 100 CAPSULE ORAL at 03:09

## 2024-09-02 RX ADMIN — OLOPATADINE HYDROCHLORIDE 1 DROP: 1 SOLUTION OPHTHALMIC at 09:09

## 2024-09-02 RX ADMIN — GABAPENTIN 100 MG: 100 CAPSULE ORAL at 08:09

## 2024-09-02 RX ADMIN — SERTRALINE HYDROCHLORIDE 50 MG: 50 TABLET ORAL at 09:09

## 2024-09-02 RX ADMIN — LEVETIRACETAM 500 MG: 500 TABLET, FILM COATED ORAL at 09:09

## 2024-09-02 RX ADMIN — RISPERIDONE 1 MG: 1 SOLUTION ORAL at 08:09

## 2024-09-02 RX ADMIN — DICLOFENAC SODIUM 2 G: 10 GEL TOPICAL at 03:09

## 2024-09-02 RX ADMIN — DICLOFENAC SODIUM 2 G: 10 GEL TOPICAL at 09:09

## 2024-09-02 RX ADMIN — TRAZODONE HYDROCHLORIDE 100 MG: 100 TABLET ORAL at 08:09

## 2024-09-02 RX ADMIN — OXYCODONE HYDROCHLORIDE 5 MG: 5 TABLET ORAL at 01:09

## 2024-09-02 RX ADMIN — OLOPATADINE HYDROCHLORIDE 1 DROP: 1 SOLUTION OPHTHALMIC at 08:09

## 2024-09-02 RX ADMIN — RISPERIDONE 1 MG: 1 SOLUTION ORAL at 09:09

## 2024-09-02 RX ADMIN — GABAPENTIN 100 MG: 100 CAPSULE ORAL at 09:09

## 2024-09-02 RX ADMIN — DICLOFENAC SODIUM 2 G: 10 GEL TOPICAL at 08:09

## 2024-09-02 RX ADMIN — TAMSULOSIN HYDROCHLORIDE 0.4 MG: 0.4 CAPSULE ORAL at 09:09

## 2024-09-02 RX ADMIN — LEVETIRACETAM 500 MG: 500 TABLET, FILM COATED ORAL at 08:09

## 2024-09-02 RX ADMIN — ALPRAZOLAM 0.25 MG: 0.25 TABLET ORAL at 08:09

## 2024-09-02 RX ADMIN — ATORVASTATIN CALCIUM 40 MG: 40 TABLET, FILM COATED ORAL at 08:09

## 2024-09-02 NOTE — PROGRESS NOTES
[facility] Hospital Medicine Progress Note        Chief Complaint: Inpatient Follow-up     HPI:      59-year-old male with significant history of Crohn's disease, GERD, chronic tobacco use/substance use-cocaine presented to the ED with complaints of acute onset left-sided weakness, facial droop and aphasia along with sudden onset headache.  Imaging in the ED revealed intraparenchymal hemorrhage of right thalamus with surrounding edema, compression of right lateral ventricle with 8 mm midline shift, early obstructive hydrocephalus, possible subarachnoid hemorrhage. patient admitted to ICU, neurology and neurosurgery services were consulted.  CT angiogram with mild dolichoectasia of basilar artery , echocardiogram negative for bubble study, EF intact, grade 2 diastolic dysfunction. Conservative management, recommended to keep BP at goal, Keppra for seizure prevention, close neuro checks, 3% saline initiated.  Follow up CT with stable findings, therapy services consulted.  Holding anticoagulation, antiplatelets.  Hypertonic saline discontinued, initiate high-intensity statin, patient downgraded to hospital medicine services, case management consulted for placement, neurology recommended to continue to avoid antiplatelets.  Cleared for p.o. intake by speech, tolerating well.  Patient continues to participate with therapy services, case management actively working on placement.  Resumed home amlodipine, labs stable.  Psych consulted on 08/28 for impulsive behavior, initiated risperidone, Zoloft and p.r.n. trazodone   Concern for urinary retention, closely monitoring with bladder scan, added tamsulosin   spoke to sister over the phone   Patient asking when he can go home unfortunately he is unable to live by himself  He has been denied by multiple facilities      Interval Hx:      September 2, 2024 - patient seen and examined at bedside,   Vitals reviewed and stable      Objective/physical  exam:  General: In no acute distress, afebrile  Chest: Clear to auscultation bilaterally  Heart: S1, S2, no appreciable murmur  Abdomen: Soft, nontender, BS +  MSK: Warm, no lower extremity edema, no clubbing or cyanosis  Neurologic:  Awake, alert, intermittent impulsivity    Assessment/Plan:   low normal bp , improved  Acute intra parenchymal hemorrhage-right thalamic capsular with edema/midline shift/intraventricular extension/early obstructive hydrocephalus  Acute subarachnoid hemorrhage   Left-sided weakness/partial aphasia secondary to above  Oropharyngeal dysphagia secondary to above-cleared for modified diet  New diagnosis essential HTN-stable  Impulsive behavior   Urinary retention-improved today  History of Crohn's disease/GERD  Chronic tobacco use/substance use  Prophylaxis     Plan  D/c amlodipine  Eye drops bid olopatadine   Neurology and neurosurgery signed off   Per latest neurology note recommending to continue to avoid antiplatelets  Repeat CT with stable findings   CT angiogram with no AVMs or aneurysm  Negative bubble study  PT/OT  Keppra for seizure prevention   Urinary retention improved after adding tamsulosin on 08/28   Psych on board for impulsive behavior  They have added Zoloft, risperidone and p.r.n. trazodone as of 8/28  DVT prophylaxis-bilateral SCDs           Patient with frequent impulsive behavior   Inappropriate with medical staff   Consult  for skilled nursing facility-denied by 30 different skilled nursing facility              All diagnosis and differential diagnosis have been reviewed; assessment and plan has been documented; I have personally reviewed the labs and test results that are presently available; I have reviewed the patients medication list; I have reviewed the consulting providers response and recommendations. I have reviewed or attempted to review medical records based upon their availability    All of the patient's questions have been  addressed and  answered. Patient's is agreeable to the above stated plan. I will continue to monitor closely and make adjustments to medical management as needed.    Portions of this note dictated using EMR integrated voice recognition software, and may be subject to voice recognition errors not corrected at proofreading. Please contact writer for clarification if needed.     VITAL SIGNS: 24 HRS MIN & MAX LAST   Temp  Min: 97.7 °F (36.5 °C)  Max: 98.4 °F (36.9 °C) 98.4 °F (36.9 °C)   BP  Min: 102/63  Max: 116/68 110/71   Pulse  Min: 56  Max: 69  69   Resp  Min: 18  Max: 18 18   SpO2  Min: 93 %  Max: 97 % (!) 93 %     I have reviewed the following labs:  Recent Labs   Lab 08/12/24  0441 08/13/24  0433 08/14/24  0500   WBC 7.19 5.66 5.53   RBC 4.95 4.46* 4.39*   HGB 14.8 13.6* 13.4*   HCT 43.5 39.0* 38.4*   MCV 87.9 87.4 87.5   MCH 29.9 30.5 30.5   MCHC 34.0 34.9 34.9   RDW 12.8 12.7 12.7    206 200   MPV 9.0 9.0 9.4     Recent Labs   Lab 08/12/24  0441 08/13/24  0433 08/14/24  0500 08/15/24  0508   * 133* 132* 135*   K 3.5 3.4* 3.3* 4.1   CL 97* 101 98 101   CO2 24 22* 23 22*   BUN 20.0 19.6 16.7 15.4   CREATININE 1.05 1.00 0.99 0.92   CALCIUM 9.3 9.0 8.9 9.2   MG 2.60 2.10 1.80 2.00   ALBUMIN 3.6 3.4 3.3*  --    ALKPHOS 112 103 97  --    ALT 22 19 20  --    AST 24 20 25  --    BILITOT 0.8 0.7 0.7  --      Microbiology Results (last 7 days)       ** No results found for the last 168 hours. **          _____________________________________________________________________    Malnutrition Status:    Scheduled Med:   apixaban  5 mg Oral BID    aspirin  81 mg Oral Daily    atorvastatin  40 mg Oral QHS    diltiaZEM  360 mg Oral Daily    docusate sodium  50 mg Oral BID    ergocalciferol  50,000 Units Oral Q7 Days    guaiFENesin  600 mg Oral BID    insulin glargine U-100  15 Units Subcutaneous QHS    QUEtiapine  150 mg Oral Nightly      Continuous Infusions:     PRN Meds:    Current Facility-Administered Medications:      acetaminophen, 1,000 mg, Oral, Q6H PRN    aluminum-magnesium hydroxide-simethicone, 30 mL, Oral, QID PRN    bisacodyL, 10 mg, Rectal, Daily PRN    dextrose 10%, 12.5 g, Intravenous, PRN    dextrose 10%, 25 g, Intravenous, PRN    glucagon (human recombinant), 1 mg, Intramuscular, PRN    glucose, 16 g, Oral, PRN    glucose, 24 g, Oral, PRN    guaiFENesin 100 mg/5 ml, 200 mg, Oral, Q4H PRN    HYDROcodone-acetaminophen, 1 tablet, Oral, Q4H PRN    insulin aspart U-100, 1-10 Units, Subcutaneous, QID (AC + HS) PRN    melatonin, 6 mg, Oral, Nightly PRN    naloxone, 0.02 mg, Intravenous, PRN    ondansetron, 4 mg, Intravenous, Q4H PRN    prochlorperazine, 5 mg, Intravenous, Q6H PRN    senna-docusate 8.6-50 mg, 1 tablet, Oral, BID PRN    sodium chloride 0.9%, 10 mL, Intravenous, PRN     Radiology:  I have personally reviewed the following imaging and agree with the radiologist.     Cardiac catheterization  Procedure performed in the Invasive Lab    - See Procedure Log link below for nursing documentation    - See OpNote on Surgeries Tab for physician findings    - See Imaging Tab for radiologist dictation      Nain Lizama MD  Department of Hospital Medicine   [facility]   08/18/2024

## 2024-09-02 NOTE — PT/OT/SLP PROGRESS
Occupational Therapy   Treatment    Name: Dell Garrett Jr.  MRN: [id number]  Admitting Diagnosis:  Intraparenchymal hemorrhage of brain       Recommendations:     Recommended therapy intensity at discharge: Moderate Intensity Therapy   Discharge Equipment Recommendations:  to be determined by next level of care  Barriers to discharge:       Assessment:     Dell Garrett Jr. is a 59 y.o. male with a medical diagnosis of Intraparenchymal hemorrhage of brain.  He presents with good participation. Performance deficits affecting function are abnormal tone, impaired endurance, impaired self care skills, impaired sensation, impaired functional mobility, gait instability, impaired balance, decreased safety awareness, decreased lower extremity function, decreased upper extremity function.     Rehab Prognosis:  Good; patient would benefit from acute skilled OT services to address these deficits and reach maximum level of function.       Plan:     Patient to be seen 6 x/week to address the above listed problems via self-care/home management, therapeutic activities, therapeutic exercises, neuromuscular re-education  Plan of Care Expires: 09/27/28  Plan of Care Reviewed with: patient    Subjective     Pain/Comfort:  Pain Rating 1: 0/10    Objective:     Communicated with: nurse prior to session.  Patient found HOB elevated with telemetry, pulse ox (continuous), peripheral IV, PureWick upon OT entry to room.    General Precautions: Standard, fall, aspiration    Orthopedic Precautions:N/A  Braces: N/A  Respiratory Status: Room air     Occupational Performance:     Bed Mobility:    Patient completed Scooting/Bridging with maximal assistance  Patient completed Supine to Sit with maximal assistance and 2 persons  Patient completed Sit to Supine with maximal assistance and 2 persons   Maintain sitting balance edge of bed with Mod/Max A severe lean to left side    Functional Mobility/Transfers:  Patient completed Sit <> Stand Transfer  with moderate assistance and of 2 persons  with  hand-held assist   Functional Mobility: static standing with Mod A x 2 with hand held assist blocking left knee    Therapeutic Activities:  UE weight bearing with LUE in sitting and standing     Therapeutic Positioning    OT interventions performed during the course of today's session in an effort to prevent and/or reduce acquired pressure injuries:   Therapeutic positioning was provided at the conclusion of session to offload all bony prominences for the prevention and/or reduction of pressure injuries and for contracture prevention    AMPA 6 Click ADL: 13    Patient Education:  Patient provided with verbal education and demonstrations education regarding fall prevention and safety awareness.  Understanding was verbalized, however additional teaching warranted.      Patient left HOB elevated with all lines intact and call button in reach.    GOALS:   Multidisciplinary Problems       Occupational Therapy Goals          Problem: Occupational Therapy    Goal Priority Disciplines Outcome Interventions   Occupational Therapy Goal     OT, PT/OT Progressing    Description: Goals to be met by:  9/27/24    Patient will increase functional independence with ADLs by performing:    UE Dressing with Stand-by Assistance.  LE Dressing with mod A  Grooming while seated at sink with min A  Assistance.  Toileting from toilet with mod A for hygiene and clothing management.   Toilet transfer to toilet with mod A  Increased functional strength to 3/5 LUE through therEX, neuromuscular re-ed.  Pt will visually attend to L side of environment with no cues                       Time Tracking:     OT Date of Treatment: 09/02/24  OT Start Time: 1523  OT Stop Time: 1548  OT Total Time (min): 25 min    Billable Minutes:Therapeutic Activity 25    OT/MAC: MAC     Number of MAC visits since last OT visit: 3    9/2/2024

## 2024-09-02 NOTE — NURSING
Nurses Note -- 4 Eyes      9/2/2024   12:38 AM      Skin assessed during: Q Shift Change      [x] No Altered Skin Integrity Present    [x]Prevention Measures Documented      [] Yes- Altered Skin Integrity Present or Discovered   [] LDA Added if Not in Epic (Describe Wound)   [] New Altered Skin Integrity was Present on Admit and Documented in LDA   [] Wound Image Taken    Wound Care Consulted? No    Attending Nurse:  Kacie Bethea RN/Staff Member:  DANIEL Zarate

## 2024-09-03 PROCEDURE — 25000003 PHARM REV CODE 250: Performed by: INTERNAL MEDICINE

## 2024-09-03 PROCEDURE — 21400001 HC TELEMETRY ROOM

## 2024-09-03 PROCEDURE — 97129 THER IVNTJ 1ST 15 MIN: CPT

## 2024-09-03 PROCEDURE — 25000003 PHARM REV CODE 250

## 2024-09-03 PROCEDURE — 25000003 PHARM REV CODE 250: Performed by: HOSPITALIST

## 2024-09-03 PROCEDURE — 97530 THERAPEUTIC ACTIVITIES: CPT | Mod: CO

## 2024-09-03 PROCEDURE — 97116 GAIT TRAINING THERAPY: CPT | Mod: CQ

## 2024-09-03 RX ADMIN — RISPERIDONE 1 MG: 1 SOLUTION ORAL at 09:09

## 2024-09-03 RX ADMIN — DICLOFENAC SODIUM 2 G: 10 GEL TOPICAL at 09:09

## 2024-09-03 RX ADMIN — ATORVASTATIN CALCIUM 40 MG: 40 TABLET, FILM COATED ORAL at 09:09

## 2024-09-03 RX ADMIN — LEVETIRACETAM 500 MG: 500 TABLET, FILM COATED ORAL at 09:09

## 2024-09-03 RX ADMIN — OLOPATADINE HYDROCHLORIDE 1 DROP: 1 SOLUTION OPHTHALMIC at 09:09

## 2024-09-03 RX ADMIN — GABAPENTIN 100 MG: 100 CAPSULE ORAL at 04:09

## 2024-09-03 RX ADMIN — SERTRALINE HYDROCHLORIDE 50 MG: 50 TABLET ORAL at 09:09

## 2024-09-03 RX ADMIN — DICLOFENAC SODIUM 2 G: 10 GEL TOPICAL at 04:09

## 2024-09-03 RX ADMIN — TAMSULOSIN HYDROCHLORIDE 0.4 MG: 0.4 CAPSULE ORAL at 09:09

## 2024-09-03 RX ADMIN — GABAPENTIN 100 MG: 100 CAPSULE ORAL at 09:09

## 2024-09-03 RX ADMIN — TRAZODONE HYDROCHLORIDE 100 MG: 100 TABLET ORAL at 11:09

## 2024-09-03 NOTE — PT/OT/SLP PROGRESS
Physical Therapy Treatment    Patient Name:  Dell Garrett Jr.   MRN:  [id number]    Recommendations:     Discharge therapy intensity: Moderate Intensity Therapy   Discharge Equipment Recommendations: to be determined by next level of care  Barriers to discharge: Ongoing medical needs and placement    Assessment:     Dell Garrett Jr. is a 59 y.o. 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  He presents with the following impairments/functional limitations: weakness, impaired endurance, impaired functional mobility, impaired cognition, decreased upper extremity function, impaired self care skills, decreased lower extremity function, gait instability, decreased safety awareness, impaired balance, impaired cardiopulmonary response to activity.    Rehab Prognosis: Fair; patient would benefit from acute skilled PT services to address these deficits and reach maximum level of function.    Recent Surgery: * No surgery found *      Plan:     During this hospitalization, patient would benefit from acute PT services 5 x/week to address the identified rehab impairments via gait training, therapeutic activities and progress toward the following goals:    Plan of Care Expires:  09/12/24    Subjective     Chief Complaint:   Patient/Family Comments/goals:   Pain/Comfort:         Objective:     Communicated with RN prior to session.  Patient found up in chair with telemetry, pulse ox (continuous), peripheral IV, PureWick upon PT entry to room.     General Precautions: Standard, fall  Orthopedic Precautions: N/A  Braces: N/A  Respiratory Status: Room air  Skin Integrity: Visible skin intact      Functional Mobility:  Bed Mobility:     Sit to Supine: moderate assistance and of 2 persons  Transfers:     Sit to Stand:  moderate assistance with R hand rail  Gait: Pt amb 8'x2 modAx2 w/R HR and chair in tow; mirror for visual cues and assistance w/LLE  advancement during swing phase and L knee block during stance phase. Verbal and tactile cueing for improved posture prior to each step.     Education:  Patient provided with verbal education education regarding PT role/goals/POC, fall prevention, and safety awareness.  Understanding was verbalized.     Patient left HOB elevated with all lines intact, call button in reach, and bed alarm on    GOALS:   Multidisciplinary Problems       Physical Therapy Goals          Problem: Physical Therapy    Goal Priority Disciplines Outcome Goal Variances Interventions   Physical Therapy Goal     PT, PT/OT Progressing     Description: Goals to be met by: 2024     Patient will increase functional independence with mobility by performin. Supine to sit with MInimal Assistance  2. Sit to supine with MInimal Assistance  3. Sit to stand transfer with Minimal Assistance  4. Pt to follow 75% of commands.   5. Gait  x 75 feet with Minimal Assistance using Rolling Walker vs LRAD.                          Time Tracking:     PT Received On: 24  PT Start Time: 1104     PT Stop Time: 1130  PT Total Time (min): 26 min     Billable Minutes: Gait Training 26    Treatment Type: Treatment  PT/PTA: PTA     Number of PTA visits since last PT visit: 3     2024

## 2024-09-03 NOTE — PROGRESS NOTES
[facility] Hospital Medicine Progress Note        Chief Complaint: Inpatient Follow-up     HPI:      59-year-old male with significant history of Crohn's disease, GERD, chronic tobacco use/substance use-cocaine presented to the ED with complaints of acute onset left-sided weakness, facial droop and aphasia along with sudden onset headache.  Imaging in the ED revealed intraparenchymal hemorrhage of right thalamus with surrounding edema, compression of right lateral ventricle with 8 mm midline shift, early obstructive hydrocephalus, possible subarachnoid hemorrhage. patient admitted to ICU, neurology and neurosurgery services were consulted.  CT angiogram with mild dolichoectasia of basilar artery , echocardiogram negative for bubble study, EF intact, grade 2 diastolic dysfunction. Conservative management, recommended to keep BP at goal, Keppra for seizure prevention, close neuro checks, 3% saline initiated.  Follow up CT with stable findings, therapy services consulted.  Holding anticoagulation, antiplatelets.  Hypertonic saline discontinued, initiate high-intensity statin, patient downgraded to hospital medicine services, case management consulted for placement, neurology recommended to continue to avoid antiplatelets.  Cleared for p.o. intake by speech, tolerating well.  Patient continues to participate with therapy services, case management actively working on placement.  Resumed home amlodipine, labs stable.  Psych consulted on 08/28 for impulsive behavior, initiated risperidone, Zoloft and p.r.n. trazodone   Concern for urinary retention, closely monitoring with bladder scan, added tamsulosin   spoke to sister over the phone   Patient asking when he can go home unfortunately he is unable to live by himself  He has been denied by multiple facilities      Interval Hx:      September 3, 2024 - patient seen and examined at bedside,   Vitals reviewed and stable      Objective/physical  exam:  General: In no acute distress, afebrile  Chest: Clear to auscultation bilaterally  Heart: S1, S2, no appreciable murmur  Abdomen: Soft, nontender, BS +  MSK: Warm, no lower extremity edema, no clubbing or cyanosis  Neurologic:  Awake, alert, intermittent impulsivity    Assessment/Plan:   low normal bp , improved  Acute intra parenchymal hemorrhage-right thalamic capsular with edema/midline shift/intraventricular extension/early obstructive hydrocephalus  Acute subarachnoid hemorrhage   Left-sided weakness/partial aphasia secondary to above  Oropharyngeal dysphagia secondary to above-cleared for modified diet  New diagnosis essential HTN-stable  Impulsive behavior   Urinary retention-improved today  History of Crohn's disease/GERD  Chronic tobacco use/substance use  Prophylaxis     Plan  Amlodipine was discontinued on 9/1  olopatadine Eye drops bid   Neurology and neurosurgery signed off   Per latest neurology note recommending to continue to avoid antiplatelets  Repeat CT with stable findings   CT angiogram with no AVMs or aneurysm  Negative bubble study  PT/OT on board   Keppra for seizure prevention   Urinary retention improved after adding tamsulosin on 08/28   Psych on board for impulsive behavior  They have added Zoloft, risperidone and p.r.n. trazodone as of 8/28  DVT prophylaxis-bilateral SCDs           Patient with frequent impulsive behavior   Inappropriate with medical staff   Consult  for skilled nursing facility-denied by 30 different skilled nursing facility              All diagnosis and differential diagnosis have been reviewed; assessment and plan has been documented; I have personally reviewed the labs and test results that are presently available; I have reviewed the patients medication list; I have reviewed the consulting providers response and recommendations. I have reviewed or attempted to review medical records based upon their availability    All of the patient's questions  have been  addressed and answered. Patient's is agreeable to the above stated plan. I will continue to monitor closely and make adjustments to medical management as needed.    Portions of this note dictated using EMR integrated voice recognition software, and may be subject to voice recognition errors not corrected at proofreading. Please contact writer for clarification if needed.     VITAL SIGNS: 24 HRS MIN & MAX LAST   Temp  Min: 97.7 °F (36.5 °C)  Max: 98.4 °F (36.9 °C) 98.4 °F (36.9 °C)   BP  Min: 102/63  Max: 116/68 110/71   Pulse  Min: 56  Max: 69  69   Resp  Min: 18  Max: 18 18   SpO2  Min: 93 %  Max: 97 % (!) 93 %     I have reviewed the following labs:  Recent Labs   Lab 08/12/24 0441 08/13/24  0433 08/14/24  0500   WBC 7.19 5.66 5.53   RBC 4.95 4.46* 4.39*   HGB 14.8 13.6* 13.4*   HCT 43.5 39.0* 38.4*   MCV 87.9 87.4 87.5   MCH 29.9 30.5 30.5   MCHC 34.0 34.9 34.9   RDW 12.8 12.7 12.7    206 200   MPV 9.0 9.0 9.4     Recent Labs   Lab 08/12/24 0441 08/13/24  0433 08/14/24  0500 08/15/24  0508   * 133* 132* 135*   K 3.5 3.4* 3.3* 4.1   CL 97* 101 98 101   CO2 24 22* 23 22*   BUN 20.0 19.6 16.7 15.4   CREATININE 1.05 1.00 0.99 0.92   CALCIUM 9.3 9.0 8.9 9.2   MG 2.60 2.10 1.80 2.00   ALBUMIN 3.6 3.4 3.3*  --    ALKPHOS 112 103 97  --    ALT 22 19 20  --    AST 24 20 25  --    BILITOT 0.8 0.7 0.7  --      Microbiology Results (last 7 days)       ** No results found for the last 168 hours. **          _____________________________________________________________________    Malnutrition Status:    Scheduled Med:   apixaban  5 mg Oral BID    aspirin  81 mg Oral Daily    atorvastatin  40 mg Oral QHS    diltiaZEM  360 mg Oral Daily    docusate sodium  50 mg Oral BID    ergocalciferol  50,000 Units Oral Q7 Days    guaiFENesin  600 mg Oral BID    insulin glargine U-100  15 Units Subcutaneous QHS    QUEtiapine  150 mg Oral Nightly      Continuous Infusions:     PRN Meds:    Current  Facility-Administered Medications:     acetaminophen, 1,000 mg, Oral, Q6H PRN    aluminum-magnesium hydroxide-simethicone, 30 mL, Oral, QID PRN    bisacodyL, 10 mg, Rectal, Daily PRN    dextrose 10%, 12.5 g, Intravenous, PRN    dextrose 10%, 25 g, Intravenous, PRN    glucagon (human recombinant), 1 mg, Intramuscular, PRN    glucose, 16 g, Oral, PRN    glucose, 24 g, Oral, PRN    guaiFENesin 100 mg/5 ml, 200 mg, Oral, Q4H PRN    HYDROcodone-acetaminophen, 1 tablet, Oral, Q4H PRN    insulin aspart U-100, 1-10 Units, Subcutaneous, QID (AC + HS) PRN    melatonin, 6 mg, Oral, Nightly PRN    naloxone, 0.02 mg, Intravenous, PRN    ondansetron, 4 mg, Intravenous, Q4H PRN    prochlorperazine, 5 mg, Intravenous, Q6H PRN    senna-docusate 8.6-50 mg, 1 tablet, Oral, BID PRN    sodium chloride 0.9%, 10 mL, Intravenous, PRN     Radiology:  I have personally reviewed the following imaging and agree with the radiologist.     Cardiac catheterization  Procedure performed in the Invasive Lab    - See Procedure Log link below for nursing documentation    - See OpNote on Surgeries Tab for physician findings    - See Imaging Tab for radiologist dictation      Nain Lizama MD  Department of Hospital Medicine   [facility]   08/18/2024

## 2024-09-03 NOTE — NURSING
Nurses Note -- 4 Eyes      9/3/2024   7:32 AM      Skin assessed during: Q Shift Change      [x] No Altered Skin Integrity Present    [x]Prevention Measures Documented      [] Yes- Altered Skin Integrity Present or Discovered   [] LDA Added if Not in Epic (Describe Wound)   [] New Altered Skin Integrity was Present on Admit and Documented in LDA   [] Wound Image Taken    Wound Care Consulted? No    Attending Nurse:  Tessa Bethea RN/Staff Member: Charline

## 2024-09-03 NOTE — PT/OT/SLP PROGRESS
Occupational Therapy   Treatment    Name: Dell Garrett Jr.  MRN: [id number]  Admitting Diagnosis:  Intraparenchymal hemorrhage of brain       Recommendations:     Recommended therapy intensity at discharge: Moderate Intensity Therapy   Discharge Equipment Recommendations:  to be determined by next level of care  Barriers to discharge:       Assessment:     Dell Garrett Jr. is a 59 y.o. male with a medical diagnosis of Intraparenchymal hemorrhage of brain.  He presents with good participation. Performance deficits affecting function are weakness, abnormal tone, impaired endurance, impaired sensation, impaired self care skills, impaired functional mobility, gait instability, impaired balance, decreased safety awareness, decreased lower extremity function, decreased upper extremity function.     Rehab Prognosis:  Good; patient would benefit from acute skilled OT services to address these deficits and reach maximum level of function.       Plan:     Patient to be seen 6 x/week to address the above listed problems via self-care/home management, therapeutic activities, therapeutic exercises  Plan of Care Expires: 09/27/28  Plan of Care Reviewed with: patient    Subjective     Pain/Comfort:  Pain Rating 1: 0/10    Objective:     Communicated with: nurse prior to session.  Patient found HOB elevated with telemetry, pulse ox (continuous), peripheral IV, PureWick upon OT entry to room.    General Precautions: Standard, aspiration, fall    Orthopedic Precautions:N/A  Braces: N/A  Respiratory Status: Room air     Occupational Performance:     Bed Mobility:    Patient completed Scooting/Bridging with maximal assistance  Patient completed Supine to Sit with maximal assistance   Maintain sitting edge of bed with Mod A leans to left side    Functional Mobility/Transfers:  Patient completed Sit <> Stand Transfer with moderate assistance  with  hand-held assist and with gait belt   Patient completed Bed <> Chair Transfer using Step  Transfer technique with moderate assistance with hand-held assist and gait belt, blocking left knee and physical assistance to promote weight shifting, left lean not as evident in standing     Therapeutic Positioning    OT interventions performed during the course of today's session in an effort to prevent and/or reduce acquired pressure injuries:   Therapeutic positioning was provided at the conclusion of session to offload all bony prominences for the prevention and/or reduction of pressure injuries and for contracture prevention    [facility] 6 Click ADL: 13    Patient Education:  Patient provided with verbal education and demonstrations education regarding fall prevention and safety awareness.  Understanding was verbalized, however additional teaching warranted.      Patient left up in chair with all lines intact, call button in reach, chair alarm on, and nurse notified.    GOALS:   Multidisciplinary Problems       Occupational Therapy Goals          Problem: Occupational Therapy    Goal Priority Disciplines Outcome Interventions   Occupational Therapy Goal     OT, PT/OT Progressing    Description: Goals to be met by:  9/27/24    Patient will increase functional independence with ADLs by performing:    UE Dressing with Stand-by Assistance.  LE Dressing with mod A  Grooming while seated at sink with min A  Assistance.  Toileting from toilet with mod A for hygiene and clothing management.   Toilet transfer to toilet with mod A  Increased functional strength to 3/5 LUE through therEX, neuromuscular re-ed.  Pt will visually attend to L side of environment with no cues                       Time Tracking:     OT Date of Treatment: 09/03/24  OT Start Time: 0830  OT Stop Time: 0901  OT Total Time (min): 31 min    Billable Minutes:Therapeutic Activity 31    OT/MAC: MAC     Number of MAC visits since last OT visit: 4    9/3/2024

## 2024-09-03 NOTE — PT/OT/SLP PROGRESS
[facility]  Speech Language Pathology Department  Therapy Progress Note    Patient Name:  Dell Garrett Jr.   MRN:  [id number]    Recommendations     General recommendations:  dysphagia therapy and cognitive-linguistic therapy  Communication strategies:  go to room if call light pushed    Discharge therapy intensity: Moderate Intensity Therapy   Barriers to safe discharge: limited insight into deficits and safety awareness    Subjective     Patient awake, alert, and cooperative.    Pain/Comfort: Pain Rating 1: 0/10  Spiritual/Cultural/[employer] Beliefs/Practices that affect care: no    Objective     Problem Solving: Decreased insight into deficits and requires maximum cues to identify problems/solutions re: safety, medical situation, etc. 40% accuracy independently; improved to 60% given max cues.    Dysarthria Drills: Educated patient on compensatory strategies for improved speech intelligibility. Able to use skills at the phrase level with 50% accuracy.     Assessment     Pt continues to present with cognitive deficits and  oropharyngeal dysphagia requiring diet modification to improve swallow safety and efficiency.     Goals     Multidisciplinary Problems       SLP Goals          Problem: SLP    Goal Priority Disciplines Outcome   SLP Goal     SLP    Description: LTG: Patient will tolerate safest and least restrictive PO diet without signs/sx of aspiration.  STG: Meal trial of pureed solids and mildly thick liquids via cup without adequate REILLY and no signs/sx of aspiration. -met  STG: Minced/moist solids with adequate bolus preparation/mastication and without signs/sx of aspiration. -met  STG: Soft/bite sized solids with adequate bolus preparation/mastication and without signs/sx of aspiration.  STG: Laryngeal/BOT exercises Min A.    LTG: Patient will improve cognitive-linguistic skills in order to return to PLOF.  STG: Dysarthria drills at the phrase level with 90% accuracy.  STG:  Problem solving/reasoning tasks with 90% accuracy.  STG: Short term memory tasks with 90% accuracy.                     Patient Education     Patient provided with verbal education regarding ST POC.  Understanding was verbalized, however additional teaching warranted.    Plan     Will continue to follow and tx as appropriate.    SLP Follow-Up:  Yes   Patient to be seen:  5 x/week   Plan of Care expires:  09/04/24  Plan of Care reviewed with:  patient     Time Tracking     SLP Treatment Date:   09/03/24  Speech Start Time:  1030  Speech Stop Time:  1045     Speech Total Time (min):  15 min    Billable minutes:  Cognitive Skills Intervention, Initial 15 mins     09/03/2024

## 2024-09-03 NOTE — NURSING
Nurses Note -- 4 Eyes      9/2/2024   10:14 PM      Skin assessed during: Q Shift Change      [x] No Altered Skin Integrity Present    [x]Prevention Measures Documented      [] Yes- Altered Skin Integrity Present or Discovered   [] LDA Added if Not in Epic (Describe Wound)   [] New Altered Skin Integrity was Present on Admit and Documented in LDA   [] Wound Image Taken    Wound Care Consulted? No    Attending Nurse:  Charline Bethea RN/Staff Member:  BRE Yañez

## 2024-09-03 NOTE — PLAN OF CARE
Problem: Skin Injury Risk Increased  Goal: Skin Health and Integrity  Outcome: Progressing     Problem: Stroke, Intracerebral Hemorrhage  Goal: Optimal Coping  Outcome: Progressing  Goal: Effective Bowel Elimination  Outcome: Progressing  Goal: Optimal Cerebral Tissue Perfusion  Outcome: Progressing  Goal: Optimal Cognitive Function  Outcome: Progressing  Goal: Effective Communication Skills  Outcome: Progressing  Goal: Optimal Functional Ability  Outcome: Progressing  Goal: Optimal Nutrition Intake  Outcome: Progressing  Goal: Optimal Pain Control and Function  Outcome: Progressing  Goal: Effective Oxygenation and Ventilation  Outcome: Progressing  Goal: Improved Sensorimotor Function  Outcome: Progressing  Goal: Safe and Effective Swallow  Outcome: Progressing  Goal: Effective Urinary Elimination  Outcome: Progressing     Problem: Adult Inpatient Plan of Care  Goal: Absence of Hospital-Acquired Illness or Injury  Outcome: Progressing     Problem: Adult Inpatient Plan of Care  Goal: Optimal Comfort and Wellbeing  Outcome: Progressing

## 2024-09-04 PROCEDURE — 25000003 PHARM REV CODE 250: Performed by: INTERNAL MEDICINE

## 2024-09-04 PROCEDURE — 25000003 PHARM REV CODE 250: Performed by: NURSE PRACTITIONER

## 2024-09-04 PROCEDURE — 25000003 PHARM REV CODE 250: Performed by: HOSPITALIST

## 2024-09-04 PROCEDURE — 93010 ELECTROCARDIOGRAM REPORT: CPT | Mod: ,,, | Performed by: INTERNAL MEDICINE

## 2024-09-04 PROCEDURE — 21400001 HC TELEMETRY ROOM

## 2024-09-04 PROCEDURE — 97535 SELF CARE MNGMENT TRAINING: CPT

## 2024-09-04 PROCEDURE — 93005 ELECTROCARDIOGRAM TRACING: CPT

## 2024-09-04 PROCEDURE — 97116 GAIT TRAINING THERAPY: CPT | Mod: CQ

## 2024-09-04 PROCEDURE — 25000003 PHARM REV CODE 250

## 2024-09-04 RX ADMIN — DICLOFENAC SODIUM 2 G: 10 GEL TOPICAL at 02:09

## 2024-09-04 RX ADMIN — GABAPENTIN 100 MG: 100 CAPSULE ORAL at 02:09

## 2024-09-04 RX ADMIN — DICLOFENAC SODIUM 2 G: 10 GEL TOPICAL at 09:09

## 2024-09-04 RX ADMIN — GABAPENTIN 100 MG: 100 CAPSULE ORAL at 09:09

## 2024-09-04 RX ADMIN — LEVETIRACETAM 500 MG: 500 TABLET, FILM COATED ORAL at 09:09

## 2024-09-04 RX ADMIN — ALPRAZOLAM 0.25 MG: 0.25 TABLET ORAL at 09:09

## 2024-09-04 RX ADMIN — OXYCODONE HYDROCHLORIDE 5 MG: 5 TABLET ORAL at 09:09

## 2024-09-04 RX ADMIN — ATORVASTATIN CALCIUM 40 MG: 40 TABLET, FILM COATED ORAL at 09:09

## 2024-09-04 RX ADMIN — ALPRAZOLAM 0.25 MG: 0.25 TABLET ORAL at 02:09

## 2024-09-04 RX ADMIN — DIPHENHYDRAMINE HYDROCHLORIDE 25 MG: 25 CAPSULE ORAL at 09:09

## 2024-09-04 RX ADMIN — SERTRALINE HYDROCHLORIDE 50 MG: 50 TABLET ORAL at 09:09

## 2024-09-04 RX ADMIN — TRAZODONE HYDROCHLORIDE 100 MG: 100 TABLET ORAL at 09:09

## 2024-09-04 RX ADMIN — RISPERIDONE 1 MG: 1 SOLUTION ORAL at 09:09

## 2024-09-04 RX ADMIN — TAMSULOSIN HYDROCHLORIDE 0.4 MG: 0.4 CAPSULE ORAL at 09:09

## 2024-09-04 RX ADMIN — OLOPATADINE HYDROCHLORIDE 1 DROP: 1 SOLUTION OPHTHALMIC at 09:09

## 2024-09-04 NOTE — PLAN OF CARE
Spoke to [facility] She called to get an update on the status of this patient's placement. Updated her on which facilties are currently reviewing and the most recent reason's for denial. She stated that I should continue to pursue a facility that will submit for a single case agreement (one time contract). No facility willing to accept at this time. Will continue to follow up regarding this patient's placement.

## 2024-09-04 NOTE — NURSING
Nurses Note -- 4 Eyes      9/3/2024   10:18 PM      Skin assessed during: Q Shift Change      [x] No Altered Skin Integrity Present    [x]Prevention Measures Documented      [] Yes- Altered Skin Integrity Present or Discovered   [] LDA Added if Not in Epic (Describe Wound)   [] New Altered Skin Integrity was Present on Admit and Documented in LDA   [] Wound Image Taken    Wound Care Consulted? No    Attending Nurse:  Andra Bethea RN/Staff Member:  Tessa SANCHEZ

## 2024-09-04 NOTE — PT/OT/SLP PROGRESS
Occupational Therapy   Treatment    Name: Dell Garrett Jr.  MRN: [id number]  Admitting Diagnosis:  Intraparenchymal hemorrhage of brain       Recommendations:     Recommended therapy intensity at discharge: Moderate Intensity Therapy   Discharge Equipment Recommendations:  to be determined by next level of care  Barriers to discharge:  None    Assessment:     Dell Garrett Jr. is a 59 y.o. male with a medical diagnosis of R basal ganglia hemorrhage with R to L shift and intraventricular extension, elevated BP.  He presents with the following performance deficits affecting function are weakness, impaired endurance, impaired sensation, impaired self care skills, impaired functional mobility, gait instability, impaired balance, decreased safety awareness, decreased lower extremity function, decreased upper extremity function, decreased coordination, abnormal tone, visual deficits.     Addendum: OT returned to assist pt back to bed at 1410. Pt completed stand pivot from bedside chair>EOB with Mod A x2. Pt positioned appropriately with RN aware and CNA present to assist with hygiene.     Rehab Prognosis:  Good; patient would benefit from acute skilled OT services to address these deficits and reach maximum level of function.       Plan:     Patient to be seen 6 x/week to address the above listed problems via self-care/home management, therapeutic activities, therapeutic exercises, neuromuscular re-education  Plan of Care Expires: 09/27/28  Plan of Care Reviewed with: patient    Subjective     Pain/Comfort:  Pain Rating 1: 0/10    Objective:     Communicated with: RN prior to session.  Patient found up in chair with telemetry, peripheral IV, PureWick, chair check upon OT entry to room.    General Precautions: Standard, fall, aspiration    Orthopedic Precautions:N/A  Braces: N/A  Respiratory Status: Room air  Vital Signs: Blood Pressure: 106/69  HR: 71     Occupational Performance:     Functional  Mobility/Transfers:  Patient completed Sit <> Stand Transfer with minimum assistance x2 with bathroom grab bars(s) at sink    Activities of Daily Living:  Grooming: minimum assistance for completing oral care, assist needed for thoroughness and rinsing via thickened water. Cues for thorough spitting into basin.   Pt completed face washing and donning lotion on face, arms, and upper legs while seated at bathroom sink with mod A. Cues for attending to L side.   Pt stood at bathroom sink with Mod A x2 for standing balance with L knee blocked and facilitation of weight bearing throughout LUE on bathroom counter for increased strengthening and awareness needed for daily tasks.     Therapeutic Positioning    OT interventions performed during the course of today's session in an effort to prevent and/or reduce acquired pressure injuries:   Education was provided on benefits of and recommendations for therapeutic positioning    Skin assessment: visible skin intact    Patient Education:  Patient provided with verbal education education regarding OT role/goals/POC, fall prevention, safety awareness, and pressure ulcer prevention.  Additional teaching is warranted.    Patient left up in chair with all lines intact, call button in reach, chair alarm on, kimberly pad in place, and RN notified.    GOALS:   Multidisciplinary Problems       Occupational Therapy Goals          Problem: Occupational Therapy    Goal Priority Disciplines Outcome Interventions   Occupational Therapy Goal     OT, PT/OT Progressing    Description: Goals to be met by:  9/27/24    Patient will increase functional independence with ADLs by performing:    UE Dressing with Stand-by Assistance.  LE Dressing with mod A  Grooming while seated at sink with min A  Assistance.  Toileting from toilet with mod A for hygiene and clothing management.   Toilet transfer to toilet with mod A  Increased functional strength to 3/5 LUE through therEX, neuromuscular re-ed.  Pt  will visually attend to L side of environment with no cues                       Time Tracking:     OT Date of Treatment: 09/04/24  OT Start Time: 1057  OT Stop Time: 1130  OT Total Time (min): 33 min    Billable Minutes:Self Care/Home Management 33 mins    OT/MAC: OT     Number of MAC visits since last OT visit: 5    9/4/2024

## 2024-09-04 NOTE — PROGRESS NOTES
09/04/24 1030   Missed Time Reason   SLP Attempted Eval Date 09/04/24   SLP Attempted Eval Time 1030   Missed Treatment Reason Patient unwilling to participate     Pt reporting he is hungry and wants to eat a snack prior to therapy. SLP to re-attempt as schedule allows.

## 2024-09-04 NOTE — PLAN OF CARE
Spoke to Yvonne at [facility]. She asked for clinical updates to continue to follow this patient. She stated she will review most recent updates and let me know if they are able to meet this patient's needs at this time.    [facility] is unable to accept and admissions at this time.     Please see addended note from 8/22 for full list of facilties referrals have been sent to and their responses.

## 2024-09-04 NOTE — PROGRESS NOTES
Inpatient Nutrition Assessment    Admit Date: 8/10/2024   Total duration of encounter: 25 days   Patient Age: 59 y.o.    Nutrition Recommendation/Prescription     Continue Continue current diet (Diet Minced & Moist (IDDSI Level 5) No Straws, Supervision with Meals, Double Portions; Mildly Thick Liquids (IDDSI Level 2)) as tolerated.  Continue Boost Very High Calorie (provides 530 kcal, 22 g protein per serving) BID.    Communication of Recommendations: reviewed with nurse and reviewed with patient    Nutrition Assessment     Malnutrition Assessment/Nutrition-Focused Physical Exam    Malnutrition Context: acute illness or injury (08/12/24 1142)  Malnutrition Level: moderate (08/12/24 1142)  Energy Intake (Malnutrition):  (does not meet criteria) (08/12/24 1142)  Weight Loss (Malnutrition):  (does not meet criteria) (08/12/24 1142)  Subcutaneous Fat (Malnutrition): mild depletion (08/12/24 1142)     Upper Arm Region (Subcutaneous Fat Loss): mild depletion     Muscle Mass (Malnutrition): mild depletion (08/12/24 1142)     Clavicle Bone Region (Muscle Loss): mild depletion                             A minimum of two characteristics is recommended for diagnosis of either severe or non-severe malnutrition.    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 with left-sided weakness and suppressed alertness  Elevated blood pressure, no previous dx of HTN   Hypercholesterolemia   Elevated creatinine, resolved    Relevant Medical History: Crohn's disease, GERD, and chronic tobacco use     Scheduled Medications:  atorvastatin, 40 mg, QHS  diclofenac sodium, 2 g, TID  gabapentin, 100 mg, TID  levETIRAcetam, 500 mg, BID  olopatadine, 1 drop,  BID  risperidone 1 mg/ml, 1 mg, BID  sertraline, 50 mg, Daily  tamsulosin, 0.4 mg, Daily    Continuous Infusions: none       PRN Medications:   0.9% NaCl, , PRN  acetaminophen, 650 mg, Q6H PRN  acetaminophen, 650 mg, Q6H PRN  ALPRAZolam, 0.25 mg, Q4H PRN  bisacodyL, 10 mg, Daily PRN  cloNIDine, 0.1 mg, Q6H PRN  diphenhydrAMINE, 25 mg, Q6H PRN  haloperidol lactate, 5 mg, Q6H PRN  hydrALAZINE, 10 mg, Q4H PRN  oxyCODONE, 5 mg, Q6H PRN  polyethylene glycol, 17 g, BID PRN  traMADoL, 50 mg, Q6H PRN  traZODone, 100 mg, Nightly PRN    Calorie Containing IV Medications: no significant kcals from medications at this time    Recent Labs   Lab 08/29/24  1323 08/30/24  0307    137   K 4.8 4.7   CALCIUM 9.8 9.4   CO2 28 25   BUN 24.1 23.6   CREATININE 0.93 0.90   EGFRNORACEVR >60 >60   GLUCOSE 117* 105   BILITOT  --  0.4   ALKPHOS  --  64   ALT  --  27   AST  --  22   ALBUMIN  --  2.9*   WBC  --  5.21   HGB  --  10.7*   HCT  --  33.2*     Nutrition Orders:  Diet Minced & Moist (IDDSI Level 5) No Straws, Supervision with Meals, Double Portions; Mildly Thick Liquids (IDDSI Level 2)  Dietary nutrition supplements All Meals; Boost Very High Calorie Nutritional Drink - Any flavor    Appetite/Oral Intake: good/% of meals  Factors Affecting Nutritional Intake: none identified  Social Needs Impacting Access to Food: none identified  Food/Gnosticist/Cultural Preferences: none reported  Food Allergies: no known food allergies  Last Bowel Movement: 09/01/24  Wound(s): no pressure injuries documented at this time     Comments    8/12/24 Patient confused, sister at bedside. Patient's sister reports good appetite/intake prior to admission, regular diet at home, denies weight loss. Patient reports liking vanilla/strawberry Ensure. He is currently NPO, had not been able to participate in SLP evaluation. Tube feeding recommendation provided if oral intake remains not feasible.    8/14/24 Patient remains NPO, started on tube feeding, no  "longer receiving calories from Cleviprex, will change to standard polymeric formula.    8/19/24 Tube feeding tolerated at goal.    8/23/24 Dysphagia diet, no longer on tube feeding, nurse reports patient eating 100% and asking for more food, will add double portions and Boost.    8/28/24: RN reports pt has been eating fairly well, nursing has been feeding him his Boost supplements BID. Noted last BM was 8/22.     8/30/24: Spoke with rn, pt eating well and enjoying his Boost supplements.     9/4/24: RN reports good oral intake. Patient states drinking Boost supplements.    Anthropometrics    Height: 5' 7" (170.2 cm), Height Method: Measured  Last Weight: 65.3 kg (143 lb 15.4 oz) (08/10/24 1346), Weight Method: Bed Scale  BMI (Calculated): 22.5  BMI Classification: normal (BMI 18.5-24.9)        Ideal Body Weight (IBW), Male: 148 lb     % Ideal Body Weight, Male (lb): 97.27 %                          Usual Weight Provided By: family/caregiver denies unintentional weight loss    Wt Readings from Last 5 Encounters:   08/10/24 65.3 kg (143 lb 15.4 oz)   05/12/24 65.8 kg (145 lb)   09/25/23 67.1 kg (148 lb)   08/30/23 67.1 kg (148 lb)   08/21/23 63.5 kg (140 lb)     Weight Change(s) Since Admission: none, new admit  Wt Readings from Last 1 Encounters:   08/10/24 1346 65.3 kg (143 lb 15.4 oz)   08/10/24 0743 65.3 kg (143 lb 15.4 oz)   Admit Weight: 65.3 kg (143 lb 15.4 oz) (08/10/24 0743), Weight Method: Bed Scale    Estimated Needs    Weight Used For Calorie Calculations: 65.3 kg (143 lb 15.4 oz)  Energy Calorie Requirements (kcal): 1984, 1.4 stress factor  Energy Need Method: [place]-St Jeor  Weight Used For Protein Calculations: 65.3 kg (143 lb 15.4 oz)  Protein Requirements: 79-98 g, 1.2-1.5 g/kg  Fluid Requirements (mL): 1984, 1 ml/kcal      Enteral Nutrition Patient not receiving enteral nutrition support at this time.    Parenteral Nutrition Patient not receiving parenteral nutrition support at this " time.    Evaluation of Received Nutrient Intake    Calories: meeting estimated needs  Protein: meeting estimated needs    Patient Education Not applicable.    Nutrition Diagnosis     PES: Inadequate energy intake related to inability to consume sufficient nutrients as evidenced by less than 80% needs met. (resolved)  PES: Moderate acute disease or injury related malnutrition related to acute illness as evidenced by mild fat depletion and mild muscle depletion. (active)    Nutrition Interventions     Intervention(s): modified composition of enteral nutrition and collaboration with other providers  Goal: Meet greater than 80% of nutritional needs by follow-up. (goal progressing)    Nutrition Goals & Monitoring     Dietitian will monitor: food and beverage intake, energy intake, enteral nutrition intake, weight, electrolyte/renal panel, and beliefs/attitudes  Discharge planning:  regular diet with texture modifications per SLP  Nutrition Risk/Follow-Up: moderate (follow-up in 3-5 days)   Please consult if re-assessment needed sooner.

## 2024-09-04 NOTE — PT/OT/SLP PROGRESS
Physical Therapy Treatment    Patient Name:  Dell Garrett Jr.   MRN:  [id number]    Recommendations:     Discharge therapy intensity: Moderate Intensity Therapy   Discharge Equipment Recommendations: to be determined by next level of care  Barriers to discharge: Ongoing medical needs and placement.    Assessment:     Dell Garrett Jr. is a 59 y.o. 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  He presents with the following impairments/functional limitations: weakness, impaired endurance, impaired functional mobility, impaired cognition, decreased upper extremity function, impaired self care skills, decreased lower extremity function, gait instability, decreased safety awareness, impaired balance, impaired cardiopulmonary response to activity.    Rehab Prognosis: Good; patient would benefit from acute skilled PT services to address these deficits and reach maximum level of function.    Recent Surgery: * No surgery found *      Plan:     During this hospitalization, patient would benefit from acute PT services 5 x/week to address the identified rehab impairments via gait training, therapeutic activities and progress toward the following goals:    Plan of Care Expires:  09/12/24    Subjective     Chief Complaint: n/a  Patient/Family Comments/goals:  Pain/Comfort:         Objective:     Communicated with RN prior to session.  Patient found HOB elevated with telemetry, pulse ox (continuous), peripheral IV, PureWick upon PT entry to room.     General Precautions: Standard, fall  Orthopedic Precautions: N/A  Braces: N/A  Respiratory Status: Room air  Skin Integrity: Visible skin intact      Functional Mobility:  Bed Mobility:     Supine to Sit: moderate assistance and of 2 persons  Transfers:     Sit to Stand:  moderate assistance with R hand rail  Bed to Chair: moderate assistance with  no AD  using  Stand Pivot  Gait: Pt amb 8'x2 modAx2  w/R HR and chair in tow; mirror for visual cues and assistance w/LLE advancement during swing phase and L knee block during stance phase. Verbal and tactile cueing for improved posture prior to each step.     Education:  Patient provided with verbal education education regarding PT role/goals/POC, fall prevention, and safety awareness.  Understanding was verbalized.     Patient left up in chair with all lines intact, call button in reach, chair alarm on, kimberly pad in place, and RN notified    GOALS:   Multidisciplinary Problems       Physical Therapy Goals          Problem: Physical Therapy    Goal Priority Disciplines Outcome Goal Variances Interventions   Physical Therapy Goal     PT, PT/OT Progressing     Description: Goals to be met by: 2024     Patient will increase functional independence with mobility by performin. Supine to sit with MInimal Assistance  2. Sit to supine with MInimal Assistance  3. Sit to stand transfer with Minimal Assistance  4. Pt to follow 75% of commands.   5. Gait  x 75 feet with Minimal Assistance using Rolling Walker vs LRAD.                          Time Tracking:     PT Received On: 24  PT Start Time: 947     PT Stop Time: 1017  PT Total Time (min): 30 min     Billable Minutes: Gait Training 30    Treatment Type: Treatment  PT/PTA: PTA     Number of PTA visits since last PT visit: 4     2024

## 2024-09-05 LAB
OHS QRS DURATION: 90 MS
OHS QTC CALCULATION: 408 MS

## 2024-09-05 PROCEDURE — 97535 SELF CARE MNGMENT TRAINING: CPT

## 2024-09-05 PROCEDURE — 97129 THER IVNTJ 1ST 15 MIN: CPT

## 2024-09-05 PROCEDURE — 97116 GAIT TRAINING THERAPY: CPT | Mod: CQ

## 2024-09-05 PROCEDURE — 25000003 PHARM REV CODE 250: Performed by: HOSPITALIST

## 2024-09-05 PROCEDURE — 97168 OT RE-EVAL EST PLAN CARE: CPT

## 2024-09-05 PROCEDURE — 21400001 HC TELEMETRY ROOM

## 2024-09-05 PROCEDURE — 25000003 PHARM REV CODE 250

## 2024-09-05 PROCEDURE — 97530 THERAPEUTIC ACTIVITIES: CPT | Mod: CQ

## 2024-09-05 PROCEDURE — 25000003 PHARM REV CODE 250: Performed by: INTERNAL MEDICINE

## 2024-09-05 RX ADMIN — GABAPENTIN 100 MG: 100 CAPSULE ORAL at 08:09

## 2024-09-05 RX ADMIN — DICLOFENAC SODIUM 2 G: 10 GEL TOPICAL at 08:09

## 2024-09-05 RX ADMIN — GABAPENTIN 100 MG: 100 CAPSULE ORAL at 09:09

## 2024-09-05 RX ADMIN — SERTRALINE HYDROCHLORIDE 50 MG: 50 TABLET ORAL at 08:09

## 2024-09-05 RX ADMIN — DICLOFENAC SODIUM 2 G: 10 GEL TOPICAL at 02:09

## 2024-09-05 RX ADMIN — OLOPATADINE HYDROCHLORIDE 1 DROP: 1 SOLUTION OPHTHALMIC at 08:09

## 2024-09-05 RX ADMIN — OLOPATADINE HYDROCHLORIDE 1 DROP: 1 SOLUTION OPHTHALMIC at 09:09

## 2024-09-05 RX ADMIN — ALPRAZOLAM 0.25 MG: 0.25 TABLET ORAL at 09:09

## 2024-09-05 RX ADMIN — DICLOFENAC SODIUM 2 G: 10 GEL TOPICAL at 09:09

## 2024-09-05 RX ADMIN — ATORVASTATIN CALCIUM 40 MG: 40 TABLET, FILM COATED ORAL at 09:09

## 2024-09-05 RX ADMIN — TAMSULOSIN HYDROCHLORIDE 0.4 MG: 0.4 CAPSULE ORAL at 08:09

## 2024-09-05 RX ADMIN — TRAZODONE HYDROCHLORIDE 100 MG: 100 TABLET ORAL at 09:09

## 2024-09-05 RX ADMIN — LEVETIRACETAM 500 MG: 500 TABLET, FILM COATED ORAL at 09:09

## 2024-09-05 RX ADMIN — OXYCODONE HYDROCHLORIDE 5 MG: 5 TABLET ORAL at 10:09

## 2024-09-05 RX ADMIN — GABAPENTIN 100 MG: 100 CAPSULE ORAL at 02:09

## 2024-09-05 RX ADMIN — RISPERIDONE 1 MG: 1 SOLUTION ORAL at 09:09

## 2024-09-05 RX ADMIN — LEVETIRACETAM 500 MG: 500 TABLET, FILM COATED ORAL at 08:09

## 2024-09-05 RX ADMIN — RISPERIDONE 1 MG: 1 SOLUTION ORAL at 02:09

## 2024-09-05 NOTE — PT/OT/SLP RE-EVAL
Occupational Therapy   Re-evaluation    Name: Dell Garrett Jr.  MRN: [id number]  Admitting Diagnosis: CVA  Recent Surgery: * No surgery found *      Recommendations:     Discharge therapy intensity: Moderate Intensity Therapy   Discharge Equipment Recommendations:  to be determined by next level of care  Barriers to discharge:  Decreased caregiver support    Assessment:     Dell Garrett Jr. is a 59 y.o. male with a medical diagnosis of R basal ganglia hemorrhage with R to L shift and intraventricular extension, elevated BP.  He presents with the following performance deficits affecting function: weakness, impaired endurance, impaired sensation, impaired self care skills, impaired functional mobility, gait instability, impaired balance, decreased safety awareness, decreased lower extremity function, decreased upper extremity function, decreased coordination, impaired cognition, abnormal tone.      Pt tolerated OT re-evaluation well. Pt requires SBA-Min A for upper body grooming ADLs, Mod-Max A for dressing, and Max A for toileting at this time. Pt remains with flaccid LUE, notes pain sensation to LUE but impaired light touch remains. Pt with improved tracking to left, briefly crossed midline x2 with max cues. Pt remains appropriate for moderate intensity therapy upon discharge. Updated pt goals and POC to reflect current progress.    Rehab Prognosis: Good; patient would benefit from acute skilled OT services to address these deficits and reach maximum level of function.       Plan:     Patient to be seen 5 x/week to address the above listed problems via self-care/home management, therapeutic activities, therapeutic exercises, neuromuscular re-education  Plan of Care Expires: 10/04/24  Plan of Care Reviewed with: patient    Subjective     Chief Complaint: none  Patient/Family Comments/goals: to get better    Pain/Comfort:  Pain Rating 1: 0/10    Patients cultural, spiritual, Muslim conflicts given the current  situation: no    Objective:     OT communicated with RN prior to session.      Patient was found HOB elevated with bed alarm, peripheral IV, telemetry upon OT entry to room.    General Precautions: Standard, fall, aspiration  Orthopedic Precautions: N/A  Braces: N/A    Vital Signs: Blood Pressure: 100/69  HR: 73    Bed Mobility:    Patient completed Supine to Sit with moderate assistance    Functional Mobility/Transfers:  Patient completed Sit <> Stand Transfer with moderate assistance with hand-held assist   Patient completed Bed <> Chair Transfer using Stand Pivot technique with minimum assistance x2 with no assistive device    Activities of Daily Living:  Grooming: stand by assistance and set up of toothbrush for oral care with thickened water.   Upper Body Dressing: moderate assistance for donning gown using rian-technique. Max cues for rian dressing technique to thread LUE   Lower Body Dressing: minimum assistance for donning R sock at bed level, Max A for L sock  Toileting: maximal assistance at bed level for posterior hygiene as pt found soiled    Functional Cognition:  Orientation: oriented to Person and Place  Safety Awareness: Impaired.      Visual Perceptual Skills:  Pursuits: pt able to briefly cross midline into left visual field x2 with maximal cues    Upper Extremity Function:  Right Upper Extremity:   Strength: WFL  Sensation: WFL    Left Upper Extremity:  Range of Motion: PROM WFL  Strength: 0/5  Sensation: pain sensation intact, light touch sensation impaired  -adjustment needed for better fit of resting hand splint. Will reassess next session/try new splint.     Balance:   Static sitting balance: mod A  Dynamic sitting balance:mod-max A    Therapeutic Positioning  Risk for acquired pressure injuries is increased due to impaired mobility.    OT interventions performed during the course of today's session in an effort to prevent and/or reduce acquired pressure injuries:   Education was provided on  benefits of and recommendations for therapeutic positioning    Skin assessment: visible skin and sacrum intact    OT recommendations for therapeutic positioning throughout hospitalization:   Follow [facility] Pressure Injury Prevention Protocol    Additional Treatment:  ADL Training: oral hygiene and rian-dressing education    Patient Education:  Patient provided with verbal education education regarding OT role/goals/POC, fall prevention, and safety awareness.  Understanding was verbalized, however additional teaching warranted.     Patient left up in chair with all lines intact, call button in reach, chair alarm on, and kimberly pad in place    GOALS:   Multidisciplinary Problems       Occupational Therapy Goals          Problem: Occupational Therapy    Goal Priority Disciplines Outcome Interventions   Occupational Therapy Goal     OT, PT/OT Progressing    Description: Goals to be met by:  10/5/24    Patient will increase functional independence with ADLs by performing:    UE Dressing with Stand-by Assistance.  LE Dressing with mod A  Grooming while seated at sink with SBA. -revised 9/5  Toileting from toilet with Min A for hygiene and clothing management. -revised 9/5  Toilet transfer to toilet with Min A. -revised 9/5  Increased functional strength to 3/5 LUE through therEX, neuromuscular re-ed.  Pt will visually attend to L side of environment with no cues                       History:     Past Medical History:   Diagnosis Date    Crohn's disease     GERD (gastroesophageal reflux disease)        History reviewed. No pertinent surgical history.    Time Tracking:     OT Date of Treatment: 09/05/24  OT Start Time: 0929  OT Stop Time: 0958  OT Total Time (min): 29 min    Billable Minutes:Re-eval 14 mins  Self Care/Home Management 15 mins    9/5/2024

## 2024-09-05 NOTE — PROGRESS NOTES
[facility] Medicine Progress Note        Chief Complaint: Inpatient Follow-up acute stroke     HPI:      59-year-old male with significant history of Crohn's disease, GERD, chronic tobacco use/substance use-cocaine presented to the ED with complaints of acute onset left-sided weakness, facial droop and aphasia along with sudden onset headache.  Imaging in the ED revealed intraparenchymal hemorrhage of right thalamus with surrounding edema, compression of right lateral ventricle with 8 mm midline shift, early obstructive hydrocephalus, possible subarachnoid hemorrhage. patient admitted to ICU, neurology and neurosurgery services were consulted.  CT angiogram with mild dolichoectasia of basilar artery , echocardiogram negative for bubble study, EF intact, grade 2 diastolic dysfunction. Conservative management, recommended to keep BP at goal, Keppra for seizure prevention, close neuro checks, 3% saline initiated.  Follow up CT with stable findings, therapy services consulted.  Holding anticoagulation, antiplatelets.  Hypertonic saline discontinued, initiate high-intensity statin, patient downgraded to hospital medicine services, case management consulted for placement, neurology recommended to continue to avoid antiplatelets.  Cleared for p.o. intake by speech, tolerating well.  Patient continues to participate with therapy services, case management actively working on placement.  Resumed home amlodipine, labs stable.  Psych consulted on 08/28 for impulsive behavior, initiated risperidone, Zoloft and p.r.n. trazodone   Concern for urinary retention, closely monitoring with bladder scan, added tamsulosin   spoke to sister over the phone   Patient asking when he can go home unfortunately he is unable to live by himself  He has been denied by multiple facilities      Interval Hx:   Patient seen and examined by bedside.  States that he feels stronger and better.  No problems with  eating.  Wants double portions with his meal.      Objective/physical exam:  General: In no acute distress, afebrile  Chest: Clear to auscultation bilaterally  Heart: S1, S2, no appreciable murmur  Abdomen: Soft, nontender, BS +  MSK: Warm, no lower extremity edema, no clubbing or cyanosis  Neurologic:  Awake, alert, intermittent impulsivity    Assessment/Plan:   low normal bp , improved  Acute intra parenchymal hemorrhage-right thalamic capsular with edema/midline shift/intraventricular extension/early obstructive hydrocephalus  Acute subarachnoid hemorrhage   Left-sided weakness/partial aphasia secondary to above  Oropharyngeal dysphagia secondary to above-cleared for modified diet  New diagnosis essential HTN-stable  Impulsive behavior   Urinary retention-improved today  History of Crohn's disease/GERD  Chronic tobacco use/substance use  Prophylaxis     Plan  Amlodipine was discontinued on 9/1, bp wnl  olopatadine Eye drops bid   Neurology and neurosurgery signed off   Per latest neurology note recommending to continue to avoid antiplatelets  Repeat CT with stable findings   CT angiogram with no AVMs or aneurysm  Negative bubble study  PT/OT on board   Kera for seizure prevention   Urinary retention improved after adding tamsulosin on 08/28   Psych on board for impulsive behavior  They have added Zoloft, risperidone and p.r.n. trazodone as of 8/28  DVT prophylaxis-bilateral SCDs           Case management has been consulted for skilled nursing facility.  Patient has been denied by several skilled nursing facilities in the area.  Challenging discharge.        All diagnosis and differential diagnosis have been reviewed; assessment and plan has been documented; I have personally reviewed the labs and test results that are presently available; I have reviewed the patients medication list; I have reviewed the consulting providers response and recommendations. I have reviewed or attempted to review medical records  based upon their availability    All of the patient's questions have been  addressed and answered. Patient's is agreeable to the above stated plan. I will continue to monitor closely and make adjustments to medical management as needed.    Portions of this note dictated using EMR integrated voice recognition software, and may be subject to voice recognition errors not corrected at proofreading. Please contact writer for clarification if needed.     VITAL SIGNS: 24 HRS MIN & MAX LAST   Temp  Min: 97.7 °F (36.5 °C)  Max: 98.4 °F (36.9 °C) 98.4 °F (36.9 °C)   BP  Min: 102/63  Max: 116/68 110/71   Pulse  Min: 56  Max: 69  69   Resp  Min: 18  Max: 18 18   SpO2  Min: 93 %  Max: 97 % (!) 93 %     I have reviewed the following labs:  Recent Labs   Lab 08/12/24 0441 08/13/24  0433 08/14/24  0500   WBC 7.19 5.66 5.53   RBC 4.95 4.46* 4.39*   HGB 14.8 13.6* 13.4*   HCT 43.5 39.0* 38.4*   MCV 87.9 87.4 87.5   MCH 29.9 30.5 30.5   MCHC 34.0 34.9 34.9   RDW 12.8 12.7 12.7    206 200   MPV 9.0 9.0 9.4     Recent Labs   Lab 08/12/24 0441 08/13/24  0433 08/14/24  0500 08/15/24  0508   * 133* 132* 135*   K 3.5 3.4* 3.3* 4.1   CL 97* 101 98 101   CO2 24 22* 23 22*   BUN 20.0 19.6 16.7 15.4   CREATININE 1.05 1.00 0.99 0.92   CALCIUM 9.3 9.0 8.9 9.2   MG 2.60 2.10 1.80 2.00   ALBUMIN 3.6 3.4 3.3*  --    ALKPHOS 112 103 97  --    ALT 22 19 20  --    AST 24 20 25  --    BILITOT 0.8 0.7 0.7  --      Microbiology Results (last 7 days)       ** No results found for the last 168 hours. **          _____________________________________________________________________    Malnutrition Status:    Scheduled Med:   apixaban  5 mg Oral BID    aspirin  81 mg Oral Daily    atorvastatin  40 mg Oral QHS    diltiaZEM  360 mg Oral Daily    docusate sodium  50 mg Oral BID    ergocalciferol  50,000 Units Oral Q7 Days    guaiFENesin  600 mg Oral BID    insulin glargine U-100  15 Units Subcutaneous QHS    QUEtiapine  150 mg Oral Nightly       Continuous Infusions:     PRN Meds:    Current Facility-Administered Medications:     acetaminophen, 1,000 mg, Oral, Q6H PRN    aluminum-magnesium hydroxide-simethicone, 30 mL, Oral, QID PRN    bisacodyL, 10 mg, Rectal, Daily PRN    dextrose 10%, 12.5 g, Intravenous, PRN    dextrose 10%, 25 g, Intravenous, PRN    glucagon (human recombinant), 1 mg, Intramuscular, PRN    glucose, 16 g, Oral, PRN    glucose, 24 g, Oral, PRN    guaiFENesin 100 mg/5 ml, 200 mg, Oral, Q4H PRN    HYDROcodone-acetaminophen, 1 tablet, Oral, Q4H PRN    insulin aspart U-100, 1-10 Units, Subcutaneous, QID (AC + HS) PRN    melatonin, 6 mg, Oral, Nightly PRN    naloxone, 0.02 mg, Intravenous, PRN    ondansetron, 4 mg, Intravenous, Q4H PRN    prochlorperazine, 5 mg, Intravenous, Q6H PRN    senna-docusate 8.6-50 mg, 1 tablet, Oral, BID PRN    sodium chloride 0.9%, 10 mL, Intravenous, PRN     Radiology:  I have personally reviewed the following imaging and agree with the radiologist.     Cardiac catheterization  Procedure performed in the Invasive Lab    - See Procedure Log link below for nursing documentation    - See OpNote on Surgeries Tab for physician findings    - See Imaging Tab for radiologist dictation      Jesenia Schumacher DO  Department of Hospital Medicine  [facility]  09/05/2024

## 2024-09-05 NOTE — NURSING
Nurses Note -- 4 Eyes      9/4/2024   8:08 PM      Skin assessed during: Q Shift Change      [x] No Altered Skin Integrity Present    [x]Prevention Measures Documented      [] Yes- Altered Skin Integrity Present or Discovered   [] LDA Added if Not in Epic (Describe Wound)   [] New Altered Skin Integrity was Present on Admit and Documented in LDA   [] Wound Image Taken    Wound Care Consulted? No    Attending Nurse:  Raffi Bethea RN/Staff Member:  Pamela

## 2024-09-05 NOTE — PLAN OF CARE
Problem: Occupational Therapy  Goal: Occupational Therapy Goal  Description: Goals to be met by:  10/5/24    Patient will increase functional independence with ADLs by performing:    UE Dressing with Stand-by Assistance.  LE Dressing with mod A  Grooming while seated at sink with SBA. -revised 9/5  Toileting from toilet with Min A for hygiene and clothing management. -revised 9/5  Toilet transfer to toilet with Min A. -revised 9/5  Increased functional strength to 3/5 LUE through therEX, neuromuscular re-ed.  Pt will visually attend to L side of environment with no cues  Outcome: Progressing

## 2024-09-05 NOTE — PROGRESS NOTES
[facility] Medicine Progress Note        Chief Complaint: Inpatient Follow-up acute stroke     HPI:      59-year-old male with significant history of Crohn's disease, GERD, chronic tobacco use/substance use-cocaine presented to the ED with complaints of acute onset left-sided weakness, facial droop and aphasia along with sudden onset headache.  Imaging in the ED revealed intraparenchymal hemorrhage of right thalamus with surrounding edema, compression of right lateral ventricle with 8 mm midline shift, early obstructive hydrocephalus, possible subarachnoid hemorrhage. patient admitted to ICU, neurology and neurosurgery services were consulted.  CT angiogram with mild dolichoectasia of basilar artery , echocardiogram negative for bubble study, EF intact, grade 2 diastolic dysfunction. Conservative management, recommended to keep BP at goal, Keppra for seizure prevention, close neuro checks, 3% saline initiated.  Follow up CT with stable findings, therapy services consulted.  Holding anticoagulation, antiplatelets.  Hypertonic saline discontinued, initiate high-intensity statin, patient downgraded to hospital medicine services, case management consulted for placement, neurology recommended to continue to avoid antiplatelets.  Cleared for p.o. intake by speech, tolerating well.  Patient continues to participate with therapy services, case management actively working on placement.  Resumed home amlodipine, labs stable.  Psych consulted on 08/28 for impulsive behavior, initiated risperidone, Zoloft and p.r.n. trazodone   Concern for urinary retention, closely monitoring with bladder scan, added tamsulosin   spoke to sister over the phone   Patient asking when he can go home unfortunately he is unable to live by himself  He has been denied by multiple facilities      Interval Hx:      September 4, 2024 - patient seen and examined at bedside,   Vitals reviewed and stable  No new  problems       Objective/physical exam:  General: In no acute distress, afebrile  Chest: Clear to auscultation bilaterally  Heart: S1, S2, no appreciable murmur  Abdomen: Soft, nontender, BS +  MSK: Warm, no lower extremity edema, no clubbing or cyanosis  Neurologic:  Awake, alert, intermittent impulsivity    Assessment/Plan:   low normal bp , improved  Acute intra parenchymal hemorrhage-right thalamic capsular with edema/midline shift/intraventricular extension/early obstructive hydrocephalus  Acute subarachnoid hemorrhage   Left-sided weakness/partial aphasia secondary to above  Oropharyngeal dysphagia secondary to above-cleared for modified diet  New diagnosis essential HTN-stable  Impulsive behavior   Urinary retention-improved today  History of Crohn's disease/GERD  Chronic tobacco use/substance use  Prophylaxis     Plan  Amlodipine was discontinued on 9/1, bp wnl  olopatadine Eye drops bid   Neurology and neurosurgery signed off   Per latest neurology note recommending to continue to avoid antiplatelets  Repeat CT with stable findings   CT angiogram with no AVMs or aneurysm  Negative bubble study  PT/OT on board   Keppra for seizure prevention   Urinary retention improved after adding tamsulosin on 08/28   Psych on board for impulsive behavior  They have added Zoloft, risperidone and p.r.n. trazodone as of 8/28  DVT prophylaxis-bilateral SCDs           Patient with frequent impulsive behavior   Inappropriate with medical staff   Consult  for skilled nursing facility-denied by 30 different skilled nursing facility              All diagnosis and differential diagnosis have been reviewed; assessment and plan has been documented; I have personally reviewed the labs and test results that are presently available; I have reviewed the patients medication list; I have reviewed the consulting providers response and recommendations. I have reviewed or attempted to review medical records based upon their  availability    All of the patient's questions have been  addressed and answered. Patient's is agreeable to the above stated plan. I will continue to monitor closely and make adjustments to medical management as needed.    Portions of this note dictated using EMR integrated voice recognition software, and may be subject to voice recognition errors not corrected at proofreading. Please contact writer for clarification if needed.     VITAL SIGNS: 24 HRS MIN & MAX LAST   Temp  Min: 97.7 °F (36.5 °C)  Max: 98.4 °F (36.9 °C) 98.4 °F (36.9 °C)   BP  Min: 102/63  Max: 116/68 110/71   Pulse  Min: 56  Max: 69  69   Resp  Min: 18  Max: 18 18   SpO2  Min: 93 %  Max: 97 % (!) 93 %     I have reviewed the following labs:  Recent Labs   Lab 08/12/24 0441 08/13/24  0433 08/14/24  0500   WBC 7.19 5.66 5.53   RBC 4.95 4.46* 4.39*   HGB 14.8 13.6* 13.4*   HCT 43.5 39.0* 38.4*   MCV 87.9 87.4 87.5   MCH 29.9 30.5 30.5   MCHC 34.0 34.9 34.9   RDW 12.8 12.7 12.7    206 200   MPV 9.0 9.0 9.4     Recent Labs   Lab 08/12/24 0441 08/13/24  0433 08/14/24  0500 08/15/24  0508   * 133* 132* 135*   K 3.5 3.4* 3.3* 4.1   CL 97* 101 98 101   CO2 24 22* 23 22*   BUN 20.0 19.6 16.7 15.4   CREATININE 1.05 1.00 0.99 0.92   CALCIUM 9.3 9.0 8.9 9.2   MG 2.60 2.10 1.80 2.00   ALBUMIN 3.6 3.4 3.3*  --    ALKPHOS 112 103 97  --    ALT 22 19 20  --    AST 24 20 25  --    BILITOT 0.8 0.7 0.7  --      Microbiology Results (last 7 days)       ** No results found for the last 168 hours. **          _____________________________________________________________________    Malnutrition Status:    Scheduled Med:   apixaban  5 mg Oral BID    aspirin  81 mg Oral Daily    atorvastatin  40 mg Oral QHS    diltiaZEM  360 mg Oral Daily    docusate sodium  50 mg Oral BID    ergocalciferol  50,000 Units Oral Q7 Days    guaiFENesin  600 mg Oral BID    insulin glargine U-100  15 Units Subcutaneous QHS    QUEtiapine  150 mg Oral Nightly      Continuous  Infusions:     PRN Meds:    Current Facility-Administered Medications:     acetaminophen, 1,000 mg, Oral, Q6H PRN    aluminum-magnesium hydroxide-simethicone, 30 mL, Oral, QID PRN    bisacodyL, 10 mg, Rectal, Daily PRN    dextrose 10%, 12.5 g, Intravenous, PRN    dextrose 10%, 25 g, Intravenous, PRN    glucagon (human recombinant), 1 mg, Intramuscular, PRN    glucose, 16 g, Oral, PRN    glucose, 24 g, Oral, PRN    guaiFENesin 100 mg/5 ml, 200 mg, Oral, Q4H PRN    HYDROcodone-acetaminophen, 1 tablet, Oral, Q4H PRN    insulin aspart U-100, 1-10 Units, Subcutaneous, QID (AC + HS) PRN    melatonin, 6 mg, Oral, Nightly PRN    naloxone, 0.02 mg, Intravenous, PRN    ondansetron, 4 mg, Intravenous, Q4H PRN    prochlorperazine, 5 mg, Intravenous, Q6H PRN    senna-docusate 8.6-50 mg, 1 tablet, Oral, BID PRN    sodium chloride 0.9%, 10 mL, Intravenous, PRN     Radiology:  I have personally reviewed the following imaging and agree with the radiologist.     Cardiac catheterization  Procedure performed in the Invasive Lab    - See Procedure Log link below for nursing documentation    - See OpNote on Surgeries Tab for physician findings    - See Imaging Tab for radiologist dictation      Nain Lizama MD  Department of Hospital Medicine   [facility]   08/18/2024

## 2024-09-05 NOTE — PLAN OF CARE
Clinical updates sent to [facility]. Awaiting return call from Yvonne at [facility] at this time for decision on whether they are able to accept this patient for SNF to long term placement.

## 2024-09-05 NOTE — PT/OT/SLP PROGRESS
Physical Therapy Treatment    Patient Name:  Dell Garrett Jr.   MRN:  [id number]    Recommendations:     Discharge therapy intensity: Moderate Intensity Therapy   Discharge Equipment Recommendations: to be determined by next level of care  Barriers to discharge: Impaired mobility and Ongoing medical needs    Assessment:     Dell Garrett Jr. is a 59 y.o. 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  He presents with the following impairments/functional limitations: weakness, impaired endurance, impaired functional mobility, impaired cognition, decreased upper extremity function, impaired self care skills, decreased lower extremity function, gait instability, decreased safety awareness, impaired balance, impaired cardiopulmonary response to activity.    Rehab Prognosis: Fair; patient would benefit from acute skilled PT services to address these deficits and reach maximum level of function.    Recent Surgery: * No surgery found *      Plan:     During this hospitalization, patient would benefit from acute PT services 5 x/week to address the identified rehab impairments via gait training, therapeutic activities and progress toward the following goals:    Plan of Care Expires:  09/12/24    Subjective     Chief Complaint:   Patient/Family Comments/goals:   Pain/Comfort:         Objective:     Communicated with RN prior to session.  Patient found up in chair with telemetry, pulse ox (continuous), peripheral IV, PureWick upon PT entry to room.     General Precautions: Standard, fall  Orthopedic Precautions: N/A  Braces: N/A  Respiratory Status: Room air  Skin Integrity: Visible skin intact      Functional Mobility:  Bed Mobility:     Sit to Supine: moderate assistance and of 2 persons  Transfers:     Sit to Stand:  moderate assistance with R HR  Bed to Chair: minimum assistance with  no AD  using  Squat Pivot  Gait: 8'x2 modAx2 w/R HR and  chair in tow; mirror for visual cues and assistance w/LLE advancement during swing phase and L knee block during stance phase. Verbal and tactile cueing for improved posture prior to each step.    Therapeutic Activities/Exercises:  Anterior WS in bschair w/no UE use x 10; vc to maintain midline     Education:  Patient provided with verbal education education regarding PT role/goals/POC, fall prevention, and safety awareness.  Understanding was verbalized.     Patient left HOB elevated with all lines intact, call button in reach, and bed alarm on    GOALS:   Multidisciplinary Problems       Physical Therapy Goals          Problem: Physical Therapy    Goal Priority Disciplines Outcome Goal Variances Interventions   Physical Therapy Goal     PT, PT/OT Progressing     Description: Goals to be met by: 2024     Patient will increase functional independence with mobility by performin. Supine to sit with MInimal Assistance  2. Sit to supine with MInimal Assistance  3. Sit to stand transfer with Minimal Assistance  4. Pt to follow 75% of commands.   5. Gait  x 75 feet with Minimal Assistance using Rolling Walker vs LRAD.                          Time Tracking:     PT Received On: 24  PT Start Time: 1017     PT Stop Time: 1055  PT Total Time (min): 38 min     Billable Minutes: Gait Training 20 and Therapeutic Activity 18    Treatment Type: Treatment  PT/PTA: PTA     Number of PTA visits since last PT visit: 5     2024

## 2024-09-05 NOTE — PT/OT/SLP PROGRESS
[facility]  Speech Language Pathology Department  Therapy Progress Note    Patient Name:  Dell Garrett Jr.   MRN:  [id number]    Recommendations     General recommendations:  dysphagia therapy and cognitive-linguistic therapy  Communication strategies:  go to room if call light pushed    Discharge therapy intensity: Moderate Intensity Therapy   Barriers to safe discharge: limited insight into deficits and safety awareness    Subjective     Patient awake, alert, and cooperative.    Pain/Comfort: Pain Rating 1: 0/10  Spiritual/Cultural/Jehovah's witness Beliefs/Practices that affect care: no    Objective     Reasonin% accuracy independently; improved to 100% given max cues.    Dysarthria Drills: Educated patient on compensatory strategies for improved speech intelligibility. Able to use skills at the phrase level with 80% accuracy.     Assessment     Pt continues to present with cognitive deficits and  oropharyngeal dysphagia requiring diet modification to improve swallow safety and efficiency.     Goals     Multidisciplinary Problems       SLP Goals          Problem: SLP    Goal Priority Disciplines Outcome   SLP Goal     SLP    Description: LTG: Patient will tolerate safest and least restrictive PO diet without signs/sx of aspiration.  STG: Meal trial of pureed solids and mildly thick liquids via cup without adequate REILLY and no signs/sx of aspiration. -met  STG: Minced/moist solids with adequate bolus preparation/mastication and without signs/sx of aspiration. -met  STG: Soft/bite sized solids with adequate bolus preparation/mastication and without signs/sx of aspiration.  STG: Laryngeal/BOT exercises Min A.    LTG: Patient will improve cognitive-linguistic skills in order to return to PLOF.  STG: Dysarthria drills at the phrase level with 90% accuracy.  STG: Problem solving/reasoning tasks with 90% accuracy.  STG: Short term memory tasks with 90% accuracy.                     Patient  Education     Patient provided with verbal education regarding ST POC.  Understanding was verbalized, however additional teaching warranted.    Plan     Will continue to follow and tx as appropriate.    SLP Follow-Up:  Yes   Patient to be seen:  5 x/week   Plan of Care expires:  09/04/24  Plan of Care reviewed with:  patient     Time Tracking     SLP Treatment Date:   09/05/24  Speech Start Time:  1400  Speech Stop Time:  1415     Speech Total Time (min):  15 min    Billable minutes:  Cognitive Skills Intervention, Initial 15 mins     09/05/2024

## 2024-09-06 PROCEDURE — 25000003 PHARM REV CODE 250: Performed by: NURSE PRACTITIONER

## 2024-09-06 PROCEDURE — 97164 PT RE-EVAL EST PLAN CARE: CPT

## 2024-09-06 PROCEDURE — 97112 NEUROMUSCULAR REEDUCATION: CPT

## 2024-09-06 PROCEDURE — 92526 ORAL FUNCTION THERAPY: CPT

## 2024-09-06 PROCEDURE — 25000003 PHARM REV CODE 250: Performed by: HOSPITALIST

## 2024-09-06 PROCEDURE — 97116 GAIT TRAINING THERAPY: CPT

## 2024-09-06 PROCEDURE — 21400001 HC TELEMETRY ROOM

## 2024-09-06 PROCEDURE — 25000003 PHARM REV CODE 250: Performed by: INTERNAL MEDICINE

## 2024-09-06 PROCEDURE — 97530 THERAPEUTIC ACTIVITIES: CPT

## 2024-09-06 PROCEDURE — 25000003 PHARM REV CODE 250

## 2024-09-06 RX ADMIN — ATORVASTATIN CALCIUM 40 MG: 40 TABLET, FILM COATED ORAL at 09:09

## 2024-09-06 RX ADMIN — DICLOFENAC SODIUM 2 G: 10 GEL TOPICAL at 10:09

## 2024-09-06 RX ADMIN — DICLOFENAC SODIUM 2 G: 10 GEL TOPICAL at 09:09

## 2024-09-06 RX ADMIN — OLOPATADINE HYDROCHLORIDE 1 DROP: 1 SOLUTION OPHTHALMIC at 10:09

## 2024-09-06 RX ADMIN — DICLOFENAC SODIUM 2 G: 10 GEL TOPICAL at 03:09

## 2024-09-06 RX ADMIN — RISPERIDONE 1 MG: 1 SOLUTION ORAL at 10:09

## 2024-09-06 RX ADMIN — TRAMADOL HYDROCHLORIDE 50 MG: 50 TABLET, COATED ORAL at 09:09

## 2024-09-06 RX ADMIN — ALPRAZOLAM 0.25 MG: 0.25 TABLET ORAL at 01:09

## 2024-09-06 RX ADMIN — ALPRAZOLAM 0.25 MG: 0.25 TABLET ORAL at 09:09

## 2024-09-06 RX ADMIN — LEVETIRACETAM 500 MG: 500 TABLET, FILM COATED ORAL at 09:09

## 2024-09-06 RX ADMIN — GABAPENTIN 100 MG: 100 CAPSULE ORAL at 09:09

## 2024-09-06 RX ADMIN — OLOPATADINE HYDROCHLORIDE 1 DROP: 1 SOLUTION OPHTHALMIC at 09:09

## 2024-09-06 RX ADMIN — GABAPENTIN 100 MG: 100 CAPSULE ORAL at 03:09

## 2024-09-06 RX ADMIN — GABAPENTIN 100 MG: 100 CAPSULE ORAL at 10:09

## 2024-09-06 RX ADMIN — TRAZODONE HYDROCHLORIDE 100 MG: 100 TABLET ORAL at 09:09

## 2024-09-06 RX ADMIN — LEVETIRACETAM 500 MG: 500 TABLET, FILM COATED ORAL at 10:09

## 2024-09-06 RX ADMIN — DIPHENHYDRAMINE HYDROCHLORIDE 25 MG: 25 CAPSULE ORAL at 09:09

## 2024-09-06 RX ADMIN — TAMSULOSIN HYDROCHLORIDE 0.4 MG: 0.4 CAPSULE ORAL at 10:09

## 2024-09-06 RX ADMIN — SERTRALINE HYDROCHLORIDE 50 MG: 50 TABLET ORAL at 10:09

## 2024-09-06 RX ADMIN — RISPERIDONE 1 MG: 1 SOLUTION ORAL at 09:09

## 2024-09-06 NOTE — PLAN OF CARE
Spoke to Yvonne at [facility]. She stated that this patient referral is still under review and she requested a MAR as well as clinical updates to be sent through [email or web address] instead of fax. I had been faxing updates since there was no indication that the facility used [employer], but a copy of today's updates including a MAR was sent via [email or web address]. Yvonne stated she should have a decision on this referral reached today.    Referrals also sent to [facility], as well as [facility].    For a full list of facility referrals and responses, please see my addended note from 8/22.

## 2024-09-06 NOTE — PROGRESS NOTES
[facility] Medicine Progress Note        Chief Complaint: Inpatient Follow-up acute stroke     HPI:      59-year-old male with significant history of Crohn's disease, GERD, chronic tobacco use/substance use-cocaine presented to the ED with complaints of acute onset left-sided weakness, facial droop and aphasia along with sudden onset headache.  Imaging in the ED revealed intraparenchymal hemorrhage of right thalamus with surrounding edema, compression of right lateral ventricle with 8 mm midline shift, early obstructive hydrocephalus, possible subarachnoid hemorrhage. patient admitted to ICU, neurology and neurosurgery services were consulted.  CT angiogram with mild dolichoectasia of basilar artery , echocardiogram negative for bubble study, EF intact, grade 2 diastolic dysfunction. Conservative management, recommended to keep BP at goal, Keppra for seizure prevention, close neuro checks, 3% saline initiated.  Follow up CT with stable findings, therapy services consulted.  Holding anticoagulation, antiplatelets.  Hypertonic saline discontinued, initiate high-intensity statin, patient downgraded to hospital medicine services, case management consulted for placement, neurology recommended to continue to avoid antiplatelets.  Cleared for p.o. intake by speech, tolerating well.  Patient continues to participate with therapy services, case management actively working on placement.  Resumed home amlodipine, labs stable.  Psych consulted on 08/28 for impulsive behavior, initiated risperidone, Zoloft and p.r.n. trazodone   Concern for urinary retention, closely monitoring with bladder scan, added tamsulosin   spoke to sister over the phone   Patient asking when he can go home unfortunately he is unable to live by himself  He has been denied by multiple facilities      Interval Hx:   Patient seen and examined by bedside.  No acute overnight events.  No acute concerns at this time.       Objective/physical exam:  General: In no acute distress, afebrile  Chest: Clear to auscultation bilaterally  Heart: S1, S2, no appreciable murmur  Abdomen: Soft, nontender, BS +  MSK: Warm, no lower extremity edema, no clubbing or cyanosis  Neurologic:  Awake, alert, intermittent impulsivity    Assessment/Plan:   low normal bp , improved  Acute intra parenchymal hemorrhage-right thalamic capsular with edema/midline shift/intraventricular extension/early obstructive hydrocephalus  Acute subarachnoid hemorrhage   Left-sided weakness/partial aphasia secondary to above  Oropharyngeal dysphagia secondary to above-cleared for modified diet  New diagnosis essential HTN-stable  Impulsive behavior   Urinary retention-improved today  History of Crohn's disease/GERD  Chronic tobacco use/substance use  Prophylaxis     Plan  Amlodipine was discontinued on 9/1, bp wnl  olopatadine Eye drops bid   Neurology and neurosurgery signed off   Per latest neurology note recommending to continue to avoid antiplatelets  Repeat CT with stable findings   CT angiogram with no AVMs or aneurysm  Negative bubble study  PT/OT on board   Keppra for seizure prevention   Urinary retention improved after adding tamsulosin on 08/28   Psych on board for impulsive behavior  They have added Zoloft, risperidone and p.r.n. trazodone as of 8/28  DVT prophylaxis-bilateral SCDs           Case management has been consulted for skilled nursing facility.  Patient has been denied by several skilled nursing facilities in the area.  Challenging discharge.  Medically stable for discharge when accepted        All diagnosis and differential diagnosis have been reviewed; assessment and plan has been documented; I have personally reviewed the labs and test results that are presently available; I have reviewed the patients medication list; I have reviewed the consulting providers response and recommendations. I have reviewed or attempted to review medical records based  upon their availability    All of the patient's questions have been  addressed and answered. Patient's is agreeable to the above stated plan. I will continue to monitor closely and make adjustments to medical management as needed.    Portions of this note dictated using EMR integrated voice recognition software, and may be subject to voice recognition errors not corrected at proofreading. Please contact writer for clarification if needed.     VITAL SIGNS: 24 HRS MIN & MAX LAST   Temp  Min: 97.7 °F (36.5 °C)  Max: 98.4 °F (36.9 °C) 98.4 °F (36.9 °C)   BP  Min: 102/63  Max: 116/68 110/71   Pulse  Min: 56  Max: 69  69   Resp  Min: 18  Max: 18 18   SpO2  Min: 93 %  Max: 97 % (!) 93 %     I have reviewed the following labs:  Recent Labs   Lab 08/12/24 0441 08/13/24  0433 08/14/24  0500   WBC 7.19 5.66 5.53   RBC 4.95 4.46* 4.39*   HGB 14.8 13.6* 13.4*   HCT 43.5 39.0* 38.4*   MCV 87.9 87.4 87.5   MCH 29.9 30.5 30.5   MCHC 34.0 34.9 34.9   RDW 12.8 12.7 12.7    206 200   MPV 9.0 9.0 9.4     Recent Labs   Lab 08/12/24 0441 08/13/24  0433 08/14/24  0500 08/15/24  0508   * 133* 132* 135*   K 3.5 3.4* 3.3* 4.1   CL 97* 101 98 101   CO2 24 22* 23 22*   BUN 20.0 19.6 16.7 15.4   CREATININE 1.05 1.00 0.99 0.92   CALCIUM 9.3 9.0 8.9 9.2   MG 2.60 2.10 1.80 2.00   ALBUMIN 3.6 3.4 3.3*  --    ALKPHOS 112 103 97  --    ALT 22 19 20  --    AST 24 20 25  --    BILITOT 0.8 0.7 0.7  --      Microbiology Results (last 7 days)       ** No results found for the last 168 hours. **          _____________________________________________________________________    Malnutrition Status:    Scheduled Med:   apixaban  5 mg Oral BID    aspirin  81 mg Oral Daily    atorvastatin  40 mg Oral QHS    diltiaZEM  360 mg Oral Daily    docusate sodium  50 mg Oral BID    ergocalciferol  50,000 Units Oral Q7 Days    guaiFENesin  600 mg Oral BID    insulin glargine U-100  15 Units Subcutaneous QHS    QUEtiapine  150 mg Oral Nightly       Continuous Infusions:     PRN Meds:    Current Facility-Administered Medications:     acetaminophen, 1,000 mg, Oral, Q6H PRN    aluminum-magnesium hydroxide-simethicone, 30 mL, Oral, QID PRN    bisacodyL, 10 mg, Rectal, Daily PRN    dextrose 10%, 12.5 g, Intravenous, PRN    dextrose 10%, 25 g, Intravenous, PRN    glucagon (human recombinant), 1 mg, Intramuscular, PRN    glucose, 16 g, Oral, PRN    glucose, 24 g, Oral, PRN    guaiFENesin 100 mg/5 ml, 200 mg, Oral, Q4H PRN    HYDROcodone-acetaminophen, 1 tablet, Oral, Q4H PRN    insulin aspart U-100, 1-10 Units, Subcutaneous, QID (AC + HS) PRN    melatonin, 6 mg, Oral, Nightly PRN    naloxone, 0.02 mg, Intravenous, PRN    ondansetron, 4 mg, Intravenous, Q4H PRN    prochlorperazine, 5 mg, Intravenous, Q6H PRN    senna-docusate 8.6-50 mg, 1 tablet, Oral, BID PRN    sodium chloride 0.9%, 10 mL, Intravenous, PRN     Radiology:  I have personally reviewed the following imaging and agree with the radiologist.     Cardiac catheterization  Procedure performed in the Invasive Lab    - See Procedure Log link below for nursing documentation    - See OpNote on Surgeries Tab for physician findings    - See Imaging Tab for radiologist dictation      Jesenia Schumacher DO  Department of Hospital Medicine  [facility]  09/06/2024

## 2024-09-06 NOTE — PT/OT/SLP PROGRESS
Occupational Therapy   Treatment    Name: Dell Garrett Jr.  MRN: [id number]  Admitting Diagnosis:  Intraparenchymal hemorrhage of brain       Recommendations:     Recommended therapy intensity at discharge: Moderate Intensity Therapy   Discharge Equipment Recommendations:  to be determined by next level of care  Barriers to discharge:  Decreased caregiver support    Assessment:     Dell Garrett Jr. is a 59 y.o. male with a medical diagnosis of R basal ganglia hemorrhage with R to L shift and intraventricular extension, elevated BP.  He presents with the following performance deficits affecting function are weakness, impaired endurance, impaired sensation, impaired self care skills, impaired functional mobility, gait instability, impaired balance, decreased safety awareness, decreased lower extremity function, decreased upper extremity function, decreased coordination, impaired cognition.     Rehab Prognosis:  Good; patient would benefit from acute skilled OT services to address these deficits and reach maximum level of function.       Plan:     Patient to be seen 5 x/week to address the above listed problems via self-care/home management, therapeutic activities, therapeutic exercises, neuromuscular re-education  Plan of Care Expires: 10/04/24  Plan of Care Reviewed with: patient    Subjective     Pain/Comfort:  Pain Rating 1: 0/10    Objective:     Communicated with: RN prior to session.  Patient found HOB elevated with telemetry upon OT entry to room.    General Precautions: Standard, fall    Orthopedic Precautions:N/A  Braces: N/A  Respiratory Status: Room air     Occupational Performance:     Bed Mobility:    Required total A for adjustment of trunk in bed for improved positioning as pt leaning to the right into bedrail    Therapeutic Activities:  -Pt's pre-willam resting hand splint adjusted for improved fit/positioning at E. Pt with good tolerance, skin intact.  -Educated pt on purpose and fit of splint for  prevention of contractures.   -Provided handout in pt's room for splint schedule and educated pt's nurse on use.   -repositioned pt in bed for improved trunk support and LUE in elevation on pillows      Patient Education:  Patient provided with verbal education education regarding OT role/goals/POC, safety awareness, and LUE splint .  Understanding was verbalized, however additional teaching warranted.    Patient left HOB elevated with all lines intact, call button in reach, bed alarm on, and RN notified.    GOALS:   Multidisciplinary Problems       Occupational Therapy Goals          Problem: Occupational Therapy    Goal Priority Disciplines Outcome Interventions   Occupational Therapy Goal     OT, PT/OT Progressing    Description: Goals to be met by:  10/5/24    Patient will increase functional independence with ADLs by performing:    UE Dressing with Stand-by Assistance.  LE Dressing with mod A  Grooming while seated at sink with SBA. -revised 9/5  Toileting from toilet with Min A for hygiene and clothing management. -revised 9/5  Toilet transfer to toilet with Min A. -revised 9/5  Increased functional strength to 3/5 LUE through therEX, neuromuscular re-ed.  Pt will visually attend to L side of environment with no cues                       Time Tracking:     OT Date of Treatment: 09/06/24  OT Start Time: 1447  OT Stop Time: 1500  OT Total Time (min): 13 min    Billable Minutes:Therapeutic Activity 13 mins    OT/MAC: OT     Number of MAC visits since last OT visit: 1    9/6/2024

## 2024-09-06 NOTE — NURSING
Nurses Note -- 4 Eyes      9/5/2024   10:52 PM      Skin assessed during: Q Shift Change      [x] No Altered Skin Integrity Present    [x]Prevention Measures Documented      [] Yes- Altered Skin Integrity Present or Discovered   [] LDA Added if Not in Epic (Describe Wound)   [] New Altered Skin Integrity was Present on Admit and Documented in LDA   [] Wound Image Taken    Wound Care Consulted? No    Attending Nurse:  Kacie Bethea RN/Staff Member:  DANIEL Bermudez

## 2024-09-06 NOTE — PT/OT/SLP RE-EVAL
"Physical Therapy Re-Evaluation    Patient Name:  Dell Garrett Jr.   MRN:  [id number]    Recommendations:     Discharge therapy intensity: Moderate Intensity Therapy   Discharge Equipment Recommendations: to be determined by next level of care   Barriers to discharge: Impaired mobility and Ongoing medical needs    Assessment:     Dell Garrett Jr. is a 59 y.o. 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  He presents with the following impairments/functional limitations: weakness, impaired endurance, impaired functional mobility, impaired cognition, decreased upper extremity function, impaired self care skills, decreased lower extremity function, gait instability, decreased safety awareness, impaired balance, impaired cardiopulmonary response to activity .  Good tolerance to PT re-eval. He continues to demo impaired trunk control and poor orientation to midline. Pt requiring max cues to attend to task t/o session. He is requiring modA x 2 for bed mobility and Leyla for STS. Also incorporating more gait training into recent sessions; mod-maxA of 1-2 people to ambulate ~10-15ft with use of R handrail. Continuing to recommend moderate intensity therapy at d/c.     Rehab Prognosis: Good; patient would benefit from acute skilled PT services to address these deficits and reach maximum level of function.    Recent Surgery: * No surgery found *      Plan:     During this hospitalization, patient would benefit from acute PT services 5 x/week to address the identified rehab impairments via gait training, therapeutic activities, therapeutic exercises, neuromuscular re-education and progress toward the following goals:    Plan of Care Expires:  10/04/24    PT/PTA conference to discuss PT POC and patient's progression towards goals held with  Tristian Jha PTA .     Subjective     Chief Complaint: "I don't like looking at myself in the " "mirror"  Patient/Family Comments/goals: to get stronger  Pain/Comfort:  Pain Rating 1: 0/10    Patients cultural, spiritual, Yazdanism conflicts given the current situation: no    Objective:     Communicated with NSG prior to session.  Patient found supine with telemetry  upon PT entry to room.    General Precautions: Standard, fall, aspiration  Orthopedic Precautions:N/A   Braces: N/A  Respiratory Status: Room air  Blood Pressure: NT       Exams:  Cognitive Exam:  Patient is oriented to Person and Place  RLE Strength: grossly 5/5  LLE Strength: grossly 0/5; some hip extension in standing   Skin integrity: Visible skin intact      Functional Mobility:  Bed Mobility:     Scooting: moderate assistance  Supine to Sit: moderate assistance and of 2 persons  Transfers:     Sit to Stand:  Sebastian with back of chair or R HR to pull up on  Gait: Pt ambulated 10ft, 15ft with mod-maxA x 1-2 people and RUE support on R wall rail. PT assisting with weightshifting and advancing and blocking LLE. Mirror used for visual feedback for first trial, but pt requesting not to use mirror for second trial stating "I don't want to look at myself," but with poor response to VC/TCs for upright and midline posture. Mirror placed sideways in front of pt and yellow card taped to top edge of mirror for second trial and pt instructed to keep self in midline with side edge of mirror and to look up at yellow card to promote upright posture. Pt with better response to visual feedback this way but still requiring VC/TCs for posture, glute activation, and weightshifting.   Balance: mod-maxA for static/dynamic sitting balance 2/2 poor orientation to midline        AM-PAC 6 CLICK MOBILITY  Total Score:        Treatment & Education:  Pt sat on EOB x 10mins with mod-maxA for sitting balance while participating in reaching activities. Requiring cues to reorient to midline. Pt reaching to assist in donning shirt.  Pt also participating in donning pants with " maxA, able to use RLE to lift LLE to put leg in leghole of pants.    Patient provided with verbal education education regarding PT role/goals/POC, fall prevention, safety awareness, and discharge/DME recommendations.  Additional teaching is warranted.     Patient left up in chair with call button in reach, chair alarm on, and RN notified.    GOALS:   Multidisciplinary Problems       Physical Therapy Goals          Problem: Physical Therapy    Goal Priority Disciplines Outcome Goal Variances Interventions   Physical Therapy Goal     PT, PT/OT Progressing     Description: Goals to be met by: 2024     Patient will increase functional independence with mobility by performin. Supine to sit with MInimal Assistance  2. Sit to supine with MInimal Assistance  3. Sit to stand transfer with Minimal Assistance  4. Pt to follow 75% of commands.   5. Gait  x 75 feet with Minimal Assistance using Rolling Walker vs LRAD.                          History:     Past Medical History:   Diagnosis Date    Crohn's disease     GERD (gastroesophageal reflux disease)        History reviewed. No pertinent surgical history.    Time Tracking:     PT Received On: 24  PT Start Time: 1150     PT Stop Time: 1233  PT Total Time (min): 43 min     Billable Minutes: Re-eval 1 and Gait Training 30 and Neuromuscular Re-education 12      2024

## 2024-09-06 NOTE — PLAN OF CARE
Problem: Physical Therapy  Goal: Physical Therapy Goal  Description: Goals to be met by: 2024     Patient will increase functional independence with mobility by performin. Supine to sit with MInimal Assistance  2. Sit to supine with MInimal Assistance  3. Sit to stand transfer with Minimal Assistance  4. Pt to follow 75% of commands.   5. Gait  x 75 feet with Minimal Assistance using Rolling Walker vs LRAD.     Outcome: Progressing

## 2024-09-06 NOTE — PT/OT/SLP PROGRESS
[facility]  Speech Language Pathology Department  Dysphagia Therapy Progress Note    Patient Name:  Dell Garrett Jr.   MRN:  [id number]    Recommendations     General recommendations:  dysphagia therapy and cognitive-linguistic therapy  Solid texture recommendation:  Minced & Moist Diet - IDDSI Level 5  Liquid consistency recommendation: Mildly thick liquids - IDDSI Level 2   Medications: crushed in puree  Aspiration precautions: small bites/sips, slow rate, alternate solids/liquids, check for pocketing, and upright for PO intake    Discharge therapy intensity: Moderate Intensity Therapy   Barriers to safe discharge:  limited insight into deficits and safety awareness    Subjective     Patient awake, alert, and cooperative.  Spiritual/Cultural/Baptism Beliefs/Practices that affect care: no    Pain/Comfort: Pain Rating 1: 0/10    Objective     Therapeutic Activities:  Pt completed base of tongue and laryngeal exercises x20 with minimal-moderate cues.    Therapeutic PO Trials:  Consistency Amount Fed By Oral Symptoms Pharyngeal Symptoms   Thin liquid by cup Multiple trials SLP Decreased lip closure Cough after the swallow     Assessment     Pt continues to present with oropharyngeal dysphagia requiring diet modification to improve swallow safety and efficiency.    Goals     Multidisciplinary Problems       SLP Goals          Problem: SLP    Goal Priority Disciplines Outcome   SLP Goal     SLP    Description: LTG: Patient will tolerate safest and least restrictive PO diet without signs/sx of aspiration.  STG: Meal trial of pureed solids and mildly thick liquids via cup without adequate REILLY and no signs/sx of aspiration. -met  STG: Minced/moist solids with adequate bolus preparation/mastication and without signs/sx of aspiration. -met  STG: Soft/bite sized solids with adequate bolus preparation/mastication and without signs/sx of aspiration.  STG: Laryngeal/BOT exercises Min A.    LTG:  Patient will improve cognitive-linguistic skills in order to return to PLOF.  STG: Dysarthria drills at the phrase level with 90% accuracy.  STG: Problem solving/reasoning tasks with 90% accuracy.  STG: Short term memory tasks with 90% accuracy.                     Patient Education     Patient provided with verbal education regarding ST POC.  Understanding was verbalized.    Plan     Will continue to follow and tx as appropriate.    SLP Follow-Up:  Yes   Patient to be seen:  5 x/week   Plan of Care expires:  09/04/24  Plan of Care reviewed with:  patient       Time Tracking     SLP Treatment Date:   09/06/24  Speech Start Time:  0930  Speech Stop Time:  0940     Speech Total Time (min):  10 min    Billable minutes:  Treatment of Swallow Dysfunction, 10 minutes       09/06/2024

## 2024-09-07 PROCEDURE — 25000003 PHARM REV CODE 250: Performed by: INTERNAL MEDICINE

## 2024-09-07 PROCEDURE — 25000003 PHARM REV CODE 250: Performed by: HOSPITALIST

## 2024-09-07 PROCEDURE — 25000003 PHARM REV CODE 250

## 2024-09-07 PROCEDURE — 21400001 HC TELEMETRY ROOM

## 2024-09-07 RX ADMIN — ALPRAZOLAM 0.25 MG: 0.25 TABLET ORAL at 08:09

## 2024-09-07 RX ADMIN — RISPERIDONE 1 MG: 1 SOLUTION ORAL at 08:09

## 2024-09-07 RX ADMIN — DICLOFENAC SODIUM 2 G: 10 GEL TOPICAL at 03:09

## 2024-09-07 RX ADMIN — ATORVASTATIN CALCIUM 40 MG: 40 TABLET, FILM COATED ORAL at 08:09

## 2024-09-07 RX ADMIN — DICLOFENAC SODIUM 2 G: 10 GEL TOPICAL at 08:09

## 2024-09-07 RX ADMIN — DICLOFENAC SODIUM 2 G: 10 GEL TOPICAL at 09:09

## 2024-09-07 RX ADMIN — RISPERIDONE 1 MG: 1 SOLUTION ORAL at 09:09

## 2024-09-07 RX ADMIN — SERTRALINE HYDROCHLORIDE 50 MG: 50 TABLET ORAL at 08:09

## 2024-09-07 RX ADMIN — TAMSULOSIN HYDROCHLORIDE 0.4 MG: 0.4 CAPSULE ORAL at 08:09

## 2024-09-07 RX ADMIN — OXYCODONE HYDROCHLORIDE 5 MG: 5 TABLET ORAL at 02:09

## 2024-09-07 RX ADMIN — GABAPENTIN 100 MG: 100 CAPSULE ORAL at 02:09

## 2024-09-07 RX ADMIN — LEVETIRACETAM 500 MG: 500 TABLET, FILM COATED ORAL at 08:09

## 2024-09-07 RX ADMIN — OXYCODONE HYDROCHLORIDE 5 MG: 5 TABLET ORAL at 08:09

## 2024-09-07 RX ADMIN — OLOPATADINE HYDROCHLORIDE 1 DROP: 1 SOLUTION OPHTHALMIC at 08:09

## 2024-09-07 RX ADMIN — OLOPATADINE HYDROCHLORIDE 1 DROP: 1 SOLUTION OPHTHALMIC at 09:09

## 2024-09-07 RX ADMIN — GABAPENTIN 100 MG: 100 CAPSULE ORAL at 08:09

## 2024-09-07 RX ADMIN — ALPRAZOLAM 0.25 MG: 0.25 TABLET ORAL at 02:09

## 2024-09-07 NOTE — NURSING
Nurses Note -- 4 Eyes      9/7/2024   10:15 AM      Skin assessed during: Q Shift Change      [x] No Altered Skin Integrity Present    []Prevention Measures Documented      [] Yes- Altered Skin Integrity Present or Discovered   [] LDA Added if Not in Epic (Describe Wound)   [] New Altered Skin Integrity was Present on Admit and Documented in LDA   [] Wound Image Taken    Wound Care Consulted? No    Attending Nurse:  Lenny Bethea RN/Staff Member:  Portillo

## 2024-09-07 NOTE — NURSING
Nurses Note -- 4 Eyes      9/6/2024   8:07 PM      Skin assessed during: Q Shift Change      [x] No Altered Skin Integrity Present    [x]Prevention Measures Documented      [] Yes- Altered Skin Integrity Present or Discovered   [] LDA Added if Not in Epic (Describe Wound)   [] New Altered Skin Integrity was Present on Admit and Documented in LDA   [] Wound Image Taken    Wound Care Consulted? No    Attending Nurse:  Portillo Bethea RN/Staff Member:  HENRI

## 2024-09-07 NOTE — PROGRESS NOTES
[facility] Hospital Medicine Progress Note        Chief Complaint: Inpatient Follow-up for     HPI:   59-year-old male with significant history of Crohn's disease, GERD, chronic tobacco use/substance use-cocaine presented to the ED with complaints of acute onset left-sided weakness, facial droop and aphasia along with sudden onset headache.  Imaging in the ED revealed intraparenchymal hemorrhage of right thalamus with surrounding edema, compression of right lateral ventricle with 8 mm midline shift, early obstructive hydrocephalus, possible subarachnoid hemorrhage. patient admitted to ICU, neurology and neurosurgery services were consulted.  CT angiogram with mild dolichoectasia of basilar artery , echocardiogram negative for bubble study, EF intact, grade 2 diastolic dysfunction. Conservative management, recommended to keep BP at goal, Keppra for seizure prevention, close neuro checks, 3% saline initiated.  Follow up CT with stable findings, therapy services consulted.  Holding anticoagulation, antiplatelets.  Hypertonic saline discontinued, initiate high-intensity statin, patient downgraded to hospital medicine services, case management consulted for placement, neurology recommended to continue to avoid antiplatelets.  Cleared for p.o. intake by speech, tolerating well.  Patient continues to participate with therapy services, case management actively working on placement.  Resumed home amlodipine, labs stable.  Psych consulted on 08/28 for impulsive behavior, initiated risperidone, Zoloft and p.r.n. trazodone   Concern for urinary retention, closely monitoring with bladder scan, added tamsulosin   spoke to sister over the phone   Patient asking when he can go home unfortunately he is unable to live by himself  He has been denied by multiple facilities    Interval Hx:   No acute events overnight.  Blood pressure borderline low with map above 65.  Alert, comfortably resting.  Complains  of pain the lower ribcage and left knee asking for analgesics.  Doing well on room air.  Tolerating diet.  Motivated for PT.  No routine labs obtained for this morning.  He currently awaiting placement.      Objective/physical exam:  Vitals:    09/06/24 1957 09/06/24 2107 09/06/24 2331 09/07/24 0327   BP: 117/78  104/70 103/70   Patient Position: Lying  Lying Lying   Pulse: 82  74 82   Resp: 18 18 17 18   Temp: 97.7 °F (36.5 °C)  97.6 °F (36.4 °C) 98.3 °F (36.8 °C)   TempSrc: Oral  Oral Oral   SpO2: 97%  97% (!) 94%   Weight:       Height:         General: In no acute distress, afebrile  Respiratory: Clear to auscultation bilaterally  Cardiovascular: S1, S2, no appreciable murmur  Abdomen: Soft, nontender, BS +  MSK: Warm, no lower extremity edema, no clubbing or cyanosis  Neurologic: Alert and oriented x4, moving all extremities with good strength     Lab Results   Component Value Date     08/30/2024    K 4.7 08/30/2024     08/30/2024    CO2 25 08/30/2024    BUN 23.6 08/30/2024    CREATININE 0.90 08/30/2024    CALCIUM 9.4 08/30/2024    EGFRNONAA >60 06/11/2021      Lab Results   Component Value Date    ALT 27 08/30/2024    AST 22 08/30/2024    ALKPHOS 64 08/30/2024    BILITOT 0.4 08/30/2024      Lab Results   Component Value Date    WBC 5.21 08/30/2024    HGB 10.7 (L) 08/30/2024    HCT 33.2 (L) 08/30/2024    MCV 92.5 08/30/2024     08/30/2024           Medications:   atorvastatin  40 mg Oral QHS    diclofenac sodium  2 g Topical (Top) TID    gabapentin  100 mg Oral TID    levETIRAcetam  500 mg Oral BID    olopatadine  1 drop Both Eyes BID    risperidone 1 mg/ml  1 mg Oral BID    sertraline  50 mg Oral Daily    tamsulosin  0.4 mg Oral Daily        Current Facility-Administered Medications:     0.9% NaCl, , Intravenous, PRN    acetaminophen, 650 mg, Rectal, Q6H PRN    acetaminophen, 650 mg, Oral, Q6H PRN    ALPRAZolam, 0.25 mg, Oral, Q4H PRN    bisacodyL, 10 mg, Rectal, Daily PRN    cloNIDine, 0.1  mg, Oral, Q6H PRN    diphenhydrAMINE, 25 mg, Oral, Q6H PRN    haloperidol lactate, 5 mg, Intravenous, Q6H PRN    hydrALAZINE, 10 mg, Intravenous, Q4H PRN    oxyCODONE, 5 mg, Oral, Q6H PRN    polyethylene glycol, 17 g, Oral, BID PRN    traMADoL, 50 mg, Oral, Q6H PRN    traZODone, 100 mg, Oral, Nightly PRN     Assessment/Plan:    Acute intra parenchymal hemorrhage-right thalamic capsular with edema/midline shift/intraventricular extension/early obstructive hydrocephalus  Acute subarachnoid hemorrhage   Left-sided weakness/partial aphasia secondary to above  Oropharyngeal dysphagia secondary to above-cleared for modified diet  New diagnosis essential HTN-stable  Impulsive behavior   Urinary retention-improved today  History of Crohn's disease/GERD  Chronic tobacco use/substance use  Moderate malnutrition      Plan:   -continue to monitor blood pressure.  Off all antihypertensive medications   -neurology, neurosurgery recommendations noted.  Stroke workup reviewed.  Continue PT OT.  Currently awaiting placement   -avoid all antiplatelets, anticoagulation for now.  Continue Keppra for seizure prophylaxis  -mobilize.  Monitor for urinary retention  -psychiatry was consulted to evaluate aggressive behavior.  recommendations noted.  Continue Zoloft, risperidone p.r.n..  Trazodone for bedtime  -continue p.o. and topical analgesics, p.r.n. anxiolytics  -Awaiting SNF placement.  Arranging discharge.  Denied by multiple facilities.    SCDs    Tawanda Dahl MD

## 2024-09-08 PROCEDURE — 25000003 PHARM REV CODE 250: Performed by: INTERNAL MEDICINE

## 2024-09-08 PROCEDURE — 25000003 PHARM REV CODE 250

## 2024-09-08 PROCEDURE — 25000003 PHARM REV CODE 250: Performed by: HOSPITALIST

## 2024-09-08 PROCEDURE — 21400001 HC TELEMETRY ROOM

## 2024-09-08 RX ADMIN — GABAPENTIN 100 MG: 100 CAPSULE ORAL at 08:09

## 2024-09-08 RX ADMIN — GABAPENTIN 100 MG: 100 CAPSULE ORAL at 03:09

## 2024-09-08 RX ADMIN — TAMSULOSIN HYDROCHLORIDE 0.4 MG: 0.4 CAPSULE ORAL at 08:09

## 2024-09-08 RX ADMIN — LEVETIRACETAM 500 MG: 500 TABLET, FILM COATED ORAL at 08:09

## 2024-09-08 RX ADMIN — RISPERIDONE 1 MG: 1 SOLUTION ORAL at 08:09

## 2024-09-08 RX ADMIN — TRAMADOL HYDROCHLORIDE 50 MG: 50 TABLET, COATED ORAL at 08:09

## 2024-09-08 RX ADMIN — OLOPATADINE HYDROCHLORIDE 1 DROP: 1 SOLUTION OPHTHALMIC at 08:09

## 2024-09-08 RX ADMIN — DICLOFENAC SODIUM 2 G: 10 GEL TOPICAL at 08:09

## 2024-09-08 RX ADMIN — DICLOFENAC SODIUM 2 G: 10 GEL TOPICAL at 03:09

## 2024-09-08 RX ADMIN — TRAZODONE HYDROCHLORIDE 100 MG: 100 TABLET ORAL at 08:09

## 2024-09-08 RX ADMIN — OXYCODONE HYDROCHLORIDE 5 MG: 5 TABLET ORAL at 05:09

## 2024-09-08 RX ADMIN — ATORVASTATIN CALCIUM 40 MG: 40 TABLET, FILM COATED ORAL at 08:09

## 2024-09-08 RX ADMIN — SERTRALINE HYDROCHLORIDE 50 MG: 50 TABLET ORAL at 08:09

## 2024-09-08 NOTE — PROGRESS NOTES
"[facility] Hospital Medicine Progress Note        Chief Complaint: Inpatient Follow-up for     HPI:   59-year-old male with significant history of Crohn's disease, GERD, chronic tobacco use/substance use-cocaine presented to the ED with complaints of acute onset left-sided weakness, facial droop and aphasia along with sudden onset headache.  Imaging in the ED revealed intraparenchymal hemorrhage of right thalamus with surrounding edema, compression of right lateral ventricle with 8 mm midline shift, early obstructive hydrocephalus, possible subarachnoid hemorrhage. patient admitted to ICU, neurology and neurosurgery services were consulted.  CT angiogram with mild dolichoectasia of basilar artery , echocardiogram negative for bubble study, EF intact, grade 2 diastolic dysfunction. Conservative management, recommended to keep BP at goal, Keppra for seizure prevention, close neuro checks, 3% saline initiated.  Follow up CT with stable findings, therapy services consulted.  Holding anticoagulation, antiplatelets.  Hypertonic saline discontinued, initiate high-intensity statin, patient downgraded to hospital medicine services, case management consulted for placement, neurology recommended to continue to avoid antiplatelets.  Cleared for p.o. intake by speech, tolerating well.  Patient continues to participate with therapy services, case management actively working on placement.  Resumed home amlodipine, labs stable.  Psych consulted on 08/28 for impulsive behavior, initiated risperidone, Zoloft and p.r.n. trazodone   Concern for urinary retention, closely monitoring with bladder scan, added tamsulosin   spoke to sister over the phone   Patient asking when he can go home unfortunately he is unable to live by himself  He has been denied by multiple facilities    Interval Hx:     Alert, oriented.  Answers few questions appropriately.  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  Follows all commands.  Tolerating p.o.    Objective/physical exam:  Vitals:    09/07/24 1905 09/07/24 2000 09/07/24 2057 09/08/24 0427   BP: 115/77 130/79  102/75   Patient Position:       Pulse: 68 71  66   Resp:  17 18 18   Temp:  97.1 °F (36.2 °C)  97.8 °F (36.6 °C)   TempSrc:  Axillary  Oral   SpO2: 96% 97%  97%   Weight:       Height:         General: In no acute distress, afebrile  Respiratory: Clear to auscultation bilaterally  Cardiovascular: S1, S2, no appreciable murmur  Abdomen: Soft, nontender, BS +  MSK: Warm, no lower extremity edema, no clubbing or cyanosis  Neurologic: Alert and oriented x4, moving all extremities with good strength     Lab Results   Component Value Date     08/30/2024    K 4.7 08/30/2024     08/30/2024    CO2 25 08/30/2024    BUN 23.6 08/30/2024    CREATININE 0.90 08/30/2024    CALCIUM 9.4 08/30/2024    EGFRNONAA >60 06/11/2021      Lab Results   Component Value Date    ALT 27 08/30/2024    AST 22 08/30/2024    ALKPHOS 64 08/30/2024    BILITOT 0.4 08/30/2024      Lab Results   Component Value Date    WBC 5.21 08/30/2024    HGB 10.7 (L) 08/30/2024    HCT 33.2 (L) 08/30/2024    MCV 92.5 08/30/2024     08/30/2024           Medications:   atorvastatin  40 mg Oral QHS    diclofenac sodium  2 g Topical (Top) TID    gabapentin  100 mg Oral TID    levETIRAcetam  500 mg Oral BID    olopatadine  1 drop Both Eyes BID    risperidone 1 mg/ml  1 mg Oral BID    sertraline  50 mg Oral Daily    tamsulosin  0.4 mg Oral Daily        Current Facility-Administered Medications:     0.9% NaCl, , Intravenous, PRN    acetaminophen, 650 mg, Rectal, Q6H PRN    acetaminophen, 650 mg, Oral, Q6H PRN    ALPRAZolam, 0.25 mg, Oral, Q4H PRN    bisacodyL, 10 mg, Rectal, Daily PRN    cloNIDine, 0.1 mg, Oral, Q6H PRN    diphenhydrAMINE, 25 mg, Oral, Q6H PRN    haloperidol lactate, 5 mg, Intravenous, Q6H PRN    hydrALAZINE, 10 mg, Intravenous, Q4H PRN    oxyCODONE, 5 mg, Oral, Q6H PRN    " polyethylene glycol, 17 g, Oral, BID PRN    traMADoL, 50 mg, Oral, Q6H PRN    traZODone, 100 mg, Oral, Nightly PRN     Assessment/Plan:    Acute intra parenchymal hemorrhage-right thalamic capsular with edema/midline shift/intraventricular extension/early obstructive hydrocephalus  Acute subarachnoid hemorrhage   Left-sided weakness/partial aphasia secondary to above  Oropharyngeal dysphagia secondary to above-cleared for modified diet  New diagnosis essential HTN-stable  Impulsive behavior   Urinary retention-improved today  History of Crohn's disease/GERD  Chronic tobacco use/substance use  Moderate malnutrition      Plan:   -psychiatry was consulted to evaluate aggressive behavior.  recommendations noted.  Continue Zoloft, risperidone p.r.n..  Trazodone for bedtime  -continue to monitor blood pressure.  Off all antihypertensive medications   -neurology, neurosurgery recommendations noted.  Stroke workup reviewed.  Continue PT OT.  Currently awaiting placement   -avoid all antiplatelets, anticoagulation for now.  Continue Keppra for seizure prophylaxis  -mobilize.  Monitor for urinary retention  -continue p.o. and topical analgesics, p.r.n. anxiolytics  -Awaiting SNF placement.  Denied by multiple facilities.    SCDs    Tawanda Dahl MD

## 2024-09-09 PROCEDURE — 25000003 PHARM REV CODE 250: Performed by: INTERNAL MEDICINE

## 2024-09-09 PROCEDURE — 21400001 HC TELEMETRY ROOM

## 2024-09-09 PROCEDURE — 25000242 PHARM REV CODE 250 ALT 637 W/ HCPCS: Performed by: NURSE PRACTITIONER

## 2024-09-09 PROCEDURE — 25000003 PHARM REV CODE 250

## 2024-09-09 PROCEDURE — 97535 SELF CARE MNGMENT TRAINING: CPT | Mod: CO

## 2024-09-09 PROCEDURE — 25000003 PHARM REV CODE 250: Performed by: HOSPITALIST

## 2024-09-09 RX ORDER — ALBUTEROL SULFATE 90 UG/1
2 INHALANT RESPIRATORY (INHALATION) EVERY 6 HOURS PRN
Status: DISCONTINUED | OUTPATIENT
Start: 2024-09-09 | End: 2024-09-19 | Stop reason: HOSPADM

## 2024-09-09 RX ADMIN — OXYCODONE HYDROCHLORIDE 5 MG: 5 TABLET ORAL at 03:09

## 2024-09-09 RX ADMIN — LEVETIRACETAM 500 MG: 500 TABLET, FILM COATED ORAL at 08:09

## 2024-09-09 RX ADMIN — SERTRALINE HYDROCHLORIDE 50 MG: 50 TABLET ORAL at 08:09

## 2024-09-09 RX ADMIN — DICLOFENAC SODIUM 2 G: 10 GEL TOPICAL at 08:09

## 2024-09-09 RX ADMIN — RISPERIDONE 1 MG: 1 SOLUTION ORAL at 08:09

## 2024-09-09 RX ADMIN — OLOPATADINE HYDROCHLORIDE 1 DROP: 1 SOLUTION OPHTHALMIC at 08:09

## 2024-09-09 RX ADMIN — ALBUTEROL SULFATE 2 PUFF: 90 AEROSOL, METERED RESPIRATORY (INHALATION) at 04:09

## 2024-09-09 RX ADMIN — TAMSULOSIN HYDROCHLORIDE 0.4 MG: 0.4 CAPSULE ORAL at 08:09

## 2024-09-09 RX ADMIN — TRAZODONE HYDROCHLORIDE 100 MG: 100 TABLET ORAL at 08:09

## 2024-09-09 RX ADMIN — GABAPENTIN 100 MG: 100 CAPSULE ORAL at 08:09

## 2024-09-09 RX ADMIN — ATORVASTATIN CALCIUM 40 MG: 40 TABLET, FILM COATED ORAL at 08:09

## 2024-09-09 RX ADMIN — ALPRAZOLAM 0.25 MG: 0.25 TABLET ORAL at 08:09

## 2024-09-09 RX ADMIN — OXYCODONE HYDROCHLORIDE 5 MG: 5 TABLET ORAL at 08:09

## 2024-09-09 RX ADMIN — GABAPENTIN 100 MG: 100 CAPSULE ORAL at 02:09

## 2024-09-09 NOTE — PROGRESS NOTES
[facility] Medicine Progress Note        Chief Complaint: Inpatient Follow-up for     HPI:   59-year-old male with significant history of Crohn's disease, GERD, chronic tobacco use/substance use-cocaine presented to the ED with complaints of acute onset left-sided weakness, facial droop and aphasia along with sudden onset headache.  Imaging in the ED revealed intraparenchymal hemorrhage of right thalamus with surrounding edema, compression of right lateral ventricle with 8 mm midline shift, early obstructive hydrocephalus, possible subarachnoid hemorrhage. patient admitted to ICU, neurology and neurosurgery services were consulted.  CT angiogram with mild dolichoectasia of basilar artery , echocardiogram negative for bubble study, EF intact, grade 2 diastolic dysfunction. Conservative management, recommended to keep BP at goal, Keppra for seizure prevention, close neuro checks, 3% saline initiated.  Follow up CT with stable findings, therapy services consulted.  Holding anticoagulation, antiplatelets.  Hypertonic saline discontinued, initiate high-intensity statin, patient downgraded to hospital medicine services, case management consulted for placement, neurology recommended to continue to avoid antiplatelets.  Cleared for p.o. intake by speech, tolerating well.  Patient continues to participate with therapy services, case management actively working on placement.  Resumed home amlodipine, labs stable.  Psych consulted on 08/28 for impulsive behavior, initiated risperidone, Zoloft and p.r.n. trazodone   Concern for urinary retention, closely monitoring with bladder scan, added tamsulosin   spoke to sister over the phone   Patient asking when he can go home unfortunately he is unable to live by himself  He has been denied by multiple facilities and  is attempting to place him    Interval Hx:     9/8/24-Alert, oriented.  Answers few questions appropriately.  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  Follows all commands.  Tolerating p.o.    9-9-24 dr asencio - no new issues.  is attempting at [facility] . No labs since 8-30-24 , will do some for am . PT recommended placement at snf(moderate intensity therapy)    Ambulating 10-15 feet / mod to max assist/ 1-2 people     Objective/physical exam:  Vitals:    09/09/24 0442 09/09/24 0451 09/09/24 0750 09/09/24 0818   BP:  107/71 111/72    Pulse: 70 82 87    Resp: 18 18 18 16   Temp:   98.3 °F (36.8 °C)    TempSrc:   Oral    SpO2:  97% (!) 94%    Weight:       Height:         General: In no acute distress, afebrile  Respiratory: Clear to auscultation bilaterally  Cardiovascular: S1, S2, no appreciable murmur  Abdomen: Soft, nontender, BS +  MSK: Warm, no lower extremity edema, no clubbing or cyanosis  Neurologic: Alert and oriented x4,LLE strength 0/5  Lab Results   Component Value Date     08/30/2024    K 4.7 08/30/2024     08/30/2024    CO2 25 08/30/2024    BUN 23.6 08/30/2024    CREATININE 0.90 08/30/2024    CALCIUM 9.4 08/30/2024    EGFRNONAA >60 06/11/2021      Lab Results   Component Value Date    ALT 27 08/30/2024    AST 22 08/30/2024    ALKPHOS 64 08/30/2024    BILITOT 0.4 08/30/2024      Lab Results   Component Value Date    WBC 5.21 08/30/2024    HGB 10.7 (L) 08/30/2024    HCT 33.2 (L) 08/30/2024    MCV 92.5 08/30/2024     08/30/2024           Medications:   atorvastatin  40 mg Oral QHS    diclofenac sodium  2 g Topical (Top) TID    gabapentin  100 mg Oral TID    levETIRAcetam  500 mg Oral BID    olopatadine  1 drop Both Eyes BID    risperidone 1 mg/ml  1 mg Oral BID    sertraline  50 mg Oral Daily    tamsulosin  0.4 mg Oral Daily        Current Facility-Administered Medications:     0.9% NaCl, , Intravenous, PRN    acetaminophen, 650 mg, Rectal, Q6H PRN    acetaminophen, 650 mg, Oral, Q6H PRN    albuterol, 2 puff, Inhalation, Q6H PRN    ALPRAZolam, 0.25 " mg, Oral, Q4H PRN    bisacodyL, 10 mg, Rectal, Daily PRN    cloNIDine, 0.1 mg, Oral, Q6H PRN    diphenhydrAMINE, 25 mg, Oral, Q6H PRN    haloperidol lactate, 5 mg, Intravenous, Q6H PRN    hydrALAZINE, 10 mg, Intravenous, Q4H PRN    oxyCODONE, 5 mg, Oral, Q6H PRN    polyethylene glycol, 17 g, Oral, BID PRN    traMADoL, 50 mg, Oral, Q6H PRN    traZODone, 100 mg, Oral, Nightly PRN     Assessment/Plan:    Acute intra parenchymal hemorrhage-right thalamic capsular with edema/midline shift/intraventricular extension/early obstructive hydrocephalus  Acute subarachnoid hemorrhage   Left-sided weakness/partial aphasia secondary to above  Oropharyngeal dysphagia secondary to above-cleared for modified diet  New diagnosis essential HTN-stable  Impulsive behavior   Urinary retention-improved today  History of Crohn's disease/GERD  Chronic tobacco use/substance use  Moderate malnutrition      Plan:   -psychiatry was consulted to evaluate aggressive behavior.  recommendations noted.  Continue Zoloft, risperidone p.r.n..  Trazodone for bedtime  -continue to monitor blood pressure.  Off all antihypertensive medications   -neurology, neurosurgery recommendations noted.  Stroke workup reviewed.  Continue PT OT.  Currently awaiting placement   -avoid all antiplatelets, anticoagulation for now.  Continue Keppra for seizure prophylaxis  -mobilize.  Monitor for urinary retention  -continue p.o. and topical analgesics, p.r.n. anxiolytics  -Awaiting SNF placement.  Denied by multiple facilities.    SCDs    Plan - moderate intensity therapy / pt remains moderate to max assistance/ ambulating 10-15 feet with 1-2 people . Will continue present mamagement / am labs

## 2024-09-09 NOTE — NURSING
"Patient just advised that he had a fall on Friday.  He states that PT got him up into chair and they were taking too long so his mind told him that he could get up and when he did he fell and hit his head and side.  He stated that the chair alarm went off and that the "PT lady" came and got him up off floor.  He is currently complaining of pain in his head and left side from the fall.  I do not see any mention of a fall in the notes, so I notified house supervisor and the hospitalist NP of alleged fall.  No bruising or swelling noted.  "

## 2024-09-09 NOTE — PLAN OF CARE
Spoke with Isabel at [facility]. Sent requested labs, cultures, and updates. This referral is currently under review. Awaiting decision at this time.     No response from [facility] this time. Will continue to reach out for decision on placement from the facility.

## 2024-09-09 NOTE — PROGRESS NOTES
Inpatient Nutrition Assessment    Admit Date: 8/10/2024   Total duration of encounter: 30 days   Patient Age: 59 y.o.    Nutrition Recommendation/Prescription     Continue Continue current diet (Diet Minced & Moist (IDDSI Level 5) No Straws, Supervision with Meals, Double Portions; Mildly Thick Liquids (IDDSI Level 2)) as tolerated.  Continue Boost Very High Calorie (provides 530 kcal, 22 g protein per serving) TID.  Continue Boost Breeze (provides 250 kcal, 9 g protein per serving) BID.    Communication of Recommendations: reviewed with nurse and reviewed with patient    Nutrition Assessment     Malnutrition Assessment/Nutrition-Focused Physical Exam    Malnutrition Context: acute illness or injury (08/12/24 1142)  Malnutrition Level: moderate (08/12/24 1142)  Energy Intake (Malnutrition):  (does not meet criteria) (08/12/24 1142)  Weight Loss (Malnutrition):  (does not meet criteria) (08/12/24 1142)  Subcutaneous Fat (Malnutrition): mild depletion (08/12/24 1142)     Upper Arm Region (Subcutaneous Fat Loss): mild depletion     Muscle Mass (Malnutrition): mild depletion (08/12/24 1142)     Clavicle Bone Region (Muscle Loss): mild depletion                             A minimum of two characteristics is recommended for diagnosis of either severe or non-severe malnutrition.    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 with left-sided weakness and suppressed alertness  Elevated blood pressure, no previous dx of HTN   Hypercholesterolemia   Elevated creatinine, resolved    Relevant Medical History: Crohn's disease, GERD, and chronic tobacco use     Scheduled Medications:  atorvastatin, 40 mg, QHS  diclofenac sodium, 2 g, TID  gabapentin,  "100 mg, TID  levETIRAcetam, 500 mg, BID  olopatadine, 1 drop, BID  risperidone 1 mg/ml, 1 mg, BID  sertraline, 50 mg, Daily  tamsulosin, 0.4 mg, Daily    Continuous Infusions: none       PRN Medications:   0.9% NaCl, , PRN  acetaminophen, 650 mg, Q6H PRN  acetaminophen, 650 mg, Q6H PRN  albuterol, 2 puff, Q6H PRN  ALPRAZolam, 0.25 mg, Q4H PRN  bisacodyL, 10 mg, Daily PRN  cloNIDine, 0.1 mg, Q6H PRN  diphenhydrAMINE, 25 mg, Q6H PRN  haloperidol lactate, 5 mg, Q6H PRN  hydrALAZINE, 10 mg, Q4H PRN  oxyCODONE, 5 mg, Q6H PRN  polyethylene glycol, 17 g, BID PRN  traMADoL, 50 mg, Q6H PRN  traZODone, 100 mg, Nightly PRN    Calorie Containing IV Medications: no significant kcals from medications at this time    No results for input(s): "NA", "K", "CALCIUM", "PHOS", "MG", "CHLORIDE", "CO2", "BUN", "CREATININE", "EGFRNORACEVR", "GLUCOSE", "BILITOT", "ALKPHOS", "ALT", "AST", "ALBUMIN", "PREALB", "CRP", "HSCRP", "TRIG", "HGBA1C", "AMMONIA", "LIPASE", "AMYLASE", "WBC", "HGB", "HCT" in the last 168 hours.    Nutrition Orders:  Diet Minced & Moist (IDDSI Level 5) No Straws, Supervision with Meals, Double Portions; Mildly Thick Liquids (IDDSI Level 2)  Dietary nutrition supplements All Meals; Boost Very High Calorie Nutritional Drink - Any flavor,Dietary nutrition supplements BID; Boost Breeze - Any flavor    Appetite/Oral Intake: good/% of meals  Factors Affecting Nutritional Intake: none identified  Social Needs Impacting Access to Food: none identified  Food/[place]/Cultural Preferences: none reported  Food Allergies: no known food allergies  Last Bowel Movement: 09/01/24  Wound(s): no pressure injuries documented at this time     Comments    8/12/24 Patient confused, sister at bedside. Patient's sister reports good appetite/intake prior to admission, regular diet at home, denies weight loss. Patient reports liking vanilla/strawberry Ensure. He is currently NPO, had not been able to participate in SLP evaluation. Tube " "feeding recommendation provided if oral intake remains not feasible.    8/14/24 Patient remains NPO, started on tube feeding, no longer receiving calories from Cleviprex, will change to standard polymeric formula.    8/19/24 Tube feeding tolerated at goal.    8/23/24 Dysphagia diet, no longer on tube feeding, nurse reports patient eating 100% and asking for more food, will add double portions and Boost.    8/28/24: RN reports pt has been eating fairly well, nursing has been feeding him his Boost supplements BID. Noted last BM was 8/22.     8/30/24: Spoke with rn, pt eating well and enjoying his Boost supplements.     9/4/24: RN reports good oral intake. Patient states drinking Boost supplements.    9/9/24: RN reports patient with good oral intake of meals served and supplements. Will continue double portions with supplements Boost VHC and Boost Breeze.     Anthropometrics    Height: 5' 7" (170.2 cm), Height Method: Measured  Last Weight: 65.3 kg (143 lb 15.4 oz) (08/10/24 1346), Weight Method: Bed Scale  BMI (Calculated): 22.5  BMI Classification: normal (BMI 18.5-24.9)        Ideal Body Weight (IBW), Male: 148 lb     % Ideal Body Weight, Male (lb): 97.27 %                          Usual Weight Provided By: family/caregiver denies unintentional weight loss    Wt Readings from Last 5 Encounters:   08/10/24 65.3 kg (143 lb 15.4 oz)   05/12/24 65.8 kg (145 lb)   09/25/23 67.1 kg (148 lb)   08/30/23 67.1 kg (148 lb)   08/21/23 63.5 kg (140 lb)     Weight Change(s) Since Admission: none, new admit  Wt Readings from Last 1 Encounters:   08/10/24 1346 65.3 kg (143 lb 15.4 oz)   08/10/24 0743 65.3 kg (143 lb 15.4 oz)   Admit Weight: 65.3 kg (143 lb 15.4 oz) (08/10/24 0743), Weight Method: Bed Scale    Estimated Needs    Weight Used For Calorie Calculations: 65.3 kg (143 lb 15.4 oz)  Energy Calorie Requirements (kcal): 1984, 1.4 stress factor  Energy Need Method: [place] [place]  Weight Used For Protein Calculations: 65.3 kg " (143 lb 15.4 oz)  Protein Requirements: 79-98 g, 1.2-1.5 g/kg  Fluid Requirements (mL): 1984, 1 ml/kcal      Enteral Nutrition Patient not receiving enteral nutrition support at this time.    Parenteral Nutrition Patient not receiving parenteral nutrition support at this time.    Evaluation of Received Nutrient Intake    Calories: meeting estimated needs  Protein: meeting estimated needs    Patient Education Not applicable.    Nutrition Diagnosis     PES: Inadequate energy intake related to inability to consume sufficient nutrients as evidenced by less than 80% needs met. (resolved)  PES: Moderate acute disease or injury related malnutrition related to acute illness as evidenced by mild fat depletion and mild muscle depletion. (active)    Nutrition Interventions     Intervention(s): modified composition of enteral nutrition and collaboration with other providers  Goal: Meet greater than 80% of nutritional needs by follow-up. (goal progressing)    Nutrition Goals & Monitoring     Dietitian will monitor: food and beverage intake, energy intake, weight, electrolyte/renal panel, and beliefs/attitudes  Discharge planning:  regular diet with texture modifications per SLP and Boost supplements  Nutrition Risk/Follow-Up: moderate (follow-up in 3-5 days)   Please consult if re-assessment needed sooner.

## 2024-09-09 NOTE — NURSING
Nurses Note -- 4 Eyes      9/8/2024   9:34 PM      Skin assessed during: Q Shift Change      [x] No Altered Skin Integrity Present    []Prevention Measures Documented      [] Yes- Altered Skin Integrity Present or Discovered   [] LDA Added if Not in Epic (Describe Wound)   [] New Altered Skin Integrity was Present on Admit and Documented in LDA   [] Wound Image Taken    Wound Care Consulted? No    Attending Nurse:  Viv Bethea RN/Staff Member:  Eugenie SANCHEZ

## 2024-09-09 NOTE — PT/OT/SLP PROGRESS
Occupational Therapy   Treatment    Name: Dell Garrett Jr.  MRN: [id number]  Admitting Diagnosis:  Intraparenchymal hemorrhage of brain       Recommendations:     Recommended therapy intensity at discharge: Moderate Intensity Therapy   Discharge Equipment Recommendations:  to be determined by next level of care  Barriers to discharge:  Decreased caregiver support    Assessment:     Dell Garrett Jr. is a 59 y.o. male with a medical diagnosis of Intraparenchymal hemorrhage of brain.  He presents with increased lethargia, AEB inability to keep eyes open and mumbling. Pt stated his increased fatigue was due to receiving Seroquel. Upon speaking with RN, pt had not received medication. Performance deficits affecting function are weakness, impaired endurance, impaired sensation, impaired self care skills, impaired functional mobility, gait instability, impaired balance, decreased safety awareness, decreased lower extremity function, decreased upper extremity function, decreased coordination, impaired cognition.     Rehab Prognosis:  Good; patient would benefit from acute skilled OT services to address these deficits and reach maximum level of function.       Plan:     Patient to be seen 5 x/week to address the above listed problems via self-care/home management, therapeutic activities, therapeutic exercises, neuromuscular re-education, cognitive retraining  Plan of Care Expires: 10/04/24  Plan of Care Reviewed with: patient    Subjective     Pain/Comfort:       Objective:     Communicated with: RN prior to session.  Patient found HOB elevated with telemetry upon OT entry to room.    General Precautions: Standard, fall    Orthopedic Precautions:N/A  Braces: N/A  Respiratory Status: Room air       Occupational Performance:     Bed Mobility:    Pt required total-A for scooting to HOB, positioning to center of bed.  Pt required total-A for rolling R<>L for re-positioning of hector pads under pt.    Activities of Daily  Living:  Pt required total A for doffing brief.    Therapeutic Positioning    OT interventions performed during the course of today's session in an effort to prevent and/or reduce acquired pressure injuries:   Education was provided on benefits of and recommendations for therapeutic positioning      [facility] 6 Click ADL:      Patient Education:  Patient provided with verbal education education regarding OT role/goals/POC, fall prevention, safety awareness, and pressure ulcer prevention.  Understanding was verbalized, however additional teaching warranted.      Patient left HOB elevated with all lines intact, call button in reach, and RN notified.    GOALS:   Multidisciplinary Problems       Occupational Therapy Goals          Problem: Occupational Therapy    Goal Priority Disciplines Outcome Interventions   Occupational Therapy Goal     OT, PT/OT Progressing    Description: Goals to be met by:  10/5/24    Patient will increase functional independence with ADLs by performing:    UE Dressing with Stand-by Assistance.  LE Dressing with mod A  Grooming while seated at sink with SBA. -revised 9/5  Toileting from toilet with Min A for hygiene and clothing management. -revised 9/5  Toilet transfer to toilet with Min A. -revised 9/5  Increased functional strength to 3/5 LUE through therEX, neuromuscular re-ed.  Pt will visually attend to L side of environment with no cues                       Time Tracking:     OT Date of Treatment: 09/09/24  OT Start Time: 0947  OT Stop Time: 1002  OT Total Time (min): 15 min    Billable Minutes:Self Care/Home Management 1    OT/MAC: MAC     Number of MAC visits since last OT visit: 1    9/9/2024

## 2024-09-09 NOTE — NURSING
"Found patient in room storing food in his mouth (cheek ) not swallowing, I asked what was the reason for him doing this he stated "its not like I'm doing it on purpose" I said ok I understand, however with being said we will make NPO again until speech re*evaluates you"   "

## 2024-09-10 LAB
ANION GAP SERPL CALC-SCNC: 7 MEQ/L
BASOPHILS # BLD AUTO: 0.03 X10(3)/MCL
BASOPHILS NFR BLD AUTO: 0.5 %
BUN SERPL-MCNC: 17.3 MG/DL (ref 8.4–25.7)
CALCIUM SERPL-MCNC: 9 MG/DL (ref 8.8–10)
CHLORIDE SERPL-SCNC: 105 MMOL/L (ref 98–107)
CO2 SERPL-SCNC: 26 MMOL/L (ref 23–31)
CREAT SERPL-MCNC: 0.87 MG/DL (ref 0.73–1.18)
CREAT/UREA NIT SERPL: 20
EOSINOPHIL # BLD AUTO: 0.24 X10(3)/MCL (ref 0–0.9)
EOSINOPHIL NFR BLD AUTO: 3.9 %
ERYTHROCYTE [DISTWIDTH] IN BLOOD BY AUTOMATED COUNT: 12.1 % (ref 11.5–17)
GFR SERPLBLD CREATININE-BSD FMLA CKD-EPI: >60 ML/MIN/1.73/M2
GLUCOSE SERPL-MCNC: 95 MG/DL (ref 82–115)
HCT VFR BLD AUTO: 32.9 % (ref 42–52)
HGB BLD-MCNC: 10.5 G/DL (ref 14–18)
IMM GRANULOCYTES # BLD AUTO: 0.02 X10(3)/MCL (ref 0–0.04)
IMM GRANULOCYTES NFR BLD AUTO: 0.3 %
LYMPHOCYTES # BLD AUTO: 0.93 X10(3)/MCL (ref 0.6–4.6)
LYMPHOCYTES NFR BLD AUTO: 14.9 %
MAGNESIUM SERPL-MCNC: 2 MG/DL (ref 1.6–2.6)
MCH RBC QN AUTO: 29.5 PG (ref 27–31)
MCHC RBC AUTO-ENTMCNC: 31.9 G/DL (ref 33–36)
MCV RBC AUTO: 92.4 FL (ref 80–94)
MONOCYTES # BLD AUTO: 0.74 X10(3)/MCL (ref 0.1–1.3)
MONOCYTES NFR BLD AUTO: 11.9 %
NEUTROPHILS # BLD AUTO: 4.27 X10(3)/MCL (ref 2.1–9.2)
NEUTROPHILS NFR BLD AUTO: 68.5 %
NRBC BLD AUTO-RTO: 0 %
PLATELET # BLD AUTO: 270 X10(3)/MCL (ref 130–400)
PMV BLD AUTO: 8.5 FL (ref 7.4–10.4)
POTASSIUM SERPL-SCNC: 4.2 MMOL/L (ref 3.5–5.1)
RBC # BLD AUTO: 3.56 X10(6)/MCL (ref 4.7–6.1)
SODIUM SERPL-SCNC: 138 MMOL/L (ref 136–145)
WBC # BLD AUTO: 6.23 X10(3)/MCL (ref 4.5–11.5)

## 2024-09-10 PROCEDURE — 97530 THERAPEUTIC ACTIVITIES: CPT | Mod: CQ

## 2024-09-10 PROCEDURE — 97116 GAIT TRAINING THERAPY: CPT | Mod: CQ

## 2024-09-10 PROCEDURE — 36415 COLL VENOUS BLD VENIPUNCTURE: CPT | Performed by: INTERNAL MEDICINE

## 2024-09-10 PROCEDURE — 21400001 HC TELEMETRY ROOM

## 2024-09-10 PROCEDURE — 25000003 PHARM REV CODE 250: Performed by: INTERNAL MEDICINE

## 2024-09-10 PROCEDURE — 85025 COMPLETE CBC W/AUTO DIFF WBC: CPT | Performed by: INTERNAL MEDICINE

## 2024-09-10 PROCEDURE — 83735 ASSAY OF MAGNESIUM: CPT | Performed by: INTERNAL MEDICINE

## 2024-09-10 PROCEDURE — 25000003 PHARM REV CODE 250: Performed by: NURSE PRACTITIONER

## 2024-09-10 PROCEDURE — 97535 SELF CARE MNGMENT TRAINING: CPT | Mod: CO

## 2024-09-10 PROCEDURE — 25000003 PHARM REV CODE 250: Performed by: HOSPITALIST

## 2024-09-10 PROCEDURE — 80048 BASIC METABOLIC PNL TOTAL CA: CPT | Performed by: INTERNAL MEDICINE

## 2024-09-10 PROCEDURE — 92610 EVALUATE SWALLOWING FUNCTION: CPT

## 2024-09-10 PROCEDURE — 97530 THERAPEUTIC ACTIVITIES: CPT | Mod: CO

## 2024-09-10 PROCEDURE — 25000003 PHARM REV CODE 250

## 2024-09-10 RX ADMIN — LEVETIRACETAM 500 MG: 500 TABLET, FILM COATED ORAL at 08:09

## 2024-09-10 RX ADMIN — DICLOFENAC SODIUM 2 G: 10 GEL TOPICAL at 09:09

## 2024-09-10 RX ADMIN — OXYCODONE HYDROCHLORIDE 5 MG: 5 TABLET ORAL at 09:09

## 2024-09-10 RX ADMIN — GABAPENTIN 100 MG: 100 CAPSULE ORAL at 09:09

## 2024-09-10 RX ADMIN — TRAZODONE HYDROCHLORIDE 100 MG: 100 TABLET ORAL at 09:09

## 2024-09-10 RX ADMIN — ATORVASTATIN CALCIUM 40 MG: 40 TABLET, FILM COATED ORAL at 09:09

## 2024-09-10 RX ADMIN — DICLOFENAC SODIUM 2 G: 10 GEL TOPICAL at 02:09

## 2024-09-10 RX ADMIN — GABAPENTIN 100 MG: 100 CAPSULE ORAL at 02:09

## 2024-09-10 RX ADMIN — ALPRAZOLAM 0.25 MG: 0.25 TABLET ORAL at 09:09

## 2024-09-10 RX ADMIN — OLOPATADINE HYDROCHLORIDE 1 DROP: 1 SOLUTION OPHTHALMIC at 08:09

## 2024-09-10 RX ADMIN — OLOPATADINE HYDROCHLORIDE 1 DROP: 1 SOLUTION OPHTHALMIC at 09:09

## 2024-09-10 RX ADMIN — RISPERIDONE 1 MG: 1 SOLUTION ORAL at 09:09

## 2024-09-10 RX ADMIN — SERTRALINE HYDROCHLORIDE 50 MG: 50 TABLET ORAL at 08:09

## 2024-09-10 RX ADMIN — LEVETIRACETAM 500 MG: 500 TABLET, FILM COATED ORAL at 09:09

## 2024-09-10 RX ADMIN — GABAPENTIN 100 MG: 100 CAPSULE ORAL at 08:09

## 2024-09-10 RX ADMIN — TAMSULOSIN HYDROCHLORIDE 0.4 MG: 0.4 CAPSULE ORAL at 08:09

## 2024-09-10 RX ADMIN — RISPERIDONE 1 MG: 1 SOLUTION ORAL at 08:09

## 2024-09-10 RX ADMIN — DICLOFENAC SODIUM 2 G: 10 GEL TOPICAL at 08:09

## 2024-09-10 RX ADMIN — DIPHENHYDRAMINE HYDROCHLORIDE 25 MG: 25 CAPSULE ORAL at 09:09

## 2024-09-10 NOTE — NURSING
Nurses Note -- 4 Eyes      9/9/2024   10:23 PM      Skin assessed during: Q Shift Change      [x] No Altered Skin Integrity Present    [x]Prevention Measures Documented      [] Yes- Altered Skin Integrity Present or Discovered   [] LDA Added if Not in Epic (Describe Wound)   [] New Altered Skin Integrity was Present on Admit and Documented in LDA   [] Wound Image Taken    Wound Care Consulted? No    Attending Nurse:  Charline Bethea RN/Staff Member:  Pari GARDINER LPN

## 2024-09-10 NOTE — PLAN OF CARE
Clinical updates sent to [facility], as well as [facility]. Awaiting response at this time.     Please see my addended note from 8/22 for a full list of referrals sent and their responses.

## 2024-09-10 NOTE — PT/OT/SLP PROGRESS
Occupational Therapy   Treatment    Name: Dell Garrett Jr.  MRN: [id number]  Admitting Diagnosis:  Intraparenchymal hemorrhage of brain       Recommendations:     Recommended therapy intensity at discharge: Moderate Intensity Therapy   Discharge Equipment Recommendations:  to be determined by next level of care  Barriers to discharge:  Decreased caregiver support    Assessment:     Dell Garrett Jr. is a 59 y.o. male with a medical diagnosis of R basal ganglia hemorrhage with R to L shift and intraventricular extension, elevated BP.  He presents with Flaccid LUE/LLE and decreased safety awareness, especially regarding the flaccid limbs. Performance deficits affecting function are weakness, impaired endurance, impaired sensation, impaired self care skills, impaired functional mobility, gait instability, impaired balance, decreased safety awareness, decreased lower extremity function, decreased upper extremity function, decreased coordination, impaired cognition.     Rehab Prognosis:  Good; patient would benefit from acute skilled OT services to address these deficits and reach maximum level of function.       Plan:     Patient to be seen 5 x/week to address the above listed problems via self-care/home management, therapeutic activities, therapeutic exercises, cognitive retraining, neuromuscular re-education, splinting  Plan of Care Expires: 10/04/24  Plan of Care Reviewed with: patient    Subjective     Pain/Comfort:       Objective:     Communicated with: RN prior to session.  Patient found up in chair with telemetry, peripheral IV, and posey vest on upon OT entry to room.    General Precautions: Standard, fall, aspiration    Orthopedic Precautions:N/A  Braces: N/A  Respiratory Status: Room air     Occupational Performance:     Chair Mobility:    Pt required Total-A for scooting trunk to back of seat for increased safety.    Functional Mobility/Transfers:  Pt completed sit<>stand transfer with max-A X2 persons  Pt  completed toilet transfer with max-A X2 persons  Functional Mobility: Impaired due to decreased awareness of Left side and decreased overall strength  During mobilization, pt required max-A for management of LUE. Sling recommended for increased safety and joint preservation of UE.    Activities of Daily Living:  Toileting: total assistance standing to provide perineal hygiene secondary to decreased standing balance  Grooming: Pt demonstrated ability to wash hands, requiring mod-A for positioning of LUE, and verbal cues for bring RUE to left for rubbing in soap. Pt able to scoop water with RUE for cleansing of LUE.   Therapist asked nurse to ensure pt obtains real, metal silverware for increasing (I) in self-feeding.  Therapist provided education to pt, regarding need for sitting up to eat meals in order to decrease risk of aspiration. Pt agreed.  Therapist provided pt a cup of thickened coffee, pt able to hold and bring cup to mouth with RUE.    Therapeutic Positioning    OT interventions performed during the course of today's session in an effort to prevent and/or reduce acquired pressure injuries:   Therapeutic positioning was provided at the conclusion of session to offload all bony prominences for the prevention and/or reduction of pressure injuries    [facility] 6 Click ADL:      Patient Education:  Patient provided with verbal education education regarding OT role/goals/POC, fall prevention, safety awareness, and pressure ulcer prevention.  Understanding was verbalized, however additional teaching warranted.      Patient left up in chair with all lines intact, call button in reach, chair alarm on, and restraints reapplied at end of session.    GOALS:   Multidisciplinary Problems       Occupational Therapy Goals          Problem: Occupational Therapy    Goal Priority Disciplines Outcome Interventions   Occupational Therapy Goal     OT, PT/OT Progressing    Description: Goals to be met by:  10/5/24    Patient will  increase functional independence with ADLs by performing:    UE Dressing with Stand-by Assistance.  LE Dressing with mod A  Grooming while seated at sink with SBA. -revised 9/5  Toileting from toilet with Min A for hygiene and clothing management. -revised 9/5  Toilet transfer to toilet with Min A. -revised 9/5  Increased functional strength to 3/5 LUE through therEX, neuromuscular re-ed.  Pt will visually attend to L side of environment with no cues                       Time Tracking:     OT Date of Treatment: 09/10/24  OT Start Time: 1140  OT Stop Time: 1218  OT Total Time (min): 38 min    Billable Minutes:Self Care/Home Management 3    OT/MAC: MAC     Number of MAC visits since last OT visit: 2    9/10/2024

## 2024-09-10 NOTE — PT/OT/SLP PROGRESS
Physical Therapy Treatment    Patient Name:  Dell Garrett Jr.   MRN:  [id number]    Recommendations:     Discharge therapy intensity: Moderate Intensity Therapy   Discharge Equipment Recommendations: to be determined by next level of care  Barriers to discharge: Decreased caregiver support and Impaired mobility    Assessment:     Dell Garrett Jr. is a 59 y.o. male admitted with a medical diagnosis of Intraparenchymal hemorrhage of brain .  He presents with the following impairments/functional limitations: weakness, impaired endurance, impaired self care skills, impaired functional mobility, gait instability, decreased lower extremity function, impaired balance, impaired cardiopulmonary response to activity .    Rehab Prognosis: Good; patient would benefit from acute skilled PT services to address these deficits and reach maximum level of function.    Recent Surgery: * No surgery found *      Plan:     During this hospitalization, patient would benefit from acute PT services 5 x/week to address the identified rehab impairments via gait training, therapeutic activities and progress toward the following goals:    Plan of Care Expires:  10/04/24    Subjective     Chief Complaint:   Patient/Family Comments/goals:   Pain/Comfort:         Objective:     Communicated with nurse prior to session.  Patient found HOB elevated with pulse ox (continuous), telemetry, bed alarm upon PT entry to room.     General Precautions: Standard, fall, aspiration  Orthopedic Precautions: N/A  Braces: N/A  Respiratory Status: Room air  Blood Pressure:   Skin Integrity: Visible skin intact      Functional Mobility:  Bed Mobility:     Bridging: moderate assistance  Supine to Sit: moderate assistance  Transfers:     Sit to Stand:  moderate assistance with R HR x3 trials  Bed to Chair: moderate assistance with  hand-held assist  using  Squat Pivot  Gait: pt amb x2 trials for 10ft each trial with R HR and L knee blocked into extension during stance  phase; noted increased L hip active flexion as compared to previous treatment sessions         Education:  Patient provided with verbal education education regarding fall prevention and safety awareness.  Understanding was verbalized, however additional teaching warranted.     Patient left up in chair with all lines intact, call button in reach, chair alarm on, and Posey vest    GOALS:   Multidisciplinary Problems       Physical Therapy Goals          Problem: Physical Therapy    Goal Priority Disciplines Outcome Goal Variances Interventions   Physical Therapy Goal     PT, PT/OT Progressing     Description: Goals to be met by: 2024     Patient will increase functional independence with mobility by performin. Supine to sit with MInimal Assistance  2. Sit to supine with MInimal Assistance  3. Sit to stand transfer with Minimal Assistance  4. Pt to follow 75% of commands.   5. Gait  x 75 feet with Minimal Assistance using Rolling Walker vs LRAD.                          Time Tracking:     PT Received On: 09/10/24  PT Start Time: 846     PT Stop Time: 924  PT Total Time (min): 38 min     Billable Minutes: Gait Training 15 and Therapeutic Activity 23    Treatment Type: Treatment  PT/PTA: PTA     Number of PTA visits since last PT visit: 1     09/10/2024

## 2024-09-10 NOTE — PROGRESS NOTES
[facility] [place] [facility] Progress Note        Chief Complaint: Inpatient Follow-up for     HPI:   59-year-old male with significant history of Crohn's disease, GERD, chronic tobacco use/substance use-cocaine presented to the ED with complaints of acute onset left-sided weakness, facial droop and aphasia along with sudden onset headache.  Imaging in the ED revealed intraparenchymal hemorrhage of right thalamus with surrounding edema, compression of right lateral ventricle with 8 mm midline shift, early obstructive hydrocephalus, possible subarachnoid hemorrhage. patient admitted to ICU, neurology and neurosurgery services were consulted.  CT angiogram with mild dolichoectasia of basilar artery , echocardiogram negative for bubble study, EF intact, grade 2 diastolic dysfunction. Conservative management, recommended to keep BP at goal, Keppra for seizure prevention, close neuro checks, 3% saline initiated.  Follow up CT with stable findings, therapy services consulted.  Holding anticoagulation, antiplatelets.  Hypertonic saline discontinued, initiate high-intensity statin, patient downgraded to hospital medicine services, case management consulted for placement, neurology recommended to continue to avoid antiplatelets.  Cleared for p.o. intake by speech, tolerating well.  Patient continues to participate with therapy services, case management actively working on placement.  Resumed home amlodipine, labs stable.  Psych consulted on 08/28 for impulsive behavior, initiated risperidone, Zoloft and p.r.n. trazodone   Concern for urinary retention, closely monitoring with bladder scan, added tamsulosin   spoke to sister over the phone   Patient asking when he can go home unfortunately he is unable to live by himself  He has been denied by multiple facilities and  is attempting to place him    Interval Hx:     9/8/24-Alert, oriented.  Answers few questions appropriately.  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  Follows all commands.  Tolerating p.o.    9-9-24 dr asencio - no new issues.  is attempting at [facility] . No labs since 8-30-24 , will do some for am . PT recommended placement at snf(moderate intensity therapy)    Ambulating 10-15 feet / mod to max assist/ 1-2 people     9/10/24 dr asencio- labs wnl. No new issues and  continues to look into options for placement . Has been denied from multiple placements     Objective/physical exam:  Vitals:    09/09/24 1927 09/09/24 2324 09/10/24 0357 09/10/24 0837   BP: 117/71 101/64 99/60 117/78   Pulse: 75 71 64 71   Resp: 20 16 16    Temp: 96.5 °F (35.8 °C) 98.3 °F (36.8 °C) 97.7 °F (36.5 °C) 98 °F (36.7 °C)   TempSrc: Axillary Axillary Oral Oral   SpO2: 95% (!) 94% 96% (!) 94%   Weight:       Height:         General: In no acute distress, afebrile  Respiratory: Clear to auscultation bilaterally  Cardiovascular: S1, S2, no appreciable murmur  Abdomen: Soft, nontender, BS +  MSK: Warm, no lower extremity edema, no clubbing or cyanosis  Neurologic: Alert and oriented x4,LLE strength 0/5  Lab Results   Component Value Date     09/10/2024    K 4.2 09/10/2024     09/10/2024    CO2 26 09/10/2024    BUN 17.3 09/10/2024    CREATININE 0.87 09/10/2024    CALCIUM 9.0 09/10/2024    EGFRNONAA >60 06/11/2021      Lab Results   Component Value Date    ALT 27 08/30/2024    AST 22 08/30/2024    ALKPHOS 64 08/30/2024    BILITOT 0.4 08/30/2024      Lab Results   Component Value Date    WBC 6.23 09/10/2024    HGB 10.5 (L) 09/10/2024    HCT 32.9 (L) 09/10/2024    MCV 92.4 09/10/2024     09/10/2024           Medications:   atorvastatin  40 mg Oral QHS    diclofenac sodium  2 g Topical (Top) TID    gabapentin  100 mg Oral TID    levETIRAcetam  500 mg Oral BID    olopatadine  1 drop Both Eyes BID    risperidone 1 mg/ml  1 mg Oral BID    sertraline  50 mg Oral Daily    tamsulosin  0.4 " mg Oral Daily        Current Facility-Administered Medications:     0.9% NaCl, , Intravenous, PRN    acetaminophen, 650 mg, Rectal, Q6H PRN    acetaminophen, 650 mg, Oral, Q6H PRN    albuterol, 2 puff, Inhalation, Q6H PRN    ALPRAZolam, 0.25 mg, Oral, Q4H PRN    bisacodyL, 10 mg, Rectal, Daily PRN    cloNIDine, 0.1 mg, Oral, Q6H PRN    diphenhydrAMINE, 25 mg, Oral, Q6H PRN    haloperidol lactate, 5 mg, Intravenous, Q6H PRN    hydrALAZINE, 10 mg, Intravenous, Q4H PRN    oxyCODONE, 5 mg, Oral, Q6H PRN    polyethylene glycol, 17 g, Oral, BID PRN    traMADoL, 50 mg, Oral, Q6H PRN    traZODone, 100 mg, Oral, Nightly PRN     Assessment/Plan:    Acute intra parenchymal hemorrhage-right thalamic capsular with edema/midline shift/intraventricular extension/early obstructive hydrocephalus  Acute subarachnoid hemorrhage   Left-sided weakness/partial aphasia secondary to above  Oropharyngeal dysphagia secondary to above-cleared for modified diet  New diagnosis essential HTN-stable  Impulsive behavior   Urinary retention-improved today  History of Crohn's disease/GERD  Chronic tobacco use/substance use  Moderate malnutrition      Plan:   -psychiatry was consulted to evaluate aggressive behavior.  recommendations noted.  Continue Zoloft, risperidone p.r.n..  Trazodone for bedtime  -continue to monitor blood pressure.  Off all antihypertensive medications   -neurology, neurosurgery recommendations noted.  Stroke workup reviewed.  Continue PT OT.  Currently awaiting placement   -avoid all antiplatelets, anticoagulation for now.  Continue Keppra for seizure prophylaxis  -mobilize.  Monitor for urinary retention  -continue p.o. and topical analgesics, p.r.n. anxiolytics  -Awaiting SNF placement.  Denied by multiple facilities.    SCDs    moderate intensity therapy / pt remains moderate to max assistance/ ambulating 10-15 feet with 1-2 people . Will continue present mamagement /  looking into placement

## 2024-09-10 NOTE — PLAN OF CARE
Problem: Adult Inpatient Plan of Care  Goal: Plan of Care Review  Outcome: Progressing  Goal: Patient-Specific Goal (Individualized)  Outcome: Progressing  Goal: Absence of Hospital-Acquired Illness or Injury  Outcome: Progressing  Goal: Optimal Comfort and Wellbeing  Outcome: Progressing  Goal: Readiness for Transition of Care  Outcome: Progressing     Problem: Skin Injury Risk Increased  Goal: Skin Health and Integrity  Outcome: Progressing     Problem: Stroke, Intracerebral Hemorrhage  Goal: Optimal Cognitive Function  Outcome: Progressing     Problem: Stroke, Intracerebral Hemorrhage  Goal: Optimal Cerebral Tissue Perfusion  Outcome: Progressing     Problem: Stroke, Intracerebral Hemorrhage  Goal: Optimal Pain Control and Function  Outcome: Progressing

## 2024-09-10 NOTE — PT/OT/SLP EVAL
"[facility]  Speech Language Pathology Department  Clinical Swallow Evaluation    Patient Name:  Dell Garrett Jr.   MRN:  [id number]    Recommendations     General recommendations:  dysphagia therapy and cognitive-linguistic therapy  Solid texture recommendation:  Puree Diet - IDDSI Level 4  Liquid consistency recommendation: Mildly thick liquids - IDDSI Level 2   Medications: crushed in puree  Swallow strategies/precautions: small bites/sips, slow rate, additional swallow per bite/sip, alternate solids/liquids, check for pocketing, upright for PO intake, supervision with meals, assist with feeding as needed, and limit distractions  Precautions: fall    History     Dell Garrett Jr. is a/n 59 y.o. male admitted for intraparenchymal hemorrhage of brain. Pt with reported pocketing yesterday and made NPO. SLP asked to re-evaluate swallowing.    Past Medical History:   Diagnosis Date    Crohn's disease     GERD (gastroesophageal reflux disease)      Patient complaint: "I want to eat"    Imaging   Results for orders placed during the hospital encounter of 08/10/24    X-Ray Chest 1 View    Narrative  EXAMINATION:  XR CHEST 1 VIEW    CLINICAL HISTORY:  fever;    TECHNIQUE:  Single frontal view of the chest was performed.    COMPARISON:  08/10/2024    FINDINGS:  LINES AND TUBES: Left upper extremity PICC tip projects over the SVC.  Enteric tube courses below the diaphragm.    MEDIASTINUM AND MADINA: Cardiac silhouette is enlarged.    LUNGS: No lobar consolidation. No edema.    PLEURA:No pleural effusion. No pneumothorax.    OTHER: Patient is rotated to the left.    Impression  Enlarged cardiac silhouette without overt edema.      Electronically signed by: Isabella Jacques  Date:    08/13/2024  Time:    12:33    No results found for this or any previous visit.    No results found for this or any previous visit.    Subjective     Patient awake, alert, and cooperative.    Patient goals: "I want to " "eat"   Spiritual/Cultural/Worship Beliefs/Practices that affect care: no    Pain/Comfort: Pain Rating 1: 0/10    Respiratory Status:  room air    Restraints/positioning devices: none    Objective     ORAL MUSCULATURE  Dentition: missing teeth  Secretion Management: adequate  Mucosal Quality: good  Facial Movement: reduced left  Buccal Strength & Mobility: impaired  Mandibular Strength & Mobility: WFL  Oral Labial Strength & Mobility: impaired seal  Lingual Strength & Mobility: impaired strength    PO TRIALS  Consistency Fed By Oral Symptoms Pharyngeal Symptoms   Mildly thick liquid by cup Self None None   Puree SLP Mild pocketing on left side x1; cleared with additional swallow None     Assessment     Pt continues to present with oropharyngeal dysphagia requiring diet modifications to reduce the risk of aspiration. Pocketing noted on left side x1 however pt able to clear with additional swallow. Cues required for decreased talking and conversation during trials.     Outcome Measures     Functional Oral Intake Scale: 5 - Total oral diet with multiple consistencies, by requiring special preparation or compensations    Goals     Multidisciplinary Problems       SLP Goals          Problem: SLP    Goal Priority Disciplines Outcome   SLP Goal     SLP Progressing   Description: LTG: Patient will tolerate safest and least restrictive PO diet without signs/sx of aspiration.  STG: Meal trial of pureed solids and mildly thick liquids via cup without adequate REILLY and no signs/sx of aspiration. -met  STG: Minced/moist solids with adequate bolus preparation/mastication and without signs/sx of aspiration. -resume  STG: Soft/bite sized solids with adequate bolus preparation/mastication and without signs/sx of aspiration.  STG: Laryngeal/BOT exercises Min A.    LTG: Patient will improve cognitive-linguistic skills in order to return to PLOF.  STG: Dysarthria drills at the phrase level with 90% accuracy.  STG: Problem " solving/reasoning tasks with 90% accuracy.  STG: Short term memory tasks with 90% accuracy.                     Education     Patient and family were provided with verbal education regarding SLP POC.  Understanding was verbalized, however additional teaching warranted.    Plan     SLP Follow-Up:  Yes   Patient to be seen:  5 x/week   Plan of Care expires:  09/24/24  Plan of Care reviewed with:  patient, family     Time Tracking     SLP Treatment Date:   09/10/24  Speech Start Time:  0950  Speech Stop Time:  1005     Speech Total Time (min):  15 min    Billable minutes:  Swallow and Oral Function Evaluation, 15 minutes     09/10/2024

## 2024-09-10 NOTE — PLAN OF CARE
Problem: SLP  Goal: SLP Goal  Description: LTG: Patient will tolerate safest and least restrictive PO diet without signs/sx of aspiration.  STG: Meal trial of pureed solids and mildly thick liquids via cup without adequate REILLY and no signs/sx of aspiration. -met  STG: Minced/moist solids with adequate bolus preparation/mastication and without signs/sx of aspiration. -resume  STG: Soft/bite sized solids with adequate bolus preparation/mastication and without signs/sx of aspiration.  STG: Laryngeal/BOT exercises Min A.    LTG: Patient will improve cognitive-linguistic skills in order to return to PLOF.  STG: Dysarthria drills at the phrase level with 90% accuracy.  STG: Problem solving/reasoning tasks with 90% accuracy.  STG: Short term memory tasks with 90% accuracy.  Outcome: Progressing

## 2024-09-10 NOTE — PT/OT/SLP PROGRESS
Occupational Therapy   Treatment    Name: Dell Garrett Jr.  MRN: [id number]  Admitting Diagnosis:  Intraparenchymal hemorrhage of brain       Recommendations:     Recommended therapy intensity at discharge: Moderate Intensity Therapy   Discharge Equipment Recommendations:  to be determined by next level of care  Barriers to discharge:  Decreased caregiver support    Assessment:     Dell Garrett Jr. is a 59 y.o. male with a medical diagnosis of Intraparenchymal hemorrhage of brain.  He presents with good motivation to participate. Performance deficits affecting function are weakness, abnormal tone, impaired endurance, impaired sensation, impaired self care skills, impaired functional mobility, gait instability, impaired balance, decreased safety awareness, decreased lower extremity function, decreased upper extremity function.     Rehab Prognosis:  Good; patient would benefit from acute skilled OT services to address these deficits and reach maximum level of function.       Plan:     Patient to be seen 5 x/week to address the above listed problems via self-care/home management, therapeutic activities, therapeutic exercises, neuromuscular re-education  Plan of Care Expires: 10/04/24  Plan of Care Reviewed with: patient    Subjective     Pain/Comfort:  Pain Rating 1: 0/10    Objective:     Communicated with: nurse prior to session.  Patient found up in chair with pulse ox (continuous), telemetry, chair check upon OT entry to room.    General Precautions: Standard, fall, aspiration    Orthopedic Precautions:N/A  Braces: N/A  Respiratory Status: Room air     Occupational Performance:     Bed Mobility:    Patient completed Sit to Supine with moderate assistance   Maintain sitting balance edge of bed with Mod A    Functional Mobility/Transfers:  Patient completed Sit <> Stand Transfer with moderate assistance  with  hand-held assist   Patient completed Bed <> Chair Transfer using Step Transfer technique with maximal  assistance with hand-held assist to strong side    Therapeutic Positioning    OT interventions performed during the course of today's session in an effort to prevent and/or reduce acquired pressure injuries:   Therapeutic positioning was provided at the conclusion of session for contracture prevention    [facility] 6 Click ADL: 13    Patient Education:  Patient provided with verbal education and demonstrations education regarding fall prevention and safety awareness.  Understanding was verbalized, however additional teaching warranted.      Patient left HOB elevated with all lines intact and call button in reach.    GOALS:   Multidisciplinary Problems       Occupational Therapy Goals          Problem: Occupational Therapy    Goal Priority Disciplines Outcome Interventions   Occupational Therapy Goal     OT, PT/OT Progressing    Description: Goals to be met by:  10/5/24    Patient will increase functional independence with ADLs by performing:    UE Dressing with Stand-by Assistance.  LE Dressing with mod A  Grooming while seated at sink with SBA. -revised 9/5  Toileting from toilet with Min A for hygiene and clothing management. -revised 9/5  Toilet transfer to toilet with Min A. -revised 9/5  Increased functional strength to 3/5 LUE through therEX, neuromuscular re-ed.  Pt will visually attend to L side of environment with no cues                       Time Tracking:     OT Date of Treatment: 09/10/24  OT Start Time: 1409  OT Stop Time: 1436  OT Total Time (min): 27 min    Billable Minutes:Therapeutic Activity 27    OT/MAC: MAC     Number of MAC visits since last OT visit: 3    9/10/2024

## 2024-09-10 NOTE — CONSULTS
The patient is very grateful for his present condition. He has a lot of stories of testimony.  I offered him the audience and confort that he needed to share his stories.  I prayed for him and BLESSED HIM

## 2024-09-11 PROCEDURE — 97535 SELF CARE MNGMENT TRAINING: CPT

## 2024-09-11 PROCEDURE — 25000003 PHARM REV CODE 250: Performed by: INTERNAL MEDICINE

## 2024-09-11 PROCEDURE — 25000003 PHARM REV CODE 250: Performed by: HOSPITALIST

## 2024-09-11 PROCEDURE — 94760 N-INVAS EAR/PLS OXIMETRY 1: CPT

## 2024-09-11 PROCEDURE — 21400001 HC TELEMETRY ROOM

## 2024-09-11 PROCEDURE — 25000003 PHARM REV CODE 250

## 2024-09-11 PROCEDURE — 92526 ORAL FUNCTION THERAPY: CPT

## 2024-09-11 PROCEDURE — 97530 THERAPEUTIC ACTIVITIES: CPT | Mod: CQ

## 2024-09-11 RX ORDER — MICONAZOLE NITRATE 2 G/100G
POWDER TOPICAL 2 TIMES DAILY
Status: DISCONTINUED | OUTPATIENT
Start: 2024-09-11 | End: 2024-09-19 | Stop reason: HOSPADM

## 2024-09-11 RX ADMIN — ATORVASTATIN CALCIUM 40 MG: 40 TABLET, FILM COATED ORAL at 09:09

## 2024-09-11 RX ADMIN — TAMSULOSIN HYDROCHLORIDE 0.4 MG: 0.4 CAPSULE ORAL at 08:09

## 2024-09-11 RX ADMIN — OLOPATADINE HYDROCHLORIDE 1 DROP: 1 SOLUTION OPHTHALMIC at 09:09

## 2024-09-11 RX ADMIN — OXYCODONE HYDROCHLORIDE 5 MG: 5 TABLET ORAL at 09:09

## 2024-09-11 RX ADMIN — OLOPATADINE HYDROCHLORIDE 1 DROP: 1 SOLUTION OPHTHALMIC at 08:09

## 2024-09-11 RX ADMIN — GABAPENTIN 100 MG: 100 CAPSULE ORAL at 09:09

## 2024-09-11 RX ADMIN — ALPRAZOLAM 0.25 MG: 0.25 TABLET ORAL at 09:09

## 2024-09-11 RX ADMIN — DICLOFENAC SODIUM 2 G: 10 GEL TOPICAL at 02:09

## 2024-09-11 RX ADMIN — LEVETIRACETAM 500 MG: 500 TABLET, FILM COATED ORAL at 09:09

## 2024-09-11 RX ADMIN — GABAPENTIN 100 MG: 100 CAPSULE ORAL at 02:09

## 2024-09-11 RX ADMIN — GABAPENTIN 100 MG: 100 CAPSULE ORAL at 08:09

## 2024-09-11 RX ADMIN — RISPERIDONE 1 MG: 1 SOLUTION ORAL at 08:09

## 2024-09-11 RX ADMIN — SERTRALINE HYDROCHLORIDE 50 MG: 50 TABLET ORAL at 09:09

## 2024-09-11 RX ADMIN — DICLOFENAC SODIUM 2 G: 10 GEL TOPICAL at 09:09

## 2024-09-11 RX ADMIN — RISPERIDONE 1 MG: 1 SOLUTION ORAL at 09:09

## 2024-09-11 RX ADMIN — POLYETHYLENE GLYCOL 3350 17 G: 17 POWDER, FOR SOLUTION ORAL at 08:09

## 2024-09-11 RX ADMIN — LEVETIRACETAM 500 MG: 500 TABLET, FILM COATED ORAL at 08:09

## 2024-09-11 RX ADMIN — DICLOFENAC SODIUM 2 G: 10 GEL TOPICAL at 08:09

## 2024-09-11 RX ADMIN — MICONAZOLE NITRATE 2 % TOPICAL POWDER: at 09:09

## 2024-09-11 RX ADMIN — TRAZODONE HYDROCHLORIDE 100 MG: 100 TABLET ORAL at 09:09

## 2024-09-11 NOTE — CONSULTS
[facility] - Neurology  Wound Care    Patient Name:  Dell Garrett Jr.   MRN:  [id number]  Date: 9/11/2024  Diagnosis: Intraparenchymal hemorrhage of brain    History:     Past Medical History:   Diagnosis Date    Crohn's disease     GERD (gastroesophageal reflux disease)        Social History     Socioeconomic History    Marital status: Single   Tobacco Use    Smoking status: Some Days     Types: Cigarettes    Smokeless tobacco: Never   Substance and Sexual Activity    Alcohol use: Yes     Comment: daily    Drug use: Never     Social Determinants of Health     Financial Resource Strain: Patient Declined (8/11/2024)    Overall Financial Resource Strain (CARDIA)     Difficulty of Paying Living Expenses: Patient declined   Food Insecurity: Patient Declined (8/11/2024)    Hunger Vital Sign     Worried About Running Out of Food in the Last Year: Patient declined     Ran Out of Food in the Last Year: Patient declined   Transportation Needs: Patient Declined (8/11/2024)    TRANSPORTATION NEEDS     Transportation : Patient declined   Stress: Patient Declined (8/11/2024)    [employer] - Occupational Stress Questionnaire     Feeling of Stress : Patient declined   Housing Stability: Patient Declined (8/11/2024)    Housing Stability Vital Sign     Unable to Pay for Housing in the Last Year: Patient declined     Homeless in the Last Year: Patient declined       Precautions:     Allergies as of 08/10/2024 - Reviewed 08/10/2024   Allergen Reaction Noted    Acetaminophen  06/21/2023    Tramadol  09/25/2023    Ibuprofen Nausea Only 09/23/2022       [facility] Assessment Details/Treatment        09/11/24 0554   [facility] Assessment   Visit Date 09/11/24   Visit Time 0554   Consult Type New   [facility] Wound   Wound moisture   Intervention assessed;chart review;orders   Teaching on-going   Skin Interventions   Device Skin Pressure Protection absorbent pad utilized/changed        Wound 09/10/24  1701 Moisture associated dermatitis Left Foot   Date First Assessed/Time First Assessed: 09/10/24 1701   Present on Original Admission: No  Primary Wound Type: Moisture associated dermatitis  Side: Left  Location: Foot   Wound Image     Dressing Appearance Open to air   Drainage Amount None   Drainage Characteristics/Odor No odor   Appearance Moist   Tissue loss description Not applicable   Periwound Area Macerated;Moist   Wound Edges Irregular   Care Cleansed with:;Soap and water   Dressing Applied     WOCN consulted for webspace's. Initial evaluation, no family at bedside. No open areas or drainage noted. Suspected to be fungal aspect with moisture. Recommendations to Cleanse feet with soap and water. Dry in between toes well. Apply miconazole powder BID and PRN. Twine 4x4 in between toes to help wick moisture. Keep areas clean and dry, no adult briefs while in bed. Nursing to continue with turning every two hours, wedge, and floating heels. Will follow up.  09/11/2024

## 2024-09-11 NOTE — PLAN OF CARE
Clinical updates sent to [facility], as well as [facility]. Will continue to follow up on these referrals. Referral also sent to [facility].     Please see my addended note from 8/22 for a full list of referrals sent and their responses.

## 2024-09-11 NOTE — PLAN OF CARE
Problem: Adult Inpatient Plan of Care  Goal: Plan of Care Review  Outcome: Progressing  Goal: Patient-Specific Goal (Individualized)  Outcome: Progressing  Goal: Optimal Comfort and Wellbeing  Outcome: Progressing  Goal: Readiness for Transition of Care  Outcome: Progressing     Problem: Skin Injury Risk Increased  Goal: Skin Health and Integrity  Outcome: Progressing     Problem: Stroke, Intracerebral Hemorrhage  Goal: Optimal Coping  Outcome: Progressing  Goal: Effective Bowel Elimination  Outcome: Progressing  Goal: Optimal Nutrition Intake  Outcome: Progressing  Goal: Optimal Pain Control and Function  Outcome: Progressing  Goal: Effective Oxygenation and Ventilation  Outcome: Progressing     Problem: Wound  Goal: Optimal Coping  Outcome: Progressing  Goal: Optimal Functional Ability  Outcome: Progressing  Goal: Absence of Infection Signs and Symptoms  Outcome: Progressing  Goal: Optimal Wound Healing  Outcome: Progressing

## 2024-09-11 NOTE — PLAN OF CARE
Problem: Adult Inpatient Plan of Care  Goal: Plan of Care Review  Outcome: Progressing  Goal: Patient-Specific Goal (Individualized)  Outcome: Progressing  Goal: Absence of Hospital-Acquired Illness or Injury  Outcome: Progressing  Goal: Optimal Comfort and Wellbeing  Outcome: Progressing  Goal: Readiness for Transition of Care  Outcome: Progressing     Problem: Skin Injury Risk Increased  Goal: Skin Health and Integrity  Outcome: Progressing     Problem: Stroke, Intracerebral Hemorrhage  Goal: Optimal Coping  Outcome: Progressing  Goal: Effective Bowel Elimination  Outcome: Progressing  Goal: Optimal Cerebral Tissue Perfusion  Outcome: Progressing  Goal: Optimal Cognitive Function  Outcome: Progressing  Goal: Effective Communication Skills  Outcome: Progressing  Goal: Optimal Functional Ability  Outcome: Progressing  Goal: Optimal Nutrition Intake  Outcome: Progressing  Goal: Optimal Pain Control and Function  Outcome: Progressing  Goal: Effective Oxygenation and Ventilation  Outcome: Progressing  Goal: Improved Sensorimotor Function  Outcome: Progressing  Goal: Safe and Effective Swallow  Outcome: Progressing  Goal: Effective Urinary Elimination  Outcome: Progressing     Problem: Infection  Goal: Absence of Infection Signs and Symptoms  Outcome: Progressing     Problem: Wound  Goal: Optimal Coping  Outcome: Progressing  Goal: Optimal Functional Ability  Outcome: Progressing  Goal: Absence of Infection Signs and Symptoms  Outcome: Progressing  Goal: Improved Oral Intake  Outcome: Progressing  Goal: Optimal Pain Control and Function  Outcome: Progressing  Goal: Skin Health and Integrity  Outcome: Progressing  Goal: Optimal Wound Healing  Outcome: Progressing

## 2024-09-11 NOTE — PT/OT/SLP PROGRESS
Occupational Therapy   Treatment    Name: Dell Garrett Jr.  MRN: [id number]  Admitting Diagnosis:  Intraparenchymal hemorrhage of brain       Recommendations:     Recommended therapy intensity at discharge: Moderate Intensity Therapy   Discharge Equipment Recommendations:  to be determined by next level of care  Barriers to discharge:  Decreased caregiver support    Assessment:     Dell Garrett Jr. is a 59 y.o. male with a medical diagnosis of R basal ganglia hemorrhage with R to L shift and intraventricular extension, elevated BP.  He presents with the following performance deficits affecting function are weakness, visual deficits, impaired endurance, impaired self care skills, impaired sensation, impaired functional mobility, gait instability, impaired balance, decreased safety awareness, decreased lower extremity function, decreased upper extremity function, decreased coordination, abnormal tone, impaired cognition.     Rehab Prognosis:  Good; patient would benefit from acute skilled OT services to address these deficits and reach maximum level of function.       Plan:     Patient to be seen 5 x/week to address the above listed problems via self-care/home management, therapeutic activities, therapeutic exercises, neuromuscular re-education  Plan of Care Expires: 10/04/24  Plan of Care Reviewed with: patient    Subjective     Pain/Comfort:  Pain Rating 1: 0/10    Objective:     Communicated with: RN prior to session.  Patient found HOB elevated with telemetry, bed alarm, peripheral IV, PureWick upon OT entry to room.    General Precautions: Standard, fall, aspiration    Orthopedic Precautions:N/A  Braces: N/A  Respiratory Status: Room air     Occupational Performance:     Bed Mobility:    Patient completed Supine to Sit with moderate assistance  Patient completed Sit to Supine with moderate assistance     Functional Mobility/Transfers:  Patient completed Sit <> Stand Transfer with moderate assistance with  hand-held assist   Patient [place] [facility] [place] Squat Pivot technique with moderate assistance x2 with no AD    Activities of Daily Living:  Feeding:  pt able to feed self using RUE to bring food to mouth with appropriate set up of all containers, however pt required Mod verbal cues for scooping small bites, taking small sips, and swallowing all food for prevention of pocketing in L cheek; pt noted with coughing while eating. Alerted SLP. Pt completed feeding task sitting EOB with Mod A for sitting balance and cues for posture as pt with posterior and left lean. Pt fatigued after ~10 mins and was then set up appropriately with HOB elevated and CNA present.   Lower Body Dressing: maximal assistance doffing brief in standing  Toileting: maximal assistance for toilet hygiene following BM on [facility]    Therapeutic Positioning    OT interventions performed during the course of today's session in an effort to prevent and/or reduce acquired pressure injuries:   Education was provided on benefits of and recommendations for therapeutic positioning    Patient Education:  Patient provided with verbal education education regarding OT role/goals/POC, fall prevention, and safety awareness.  Understanding was verbalized, however additional teaching warranted.    Patient left HOB elevated with all lines intact, call button in reach, RN notified, and CNA present.    GOALS:   Multidisciplinary Problems       Occupational Therapy Goals          Problem: Occupational Therapy    Goal Priority Disciplines Outcome Interventions   Occupational Therapy Goal     OT, PT/OT Progressing    Description: Goals to be met by:  10/5/24    Patient will increase functional independence with ADLs by performing:    UE Dressing with Stand-by Assistance.  LE Dressing with mod A  Grooming while seated at sink with SBA. -revised 9/5  Toileting from toilet with Min A for hygiene and clothing management. -revised 9/5  Toilet transfer to toilet with Min A.  -revised 9/5  Increased functional strength to 3/5 LUE through therEX, neuromuscular re-ed.  Pt will visually attend to L side of environment with no cues                       Time Tracking:     OT Date of Treatment: 09/11/24  OT Start Time: 0827  OT Stop Time: 0905  OT Total Time (min): 38 min    Billable Minutes:Self Care/Home Management 38 mins    OT/MAC: OT     Number of MAC visits since last OT visit: 4    9/11/2024

## 2024-09-11 NOTE — PT/OT/SLP PROGRESS
[facility]  Speech Language Pathology Department  Dysphagia Therapy Progress Note    Patient Name:  Dell Garrett Jr.   MRN:  [id number]    Recommendations     General recommendations:  dysphagia therapy and cognitive-linguistic therapy  Solid texture recommendation:  Puree Diet - IDDSI Level 4  Liquid consistency recommendation: Mildly thick liquids - IDDSI Level 2   Medications: crushed in puree  Aspiration precautions: small bites/sips, slow rate, additional swallow per bite/sip, alternate solids/liquids, check for pocketing, upright for PO intake, supervision with meals, assist with feeding as needed, and limit distractions     Discharge therapy intensity: Moderate Intensity Therapy   Barriers to safe discharge:  limited insight into deficits and safety awareness    Subjective     Patient awake, alert, and cooperative.  Spiritual/Cultural/[employer]aritan Beliefs/Practices that affect care: no    Pain/Comfort: Pain Rating 1: 0/10    Objective     PT reports coughing with PO intake this AM on minced solids. SLP recommendations made yesterday for pureed consistencies. Confirmed that diet orders were correct in Epic after session.     Therapeutic PO Trials:  Consistency Amount Fed By Oral Symptoms Pharyngeal Symptoms   Mildly thick liquid by cup ~4 oz Self Decreased lip closure  Anterior spillage None   Puree x9 SLP Bolus holding  Prolonged bolus formation/mastication None     Assessment     Pt continues to present with oropharyngeal dysphagia requiring diet modification to improve swallow safety and efficiency.    Outcome Measures     Functional Oral Intake Scale: 5 - Total oral diet with multiple consistencies, by requiring special preparation or compensations    Goals     Multidisciplinary Problems       SLP Goals          Problem: SLP    Goal Priority Disciplines Outcome   SLP Goal     SLP Progressing   Description: LTG: Patient will tolerate safest and least restrictive PO diet without  signs/sx of aspiration.  STG: Meal trial of pureed solids and mildly thick liquids via cup without adequate REILLY and no signs/sx of aspiration. -met  STG: Minced/moist solids with adequate bolus preparation/mastication and without signs/sx of aspiration. -resume  STG: Soft/bite sized solids with adequate bolus preparation/mastication and without signs/sx of aspiration.  STG: Laryngeal/BOT exercises Min A.    LTG: Patient will improve cognitive-linguistic skills in order to return to PLOF.  STG: Dysarthria drills at the phrase level with 90% accuracy.  STG: Problem solving/reasoning tasks with 90% accuracy.  STG: Short term memory tasks with 90% accuracy.                     Patient Education     Patient provided with verbal education regarding ST POC.  Understanding was verbalized, however additional teaching warranted.    Plan     Will continue to follow and tx as appropriate.    SLP Follow-Up:  Yes   Patient to be seen:  5 x/week   Plan of Care expires:  09/24/24  Plan of Care reviewed with:  patient       Time Tracking     SLP Treatment Date:   09/11/24  Speech Start Time:  1240  Speech Stop Time:  1255     Speech Total Time (min):  15 min    Billable minutes:  Treatment of Swallow Dysfunction, 15 minutes       09/11/2024

## 2024-09-11 NOTE — PROGRESS NOTES
[facility] Medicine Progress Note        Chief Complaint: Inpatient Follow-up for     HPI:   59-year-old male with significant history of Crohn's disease, GERD, chronic tobacco use/substance use-cocaine presented to the ED with complaints of acute onset left-sided weakness, facial droop and aphasia along with sudden onset headache.  Imaging in the ED revealed intraparenchymal hemorrhage of right thalamus with surrounding edema, compression of right lateral ventricle with 8 mm midline shift, early obstructive hydrocephalus, possible subarachnoid hemorrhage. patient admitted to ICU, neurology and neurosurgery services were consulted.  CT angiogram with mild dolichoectasia of basilar artery , echocardiogram negative for bubble study, EF intact, grade 2 diastolic dysfunction. Conservative management, recommended to keep BP at goal, Keppra for seizure prevention, close neuro checks, 3% saline initiated.  Follow up CT with stable findings, therapy services consulted.  Holding anticoagulation, antiplatelets.  Hypertonic saline discontinued, initiate high-intensity statin, patient downgraded to hospital medicine services, case management consulted for placement, neurology recommended to continue to avoid antiplatelets.  Cleared for p.o. intake by speech, tolerating well.  Patient continues to participate with therapy services, case management actively working on placement.  Resumed home amlodipine, labs stable.  Psych consulted on 08/28 for impulsive behavior, initiated risperidone, Zoloft and p.r.n. trazodone   Concern for urinary retention, closely monitoring with bladder scan, added tamsulosin   spoke to sister over the phone   Patient asking when he can go home unfortunately he is unable to live by himself  He has been denied by multiple facilities and  is attempting to place him    Interval Hx:     9/8/24-Alert, oriented.  Answers few questions appropriately.  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  Follows all commands.  Tolerating p.o.    9-9-24 dr asencio - no new issues.  is attempting at [facility] . No labs since 8-30-24 , will do some for am . PT recommended placement at snf(moderate intensity therapy)    Ambulating 10-15 feet / mod to max assist/ 1-2 people     9/10/24 dr asencio- labs wnl. No new issues and  continues to look into options for placement . Has been denied from multiple placements     9/11/24 dr asencio - no new issues     Objective/physical exam:  Vitals:    09/11/24 0314 09/11/24 0417 09/11/24 0740 09/11/24 1013   BP: 98/71  103/65 100/65   Pulse: 62  76 78   Resp:  18     Temp: 97.4 °F (36.3 °C)  97.9 °F (36.6 °C) 98.6 °F (37 °C)   TempSrc: Oral  Oral Oral   SpO2: 96%  97% 99%   Weight:       Height:         General: In no acute distress, afebrile  Respiratory: Clear to auscultation bilaterally  Cardiovascular: S1, S2, no appreciable murmur  Abdomen: Soft, nontender, BS +  MSK: Warm, no lower extremity edema, no clubbing or cyanosis  Neurologic: Alert and oriented x4,LLE strength 0/5  Lab Results   Component Value Date     09/10/2024    K 4.2 09/10/2024     09/10/2024    CO2 26 09/10/2024    BUN 17.3 09/10/2024    CREATININE 0.87 09/10/2024    CALCIUM 9.0 09/10/2024    EGFRNONAA >60 06/11/2021      Lab Results   Component Value Date    ALT 27 08/30/2024    AST 22 08/30/2024    ALKPHOS 64 08/30/2024    BILITOT 0.4 08/30/2024      Lab Results   Component Value Date    WBC 6.23 09/10/2024    HGB 10.5 (L) 09/10/2024    HCT 32.9 (L) 09/10/2024    MCV 92.4 09/10/2024     09/10/2024           Medications:   atorvastatin  40 mg Oral QHS    diclofenac sodium  2 g Topical (Top) TID    gabapentin  100 mg Oral TID    levETIRAcetam  500 mg Oral BID    miconazole NITRATE 2 %   Topical (Top) BID    olopatadine  1 drop Both Eyes BID    risperidone 1 mg/ml  1 mg Oral BID    sertraline  50 " mg Oral Daily    tamsulosin  0.4 mg Oral Daily        Current Facility-Administered Medications:     0.9% NaCl, , Intravenous, PRN    acetaminophen, 650 mg, Rectal, Q6H PRN    acetaminophen, 650 mg, Oral, Q6H PRN    albuterol, 2 puff, Inhalation, Q6H PRN    ALPRAZolam, 0.25 mg, Oral, Q4H PRN    bisacodyL, 10 mg, Rectal, Daily PRN    cloNIDine, 0.1 mg, Oral, Q6H PRN    diphenhydrAMINE, 25 mg, Oral, Q6H PRN    haloperidol lactate, 5 mg, Intravenous, Q6H PRN    hydrALAZINE, 10 mg, Intravenous, Q4H PRN    oxyCODONE, 5 mg, Oral, Q6H PRN    polyethylene glycol, 17 g, Oral, BID PRN    traMADoL, 50 mg, Oral, Q6H PRN    traZODone, 100 mg, Oral, Nightly PRN     Assessment/Plan:    Acute intra parenchymal hemorrhage-right thalamic capsular with edema/midline shift/intraventricular extension/early obstructive hydrocephalus  Acute subarachnoid hemorrhage   Left-sided weakness/partial aphasia secondary to above  Oropharyngeal dysphagia secondary to above-cleared for modified diet  New diagnosis essential HTN-stable  Impulsive behavior   Urinary retention-improved today  History of Crohn's disease/GERD  Chronic tobacco use/substance use  Moderate malnutrition      Plan:   -psychiatry was consulted to evaluate aggressive behavior.  recommendations noted.  Continue Zoloft, risperidone p.r.n..  Trazodone for bedtime  -continue to monitor blood pressure.  Off all antihypertensive medications   -neurology, neurosurgery recommendations noted.  Stroke workup reviewed.  Continue PT OT.  Currently awaiting placement   -avoid all antiplatelets, anticoagulation for now.  Continue Keppra for seizure prophylaxis  -mobilize.  Monitor for urinary retention  -continue p.o. and topical analgesics, p.r.n. anxiolytics  -Awaiting SNF placement.  Denied by multiple facilities.    SCDs    moderate intensity therapy / pt remains moderate to max assistance/ ambulating 10-15 feet with 1-2 people . Will continue present mamagement /  looking  into placement

## 2024-09-11 NOTE — PT/OT/SLP PROGRESS
Physical Therapy Treatment    Patient Name:  Dell Garrett Jr.   MRN:  [id number]    Recommendations:     Discharge therapy intensity: Moderate Intensity Therapy   Discharge Equipment Recommendations: to be determined by next level of care  Barriers to discharge: Impaired mobility and Ongoing medical needs    Assessment:     Dell Garrett Jr. is a 59 y.o. male admitted with a medical diagnosis of Intraparenchymal hemorrhage of brain .  He presents with the following impairments/functional limitations: weakness, impaired self care skills, impaired functional mobility, gait instability, impaired endurance, impaired balance .    Rehab Prognosis: Good; patient would benefit from acute skilled PT services to address these deficits and reach maximum level of function.    Recent Surgery: * No surgery found *      Plan:     During this hospitalization, patient would benefit from acute PT services 5 x/week to address the identified rehab impairments via gait training, therapeutic activities and progress toward the following goals:    Plan of Care Expires:  10/04/24    Subjective     Chief Complaint:   Patient/Family Comments/goals:   Pain/Comfort:         Objective:     Communicated with nurse prior to session.  Patient found HOB elevated with pulse ox (continuous), telemetry, peripheral IV, bed alarm upon PT entry to room.     General Precautions: Standard, fall, aspiration  Orthopedic Precautions: N/A  Braces: N/A  Respiratory Status: Room air  Blood Pressure:   Skin Integrity: Visible skin intact      Functional Mobility:  Bed Mobility:     Supine to Sit: moderate assistance  Sit to Supine: moderate assistance  Transfers:     Sit to Stand:  moderate assistance with hand-held assist  Bed to Chair: moderate assistance and of 2 persons with  hand-held assist  using  Squat Pivot    Therapeutic Activities/Exercises:  Pt sat EOB x5 minutes with modA for sitting balance with noted L lateral and posterior lean with constant cues to  correct. Pt able to return to midline but unable to maintain.     Education:  Patient provided with verbal education education regarding fall prevention and safety awareness.  Understanding was verbalized, however additional teaching warranted.     Patient left HOB elevated with all lines intact, call button in reach, and bed alarm on    GOALS:   Multidisciplinary Problems       Physical Therapy Goals          Problem: Physical Therapy    Goal Priority Disciplines Outcome Goal Variances Interventions   Physical Therapy Goal     PT, PT/OT Progressing     Description: Goals to be met by: 2024     Patient will increase functional independence with mobility by performin. Supine to sit with MInimal Assistance  2. Sit to supine with MInimal Assistance  3. Sit to stand transfer with Minimal Assistance  4. Pt to follow 75% of commands.   5. Gait  x 75 feet with Minimal Assistance using Rolling Walker vs LRAD.                          Time Tracking:     PT Received On: 24  PT Start Time: 828     PT Stop Time: 906  PT Total Time (min): 38 min     Billable Minutes: Therapeutic Activity 38    Treatment Type: Treatment  PT/PTA: PTA     Number of PTA visits since last PT visit: 2     2024

## 2024-09-12 PROCEDURE — 25000003 PHARM REV CODE 250: Performed by: INTERNAL MEDICINE

## 2024-09-12 PROCEDURE — 25000003 PHARM REV CODE 250

## 2024-09-12 PROCEDURE — 97535 SELF CARE MNGMENT TRAINING: CPT

## 2024-09-12 PROCEDURE — 21400001 HC TELEMETRY ROOM

## 2024-09-12 PROCEDURE — 97530 THERAPEUTIC ACTIVITIES: CPT

## 2024-09-12 PROCEDURE — 25000003 PHARM REV CODE 250: Performed by: HOSPITALIST

## 2024-09-12 PROCEDURE — 97116 GAIT TRAINING THERAPY: CPT

## 2024-09-12 PROCEDURE — 97168 OT RE-EVAL EST PLAN CARE: CPT

## 2024-09-12 PROCEDURE — 25000003 PHARM REV CODE 250: Performed by: NURSE PRACTITIONER

## 2024-09-12 RX ADMIN — GABAPENTIN 100 MG: 100 CAPSULE ORAL at 02:09

## 2024-09-12 RX ADMIN — DICLOFENAC SODIUM 2 G: 10 GEL TOPICAL at 08:09

## 2024-09-12 RX ADMIN — LEVETIRACETAM 500 MG: 500 TABLET, FILM COATED ORAL at 08:09

## 2024-09-12 RX ADMIN — RISPERIDONE 1 MG: 1 SOLUTION ORAL at 08:09

## 2024-09-12 RX ADMIN — DIPHENHYDRAMINE HYDROCHLORIDE 25 MG: 25 CAPSULE ORAL at 08:09

## 2024-09-12 RX ADMIN — MICONAZOLE NITRATE 2 % TOPICAL POWDER: at 08:09

## 2024-09-12 RX ADMIN — SERTRALINE HYDROCHLORIDE 50 MG: 50 TABLET ORAL at 08:09

## 2024-09-12 RX ADMIN — ALPRAZOLAM 0.25 MG: 0.25 TABLET ORAL at 08:09

## 2024-09-12 RX ADMIN — TAMSULOSIN HYDROCHLORIDE 0.4 MG: 0.4 CAPSULE ORAL at 08:09

## 2024-09-12 RX ADMIN — TRAZODONE HYDROCHLORIDE 100 MG: 100 TABLET ORAL at 08:09

## 2024-09-12 RX ADMIN — GABAPENTIN 100 MG: 100 CAPSULE ORAL at 08:09

## 2024-09-12 RX ADMIN — OLOPATADINE HYDROCHLORIDE 1 DROP: 1 SOLUTION OPHTHALMIC at 08:09

## 2024-09-12 RX ADMIN — ATORVASTATIN CALCIUM 40 MG: 40 TABLET, FILM COATED ORAL at 08:09

## 2024-09-12 RX ADMIN — DICLOFENAC SODIUM 2 G: 10 GEL TOPICAL at 02:09

## 2024-09-12 NOTE — PLAN OF CARE
Sent updates to [facility] and [facility]. Will follow up for a decision on these referrals.     Spoke to Isabel at [facility]. They are unable to meet this patient's needs. But she was able to send his referral to two of their sister facilties, [facility], that may be able to meet his needs due to proper staffing for his needs. Will continue to follow up with these facilities regarding this placement.

## 2024-09-12 NOTE — NURSING
Pt is complaining of left shoulder and hip pain. He is also complaining that he fell last week in his room and hit his head. He states that a report was filed. He is stating at this time that he needs Xrays and his head to be looked at because of this fall. Pt states that he is feeling different. Pt is AAOx4, pt is refusing to get his vitals taken because he wants to sleep. Pt's GCS is still 15. Notified hospitalist on call about pt's concerns.

## 2024-09-12 NOTE — PT/OT/SLP RE-EVAL
Occupational Therapy   Re-evaluation    Name: Dell Garrett Jr.  MRN: [id number]  Admitting Diagnosis: CVA  Recent Surgery: * No surgery found *      Recommendations:     Discharge therapy intensity: Moderate Intensity Therapy   Discharge Equipment Recommendations:  to be determined by next level of care  Barriers to discharge:  Decreased caregiver support    Assessment:     Dell Garrett Jr. is a 59 y.o. male with a medical diagnosis of R basal ganglia hemorrhage with R to L shift and intraventricular extension, elevated BP.  He presents with the following performance deficits affecting function: weakness, impaired endurance, impaired cognition, impaired sensation, decreased coordination, impaired self care skills, decreased upper extremity function, impaired functional mobility, decreased lower extremity function, gait instability, decreased safety awareness, impaired balance, abnormal tone.      Pt tolerated OT re-evaluation well, remains motivated to participate. Pt with improvement in functional transfer today, completed 3 squat pivot transfers with Mod A to strong side (right). Pt remains with flaccid LUE, impaired sensation, and left inattention. Pt requires max A for lower body ADLs and Min-mod A for upper body ADLs. Pt remains appropriate for moderate intensity therapy upon discharge.    Rehab Prognosis: Good; patient would benefit from acute skilled OT services to address these deficits and reach maximum level of function.       Plan:     Patient to be seen 5 x/week to address the above listed problems via self-care/home management, therapeutic activities, therapeutic exercises, neuromuscular re-education  Plan of Care Expires: 10/11/24  Plan of Care Reviewed with: patient    Subjective     Chief Complaint: none  Patient/Family Comments/goals: to get better and go home    Pain/Comfort:  Pain Rating 1: 0/10    Patients cultural, spiritual, Islam conflicts given the current situation: no    Objective:      OT communicated with RN prior to session.      Patient was found up in chair with chair check, telemetry, PureWick upon OT entry to room.    General Precautions: Standard, fall, aspiration  Orthopedic Precautions: N/A  Braces: N/A    Bed Mobility:    Patient completed Rolling/Turning to Left with  moderate assistance    Functional Mobility/Transfers:  Patient completed Bed <> Chair Transfer using Squat Pivot technique with moderate assistance with no assistive device; completed 3 transfers with emphasis on technique. Pt with good participation.     Activities of Daily Living:  Grooming: stand by assistance for brushing teeth with set up A while sitting upright in bed  Bathing: minimum assistance cues for thoroughness to bathe upper body with bath wipes as pt found soiled from meal  Upper Body Dressing: moderate assistance donning clean gown  Lower Body Dressing: contact guard assistance for R sock, max A for L sock  Toileting: maximal assistance for toileting at bed level to remove brief and pt found with small BM    Functional Cognition:  Orientation: oriented to Person, Place, Time, and Situation  Safety Awareness: Impaired.      Visual Perceptual Skills:  Pursuits: improved tracking to left with cues    Upper Extremity Function:  Right Upper Extremity:   Strength: WFL    Left Upper Extremity:  Strength: 0/5  Sensation: Impaired.      Balance:   Static sitting balance: mod A due to left lateral lean    Therapeutic Positioning  Risk for acquired pressure injuries is decreased due to ability to get to BSC/toilet with assist.    OT interventions performed during the course of today's session in an effort to prevent and/or reduce acquired pressure injuries:   Education was provided on benefits of and recommendations for therapeutic positioning    Skin assessment: all bony prominences were assessed    Findings: no redness or breakdown noted    OT recommendations for therapeutic positioning throughout  hospitalization:   Follow [facility] Pressure Injury Prevention Protocol    Additional Treatment:  ADL Training: as stated above    Patient Education:  Patient provided with verbal education education regarding OT role/goals/POC, fall prevention, safety awareness, and pressure ulcer prevention.  Understanding was verbalized, however additional teaching warranted.     Patient left HOB elevated with all lines intact, call button in reach, bed alarm on, and RN notified    GOALS:   Multidisciplinary Problems       Occupational Therapy Goals          Problem: Occupational Therapy    Goal Priority Disciplines Outcome Interventions   Occupational Therapy Goal     OT, PT/OT Progressing    Description: Goals to be met by:  10/12/24    Patient will increase functional independence with ADLs by performing:    UE Dressing with Stand-by Assistance.  LE Dressing with mod A  Grooming while seated at sink with SBA. -revised 9/5  Toileting from toilet with Min A for hygiene and clothing management. -revised 9/5  Toilet transfer to toilet with Min A. -revised 9/5  Increased functional strength to 3/5 LUE through therEX, neuromuscular re-ed.  Pt will visually attend to L side of environment with no cues                       History:     Past Medical History:   Diagnosis Date    Crohn's disease     GERD (gastroesophageal reflux disease)        History reviewed. No pertinent surgical history.    Time Tracking:     OT Date of Treatment: 09/12/24  OT Start Time: 1308  OT Stop Time: 1349  OT Total Time (min): 41 min    Billable Minutes:Re-eval 18 mins  Self Care/Home Management 23 mins    9/12/2024

## 2024-09-12 NOTE — PT/OT/SLP PROGRESS
Physical Therapy Treatment    Patient Name:  Dell Garrett Jr.   MRN:  [id number]    Recommendations:     Discharge therapy intensity: Moderate Intensity Therapy   Discharge Equipment Recommendations: to be determined by next level of care  Barriers to discharge: Impaired mobility and Ongoing medical needs    Assessment:     Dell Garrett Jr. is a 59 y.o. male admitted with a medical diagnosis of Intraparenchymal hemorrhage of brain .  He presents with the following impairments/functional limitations: weakness, impaired endurance, impaired self care skills, impaired functional mobility, gait instability, impaired balance, decreased upper extremity function, decreased lower extremity function . Pt demoing some return to LLE today, able to participate in advancing LLE in gait training. PT will continue to progress as able.    Rehab Prognosis: Good; patient would benefit from acute skilled PT services to address these deficits and reach maximum level of function.    Recent Surgery: * No surgery found *      Plan:     During this hospitalization, patient would benefit from acute PT services 5 x/week to address the identified rehab impairments via gait training, therapeutic activities, therapeutic exercises, neuromuscular re-education and progress toward the following goals:    Plan of Care Expires:  10/04/24    Subjective     Chief Complaint:   Patient/Family Comments/goals:   Pain/Comfort:  Pain Rating 1: 0/10      Objective:     Communicated with nurse prior to session.  Patient found HOB elevated with pulse ox (continuous), telemetry, bed alarm upon PT entry to room.     General Precautions: Standard, aspiration, fall  Orthopedic Precautions: N/A  Braces: N/A  Respiratory Status: Room air  Blood Pressure:   Skin Integrity: Visible skin intact      Functional Mobility:  Bed Mobility:     Rolling Left:  minimum assistance  Rolling Right: moderate assistance  Supine to Sit: moderate assistance  Transfers:     Sit to  Stand:  moderate assistance with R rail  Bed to Chair: moderate assistance and of 2 persons with  hand-held assist  using  Squat Pivot  Gait: Pt ambulated 10', 20' with modA x 2 and RUE support on wall rail. Pt able to initiate advancement of LLE with PT assisting in foot placement, weightshifting, and L knee blocking in stance phase. Pt with better upright posture today.    Therapeutic Activities/Exercises:  Pt requiring frequent cues for midline posture when sitting on EOB and in chair, able to self correct most of the time but difficulty maintaining.    Education:  Patient provided with verbal education education regarding fall prevention and safety awareness.  Understanding was verbalized, however additional teaching warranted.     Patient left up in chair with all lines intact, call button in reach, chair alarm on, kimberly pad in place, NSG notified, and posey vest donned    GOALS:   Multidisciplinary Problems       Physical Therapy Goals          Problem: Physical Therapy    Goal Priority Disciplines Outcome Goal Variances Interventions   Physical Therapy Goal     PT, PT/OT Progressing     Description: Goals to be met by: 2024     Patient will increase functional independence with mobility by performin. Supine to sit with MInimal Assistance  2. Sit to supine with MInimal Assistance  3. Sit to stand transfer with Minimal Assistance  4. Pt to follow 75% of commands.   5. Gait  x 75 feet with Minimal Assistance using Rolling Walker vs LRAD.                          Time Tracking:     PT Received On: 24  PT Start Time: 943     PT Stop Time:   PT Total Time (min): 46 min     Billable Minutes: Gait Training 15 and Therapeutic Activity 31    Treatment Type: Treatment  PT/PTA: PT     Number of PTA visits since last PT visit: 3     2024

## 2024-09-12 NOTE — NURSING
Nurses Note -- 4 Eyes      9/12/2024   4:49 AM      Skin assessed during: Q Shift Change      [] No Altered Skin Integrity Present    []Prevention Measures Documented      [x] Yes- Altered Skin Integrity Present or Discovered   [x] LDA Added if Not in Epic (Describe Wound)   [x] New Altered Skin Integrity was Present on Admit and Documented in LDA   [x] Wound Image Taken    Wound Care Consulted? Yes    Attending Nurse:  Raffi Bethea RN/Staff Member:  Angi

## 2024-09-12 NOTE — PLAN OF CARE
Problem: Occupational Therapy  Goal: Occupational Therapy Goal  Description: Goals to be met by:  10/12/24    Patient will increase functional independence with ADLs by performing:    UE Dressing with Stand-by Assistance.  LE Dressing with mod A  Grooming while seated at sink with SBA. -revised 9/5  Toileting from toilet with Min A for hygiene and clothing management. -revised 9/5  Toilet transfer to toilet with Min A. -revised 9/5  Increased functional strength to 3/5 LUE through therEX, neuromuscular re-ed.  Pt will visually attend to L side of environment with no cues  Outcome: Progressing

## 2024-09-12 NOTE — PROGRESS NOTES
[facility] Hospital Medicine Progress Note        Chief Complaint: Inpatient Follow-up for hemorrhagic stroke    HPI per admitting team: 59-year-old male with significant history of Crohn's disease, GERD, chronic tobacco use/substance use-cocaine presented to the ED with complaints of acute onset left-sided weakness, facial droop and aphasia along with sudden onset headache.  Imaging in the ED revealed intraparenchymal hemorrhage of right thalamus with surrounding edema, compression of right lateral ventricle with 8 mm midline shift, early obstructive hydrocephalus, possible subarachnoid hemorrhage. patient admitted to ICU, neurology and neurosurgery services were consulted.  CT angiogram with mild dolichoectasia of basilar artery , echocardiogram negative for bubble study, EF intact, grade 2 diastolic dysfunction. Conservative management, recommended to keep BP at goal, Keppra for seizure prevention, close neuro checks, 3% saline initiated.  Follow up CT with stable findings, therapy services consulted.  Holding anticoagulation, antiplatelets.  Hypertonic saline discontinued, initiate high-intensity statin, patient downgraded to hospital medicine services, case management consulted for placement, neurology recommended to continue to avoid antiplatelets.  Cleared for p.o. intake by speech, tolerating well.  Patient continues to participate with therapy services, case management actively working on placement.  Resumed home amlodipine, labs stable.  Psych consulted on 08/28 for impulsive behavior, initiated risperidone, Zoloft and p.r.n. trazodone   Concern for urinary retention, closely monitoring with bladder scan, added tamsulosin   spoke to sister over the phone   Patient asking when he can go home unfortunately he is unable to live by himself  He has been denied by multiple facilities and  is attempting to place him    Interval Hx:   Patient just returned from therapy.   Reports he feels great.  Reported he is excited because his left leg has started to work a little.  Patient has profound left-sided facial flattening, unable to move left upper extremity but able to move left lower extremity now a little  No family is at bedside, patient therapist is in the room reported he is making progress slowly  Case was discussed with patient's nurse and  on the floor, still awaiting acceptance to [facility]    Objective/physical exam:  General: In no acute distress, afebrile  Chest: Clear to auscultation bilaterally  Heart: RRR, +S1, S2, no appreciable murmur  Abdomen: Soft, nontender, BS +  MSK: Warm, no lower extremity edema, no clubbing or cyanosis  Neurologic: Alert and oriented, unable to move left upper extremity, slightly able to move left lower extremity, flattening of the muscle of facial expression on the left, comprehension intact, speech is a little dysarthric but comprehensible  Psych:  Cooperative    VITAL SIGNS: 24 HRS MIN & MAX LAST   Temp  Min: 97.4 °F (36.3 °C)  Max: 98.3 °F (36.8 °C) 98.1 °F (36.7 °C)   BP  Min: 93/61  Max: 123/78 123/78   Pulse  Min: 60  Max: 99  99   Resp  Min: 16  Max: 20 18   SpO2  Min: 95 %  Max: 99 % 99 %     I reviewed the labs below:  Recent Labs   Lab 09/10/24  0437   WBC 6.23   RBC 3.56*   HGB 10.5*   HCT 32.9*   MCV 92.4   MCH 29.5   MCHC 31.9*   RDW 12.1      MPV 8.5     Recent Labs   Lab 09/10/24  0437      K 4.2      CO2 26   BUN 17.3   CREATININE 0.87   CALCIUM 9.0   MG 2.00     Microbiology Results (last 7 days)       ** No results found for the last 168 hours. **           See below for Radiology    Intake and Output:    Intake/Output Summary (Last 24 hours) at 9/12/2024 1955  Last data filed at 9/12/2024 1200  Gross per 24 hour   Intake 200 ml   Output 300 ml   Net -100 ml       Assessment/Plan:  Acute intra parenchymal hemorrhage-right thalamic capsular with edema/midline shift/intraventricular extension/early  obstructive hydrocephalus  Acute subarachnoid hemorrhage   Left-sided weakness/dysarthria secondary to above  Oropharyngeal dysphagia initially secondary to above- cleared for modified diet now  New diagnosis essential HTN- stable  Impulsive behavior - improving  Urinary retention-improved today  History of Crohn's disease/GERD  Chronic tobacco use/substance use  Moderate malnutrition       Psychiatry was consulted to evaluate aggressive behavior.  Recommended to continue Zoloft, risperidone p.r.n. Trazodone for bedtime  Continue to monitor blood pressure.  Off all antihypertensive medications   Neurology, neurosurgery has now signed off  Per last neurology note recommending to continue to avoid all antiplatelets, anticoagulation for now given IPH  Continue Keppra for seizure prophylaxis  Mobilize.  Monitor for urinary retention  Continue p.o. and topical analgesics, p.r.n. anxiolytics  Continue PT OT, recommended SNF, case management on board, awaiting acceptance, pt has been denied by multiple facilities.  Morning CBC, BMP, Mag ordered    VTE prophylaxis:  SCDs                        Patient condition:  Stable    Anticipated discharge and Disposition:   SNF once accepted, patient is medically cleared for discharge      All diagnosis and differential diagnosis have been reviewed; assessment and plan has been documented; I have personally reviewed the labs and test results that are presently available; I have reviewed the patients medication list; I have reviewed the consulting providers response and recommendations. I have reviewed or attempted to review medical records based upon their availability    All of the patient's questions have been  addressed and answered. Patient's is agreeable to the above stated plan. I will continue to monitor closely and make adjustments to medical management as needed.    Portions of this note dictated using EMR integrated voice recognition software, and may be subject to voice  recognition errors not corrected at proofreading. Please contact writer for clarification if needed.   _____________________________________________________________________    Nutrition Status:  Patient meets ASPEN criteria for moderate malnutrition of acute illness or injury per RD assessment as evidenced by:  Energy Intake (Malnutrition):  (does not meet criteria)  Weight Loss (Malnutrition):  (does not meet criteria)  Subcutaneous Fat (Malnutrition): mild depletion  Muscle Mass (Malnutrition): mild depletion           A minimum of two characteristics is recommended for diagnosis of either severe or non-severe malnutrition.     Current Med:   atorvastatin  40 mg Oral QHS    diclofenac sodium  2 g Topical (Top) TID    gabapentin  100 mg Oral TID    levETIRAcetam  500 mg Oral BID    miconazole NITRATE 2 %   Topical (Top) BID    olopatadine  1 drop Both Eyes BID    risperidone 1 mg/ml  1 mg Oral BID    sertraline  50 mg Oral Daily    tamsulosin  0.4 mg Oral Daily      PRN meds:  Current Facility-Administered Medications:     0.9% NaCl, , Intravenous, PRN    acetaminophen, 650 mg, Rectal, Q6H PRN    acetaminophen, 650 mg, Oral, Q6H PRN    albuterol, 2 puff, Inhalation, Q6H PRN    ALPRAZolam, 0.25 mg, Oral, Q4H PRN    bisacodyL, 10 mg, Rectal, Daily PRN    cloNIDine, 0.1 mg, Oral, Q6H PRN    diphenhydrAMINE, 25 mg, Oral, Q6H PRN    haloperidol lactate, 5 mg, Intravenous, Q6H PRN    hydrALAZINE, 10 mg, Intravenous, Q4H PRN    oxyCODONE, 5 mg, Oral, Q6H PRN    polyethylene glycol, 17 g, Oral, BID PRN    traMADoL, 50 mg, Oral, Q6H PRN    traZODone, 100 mg, Oral, Nightly PRN    Continuous infusion:       Radiology:   I have personally reviewed the images and agree with radiologist report  X-Ray Shoulder 2 or More Views Left  Narrative: EXAMINATION:  XR SHOULDER COMPLETE 2 OR MORE VIEWS LEFT    CLINICAL HISTORY:  Left shoulder pain;    COMPARISON:  None.    FINDINGS:  No acute displaced fractures or dislocations.    Joint  spaces preserved.    No blastic or lytic lesions.    Soft tissues within normal limits.  Impression: No acute osseous abnormality.    Electronically signed by: Nikhil Ignacio  Date:    09/12/2024  Time:    07:55  X-Ray Hip 2 or 3 views Left with Pelvis when performed  Narrative: EXAMINATION:  XR HIP WITH PELVIS WHEN PERFORMED 2 OR 3 VIEWS LEFT    CLINICAL HISTORY:  left hip pain;    COMPARISON:  None.    FINDINGS:  No acute displaced fractures or dislocations.    There is narrowing of the inferior medial aspect of both hip joints with minimal degenerative changes of the lumbosacral spine articular spaces are otherwise preserved with smooth articular surfaces    No blastic or lytic lesions.    Soft tissues within normal limits.  Impression: Minimal degenerative changes.    Electronically signed by: Nikihl Ignacio  Date:    09/12/2024  Time:    07:53  CT Head Without Contrast  Narrative: EXAMINATION:  CT HEAD WITHOUT CONTRAST    CLINICAL HISTORY:  Pt states that he fell here on 9/6/2024 in his room and hit his head;    TECHNIQUE:  CT imaging of the head performed from the skull base to the vertex without intravenous contrast.   mGycm. Automatic exposure control, adjustment of mA/kV or iterative reconstruction technique was used to reduce radiation.    COMPARISON:  12 August 2024    FINDINGS:  There is improved appearance of the right basal ganglia hematoma with decreased mass effect.  Minimal residual midline shift towards the left.  The ventricles are not enlarged.  No new parenchymal attenuation abnormality.    Visualized paranasal sinuses and mastoid air cells are clear.  Impression: Improved appearance of the right basal ganglia hematoma.  No new suspicious findings.    Electronically signed by: Clifford Doyle  Date:    09/12/2024  Time:    06:41        Kobi Arias MD  Department of Hospital Medicine   [facility]   09/12/2024

## 2024-09-12 NOTE — PLAN OF CARE
Problem: Adult Inpatient Plan of Care  Goal: Plan of Care Review  9/12/2024 1656 by Ashley Meyers RN  Outcome: Progressing  9/12/2024 1417 by Ashley Meyers RN  Outcome: Progressing  Goal: Patient-Specific Goal (Individualized)  9/12/2024 1656 by Ashley Meyers RN  Outcome: Progressing  9/12/2024 1417 by Ashley Meyers RN  Outcome: Progressing  Goal: Absence of Hospital-Acquired Illness or Injury  9/12/2024 1656 by Ashley Meyers RN  Outcome: Progressing  9/12/2024 1417 by Ashley Meyers RN  Outcome: Progressing  Goal: Optimal Comfort and Wellbeing  9/12/2024 1656 by Ashley Meyers RN  Outcome: Progressing  9/12/2024 1417 by Ashley Meyers RN  Outcome: Progressing  Goal: Readiness for Transition of Care  9/12/2024 1656 by Ashley Meyers RN  Outcome: Progressing  9/12/2024 1417 by Ashley Meyers RN  Outcome: Progressing     Problem: Skin Injury Risk Increased  Goal: Skin Health and Integrity  9/12/2024 1656 by Ashley Meyers RN  Outcome: Progressing  9/12/2024 1417 by Ashley Meyers RN  Outcome: Progressing     Problem: Stroke, Intracerebral Hemorrhage  Goal: Optimal Coping  9/12/2024 1656 by Ashley Meyers RN  Outcome: Progressing  9/12/2024 1417 by Ashley Meyers RN  Outcome: Progressing  Goal: Effective Bowel Elimination  9/12/2024 1656 by Ashley Meyers RN  Outcome: Progressing  9/12/2024 1417 by Ashley Meyers RN  Outcome: Progressing  Goal: Optimal Cerebral Tissue Perfusion  9/12/2024 1656 by Ashley Meyers RN  Outcome: Progressing  9/12/2024 1417 by Ashley Meyers RN  Outcome: Progressing  Goal: Optimal Cognitive Function  9/12/2024 1656 by Ashley Meyers RN  Outcome: Progressing  9/12/2024 1417 by Ashley Meyers RN  Outcome: Progressing  Goal: Effective Communication Skills  9/12/2024 1656 by Ashley Meyers, RN  Outcome: Progressing  9/12/2024 1417 by Ashley Meyers, RN  Outcome: Progressing  Goal: Optimal Functional  Ability  9/12/2024 1656 by Ashley Meyers RN  Outcome: Progressing  9/12/2024 1417 by Ashley Meyers RN  Outcome: Progressing  Goal: Optimal Nutrition Intake  9/12/2024 1656 by Ashley Meyers RN  Outcome: Progressing  9/12/2024 1417 by Ashley Meyers RN  Outcome: Progressing  Goal: Optimal Pain Control and Function  9/12/2024 1656 by Ashley Meyers, RN  Outcome: Progressing  9/12/2024 1417 by Ashley Meyers RN  Outcome: Progressing  Goal: Effective Oxygenation and Ventilation  9/12/2024 1656 by Ashley Meyers RN  Outcome: Progressing  9/12/2024 1417 by Ashley Meyers RN  Outcome: Progressing  Goal: Improved Sensorimotor Function  9/12/2024 1656 by Ashley Meyers, RN  Outcome: Progressing  9/12/2024 1417 by Ashley Meyers RN  Outcome: Progressing  Goal: Safe and Effective Swallow  9/12/2024 1656 by Ashley Meyers RN  Outcome: Progressing  9/12/2024 1417 by Ashley Meyers RN  Outcome: Progressing  Goal: Effective Urinary Elimination  9/12/2024 1656 by Ashley Meyers, RN  Outcome: Progressing  9/12/2024 1417 by Ashley Meyers RN  Outcome: Progressing     Problem: Wound  Goal: Optimal Coping  9/12/2024 1656 by Ashley Meyers, RN  Outcome: Progressing  9/12/2024 1417 by Ashley Meyers RN  Outcome: Progressing  Goal: Optimal Functional Ability  9/12/2024 1656 by Ashley Meyers, RN  Outcome: Progressing  9/12/2024 1417 by Ashley Meyers RN  Outcome: Progressing  Goal: Absence of Infection Signs and Symptoms  9/12/2024 1656 by Ashley Meyers, RN  Outcome: Progressing  9/12/2024 1417 by Ashley Meyers, RN  Outcome: Progressing  Goal: Improved Oral Intake  9/12/2024 1656 by Ashley Meyers, RN  Outcome: Progressing  9/12/2024 1417 by Ashley Meyers, RN  Outcome: Progressing  Goal: Optimal Pain Control and Function  9/12/2024 1656 by Ashley Meyers, RN  Outcome: Progressing  9/12/2024 1417 by Ashley Meyers, RN  Outcome: Progressing  Goal: Skin  Health and Integrity  9/12/2024 1656 by Ashley Meyers, RN  Outcome: Progressing  9/12/2024 1417 by Ashley Meyers RN  Outcome: Progressing  Goal: Optimal Wound Healing  9/12/2024 1656 by Ashley Meyers, RN  Outcome: Progressing  9/12/2024 1417 by Ashley Meyers, RN  Outcome: Progressing

## 2024-09-12 NOTE — NURSING
Nurses Note -- 4 Eyes      9/12/2024   2:18 PM      Skin assessed during: Q Shift Change      [] No Altered Skin Integrity Present    []Prevention Measures Documented      [x] Yes- Altered Skin Integrity Present or Discovered   [x] LDA Added if Not in Epic (Describe Wound)   [] New Altered Skin Integrity was Present on Admit and Documented in LDA   [] Wound Image Taken    Wound Care Consulted? Yes    Attending Nurse:  Ashley Bethea RN/Staff Member:  Charline Rao RN

## 2024-09-13 LAB
ANION GAP SERPL CALC-SCNC: 7 MEQ/L
BUN SERPL-MCNC: 17.4 MG/DL (ref 8.4–25.7)
CALCIUM SERPL-MCNC: 9.2 MG/DL (ref 8.8–10)
CHLORIDE SERPL-SCNC: 107 MMOL/L (ref 98–107)
CO2 SERPL-SCNC: 28 MMOL/L (ref 23–31)
CREAT SERPL-MCNC: 0.82 MG/DL (ref 0.73–1.18)
CREAT/UREA NIT SERPL: 21
ERYTHROCYTE [DISTWIDTH] IN BLOOD BY AUTOMATED COUNT: 12.2 % (ref 11.5–17)
GFR SERPLBLD CREATININE-BSD FMLA CKD-EPI: >60 ML/MIN/1.73/M2
GLUCOSE SERPL-MCNC: 93 MG/DL (ref 82–115)
HCT VFR BLD AUTO: 32.8 % (ref 42–52)
HGB BLD-MCNC: 11 G/DL (ref 14–18)
MAGNESIUM SERPL-MCNC: 1.8 MG/DL (ref 1.6–2.6)
MCH RBC QN AUTO: 30 PG (ref 27–31)
MCHC RBC AUTO-ENTMCNC: 33.5 G/DL (ref 33–36)
MCV RBC AUTO: 89.4 FL (ref 80–94)
NRBC BLD AUTO-RTO: 0 %
PLATELET # BLD AUTO: 269 X10(3)/MCL (ref 130–400)
PMV BLD AUTO: 8.7 FL (ref 7.4–10.4)
POTASSIUM SERPL-SCNC: 3.9 MMOL/L (ref 3.5–5.1)
RBC # BLD AUTO: 3.67 X10(6)/MCL (ref 4.7–6.1)
SODIUM SERPL-SCNC: 142 MMOL/L (ref 136–145)
WBC # BLD AUTO: 5.01 X10(3)/MCL (ref 4.5–11.5)

## 2024-09-13 PROCEDURE — 25000003 PHARM REV CODE 250: Performed by: NURSE PRACTITIONER

## 2024-09-13 PROCEDURE — 25000003 PHARM REV CODE 250

## 2024-09-13 PROCEDURE — 97530 THERAPEUTIC ACTIVITIES: CPT | Mod: CO

## 2024-09-13 PROCEDURE — 21400001 HC TELEMETRY ROOM

## 2024-09-13 PROCEDURE — 25000003 PHARM REV CODE 250: Performed by: HOSPITALIST

## 2024-09-13 PROCEDURE — 97116 GAIT TRAINING THERAPY: CPT

## 2024-09-13 PROCEDURE — 97530 THERAPEUTIC ACTIVITIES: CPT

## 2024-09-13 PROCEDURE — 36415 COLL VENOUS BLD VENIPUNCTURE: CPT | Performed by: INTERNAL MEDICINE

## 2024-09-13 PROCEDURE — 25000003 PHARM REV CODE 250: Performed by: INTERNAL MEDICINE

## 2024-09-13 PROCEDURE — 97535 SELF CARE MNGMENT TRAINING: CPT | Mod: CO

## 2024-09-13 PROCEDURE — 85027 COMPLETE CBC AUTOMATED: CPT | Performed by: INTERNAL MEDICINE

## 2024-09-13 PROCEDURE — 83735 ASSAY OF MAGNESIUM: CPT | Performed by: INTERNAL MEDICINE

## 2024-09-13 PROCEDURE — 80048 BASIC METABOLIC PNL TOTAL CA: CPT | Performed by: INTERNAL MEDICINE

## 2024-09-13 RX ADMIN — GABAPENTIN 100 MG: 100 CAPSULE ORAL at 09:09

## 2024-09-13 RX ADMIN — DICLOFENAC SODIUM 2 G: 10 GEL TOPICAL at 08:09

## 2024-09-13 RX ADMIN — MICONAZOLE NITRATE 2 % TOPICAL POWDER: at 08:09

## 2024-09-13 RX ADMIN — ALPRAZOLAM 0.25 MG: 0.25 TABLET ORAL at 02:09

## 2024-09-13 RX ADMIN — DICLOFENAC SODIUM 2 G: 10 GEL TOPICAL at 02:09

## 2024-09-13 RX ADMIN — OLOPATADINE HYDROCHLORIDE 1 DROP: 1 SOLUTION OPHTHALMIC at 08:09

## 2024-09-13 RX ADMIN — GABAPENTIN 100 MG: 100 CAPSULE ORAL at 08:09

## 2024-09-13 RX ADMIN — LEVETIRACETAM 500 MG: 500 TABLET, FILM COATED ORAL at 09:09

## 2024-09-13 RX ADMIN — DICLOFENAC SODIUM 2 G: 10 GEL TOPICAL at 09:09

## 2024-09-13 RX ADMIN — ATORVASTATIN CALCIUM 40 MG: 40 TABLET, FILM COATED ORAL at 08:09

## 2024-09-13 RX ADMIN — BISACODYL 10 MG: 10 SUPPOSITORY RECTAL at 04:09

## 2024-09-13 RX ADMIN — TRAZODONE HYDROCHLORIDE 100 MG: 100 TABLET ORAL at 08:09

## 2024-09-13 RX ADMIN — LEVETIRACETAM 500 MG: 500 TABLET, FILM COATED ORAL at 07:09

## 2024-09-13 RX ADMIN — MICONAZOLE NITRATE 2 % TOPICAL POWDER: at 09:09

## 2024-09-13 RX ADMIN — RISPERIDONE 1 MG: 1 SOLUTION ORAL at 08:09

## 2024-09-13 RX ADMIN — SERTRALINE HYDROCHLORIDE 50 MG: 50 TABLET ORAL at 09:09

## 2024-09-13 RX ADMIN — RISPERIDONE 1 MG: 1 SOLUTION ORAL at 09:09

## 2024-09-13 RX ADMIN — TAMSULOSIN HYDROCHLORIDE 0.4 MG: 0.4 CAPSULE ORAL at 09:09

## 2024-09-13 RX ADMIN — GABAPENTIN 100 MG: 100 CAPSULE ORAL at 02:09

## 2024-09-13 RX ADMIN — OXYCODONE HYDROCHLORIDE 5 MG: 5 TABLET ORAL at 07:09

## 2024-09-13 RX ADMIN — OLOPATADINE HYDROCHLORIDE 1 DROP: 1 SOLUTION OPHTHALMIC at 02:09

## 2024-09-13 NOTE — NURSING
Nurses Note -- 4 Eyes      9/12/2024   8:09 PM      Skin assessed during: Q Shift Change      [] No Altered Skin Integrity Present    []Prevention Measures Documented      [x] Yes- Altered Skin Integrity Present or Discovered   [x] LDA Added if Not in Epic (Describe Wound)   [] New Altered Skin Integrity was Present on Admit and Documented in LDA   [] Wound Image Taken    Wound Care Consulted? Yes    Attending Nurse:  Portillo Bethea RN/Staff Member:  CHUCKY

## 2024-09-13 NOTE — PROGRESS NOTES
[facility] Hospital Medicine Progress Note        Chief Complaint: Inpatient Follow-up for hemorrhagic stroke    HPI per admitting team: 59-year-old male with significant history of Crohn's disease, GERD, chronic tobacco use/substance use-cocaine presented to the ED with complaints of acute onset left-sided weakness, facial droop and aphasia along with sudden onset headache.  Imaging in the ED revealed intraparenchymal hemorrhage of right thalamus with surrounding edema, compression of right lateral ventricle with 8 mm midline shift, early obstructive hydrocephalus, possible subarachnoid hemorrhage. patient admitted to ICU, neurology and neurosurgery services were consulted.  CT angiogram with mild dolichoectasia of basilar artery , echocardiogram negative for bubble study, EF intact, grade 2 diastolic dysfunction. Conservative management, recommended to keep BP at goal, Keppra for seizure prevention, close neuro checks, 3% saline initiated.  Follow up CT with stable findings, therapy services consulted.  Holding anticoagulation, antiplatelets.  Hypertonic saline discontinued, initiate high-intensity statin, patient downgraded to hospital medicine services, case management consulted for placement, neurology recommended to continue to avoid antiplatelets.  Cleared for p.o. intake by speech, tolerating well.  Patient continues to participate with therapy services, case management actively working on placement.  Resumed home amlodipine, labs stable.  Psych consulted on 08/28 for impulsive behavior, initiated risperidone, Zoloft and p.r.n. trazodone   Concern for urinary retention, closely monitoring with bladder scan, added tamsulosin   spoke to sister over the phone   Patient asking when he can go home unfortunately he is unable to live by himself  He has been denied by multiple facilities and  is attempting to place him    Interval Hx:   Patient is sitting in chair, reports  he can move his left lower extremity much better than yesterday, noted he was able to elevated above against gravity  Reported he is working on his left upper extremity to work  No family is at bedside  Case was discussed with patient's nurse and  on the floor, still awaiting acceptance to [facility]    Objective/physical exam:  General: In no acute distress, afebrile  Chest: Clear to auscultation bilaterally  Heart: RRR, +S1, S2, no appreciable murmur  Abdomen: Soft, nontender, BS +  MSK: Warm, no lower extremity edema, no clubbing or cyanosis  Neurologic: Alert and oriented, unable to move left upper extremity, slightly able to move left lower extremity, flattening of the muscle of facial expression on the left, comprehension intact, speech is a little dysarthric but comprehensible  Psych:  Cooperative    VITAL SIGNS: 24 HRS MIN & MAX LAST   Temp  Min: 97.4 °F (36.3 °C)  Max: 98.3 °F (36.8 °C) 97.9 °F (36.6 °C)   BP  Min: 92/61  Max: 129/80 129/80   Pulse  Min: 50  Max: 99  66   Resp  Min: 18  Max: 20 18   SpO2  Min: 96 %  Max: 99 % 97 %     I reviewed the labs below:  Recent Labs   Lab 09/10/24  0437 09/13/24  0347   WBC 6.23 5.01   RBC 3.56* 3.67*   HGB 10.5* 11.0*   HCT 32.9* 32.8*   MCV 92.4 89.4   MCH 29.5 30.0   MCHC 31.9* 33.5   RDW 12.1 12.2    269   MPV 8.5 8.7     Recent Labs   Lab 09/10/24  0437 09/13/24  0347    142   K 4.2 3.9    107   CO2 26 28   BUN 17.3 17.4   CREATININE 0.87 0.82   CALCIUM 9.0 9.2   MG 2.00 1.80     Microbiology Results (last 7 days)       ** No results found for the last 168 hours. **           See below for Radiology    Intake and Output:    Intake/Output Summary (Last 24 hours) at 9/13/2024 0730  Last data filed at 9/13/2024 0233  Gross per 24 hour   Intake 400 ml   Output --   Net 400 ml       Assessment/Plan:  Acute intra parenchymal hemorrhage-right thalamic capsular with edema/midline shift/intraventricular extension/early obstructive  hydrocephalus  Acute subarachnoid hemorrhage   Left-sided weakness/dysarthria secondary to above  Oropharyngeal dysphagia initially secondary to above- cleared for modified diet now  New diagnosis essential HTN- stable  Impulsive behavior - improving  Urinary retention-improved today  History of Crohn's disease/GERD  Chronic tobacco use/substance use  Moderate malnutrition       Psychiatry was consulted for his aggressive behavior during the initial days of his admission, recommended to continue Zoloft, risperidone p.r.n. Trazodone for bedtime, since then patient cooperative and calm  Blood pressure normal off of all antihypertensive medications   Neurology, neurosurgery has now signed off  Per last neurology note recommending to continue to avoid all antiplatelets, anticoagulation for now given IPH  Continue Keppra for seizure prophylaxis  Mobilize.  Monitor for urinary retention  Continue p.o. and topical analgesics, p.r.n. anxiolytics  Continue PT OT, recommended SNF, case management on board, awaiting acceptance, pt has been denied by multiple facilities.  Morning lab stable    VTE prophylaxis:  SCDs, no chemical prophylaxis given IPH    Patient condition:  Stable    Anticipated discharge and Disposition:   SNF once accepted, patient is medically cleared for discharge      All diagnosis and differential diagnosis have been reviewed; assessment and plan has been documented; I have personally reviewed the labs and test results that are presently available; I have reviewed the patients medication list; I have reviewed the consulting providers response and recommendations. I have reviewed or attempted to review medical records based upon their availability    All of the patient's questions have been  addressed and answered. Patient's is agreeable to the above stated plan. I will continue to monitor closely and make adjustments to medical management as needed.    Portions of this note dictated using EMR integrated  voice recognition software, and may be subject to voice recognition errors not corrected at proofreading. Please contact writer for clarification if needed.   _____________________________________________________________________    Nutrition Status:  Patient meets ASPEN criteria for moderate malnutrition of acute illness or injury per RD assessment as evidenced by:  Energy Intake (Malnutrition):  (does not meet criteria)  Weight Loss (Malnutrition):  (does not meet criteria)  Subcutaneous Fat (Malnutrition): mild depletion  Muscle Mass (Malnutrition): mild depletion           A minimum of two characteristics is recommended for diagnosis of either severe or non-severe malnutrition.     Current Med:   atorvastatin  40 mg Oral QHS    diclofenac sodium  2 g Topical (Top) TID    gabapentin  100 mg Oral TID    levETIRAcetam  500 mg Oral BID    miconazole NITRATE 2 %   Topical (Top) BID    olopatadine  1 drop Both Eyes BID    risperidone 1 mg/ml  1 mg Oral BID    sertraline  50 mg Oral Daily    tamsulosin  0.4 mg Oral Daily      PRN meds:  Current Facility-Administered Medications:     0.9% NaCl, , Intravenous, PRN    acetaminophen, 650 mg, Rectal, Q6H PRN    acetaminophen, 650 mg, Oral, Q6H PRN    albuterol, 2 puff, Inhalation, Q6H PRN    ALPRAZolam, 0.25 mg, Oral, Q4H PRN    bisacodyL, 10 mg, Rectal, Daily PRN    cloNIDine, 0.1 mg, Oral, Q6H PRN    diphenhydrAMINE, 25 mg, Oral, Q6H PRN    haloperidol lactate, 5 mg, Intravenous, Q6H PRN    hydrALAZINE, 10 mg, Intravenous, Q4H PRN    oxyCODONE, 5 mg, Oral, Q6H PRN    polyethylene glycol, 17 g, Oral, BID PRN    traMADoL, 50 mg, Oral, Q6H PRN    traZODone, 100 mg, Oral, Nightly PRN    Continuous infusion:       Radiology:   I have personally reviewed the images and agree with radiologist report  X-Ray Shoulder 2 or More Views Left  Narrative: EXAMINATION:  XR SHOULDER COMPLETE 2 OR MORE VIEWS LEFT    CLINICAL HISTORY:  Left shoulder  pain;    COMPARISON:  None.    FINDINGS:  No acute displaced fractures or dislocations.    Joint spaces preserved.    No blastic or lytic lesions.    Soft tissues within normal limits.  Impression: No acute osseous abnormality.    Electronically signed by: Nikhil Ignacio  Date:    09/12/2024  Time:    07:55  X-Ray Hip 2 or 3 views Left with Pelvis when performed  Narrative: EXAMINATION:  XR HIP WITH PELVIS WHEN PERFORMED 2 OR 3 VIEWS LEFT    CLINICAL HISTORY:  left hip pain;    COMPARISON:  None.    FINDINGS:  No acute displaced fractures or dislocations.    There is narrowing of the inferior medial aspect of both hip joints with minimal degenerative changes of the lumbosacral spine articular spaces are otherwise preserved with smooth articular surfaces    No blastic or lytic lesions.    Soft tissues within normal limits.  Impression: Minimal degenerative changes.    Electronically signed by: Nikhil Ignacio  Date:    09/12/2024  Time:    07:53  CT Head Without Contrast  Narrative: EXAMINATION:  CT HEAD WITHOUT CONTRAST    CLINICAL HISTORY:  Pt states that he fell here on 9/6/2024 in his room and hit his head;    TECHNIQUE:  CT imaging of the head performed from the skull base to the vertex without intravenous contrast.   mGycm. Automatic exposure control, adjustment of mA/kV or iterative reconstruction technique was used to reduce radiation.    COMPARISON:  12 August 2024    FINDINGS:  There is improved appearance of the right basal ganglia hematoma with decreased mass effect.  Minimal residual midline shift towards the left.  The ventricles are not enlarged.  No new parenchymal attenuation abnormality.    Visualized paranasal sinuses and mastoid air cells are clear.  Impression: Improved appearance of the right basal ganglia hematoma.  No new suspicious findings.    Electronically signed by: Clifford Doyle  Date:    09/12/2024  Time:    06:41        Kobi Arias MD  Department of Hospital Medicine    [facility]   09/13/2024

## 2024-09-13 NOTE — PROGRESS NOTES
Inpatient Nutrition Assessment    Admit Date: 8/10/2024   Total duration of encounter: 34 days   Patient Age: 59 y.o.    Nutrition Recommendation/Prescription     Continue current diet (Diet Pureed (IDDSI Level 4) No Straws, Supervision with Meals, Double Portions; Mildly Thick Liquids (IDDSI Level 2)) as tolerated.  Vanilla Boost Very High Calorie (provides 530 kcal, 22 g protein per serving) with breakfast.  Boost Breeze (provides 250 kcal, 9 g protein per serving) BID. Thicken with 2 packets of mildly thick thickener, or per SLP recommendations.  Obtain measured weight.    Communication of Recommendations: reviewed with nurse and reviewed with patient    Nutrition Assessment     Malnutrition Assessment/Nutrition-Focused Physical Exam    Malnutrition Context: acute illness or injury (08/12/24 1142)  Malnutrition Level: moderate (08/12/24 1142)  Energy Intake (Malnutrition):  (does not meet criteria) (08/12/24 1142)  Weight Loss (Malnutrition):  (does not meet criteria) (08/12/24 1142)  Subcutaneous Fat (Malnutrition): mild depletion (08/12/24 1142)     Upper Arm Region (Subcutaneous Fat Loss): mild depletion     Muscle Mass (Malnutrition): mild depletion (08/12/24 1142)     Clavicle Bone Region (Muscle Loss): mild depletion                             A minimum of two characteristics is recommended for diagnosis of either severe or non-severe malnutrition.    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 with left-sided weakness and suppressed alertness  Elevated blood pressure, no previous dx of HTN   Hypercholesterolemia   Elevated creatinine, resolved    Relevant Medical History: Crohn's disease, GERD, and chronic tobacco use      Scheduled Medications:  atorvastatin, 40 mg, QHS  diclofenac sodium, 2 g, TID  gabapentin, 100 mg, TID  levETIRAcetam, 500 mg, BID  miconazole NITRATE 2 %, , BID  olopatadine, 1 drop, BID  risperidone 1 mg/ml, 1 mg, BID  sertraline, 50 mg, Daily  tamsulosin, 0.4 mg, Daily    Continuous Infusions: none       PRN Medications:   0.9% NaCl, , PRN  acetaminophen, 650 mg, Q6H PRN  acetaminophen, 650 mg, Q6H PRN  albuterol, 2 puff, Q6H PRN  ALPRAZolam, 0.25 mg, Q4H PRN  bisacodyL, 10 mg, Daily PRN  cloNIDine, 0.1 mg, Q6H PRN  diphenhydrAMINE, 25 mg, Q6H PRN  haloperidol lactate, 5 mg, Q6H PRN  hydrALAZINE, 10 mg, Q4H PRN  oxyCODONE, 5 mg, Q6H PRN  polyethylene glycol, 17 g, BID PRN  traMADoL, 50 mg, Q6H PRN  traZODone, 100 mg, Nightly PRN    Calorie Containing IV Medications: no significant kcals from medications at this time    Recent Labs   Lab 09/10/24  0437 09/13/24  0347    142   K 4.2 3.9   CALCIUM 9.0 9.2   MG 2.00 1.80   CO2 26 28   BUN 17.3 17.4   CREATININE 0.87 0.82   EGFRNORACEVR >60 >60   GLUCOSE 95 93   WBC 6.23 5.01   HGB 10.5* 11.0*   HCT 32.9* 32.8*       Nutrition Orders:  Diet Pureed (IDDSI Level 4) No Straws, Supervision with Meals, Double Portions; Mildly Thick Liquids (IDDSI Level 2)  Dietary nutrition supplements BID; Boost Breeze - Any flavor,Dietary nutrition supplements Daily; Boost Very High Calorie Nutritional Drink - Vanilla    Appetite/Oral Intake: good/% of meals  Factors Affecting Nutritional Intake: none identified  Social Needs Impacting Access to Food: none identified  Food/Druze/Cultural Preferences: none reported  Food Allergies: no known food allergies  Last Bowel Movement: 09/12/24  Wound(s):     Wound 09/10/24 1701 Moisture associated dermatitis Left Foot-Tissue loss description: Not applicableno pressure injuries documented at this time     Comments    8/12/24 Patient confused, sister at bedside. Patient's sister reports good appetite/intake prior to admission,  "regular diet at home, denies weight loss. Patient reports liking vanilla/strawberry Ensure. He is currently NPO, had not been able to participate in SLP evaluation. Tube feeding recommendation provided if oral intake remains not feasible.    8/14/24 Patient remains NPO, started on tube feeding, no longer receiving calories from Cleviprex, will change to standard polymeric formula.    8/19/24 Tube feeding tolerated at goal.    8/23/24 Dysphagia diet, no longer on tube feeding, nurse reports patient eating 100% and asking for more food, will add double portions and Boost.    8/28/24: RN reports pt has been eating fairly well, nursing has been feeding him his Boost supplements BID. Noted last BM was 8/22.     8/30/24: Spoke with rn, pt eating well and enjoying his Boost supplements.     9/4/24: RN reports good oral intake. Patient states drinking Boost supplements.    9/9/24: RN reports patient with good oral intake of meals served and supplements. Will continue double portions with supplements Boost VHC and Boost Breeze.     9/13/24: Eating breakfast, nurse at bedside. States uses Boost VHC in morning to cover breakfast, would like only vanilla; changed order. And likes Boost Breeze for other meals. Discussed with RN and pt to ensure thickening Boost Breeze appropriately; verbalized understanding. Denies any GI complaints.   Anthropometrics    Height: 5' 7" (170.2 cm), Height Method: Measured  Last Weight: 65.3 kg (143 lb 15.4 oz) (08/10/24 1346), Weight Method: Bed Scale  BMI (Calculated): 22.5  BMI Classification: normal (BMI 18.5-24.9)        Ideal Body Weight (IBW), Male: 148 lb     % Ideal Body Weight, Male (lb): 97.27 %                          Usual Weight Provided By: family/caregiver denies unintentional weight loss    Wt Readings from Last 5 Encounters:   08/10/24 65.3 kg (143 lb 15.4 oz)   05/12/24 65.8 kg (145 lb)   09/25/23 67.1 kg (148 lb)   08/30/23 67.1 kg (148 lb)   08/21/23 63.5 kg (140 lb) "     Weight Change(s) Since Admission: none, new admit  Wt Readings from Last 1 Encounters:   08/10/24 1346 65.3 kg (143 lb 15.4 oz)   08/10/24 0743 65.3 kg (143 lb 15.4 oz)   Admit Weight: 65.3 kg (143 lb 15.4 oz) (08/10/24 0743), Weight Method: Bed Scale    Estimated Needs    Weight Used For Calorie Calculations: 65.3 kg (143 lb 15.4 oz)  Energy Calorie Requirements (kcal): 1984, 1.4 stress factor  Energy Need Method: P[place]-St Jeor  Weight Used For Protein Calculations: 65.3 kg (143 lb 15.4 oz)  Protein Requirements: 79-98 g, 1.2-1.5 g/kg  Fluid Requirements (mL): 1984, 1 ml/kcal      Enteral Nutrition Patient not receiving enteral nutrition support at this time.    Parenteral Nutrition Patient not receiving parenteral nutrition support at this time.    Evaluation of Received Nutrient Intake    Calories: meeting estimated needs  Protein: meeting estimated needs    Patient Education Not applicable.    Nutrition Diagnosis     PES: Inadequate energy intake related to inability to consume sufficient nutrients as evidenced by less than 80% needs met. (resolved)  PES: Moderate acute disease or injury related malnutrition related to acute illness as evidenced by mild fat depletion and mild muscle depletion. (active)    Nutrition Interventions     Intervention(s): modified composition of meals/snacks, commercial beverage, and collaboration with other providers  Goal: Meet greater than 80% of nutritional needs by follow-up. (goal met)  Goal: Maintain weight throughout hospitalization. (New)    Nutrition Goals & Monitoring     Dietitian will monitor: food and beverage intake, energy intake, and weight change  Discharge planning:  regular diet with texture modifications per SLP and Boost supplements  Nutrition Risk/Follow-Up: moderate (follow-up in 3-5 days)   Please consult if re-assessment needed sooner.

## 2024-09-13 NOTE — PT/OT/SLP PROGRESS
Physical Therapy Treatment    Patient Name:  Dell Garrett Jr.   MRN:  [id number]    Recommendations:     Discharge therapy intensity: Moderate Intensity Therapy   Discharge Equipment Recommendations: to be determined by next level of care  Barriers to discharge: Impaired mobility    Assessment:     Dell Garrett Jr. is a 59 y.o. male admitted with a medical diagnosis of  Intraparenchymal hemorrhage of brain .  He presents with the following impairments/functional limitations: weakness, impaired endurance, decreased safety awareness, gait instability, impaired balance, impaired functional mobility, impaired self care skills .    Rehab Prognosis: Good; patient would benefit from acute skilled PT services to address these deficits and reach maximum level of function.    Recent Surgery: * No surgery found *      Plan:     During this hospitalization, patient would benefit from acute PT services 5 x/week to address the identified rehab impairments via gait training, therapeutic activities, therapeutic exercises and progress toward the following goals:    Plan of Care Expires:  10/04/24    Subjective     Chief Complaint:   Patient/Family Comments/goals:   Pain/Comfort:         Objective:     Communicated with nurse prior to session.  Patient found up in chair with PureWick, telemetry upon PT entry to room.     General Precautions: Standard, fall  Orthopedic Precautions: N/A  Braces: N/A  Respiratory Status: Room air  Blood Pressure:   Skin Integrity: Visible skin intact      Functional Mobility:  Bed Mobility:     Scooting: moderate assistance  Sit to Supine: moderate assistance  Transfers:     Sit to Stand:  moderate assistance with with rail  Gait: pt amb with right hand on rail for 10 ft x2 with moda x2. Pt required assist with LLE advancement, maintaining erect posture, and blocking left leg with RLE advancement .    Therapeutic Activities/Exercises:  Pt then performed minisquats with therapist assist at  bedside    Education:  Patient provided with verbal education education regarding PT role/goals/POC.  Understanding was verbalized.     Patient left supine with all lines intact and call button in reach    GOALS:   Multidisciplinary Problems       Physical Therapy Goals          Problem: Physical Therapy    Goal Priority Disciplines Outcome Goal Variances Interventions   Physical Therapy Goal     PT, PT/OT Progressing     Description: Goals to be met by: 2024     Patient will increase functional independence with mobility by performin. Supine to sit with MInimal Assistance  2. Sit to supine with MInimal Assistance  3. Sit to stand transfer with Minimal Assistance  4. Pt to follow 75% of commands.   5. Gait  x 75 feet with Minimal Assistance using Rolling Walker vs LRAD.                          Time Tracking:     PT Received On: 24  PT Start Time: 1230     PT Stop Time: 1253  PT Total Time (min): 23 min     Billable Minutes: Gait Training 13 and Therapeutic Activity 10    Treatment Type: Treatment  PT/PTA: PT     Number of PTA visits since last PT visit: 4     2024

## 2024-09-13 NOTE — PT/OT/SLP PROGRESS
Occupational Therapy   Treatment    Name: Dell Garrett Jr.  MRN: [id number]  Admitting Diagnosis:  Intraparenchymal hemorrhage of brain       Recommendations:     Recommended therapy intensity at discharge: Moderate Intensity Therapy   Discharge Equipment Recommendations:  to be determined by next level of care  Barriers to discharge:  Decreased caregiver support    Assessment:     Dell Garrett Jr. is a 59 y.o. male with a medical diagnosis of R basal ganglia hemorrhage with R to L shift and intraventricular extension, elevated BP.  He presents with lack of awareness of LUE, feelings of numbness in B feet, and L Neglect, AEB pt not realizing he had more breakfast options due to not seeing the L side of tray. Pt wearing resting hand splint upon arrival. Performance deficits affecting function are weakness, impaired endurance, impaired cognition, impaired sensation, decreased coordination, impaired self care skills, decreased upper extremity function, impaired functional mobility, decreased lower extremity function, gait instability, decreased safety awareness, impaired balance, abnormal tone.     Rehab Prognosis:  Good; patient would benefit from acute skilled OT services to address these deficits and reach maximum level of function.       Plan:     Patient to be seen 5 x/week to address the above listed problems via self-care/home management, therapeutic activities, therapeutic exercises, neuromuscular re-education, cognitive retraining, sensory integration  Plan of Care Expires: 10/11/24  Plan of Care Reviewed with: patient    Subjective     Pain/Comfort:       Objective:     Communicated with: RN prior to session.  Patient found HOB elevated with telemetry, PureWick upon OT entry to room.    General Precautions: Standard, fall, aspiration    Orthopedic Precautions:N/A  Braces: N/A  Respiratory Status: Room air     Occupational Performance:     Bed Mobility:    Pt transferred sup>sit with min-A; pt able to bring B  legs off side of bed, requiring min-A for assistance with trunk due to decreased ability to push up though LUE.  Mod/Max-A for sitting balance while EOB    Functional Mobility/Transfers:  Pt required Total-A for completion of a squat-pivot t/f EOB>bedside chair, with BLE and LUE positioned prior to t/f  Pt required Total-A for bedside<>bathroom t/f, utilizing chair for transportation    Activities of Daily Living:  Feeding:  minimum assistance Pt able to complete spoon to mouth, acquiring minimal spillage onto bib; Pt required repositioning of food on tray for finding the rest of his meal, secondary to L-side neglect. Pt educated on looking to L side for finding food on tray, and nurse told of situation in order for pt to receive proper nutrition   Pt able to sit at sink for completion of brushing teeth, requiring 2-3 vc for brushing L side. Pt aware of swallowing precautions and agreeable to spit out toothpaste/water 2/2 times.  Therapist educated pt on utilizing tongue lateralization to check for food pocketing due to decreased sensation in L cheek.    Therapeutic Positioning    OT interventions performed during the course of today's session in an effort to prevent and/or reduce acquired pressure injuries:   Therapeutic positioning was provided at the conclusion of session to offload all bony prominences for the prevention and/or reduction of pressure injuries      [facility] 6 Click ADL:      Patient Education:  Patient provided with verbal education education regarding OT role/goals/POC, fall prevention, safety awareness, and pressure ulcer prevention.  Understanding was verbalized, however additional teaching warranted.      Patient left up in chair with all lines intact, call button in reach, chair alarm on, and Posey vest applied. RN and PT made aware of pt's status up in chair.    GOALS:   Multidisciplinary Problems       Occupational Therapy Goals          Problem: Occupational Therapy    Goal Priority  Disciplines Outcome Interventions   Occupational Therapy Goal     OT, PT/OT Progressing    Description: Goals to be met by:  10/12/24    Patient will increase functional independence with ADLs by performing:    UE Dressing with Stand-by Assistance.  LE Dressing with mod A  Grooming while seated at sink with SBA. -revised 9/5  Toileting from toilet with Min A for hygiene and clothing management. -revised 9/5  Toilet transfer to toilet with Min A. -revised 9/5  Increased functional strength to 3/5 LUE through therEX, neuromuscular re-ed.  Pt will visually attend to L side of environment with no cues                       Time Tracking:     OT Date of Treatment: 09/13/24  OT Start Time: 1000  OT Stop Time: 1058  OT Total Time (min): 58 min    Billable Minutes:Self Care/Home Management 3  Therapeutic Activity 1    OT/MAC: MAC     Number of MAC visits since last OT visit: 1    9/13/2024

## 2024-09-13 NOTE — PT/OT/SLP PROGRESS
SLP attempted to see pt for speech therapy; however, pt attending to his ADLs and unavailable at this time. Will reattempt at a later time schedule permitting.

## 2024-09-14 PROCEDURE — 25000003 PHARM REV CODE 250: Performed by: HOSPITALIST

## 2024-09-14 PROCEDURE — 25000003 PHARM REV CODE 250: Performed by: INTERNAL MEDICINE

## 2024-09-14 PROCEDURE — 21400001 HC TELEMETRY ROOM

## 2024-09-14 PROCEDURE — 25000003 PHARM REV CODE 250

## 2024-09-14 RX ORDER — AMMONIUM LACTATE 12 G/100G
LOTION TOPICAL 2 TIMES DAILY PRN
Status: DISCONTINUED | OUTPATIENT
Start: 2024-09-14 | End: 2024-09-19 | Stop reason: HOSPADM

## 2024-09-14 RX ADMIN — TRAZODONE HYDROCHLORIDE 100 MG: 100 TABLET ORAL at 08:09

## 2024-09-14 RX ADMIN — OLOPATADINE HYDROCHLORIDE 1 DROP: 1 SOLUTION OPHTHALMIC at 09:09

## 2024-09-14 RX ADMIN — GABAPENTIN 100 MG: 100 CAPSULE ORAL at 09:09

## 2024-09-14 RX ADMIN — RISPERIDONE 1 MG: 1 SOLUTION ORAL at 08:09

## 2024-09-14 RX ADMIN — SERTRALINE HYDROCHLORIDE 50 MG: 50 TABLET ORAL at 09:09

## 2024-09-14 RX ADMIN — DICLOFENAC SODIUM 2 G: 10 GEL TOPICAL at 03:09

## 2024-09-14 RX ADMIN — TAMSULOSIN HYDROCHLORIDE 0.4 MG: 0.4 CAPSULE ORAL at 09:09

## 2024-09-14 RX ADMIN — GABAPENTIN 100 MG: 100 CAPSULE ORAL at 03:09

## 2024-09-14 RX ADMIN — RISPERIDONE 1 MG: 1 SOLUTION ORAL at 09:09

## 2024-09-14 RX ADMIN — OXYCODONE HYDROCHLORIDE 5 MG: 5 TABLET ORAL at 08:09

## 2024-09-14 RX ADMIN — ALPRAZOLAM 0.25 MG: 0.25 TABLET ORAL at 12:09

## 2024-09-14 RX ADMIN — MICONAZOLE NITRATE 2 % TOPICAL POWDER: at 09:09

## 2024-09-14 RX ADMIN — ATORVASTATIN CALCIUM 40 MG: 40 TABLET, FILM COATED ORAL at 08:09

## 2024-09-14 RX ADMIN — DICLOFENAC SODIUM 2 G: 10 GEL TOPICAL at 08:09

## 2024-09-14 RX ADMIN — GABAPENTIN 100 MG: 100 CAPSULE ORAL at 08:09

## 2024-09-14 RX ADMIN — DICLOFENAC SODIUM 2 G: 10 GEL TOPICAL at 09:09

## 2024-09-14 RX ADMIN — ALPRAZOLAM 0.25 MG: 0.25 TABLET ORAL at 04:09

## 2024-09-14 RX ADMIN — LEVETIRACETAM 500 MG: 500 TABLET, FILM COATED ORAL at 08:09

## 2024-09-14 RX ADMIN — LEVETIRACETAM 500 MG: 500 TABLET, FILM COATED ORAL at 09:09

## 2024-09-14 RX ADMIN — OLOPATADINE HYDROCHLORIDE 1 DROP: 1 SOLUTION OPHTHALMIC at 08:09

## 2024-09-14 RX ADMIN — MICONAZOLE NITRATE 2 % TOPICAL POWDER: at 08:09

## 2024-09-14 RX ADMIN — TRAMADOL HYDROCHLORIDE 50 MG: 50 TABLET, COATED ORAL at 05:09

## 2024-09-14 NOTE — PROGRESS NOTES
[facility] - Neurology  Wound Care    Patient Name:  Dell Garrett Jr.   MRN:  [id number]  Date: 9/14/2024  Diagnosis: Intraparenchymal hemorrhage of brain    History:     Past Medical History:   Diagnosis Date    Crohn's disease     GERD (gastroesophageal reflux disease)        Social History     Socioeconomic History    Marital status: Single   Tobacco Use    Smoking status: Some Days     Types: Cigarettes    Smokeless tobacco: Never   Substance and Sexual Activity    Alcohol use: Yes     Comment: daily    Drug use: Never     Social Determinants of Health     Financial Resource Strain: Patient Declined (8/11/2024)    Overall Financial Resource Strain (CARDIA)     Difficulty of Paying Living Expenses: Patient declined   Food Insecurity: Patient Declined (8/11/2024)    Hunger Vital Sign     Worried About Running Out of Food in the Last Year: Patient declined     Ran Out of Food in the Last Year: Patient declined   Transportation Needs: Patient Declined (8/11/2024)    TRANSPORTATION NEEDS     Transportation : Patient declined   Stress: Patient Declined (8/11/2024)    [employer] [facility] - Occupational Stress Questionnaire     Feeling of Stress : Patient declined   Housing Stability: Patient Declined (8/11/2024)    Housing Stability Vital Sign     Unable to Pay for Housing in the Last Year: Patient declined     Homeless in the Last Year: Patient declined       Precautions:     Allergies as of 08/10/2024 - Reviewed 08/10/2024   Allergen Reaction Noted    Acetaminophen  06/21/2023    Tramadol  09/25/2023    Ibuprofen Nausea Only 09/23/2022       [facility] Assessment Details/Treatment     Initial visit regarding consult for dry peeling  toenails, patient receiving am care at this time, noting dry BLE and pigment changes to Bilateral feet and thickened toenails suggestive of onochomycosis. Patient is resting on a pressure redistribution mattress with pressure injury prevention measures in  place.       09/14/24 0830        Wound 09/10/24 1701 Moisture associated dermatitis Left Foot   Date First Assessed/Time First Assessed: 09/10/24 1701   Present on Original Admission: No  Primary Wound Type: Moisture associated dermatitis  Side: Left  Location: Foot   Wound Image    Dressing Appearance Open to air   Drainage Amount None   Appearance Intact  (pigment  changes)   Tissue loss description Not applicable   Wound Edges Undefined        Wound 09/10/24 1702 Right Heel   Date First Assessed/Time First Assessed: 09/10/24 1702   Present on Original Admission: No  Side: Right  Location: Heel   Wound Image   (intact dry calloused skin)         Recommendations made to primary team for local skin care measures . Orders placed.     09/14/2024

## 2024-09-14 NOTE — PROGRESS NOTES
[facility] Hospital Medicine Progress Note        Chief Complaint: Inpatient Follow-up for hemorrhagic stroke    HPI per admitting team: 59-year-old male with significant history of Crohn's disease, GERD, chronic tobacco use/substance use-cocaine presented to the ED with complaints of acute onset left-sided weakness, facial droop and aphasia along with sudden onset headache.  Imaging in the ED revealed intraparenchymal hemorrhage of right thalamus with surrounding edema, compression of right lateral ventricle with 8 mm midline shift, early obstructive hydrocephalus, possible subarachnoid hemorrhage. patient admitted to ICU, neurology and neurosurgery services were consulted.  CT angiogram with mild dolichoectasia of basilar artery , echocardiogram negative for bubble study, EF intact, grade 2 diastolic dysfunction. Conservative management, recommended to keep BP at goal, Keppra for seizure prevention, close neuro checks, 3% saline initiated.  Follow up CT with stable findings, therapy services consulted.  Holding anticoagulation, antiplatelets.  Hypertonic saline discontinued, initiate high-intensity statin, patient downgraded to hospital medicine services, case management consulted for placement, neurology recommended to continue to avoid antiplatelets.  Cleared for p.o. intake by speech, tolerating well.  Patient continues to participate with therapy services, case management actively working on placement.  Resumed home amlodipine, labs stable.  Psych consulted on 08/28 for impulsive behavior, initiated risperidone, Zoloft and p.r.n. trazodone   Concern for urinary retention, closely monitoring with bladder scan, added tamsulosin   spoke to sister over the phone   Patient asking when he can go home unfortunately he is unable to live by himself  He has been denied by multiple facilities and  is attempting to place him    Interval Hx:   Patient is laying in bed, reports he  feels very good, noted his speech is much clearer today, less droop on his left face and he is able to move his left lower extremity much better today.  Still no movement in his left upper extremity   No family is at bedside  Case was discussed with patient's nurse on the floor    Objective/physical exam:  General: In no acute distress, afebrile  Chest: Clear to auscultation bilaterally  Heart: RRR, +S1, S2, no appreciable murmur  Abdomen: Soft, nontender, BS +  MSK: Warm, no lower extremity edema, no clubbing or cyanosis  Neurologic: Alert and oriented, unable to move left upper extremity, able to move left lower extremity much better than yesterday, left facial droop is improving, comprehension intact, speech is a little dysarthric but improved as compared to yesterday   Psych:  Cooperative    VITAL SIGNS: 24 HRS MIN & MAX LAST   Temp  Min: 97.4 °F (36.3 °C)  Max: 98.8 °F (37.1 °C) 98.6 °F (37 °C)   BP  Min: 94/66  Max: 150/80 125/72   Pulse  Min: 59  Max: 89  73   Resp  Min: 16  Max: 18 18   SpO2  Min: 96 %  Max: 99 % 99 %     I reviewed the labs below:  Recent Labs   Lab 09/10/24  0437 09/13/24  0347   WBC 6.23 5.01   RBC 3.56* 3.67*   HGB 10.5* 11.0*   HCT 32.9* 32.8*   MCV 92.4 89.4   MCH 29.5 30.0   MCHC 31.9* 33.5   RDW 12.1 12.2    269   MPV 8.5 8.7     Recent Labs   Lab 09/10/24  0437 09/13/24 0347    142   K 4.2 3.9    107   CO2 26 28   BUN 17.3 17.4   CREATININE 0.87 0.82   CALCIUM 9.0 9.2   MG 2.00 1.80     Microbiology Results (last 7 days)       ** No results found for the last 168 hours. **           See below for Radiology    Intake and Output:  No intake or output data in the 24 hours ending 09/14/24 5875      Assessment/Plan:  Acute intra parenchymal hemorrhage-right thalamic capsular with edema/midline shift/intraventricular extension/early obstructive hydrocephalus  Acute subarachnoid hemorrhage   Left-sided weakness/dysarthria secondary to above  Oropharyngeal dysphagia  initially secondary to above- cleared for modified diet now  New diagnosis essential HTN- stable  Impulsive behavior - improving  Urinary retention-improved today  History of Crohn's disease/GERD  Chronic tobacco use/substance use  Moderate malnutrition       Psychiatry was consulted for his aggressive behavior during the initial days of his admission, recommended to continue Zoloft, risperidone p.r.n. Trazodone for bedtime, since then patient cooperative and calm  Blood pressure normal off of all antihypertensive medications   Neurology, neurosurgery has now signed off  Per last neurology note recommending to continue to avoid all antiplatelets, anticoagulation for now given IPH  Continue Keppra for seizure prophylaxis  Mobilize.  Monitor for urinary retention  Continue p.o. and topical analgesics, p.r.n. anxiolytics  Continue PT OT, recommended SNF, case management on board, awaiting acceptance, pt has been denied by multiple facilities.  Morning lab stable, repeat Monday    VTE prophylaxis:  SCDs, no chemical prophylaxis given IPH    Patient condition:  Stable    Anticipated discharge and Disposition:   SNF once accepted, patient is medically cleared for discharge      All diagnosis and differential diagnosis have been reviewed; assessment and plan has been documented; I have personally reviewed the labs and test results that are presently available; I have reviewed the patients medication list; I have reviewed the consulting providers response and recommendations. I have reviewed or attempted to review medical records based upon their availability    All of the patient's questions have been  addressed and answered. Patient's is agreeable to the above stated plan. I will continue to monitor closely and make adjustments to medical management as needed.    Portions of this note dictated using EMR integrated voice recognition software, and may be subject to voice recognition errors not corrected at proofreading.  Please contact writer for clarification if needed.   _____________________________________________________________________    Nutrition Status:  Patient meets ASPEN criteria for moderate malnutrition of acute illness or injury per RD assessment as evidenced by:  Energy Intake (Malnutrition):  (does not meet criteria)  Weight Loss (Malnutrition):  (does not meet criteria)  Subcutaneous Fat (Malnutrition): mild depletion  Muscle Mass (Malnutrition): mild depletion           A minimum of two characteristics is recommended for diagnosis of either severe or non-severe malnutrition.     Current Med:   atorvastatin  40 mg Oral QHS    diclofenac sodium  2 g Topical (Top) TID    gabapentin  100 mg Oral TID    levETIRAcetam  500 mg Oral BID    miconazole NITRATE 2 %   Topical (Top) BID    olopatadine  1 drop Both Eyes BID    risperidone 1 mg/ml  1 mg Oral BID    sertraline  50 mg Oral Daily    tamsulosin  0.4 mg Oral Daily      PRN meds:  Current Facility-Administered Medications:     0.9% NaCl, , Intravenous, PRN    acetaminophen, 650 mg, Rectal, Q6H PRN    acetaminophen, 650 mg, Oral, Q6H PRN    albuterol, 2 puff, Inhalation, Q6H PRN    ALPRAZolam, 0.25 mg, Oral, Q4H PRN    ammonium lactate, , Topical (Top), BID PRN    bisacodyL, 10 mg, Rectal, Daily PRN    cloNIDine, 0.1 mg, Oral, Q6H PRN    diphenhydrAMINE, 25 mg, Oral, Q6H PRN    haloperidol lactate, 5 mg, Intravenous, Q6H PRN    hydrALAZINE, 10 mg, Intravenous, Q4H PRN    oxyCODONE, 5 mg, Oral, Q6H PRN    polyethylene glycol, 17 g, Oral, BID PRN    traMADoL, 50 mg, Oral, Q6H PRN    traZODone, 100 mg, Oral, Nightly PRN    Continuous infusion:       Radiology:   I have personally reviewed the images and agree with radiologist report  X-Ray Shoulder 2 or More Views Left  Narrative: EXAMINATION:  XR SHOULDER COMPLETE 2 OR MORE VIEWS LEFT    CLINICAL HISTORY:  Left shoulder pain;    COMPARISON:  None.    FINDINGS:  No acute displaced fractures or dislocations.    Joint  spaces preserved.    No blastic or lytic lesions.    Soft tissues within normal limits.  Impression: No acute osseous abnormality.    Electronically signed by: Nikhil Ignacio  Date:    09/12/2024  Time:    07:55  X-Ray Hip 2 or 3 views Left with Pelvis when performed  Narrative: EXAMINATION:  XR HIP WITH PELVIS WHEN PERFORMED 2 OR 3 VIEWS LEFT    CLINICAL HISTORY:  left hip pain;    COMPARISON:  None.    FINDINGS:  No acute displaced fractures or dislocations.    There is narrowing of the inferior medial aspect of both hip joints with minimal degenerative changes of the lumbosacral spine articular spaces are otherwise preserved with smooth articular surfaces    No blastic or lytic lesions.    Soft tissues within normal limits.  Impression: Minimal degenerative changes.    Electronically signed by: Nihkil Ignacio  Date:    09/12/2024  Time:    07:53  CT Head Without Contrast  Narrative: EXAMINATION:  CT HEAD WITHOUT CONTRAST    CLINICAL HISTORY:  Pt states that he fell here on 9/6/2024 in his room and hit his head;    TECHNIQUE:  CT imaging of the head performed from the skull base to the vertex without intravenous contrast.   mGycm. Automatic exposure control, adjustment of mA/kV or iterative reconstruction technique was used to reduce radiation.    COMPARISON:  12 August 2024    FINDINGS:  There is improved appearance of the right basal ganglia hematoma with decreased mass effect.  Minimal residual midline shift towards the left.  The ventricles are not enlarged.  No new parenchymal attenuation abnormality.    Visualized paranasal sinuses and mastoid air cells are clear.  Impression: Improved appearance of the right basal ganglia hematoma.  No new suspicious findings.    Electronically signed by: Clifford Doyle  Date:    09/12/2024  Time:    06:41        Kobi Arias MD  Department of Hospital Medicine   [facility]   09/14/2024

## 2024-09-14 NOTE — PLAN OF CARE
Problem: Adult Inpatient Plan of Care  Goal: Plan of Care Review  9/14/2024 1705 by Ashley Meyers RN  Outcome: Progressing  9/14/2024 1159 by Ashley Meyers RN  Outcome: Progressing  Goal: Patient-Specific Goal (Individualized)  9/14/2024 1705 by Ashley Meyers RN  Outcome: Progressing  9/14/2024 1159 by Ashley Meyers RN  Outcome: Progressing  Goal: Absence of Hospital-Acquired Illness or Injury  9/14/2024 1705 by Ashley Meyers RN  Outcome: Progressing  9/14/2024 1159 by Ashley Meyers RN  Outcome: Progressing  Goal: Optimal Comfort and Wellbeing  9/14/2024 1705 by Ashley Meyers RN  Outcome: Progressing  9/14/2024 1159 by Ashley Meyers RN  Outcome: Progressing  Goal: Readiness for Transition of Care  9/14/2024 1705 by Ashley Meyers RN  Outcome: Progressing  9/14/2024 1159 by Ashley Meyers RN  Outcome: Progressing

## 2024-09-14 NOTE — PLAN OF CARE
Problem: Skin Injury Risk Increased  Goal: Skin Health and Integrity  Outcome: Not Progressing     Problem: Stroke, Intracerebral Hemorrhage  Goal: Optimal Coping  Outcome: Not Progressing  Goal: Effective Bowel Elimination  Outcome: Not Progressing  Goal: Optimal Cerebral Tissue Perfusion  Outcome: Not Progressing  Goal: Optimal Cognitive Function  Outcome: Not Progressing  Goal: Effective Communication Skills  Outcome: Not Progressing  Goal: Optimal Functional Ability  Outcome: Not Progressing  Goal: Optimal Nutrition Intake  Outcome: Not Progressing  Goal: Optimal Pain Control and Function  Outcome: Not Progressing  Goal: Effective Oxygenation and Ventilation  Outcome: Not Progressing  Goal: Improved Sensorimotor Function  Outcome: Not Progressing  Goal: Safe and Effective Swallow  Outcome: Not Progressing  Goal: Effective Urinary Elimination  Outcome: Not Progressing     Problem: Wound  Goal: Optimal Coping  Outcome: Not Progressing  Goal: Optimal Functional Ability  Outcome: Not Progressing  Goal: Absence of Infection Signs and Symptoms  Outcome: Not Progressing  Goal: Improved Oral Intake  Outcome: Not Progressing  Goal: Optimal Pain Control and Function  Outcome: Not Progressing  Goal: Skin Health and Integrity  Outcome: Not Progressing  Goal: Optimal Wound Healing  Outcome: Not Progressing

## 2024-09-14 NOTE — NURSING
Nurses Note -- 4 Eyes      9/13/2024   11:10 PM      Skin assessed during: Q Shift Change      [x] No Altered Skin Integrity Present    [x]Prevention Measures Documented      [] Yes- Altered Skin Integrity Present or Discovered   [] LDA Added if Not in Epic (Describe Wound)   [] New Altered Skin Integrity was Present on Admit and Documented in LDA   [] Wound Image Taken    Wound Care Consulted? No    Attending Nurse:  Charline Bethea RN/Staff Member:  Keely SALGUERO RN

## 2024-09-15 PROCEDURE — 25000003 PHARM REV CODE 250: Performed by: INTERNAL MEDICINE

## 2024-09-15 PROCEDURE — 25000003 PHARM REV CODE 250: Performed by: HOSPITALIST

## 2024-09-15 PROCEDURE — 97530 THERAPEUTIC ACTIVITIES: CPT | Mod: CO

## 2024-09-15 PROCEDURE — 21400001 HC TELEMETRY ROOM

## 2024-09-15 PROCEDURE — 25000003 PHARM REV CODE 250

## 2024-09-15 PROCEDURE — 97535 SELF CARE MNGMENT TRAINING: CPT | Mod: CO

## 2024-09-15 PROCEDURE — 97530 THERAPEUTIC ACTIVITIES: CPT | Mod: CQ

## 2024-09-15 RX ADMIN — DICLOFENAC SODIUM 2 G: 10 GEL TOPICAL at 03:09

## 2024-09-15 RX ADMIN — TRAMADOL HYDROCHLORIDE 50 MG: 50 TABLET, COATED ORAL at 05:09

## 2024-09-15 RX ADMIN — RISPERIDONE 1 MG: 1 SOLUTION ORAL at 08:09

## 2024-09-15 RX ADMIN — MICONAZOLE NITRATE 2 % TOPICAL POWDER: at 09:09

## 2024-09-15 RX ADMIN — OLOPATADINE HYDROCHLORIDE 1 DROP: 1 SOLUTION OPHTHALMIC at 09:09

## 2024-09-15 RX ADMIN — GABAPENTIN 100 MG: 100 CAPSULE ORAL at 03:09

## 2024-09-15 RX ADMIN — TRAZODONE HYDROCHLORIDE 100 MG: 100 TABLET ORAL at 08:09

## 2024-09-15 RX ADMIN — OXYCODONE HYDROCHLORIDE 5 MG: 5 TABLET ORAL at 08:09

## 2024-09-15 RX ADMIN — GABAPENTIN 100 MG: 100 CAPSULE ORAL at 09:09

## 2024-09-15 RX ADMIN — ATORVASTATIN CALCIUM 40 MG: 40 TABLET, FILM COATED ORAL at 08:09

## 2024-09-15 RX ADMIN — DICLOFENAC SODIUM 2 G: 10 GEL TOPICAL at 09:09

## 2024-09-15 RX ADMIN — LEVETIRACETAM 500 MG: 500 TABLET, FILM COATED ORAL at 09:09

## 2024-09-15 RX ADMIN — MICONAZOLE NITRATE 2 % TOPICAL POWDER: at 08:09

## 2024-09-15 RX ADMIN — GABAPENTIN 100 MG: 100 CAPSULE ORAL at 08:09

## 2024-09-15 RX ADMIN — TAMSULOSIN HYDROCHLORIDE 0.4 MG: 0.4 CAPSULE ORAL at 09:09

## 2024-09-15 RX ADMIN — OLOPATADINE HYDROCHLORIDE 1 DROP: 1 SOLUTION OPHTHALMIC at 08:09

## 2024-09-15 RX ADMIN — LEVETIRACETAM 500 MG: 500 TABLET, FILM COATED ORAL at 08:09

## 2024-09-15 RX ADMIN — RISPERIDONE 1 MG: 1 SOLUTION ORAL at 09:09

## 2024-09-15 RX ADMIN — DICLOFENAC SODIUM 2 G: 10 GEL TOPICAL at 08:09

## 2024-09-15 RX ADMIN — SERTRALINE HYDROCHLORIDE 50 MG: 50 TABLET ORAL at 09:09

## 2024-09-15 NOTE — PT/OT/SLP PROGRESS
Physical Therapy Treatment    Patient Name:  Dell Garrett Jr.   MRN:  [id number]    Recommendations:     Discharge therapy intensity: Moderate Intensity Therapy   Discharge Equipment Recommendations: to be determined by next level of care  Barriers to discharge: Decreased caregiver support and Impaired mobility    Assessment:     Dell Garrett Jr. is a 59 y.o. male admitted with a medical diagnosis of Intraparenchymal hemorrhage of brain .  He presents with the following impairments/functional limitations: weakness, impaired endurance, impaired cognition, impaired self care skills, decreased upper extremity function, decreased lower extremity function, impaired functional mobility, gait instability, decreased safety awareness, impaired balance .    Rehab Prognosis: Good; patient would benefit from acute skilled PT services to address these deficits and reach maximum level of function.    Recent Surgery: * No surgery found *      Plan:     During this hospitalization, patient would benefit from acute PT services 5 x/week to address the identified rehab impairments via gait training, therapeutic activities and progress toward the following goals:    Plan of Care Expires:  10/04/24    Subjective     Chief Complaint:   Patient/Family Comments/goals:   Pain/Comfort:         Objective:     Communicated with nurse prior to session.  Patient found HOB elevated with telemetry, PureWick upon PT entry to room.     General Precautions: Standard, fall  Orthopedic Precautions: N/A  Braces: N/A  Respiratory Status: Room air  Blood Pressure:   Skin Integrity: Visible skin intact      Functional Mobility:  Bed Mobility:     Supine to Sit: moderate assistance and of 2 persons  Sit to Supine: moderate assistance and of 2 persons    Therapeutic Activities/Exercises:  Pt sat EOB x12 minutes with modA for sitting balance. Pt able to correct to midline but unable to maintain without assistance.   Performed seated LLE LAQs x10 reps      Education:  Patient provided with verbal education education regarding fall prevention and safety awareness.  Additional teaching is warranted.     Patient left left sidelying with all lines intact, call button in reach, and wedge under R side    GOALS:   Multidisciplinary Problems       Physical Therapy Goals          Problem: Physical Therapy    Goal Priority Disciplines Outcome Goal Variances Interventions   Physical Therapy Goal     PT, PT/OT Progressing     Description: Goals to be met by: 2024     Patient will increase functional independence with mobility by performin. Supine to sit with MInimal Assistance  2. Sit to supine with MInimal Assistance  3. Sit to stand transfer with Minimal Assistance  4. Pt to follow 75% of commands.   5. Gait  x 75 feet with Minimal Assistance using Rolling Walker vs LRAD.                          Time Tracking:     PT Received On: 09/15/24  PT Start Time: 1042     PT Stop Time: 1109  PT Total Time (min): 27 min     Billable Minutes: Therapeutic Activity 27    Treatment Type: Treatment  PT/PTA: PTA     Number of PTA visits since last PT visit: 5     09/15/2024

## 2024-09-15 NOTE — PLAN OF CARE
Problem: Adult Inpatient Plan of Care  Goal: Plan of Care Review  Outcome: Progressing  Goal: Patient-Specific Goal (Individualized)  Outcome: Progressing  Goal: Absence of Hospital-Acquired Illness or Injury  Outcome: Progressing  Goal: Optimal Comfort and Wellbeing  Outcome: Progressing  Goal: Readiness for Transition of Care  Outcome: Progressing     Problem: Skin Injury Risk Increased  Goal: Skin Health and Integrity  Outcome: Progressing     Problem: Stroke, Intracerebral Hemorrhage  Goal: Optimal Coping  Outcome: Progressing  Goal: Effective Bowel Elimination  Outcome: Progressing  Goal: Optimal Cerebral Tissue Perfusion  Outcome: Progressing  Goal: Optimal Cognitive Function  Outcome: Progressing  Goal: Effective Communication Skills  Outcome: Progressing  Goal: Optimal Functional Ability  Outcome: Progressing  Goal: Optimal Nutrition Intake  Outcome: Progressing  Goal: Optimal Pain Control and Function  Outcome: Progressing  Goal: Effective Oxygenation and Ventilation  Outcome: Progressing  Goal: Improved Sensorimotor Function  Outcome: Progressing  Goal: Safe and Effective Swallow  Outcome: Progressing  Goal: Effective Urinary Elimination  Outcome: Progressing     Problem: Wound  Goal: Optimal Coping  Outcome: Progressing  Goal: Optimal Functional Ability  Outcome: Progressing  Goal: Absence of Infection Signs and Symptoms  Outcome: Progressing  Goal: Improved Oral Intake  Outcome: Progressing  Goal: Optimal Pain Control and Function  Outcome: Progressing  Goal: Skin Health and Integrity  Outcome: Progressing  Goal: Optimal Wound Healing  Outcome: Progressing

## 2024-09-15 NOTE — NURSING
Nurses Note -- 4 Eyes      9/14/2024   10:22 PM      Skin assessed during: Q Shift Change      [] No Altered Skin Integrity Present    [x]Prevention Measures Documented      [x] Yes- Altered Skin Integrity Present or Discovered   [] LDA Added if Not in Epic (Describe Wound)   [] New Altered Skin Integrity was Present on Admit and Documented in LDA   [] Wound Image Taken    Wound Care Consulted? Yes    Attending Nurse:  Charline Bethea RN/Staff Member:  Ashley SANCHEZ

## 2024-09-15 NOTE — PT/OT/SLP PROGRESS
Occupational Therapy   Treatment    Name: Dell Garrett Jr.  MRN: [id number]    Recommendations:     Recommended therapy intensity at discharge: Moderate Intensity Therapy   Discharge Equipment Recommendations:  to be determined by next level of care  Barriers to discharge:       Assessment:     Dell Garrett Jr. is a 59 y.o. male with a medical diagnosis of R basal ganglia hemorrhage with R to L shift and intraventricular extension, elevated BP .  He presents with great activity tolerance and motivation to participate. Performance deficits affecting function are weakness, impaired endurance, impaired cognition, impaired sensation, decreased coordination, impaired self care skills, decreased upper extremity function, impaired functional mobility, decreased lower extremity function, gait instability, decreased safety awareness, impaired balance, abnormal tone. Continues to demo L sided neglect/inattention. Able to slightly move L leg today however LUE remains flaccid.     Rehab Prognosis:  Good; patient would benefit from acute skilled OT services to address these deficits and reach maximum level of function.       Plan:     Patient to be seen 5 x/week to address the above listed problems via self-care/home management, therapeutic activities, therapeutic exercises, neuromuscular re-education, cognitive retraining, sensory integration  Plan of Care Expires: 10/11/24  Plan of Care Reviewed with: patient    Subjective     Pain/Comfort:  Pain Rating 1: 0/10    Objective:     Communicated with: RN prior to session.  Patient found supine with PureWick, telemetry upon OT entry to room.    General Precautions: Standard, fall    Orthopedic Precautions:N/A  Braces:  (L resting hand splint)  Respiratory Status: Room air     Occupational Performance:     Bed Mobility:    Patient completed Rolling/Turning to Left with  moderate assistance and 2 persons  Patient completed Scooting/Bridging with moderate assistance and 2  persons  Patient completed Supine to Sit with moderate assistance and 2 persons  Patient completed Sit to Supine with moderate assistance and 2 persons     Activities of Daily Living:  Grooming: pt washed face seated EOB with SBA and using RUE for excursion to face.   LE dressing: total A to don/doff socks and shoes.     Therapeutic Activities:  Pt sat EOB x20 minutes with Mod A for sitting balance. Noted with L posterolateral lean. Worked on core control activities, reaching outside DEMIAN and visual attention to L side. Pt able to track therapist hand to L and reach to touch 1x10 trials. Mod A to return to midline. Verbal cues provided for moving head along with eyes to L side. Good carryover after repetition.      Therapeutic Positioning    OT interventions performed during the course of today's session in an effort to prevent and/or reduce acquired pressure injuries:   Therapeutic positioning was provided at the conclusion of session to offload all bony prominences for the prevention and/or reduction of pressure injuries and for contracture prevention    [facility] 6 Click ADL:      Patient Education:  Patient provided with verbal education education regarding OT role/goals/POC.  Understanding was verbalized.      Patient left left sidelying with all lines intact, call button in reach, wedge under R side, and splint donned .    GOALS:   Multidisciplinary Problems       Occupational Therapy Goals          Problem: Occupational Therapy    Goal Priority Disciplines Outcome Interventions   Occupational Therapy Goal     OT, PT/OT Progressing    Description: Goals to be met by:  10/12/24    Patient will increase functional independence with ADLs by performing:    UE Dressing with Stand-by Assistance.  LE Dressing with mod A  Grooming while seated at sink with SBA. -revised 9/5  Toileting from toilet with Min A for hygiene and clothing management. -revised 9/5  Toilet transfer to toilet with Min A. -revised 9/5  Increased  functional strength to 3/5 LUE through therEX, neuromuscular re-ed.  Pt will visually attend to L side of environment with no cues                       Time Tracking:     OT Date of Treatment: 09/15/24  OT Start Time: 1042  OT Stop Time: 1110  OT Total Time (min): 28 min    Billable Minutes:Self Care/Home Management 8  Therapeutic Activity 20    OT/MAC: MAC     Number of MAC visits since last OT visit: 2    9/15/2024

## 2024-09-15 NOTE — PROGRESS NOTES
[facility] Hospital Medicine Progress Note        Chief Complaint: Inpatient Follow-up for hemorrhagic stroke    HPI per admitting team: 59-year-old male with significant history of Crohn's disease, GERD, chronic tobacco use/substance use-cocaine presented to the ED with complaints of acute onset left-sided weakness, facial droop and aphasia along with sudden onset headache.  Imaging in the ED revealed intraparenchymal hemorrhage of right thalamus with surrounding edema, compression of right lateral ventricle with 8 mm midline shift, early obstructive hydrocephalus, possible subarachnoid hemorrhage. patient admitted to ICU, neurology and neurosurgery services were consulted.  CT angiogram with mild dolichoectasia of basilar artery , echocardiogram negative for bubble study, EF intact, grade 2 diastolic dysfunction. Conservative management, recommended to keep BP at goal, Keppra for seizure prevention, close neuro checks, 3% saline initiated.  Follow up CT with stable findings, therapy services consulted.  Holding anticoagulation, antiplatelets.  Hypertonic saline discontinued, initiate high-intensity statin, patient downgraded to hospital medicine services, case management consulted for placement, neurology recommended to continue to avoid antiplatelets.  Cleared for p.o. intake by speech, tolerating well.  Patient continues to participate with therapy services, case management actively working on placement.  Resumed home amlodipine, labs stable.  Psych consulted on 08/28 for impulsive behavior, initiated risperidone, Zoloft and p.r.n. trazodone   Concern for urinary retention, closely monitoring with bladder scan, added tamsulosin   spoke to sister over the phone   Patient asking when he can go home unfortunately he is unable to live by himself  He has been denied by multiple facilities and  is attempting to place him    Interval Hx:   Patient is sleeping comfortably, had  is covered with his blankets.  Intra op wake up to my calling    No family is at bedside  Case was discussed with patient's nurse on the floor    Objective/physical exam:  General: In no acute distress, afebrile, sleeping comfortably with covers on his head  Chest: Clear to auscultation bilaterally  Heart: RRR, +S1, S2, no appreciable murmur  Abdomen: Soft, nontender, BS +  MSK: Warm, no lower extremity edema, no clubbing or cyanosis  Neurologic:  Asleep    VITAL SIGNS: 24 HRS MIN & MAX LAST   Temp  Min: 97.3 °F (36.3 °C)  Max: 98.6 °F (37 °C) 97.5 °F (36.4 °C)   BP  Min: 101/71  Max: 150/80 106/69   Pulse  Min: 57  Max: 80  (!) 58   Resp  Min: 17  Max: 19 19   SpO2  Min: 95 %  Max: 99 % 96 %     I reviewed the labs below:  Recent Labs   Lab 09/10/24  0437 09/13/24  0347   WBC 6.23 5.01   RBC 3.56* 3.67*   HGB 10.5* 11.0*   HCT 32.9* 32.8*   MCV 92.4 89.4   MCH 29.5 30.0   MCHC 31.9* 33.5   RDW 12.1 12.2    269   MPV 8.5 8.7     Recent Labs   Lab 09/10/24  0437 09/13/24  0347    142   K 4.2 3.9    107   CO2 26 28   BUN 17.3 17.4   CREATININE 0.87 0.82   CALCIUM 9.0 9.2   MG 2.00 1.80     Microbiology Results (last 7 days)       ** No results found for the last 168 hours. **           See below for Radiology    Intake and Output:    Intake/Output Summary (Last 24 hours) at 9/15/2024 0711  Last data filed at 9/15/2024 0500  Gross per 24 hour   Intake --   Output 1600 ml   Net -1600 ml         Assessment/Plan:  Acute intra parenchymal hemorrhage-right thalamic capsular with edema/midline shift/intraventricular extension/early obstructive hydrocephalus  Acute subarachnoid hemorrhage   Left-sided weakness/dysarthria secondary to above  Oropharyngeal dysphagia initially secondary to above- cleared for modified diet now  New diagnosis essential HTN- stable  Impulsive behavior - improving  Urinary retention-improved today  History of Crohn's disease/GERD  Chronic tobacco use/substance use  Moderate  malnutrition       Psychiatry evaluated the patient for his aggressive behavior during the initial days of his admission, recommended to continue Zoloft, risperidone p.r.n. Trazodone for bedtime, since then patient cooperative and calm  Blood pressure normal off of all antihypertensive medications   Neurology, neurosurgery has now signed off  Per last neurology note recommending to continue to avoid all antiplatelets, anticoagulation for now given IPH  Continue Keppra for seizure prophylaxis  Mobilize.  Monitor for urinary retention  Continue p.o. and topical analgesics, p.r.n. anxiolytics  Continue PT OT, recommended SNF, case management on board, awaiting acceptance, pt has been denied by multiple facilities.  Morning CBC, BMP ordered, will repeat Q Mondays    VTE prophylaxis:  SCDs, no chemical prophylaxis given IPH    Patient condition:  Stable    Anticipated discharge and Disposition:   SNF once accepted, patient is medically cleared for discharge      All diagnosis and differential diagnosis have been reviewed; assessment and plan has been documented; I have personally reviewed the labs and test results that are presently available; I have reviewed the patients medication list; I have reviewed the consulting providers response and recommendations. I have reviewed or attempted to review medical records based upon their availability    All of the patient's questions have been  addressed and answered. Patient's is agreeable to the above stated plan. I will continue to monitor closely and make adjustments to medical management as needed.    Portions of this note dictated using EMR integrated voice recognition software, and may be subject to voice recognition errors not corrected at proofreading. Please contact writer for clarification if needed.   _____________________________________________________________________    Nutrition Status:  Patient meets ASPEN criteria for moderate malnutrition of acute illness or  injury per RD assessment as evidenced by:  Energy Intake (Malnutrition):  (does not meet criteria)  Weight Loss (Malnutrition):  (does not meet criteria)  Subcutaneous Fat (Malnutrition): mild depletion  Muscle Mass (Malnutrition): mild depletion           A minimum of two characteristics is recommended for diagnosis of either severe or non-severe malnutrition.     Current Med:   atorvastatin  40 mg Oral QHS    diclofenac sodium  2 g Topical (Top) TID    gabapentin  100 mg Oral TID    levETIRAcetam  500 mg Oral BID    miconazole NITRATE 2 %   Topical (Top) BID    olopatadine  1 drop Both Eyes BID    risperidone 1 mg/ml  1 mg Oral BID    sertraline  50 mg Oral Daily    tamsulosin  0.4 mg Oral Daily      PRN meds:  Current Facility-Administered Medications:     0.9% NaCl, , Intravenous, PRN    acetaminophen, 650 mg, Rectal, Q6H PRN    acetaminophen, 650 mg, Oral, Q6H PRN    albuterol, 2 puff, Inhalation, Q6H PRN    ALPRAZolam, 0.25 mg, Oral, Q4H PRN    ammonium lactate, , Topical (Top), BID PRN    bisacodyL, 10 mg, Rectal, Daily PRN    cloNIDine, 0.1 mg, Oral, Q6H PRN    diphenhydrAMINE, 25 mg, Oral, Q6H PRN    haloperidol lactate, 5 mg, Intravenous, Q6H PRN    hydrALAZINE, 10 mg, Intravenous, Q4H PRN    oxyCODONE, 5 mg, Oral, Q6H PRN    polyethylene glycol, 17 g, Oral, BID PRN    traMADoL, 50 mg, Oral, Q6H PRN    traZODone, 100 mg, Oral, Nightly PRN    Continuous infusion:       Radiology:   I have personally reviewed the images and agree with radiologist report  X-Ray Shoulder 2 or More Views Left  Narrative: EXAMINATION:  XR SHOULDER COMPLETE 2 OR MORE VIEWS LEFT    CLINICAL HISTORY:  Left shoulder pain;    COMPARISON:  None.    FINDINGS:  No acute displaced fractures or dislocations.    Joint spaces preserved.    No blastic or lytic lesions.    Soft tissues within normal limits.  Impression: No acute osseous abnormality.    Electronically signed by: Nikhil Ignacio  Date:    09/12/2024  Time:    07:55  X-Ray Hip  2 or 3 views Left with Pelvis when performed  Narrative: EXAMINATION:  XR HIP WITH PELVIS WHEN PERFORMED 2 OR 3 VIEWS LEFT    CLINICAL HISTORY:  left hip pain;    COMPARISON:  None.    FINDINGS:  No acute displaced fractures or dislocations.    There is narrowing of the inferior medial aspect of both hip joints with minimal degenerative changes of the lumbosacral spine articular spaces are otherwise preserved with smooth articular surfaces    No blastic or lytic lesions.    Soft tissues within normal limits.  Impression: Minimal degenerative changes.    Electronically signed by: Nikhil Ignacio  Date:    09/12/2024  Time:    07:53  CT Head Without Contrast  Narrative: EXAMINATION:  CT HEAD WITHOUT CONTRAST    CLINICAL HISTORY:  Pt states that he fell here on 9/6/2024 in his room and hit his head;    TECHNIQUE:  CT imaging of the head performed from the skull base to the vertex without intravenous contrast.   mGycm. Automatic exposure control, adjustment of mA/kV or iterative reconstruction technique was used to reduce radiation.    COMPARISON:  12 August 2024    FINDINGS:  There is improved appearance of the right basal ganglia hematoma with decreased mass effect.  Minimal residual midline shift towards the left.  The ventricles are not enlarged.  No new parenchymal attenuation abnormality.    Visualized paranasal sinuses and mastoid air cells are clear.  Impression: Improved appearance of the right basal ganglia hematoma.  No new suspicious findings.    Electronically signed by: Clifford Doyle  Date:    09/12/2024  Time:    06:41        Kobi Arias MD  Department of Hospital Medicine   [facility]   09/15/2024

## 2024-09-16 LAB
ANION GAP SERPL CALC-SCNC: 6 MEQ/L
BUN SERPL-MCNC: 18.3 MG/DL (ref 8.4–25.7)
CALCIUM SERPL-MCNC: 9.4 MG/DL (ref 8.8–10)
CHLORIDE SERPL-SCNC: 105 MMOL/L (ref 98–107)
CO2 SERPL-SCNC: 29 MMOL/L (ref 23–31)
CREAT SERPL-MCNC: 0.78 MG/DL (ref 0.73–1.18)
CREAT/UREA NIT SERPL: 23
ERYTHROCYTE [DISTWIDTH] IN BLOOD BY AUTOMATED COUNT: 12.3 % (ref 11.5–17)
GFR SERPLBLD CREATININE-BSD FMLA CKD-EPI: >60 ML/MIN/1.73/M2
GLUCOSE SERPL-MCNC: 92 MG/DL (ref 82–115)
HCT VFR BLD AUTO: 35.4 % (ref 42–52)
HGB BLD-MCNC: 11.6 G/DL (ref 14–18)
MCH RBC QN AUTO: 29.7 PG (ref 27–31)
MCHC RBC AUTO-ENTMCNC: 32.8 G/DL (ref 33–36)
MCV RBC AUTO: 90.8 FL (ref 80–94)
NRBC BLD AUTO-RTO: 0 %
PLATELET # BLD AUTO: 279 X10(3)/MCL (ref 130–400)
PMV BLD AUTO: 8.6 FL (ref 7.4–10.4)
POTASSIUM SERPL-SCNC: 4.2 MMOL/L (ref 3.5–5.1)
RBC # BLD AUTO: 3.9 X10(6)/MCL (ref 4.7–6.1)
SODIUM SERPL-SCNC: 140 MMOL/L (ref 136–145)
WBC # BLD AUTO: 6.47 X10(3)/MCL (ref 4.5–11.5)

## 2024-09-16 PROCEDURE — 36415 COLL VENOUS BLD VENIPUNCTURE: CPT | Performed by: INTERNAL MEDICINE

## 2024-09-16 PROCEDURE — 25000003 PHARM REV CODE 250: Performed by: HOSPITALIST

## 2024-09-16 PROCEDURE — 97116 GAIT TRAINING THERAPY: CPT

## 2024-09-16 PROCEDURE — 85027 COMPLETE CBC AUTOMATED: CPT | Performed by: INTERNAL MEDICINE

## 2024-09-16 PROCEDURE — 25000003 PHARM REV CODE 250: Performed by: INTERNAL MEDICINE

## 2024-09-16 PROCEDURE — 97164 PT RE-EVAL EST PLAN CARE: CPT

## 2024-09-16 PROCEDURE — 25000003 PHARM REV CODE 250

## 2024-09-16 PROCEDURE — 80048 BASIC METABOLIC PNL TOTAL CA: CPT | Performed by: INTERNAL MEDICINE

## 2024-09-16 PROCEDURE — 21400001 HC TELEMETRY ROOM

## 2024-09-16 PROCEDURE — 97530 THERAPEUTIC ACTIVITIES: CPT | Mod: CO

## 2024-09-16 PROCEDURE — 97112 NEUROMUSCULAR REEDUCATION: CPT | Mod: CO

## 2024-09-16 RX ADMIN — RISPERIDONE 1 MG: 1 SOLUTION ORAL at 09:09

## 2024-09-16 RX ADMIN — OLOPATADINE HYDROCHLORIDE 1 DROP: 1 SOLUTION OPHTHALMIC at 08:09

## 2024-09-16 RX ADMIN — GABAPENTIN 100 MG: 100 CAPSULE ORAL at 08:09

## 2024-09-16 RX ADMIN — TRAMADOL HYDROCHLORIDE 50 MG: 50 TABLET, COATED ORAL at 08:09

## 2024-09-16 RX ADMIN — ATORVASTATIN CALCIUM 40 MG: 40 TABLET, FILM COATED ORAL at 09:09

## 2024-09-16 RX ADMIN — SERTRALINE HYDROCHLORIDE 50 MG: 50 TABLET ORAL at 08:09

## 2024-09-16 RX ADMIN — MICONAZOLE NITRATE 2 % TOPICAL POWDER: at 09:09

## 2024-09-16 RX ADMIN — Medication: at 09:09

## 2024-09-16 RX ADMIN — OXYCODONE HYDROCHLORIDE 5 MG: 5 TABLET ORAL at 04:09

## 2024-09-16 RX ADMIN — LEVETIRACETAM 500 MG: 500 TABLET, FILM COATED ORAL at 08:09

## 2024-09-16 RX ADMIN — GABAPENTIN 100 MG: 100 CAPSULE ORAL at 09:09

## 2024-09-16 RX ADMIN — LEVETIRACETAM 500 MG: 500 TABLET, FILM COATED ORAL at 09:09

## 2024-09-16 RX ADMIN — OLOPATADINE HYDROCHLORIDE 1 DROP: 1 SOLUTION OPHTHALMIC at 09:09

## 2024-09-16 RX ADMIN — MICONAZOLE NITRATE 2 % TOPICAL POWDER: at 08:09

## 2024-09-16 RX ADMIN — RISPERIDONE 1 MG: 1 SOLUTION ORAL at 08:09

## 2024-09-16 RX ADMIN — TAMSULOSIN HYDROCHLORIDE 0.4 MG: 0.4 CAPSULE ORAL at 08:09

## 2024-09-16 NOTE — PROGRESS NOTES
[facility]  Hospital Medicine Progress Note        Chief Complaint: Inpatient Follow-up for hemorrhagic stroke    HPI per admitting team: 59-year-old male with significant history of Crohn's disease, GERD, chronic tobacco use/substance use-cocaine presented to the ED with complaints of acute onset left-sided weakness, facial droop and aphasia along with sudden onset headache.  Imaging in the ED revealed intraparenchymal hemorrhage of right thalamus with surrounding edema, compression of right lateral ventricle with 8 mm midline shift, early obstructive hydrocephalus, possible subarachnoid hemorrhage. patient admitted to ICU, neurology and neurosurgery services were consulted.  CT angiogram with mild dolichoectasia of basilar artery , echocardiogram negative for bubble study, EF intact, grade 2 diastolic dysfunction. Conservative management, recommended to keep BP at goal, Keppra for seizure prevention, close neuro checks, 3% saline initiated.  Follow up CT with stable findings, therapy services consulted.  Holding anticoagulation, antiplatelets.  Hypertonic saline discontinued, initiate high-intensity statin, patient downgraded to hospital medicine services, case management consulted for placement, neurology recommended to continue to avoid antiplatelets.  Cleared for p.o. intake by speech, tolerating well.  Patient continues to participate with therapy services, case management actively working on placement.  Resumed home amlodipine, labs stable.  Psych consulted on 08/28 for impulsive behavior, initiated risperidone, Zoloft and p.r.n. trazodone   Concern for urinary retention, closely monitoring with bladder scan, added tamsulosin   spoke to sister over the phone   Patient asking when he can go home unfortunately he is unable to live by himself  He has been denied by multiple facilities and  is attempting to place him    Interval Hx:   Patient seen and examined this  morning all vitals have been stable saturating well on room air no issues reported by the nursing staff lab work stable  Objective/physical exam:  General: In no acute distress, afebrile  Chest: Clear to auscultation bilaterally  Heart: RRR, +S1, S2, no appreciable murmur  Abdomen: Soft, nontender, BS +  MSK: Warm, no lower extremity edema, no clubbing or cyanosis  Neurologic: Alert and oriented,  VITAL SIGNS: 24 HRS MIN & MAX LAST   Temp  Min: 97.4 °F (36.3 °C)  Max: 98.3 °F (36.8 °C) 97.6 °F (36.4 °C)   BP  Min: 111/67  Max: 148/85 (!) 141/78   Pulse  Min: 52  Max: 70  64   Resp  Min: 17  Max: 20 19   SpO2  Min: 94 %  Max: 98 % (!) 94 %     I reviewed the labs below:  Recent Labs   Lab 09/10/24  0437 09/13/24  0347 09/16/24  0437   WBC 6.23 5.01 6.47   RBC 3.56* 3.67* 3.90*   HGB 10.5* 11.0* 11.6*   HCT 32.9* 32.8* 35.4*   MCV 92.4 89.4 90.8   MCH 29.5 30.0 29.7   MCHC 31.9* 33.5 32.8*   RDW 12.1 12.2 12.3    269 279   MPV 8.5 8.7 8.6     Recent Labs   Lab 09/10/24  0437 09/13/24  0347 09/16/24  0437    142 140   K 4.2 3.9 4.2    107 105   CO2 26 28 29   BUN 17.3 17.4 18.3   CREATININE 0.87 0.82 0.78   CALCIUM 9.0 9.2 9.4   MG 2.00 1.80  --      Microbiology Results (last 7 days)       ** No results found for the last 168 hours. **           See below for Radiology    Intake and Output:    Intake/Output Summary (Last 24 hours) at 9/16/2024 0608  Last data filed at 9/15/2024 1916  Gross per 24 hour   Intake 200 ml   Output 1 ml   Net 199 ml         Assessment/Plan:  Acute intra parenchymal hemorrhage-right thalamic capsular with edema/midline shift/intraventricular extension/early obstructive hydrocephalus  Acute subarachnoid hemorrhage   Left-sided weakness/dysarthria secondary to above  Oropharyngeal dysphagia initially secondary to above- cleared for modified diet now  New diagnosis essential HTN- stable  Impulsive behavior - improving  Urinary retention-improved today  History of Crohn's  disease/GERD  Chronic tobacco use/substance use  Moderate malnutrition     Neurology recommended avoiding all antiplatelet and anticoagulation  Psychiatry was consulted for his aggressive behavior during the initial days of his admission, recommended to continue Zoloft, risperidone p.r.n. Trazodone for bedtime, since then patient cooperative and calm  Blood pressure normal off of all antihypertensive medications   Neurology, neurosurgery has now signed off  Continue Keppra for seizure prophylaxis  Mobilize.  Monitor for urinary retention  Continue p.o. and topical analgesics, p.r.n. anxiolytics    Continue with PT and OT Case management is working on placement    VTE prophylaxis:  SCDs, no chemical prophylaxis given IPH    Patient condition:  Stable    Anticipated discharge and Disposition:   SNF once accepted, patient is medically cleared for discharge      All diagnosis and differential diagnosis have been reviewed; assessment and plan has been documented; I have personally reviewed the labs and test results that are presently available; I have reviewed the patients medication list; I have reviewed the consulting providers response and recommendations. I have reviewed or attempted to review medical records based upon their availability    All of the patient's questions have been  addressed and answered. Patient's is agreeable to the above stated plan. I will continue to monitor closely and make adjustments to medical management as needed.    Portions of this note dictated using EMR integrated voice recognition software, and may be subject to voice recognition errors not corrected at proofreading. Please contact writer for clarification if needed.   _____________________________________________________________________    Nutrition Status:  Patient meets ASPEN criteria for moderate malnutrition of acute illness or injury per RD assessment as evidenced by:  Energy Intake (Malnutrition):  (does not meet  criteria)  Weight Loss (Malnutrition):  (does not meet criteria)  Subcutaneous Fat (Malnutrition): mild depletion  Muscle Mass (Malnutrition): mild depletion           A minimum of two characteristics is recommended for diagnosis of either severe or non-severe malnutrition.     Current Med:   atorvastatin  40 mg Oral QHS    diclofenac sodium  2 g Topical (Top) TID    gabapentin  100 mg Oral TID    levETIRAcetam  500 mg Oral BID    miconazole NITRATE 2 %   Topical (Top) BID    olopatadine  1 drop Both Eyes BID    risperidone 1 mg/ml  1 mg Oral BID    sertraline  50 mg Oral Daily    tamsulosin  0.4 mg Oral Daily      PRN meds:  Current Facility-Administered Medications:     0.9% NaCl, , Intravenous, PRN    acetaminophen, 650 mg, Rectal, Q6H PRN    acetaminophen, 650 mg, Oral, Q6H PRN    albuterol, 2 puff, Inhalation, Q6H PRN    ALPRAZolam, 0.25 mg, Oral, Q4H PRN    ammonium lactate, , Topical (Top), BID PRN    bisacodyL, 10 mg, Rectal, Daily PRN    cloNIDine, 0.1 mg, Oral, Q6H PRN    diphenhydrAMINE, 25 mg, Oral, Q6H PRN    haloperidol lactate, 5 mg, Intravenous, Q6H PRN    hydrALAZINE, 10 mg, Intravenous, Q4H PRN    oxyCODONE, 5 mg, Oral, Q6H PRN    polyethylene glycol, 17 g, Oral, BID PRN    traMADoL, 50 mg, Oral, Q6H PRN    traZODone, 100 mg, Oral, Nightly PRN    Continuous infusion:       Radiology:   I have personally reviewed the images and agree with radiologist report  X-Ray Shoulder 2 or More Views Left  Narrative: EXAMINATION:  XR SHOULDER COMPLETE 2 OR MORE VIEWS LEFT    CLINICAL HISTORY:  Left shoulder pain;    COMPARISON:  None.    FINDINGS:  No acute displaced fractures or dislocations.    Joint spaces preserved.    No blastic or lytic lesions.    Soft tissues within normal limits.  Impression: No acute osseous abnormality.    Electronically signed by: Nikhil Ignacio  Date:    09/12/2024  Time:    07:55  X-Ray Hip 2 or 3 views Left with Pelvis when performed  Narrative: EXAMINATION:  XR HIP WITH  PELVIS WHEN PERFORMED 2 OR 3 VIEWS LEFT    CLINICAL HISTORY:  left hip pain;    COMPARISON:  None.    FINDINGS:  No acute displaced fractures or dislocations.    There is narrowing of the inferior medial aspect of both hip joints with minimal degenerative changes of the lumbosacral spine articular spaces are otherwise preserved with smooth articular surfaces    No blastic or lytic lesions.    Soft tissues within normal limits.  Impression: Minimal degenerative changes.    Electronically signed by: Nikhil Ignacio  Date:    09/12/2024  Time:    07:53  CT Head Without Contrast  Narrative: EXAMINATION:  CT HEAD WITHOUT CONTRAST    CLINICAL HISTORY:  Pt states that he fell here on 9/6/2024 in his room and hit his head;    TECHNIQUE:  CT imaging of the head performed from the skull base to the vertex without intravenous contrast.   mGycm. Automatic exposure control, adjustment of mA/kV or iterative reconstruction technique was used to reduce radiation.    COMPARISON:  12 August 2024    FINDINGS:  There is improved appearance of the right basal ganglia hematoma with decreased mass effect.  Minimal residual midline shift towards the left.  The ventricles are not enlarged.  No new parenchymal attenuation abnormality.    Visualized paranasal sinuses and mastoid air cells are clear.  Impression: Improved appearance of the right basal ganglia hematoma.  No new suspicious findings.    Electronically signed by: Clifford Doyle  Date:    09/12/2024  Time:    06:41        Angi Rodríguez MD  Department of Hospital Medicine   [facility]   09/16/2024

## 2024-09-16 NOTE — PLAN OF CARE
Sent clinical updates to [facility] via [employer] Fax. Will follow up with these facilities regarding placement.    Left message for Admissions coordinators of both facilities. Awaiting calls back at this time.     Please see my addended note from 8/22 for a full list of facilities that have received referrals and their responses.

## 2024-09-16 NOTE — NURSING
Nurses Note -- 4 Eyes      9/15/2024   9:32 PM      Skin assessed during: Q Shift Change      [] No Altered Skin Integrity Present    [x]Prevention Measures Documented      [x] Yes- Altered Skin Integrity Present or Discovered   [] LDA Added if Not in Epic (Describe Wound)   [] New Altered Skin Integrity was Present on Admit and Documented in LDA   [x] Wound Image Taken    Wound Care Consulted? Yes    Attending Nurse:  Charline Bethea RN/Staff Member:  Princess LOPES RN

## 2024-09-16 NOTE — PT/OT/SLP PROGRESS
Occupational Therapy   Treatment    Name: Dell Garrett Jr.  MRN: [id number]  Admitting Diagnosis:  Intraparenchymal hemorrhage of brain       Recommendations:     Recommended therapy intensity at discharge: Moderate Intensity Therapy   Discharge Equipment Recommendations:  to be determined by next level of care  Barriers to discharge:  Decreased caregiver support    Assessment:     Dell Garrett Jr. is a 59 y.o. male with a medical diagnosis of Intraparenchymal hemorrhage of brain.  He presents with good motivation and participation. Performance deficits affecting function are weakness, gait instability, decreased upper extremity function, decreased lower extremity function, impaired balance, impaired endurance, decreased safety awareness, impaired self care skills, impaired functional mobility, abnormal tone.     Rehab Prognosis:  Good; patient would benefit from acute skilled OT services to address these deficits and reach maximum level of function.       Plan:     Patient to be seen 5 x/week to address the above listed problems via self-care/home management, therapeutic activities, therapeutic exercises, neuromuscular re-education  Plan of Care Expires: 10/11/24  Plan of Care Reviewed with: patient    Subjective     Pain/Comfort:  Pain Rating 1: 0/10    Objective:     Communicated with: nurse prior to session.  Patient found HOB elevated with telemetry, PureWick upon OT entry to room.    General Precautions: Standard, fall    Orthopedic Precautions:N/A  Braces:  (L resting hand splint)  Respiratory Status: Room air     Occupational Performance:     Bed Mobility:    Patient completed Rolling/Turning to Left with  moderate assistance  Patient completed Rolling/Turning to Right with total assistance  Patient completed Scooting/Bridging with maximal assistance  Patient completed Supine to Sit with maximal assistance     Functional Mobility/Transfers:  Patient completed Sit <> Stand Transfer with moderate assistance   with  no assistive device and gait belt   Patient completed Bed <> Chair Transfer using Step Transfer technique with maximal assistance with no assistive device and gait belt and blocking left knee    Activities of Daily Living:  Toileting: total assistance supine and side lying in bed secondary to soiled with purewick  Sitting balance and tolerance edge of bed with Mod A tends to pull to right side, max verbal and tactile cues required to remain in midline    Therapeutic Activities:  Weight bearing on LUE seated at edge of bed     Therapeutic Positioning    OT interventions performed during the course of today's session in an effort to prevent and/or reduce acquired pressure injuries:   Therapeutic positioning was provided at the conclusion of session elevating left UE and wedging left side to increase upright sitting    AMPA 6 Click ADL: 13    Patient Education:  Patient provided with verbal education and demonstrations education regarding fall prevention and safety awareness.  Understanding was verbalized, however additional teaching warranted.      Patient left up in chair with all lines intact, call button in reach, chair alarm on, kimberly pad in place, and nurse present.    GOALS:   Multidisciplinary Problems       Occupational Therapy Goals          Problem: Occupational Therapy    Goal Priority Disciplines Outcome Interventions   Occupational Therapy Goal     OT, PT/OT Progressing    Description: Goals to be met by:  10/12/24    Patient will increase functional independence with ADLs by performing:    UE Dressing with Stand-by Assistance.  LE Dressing with mod A  Grooming while seated at sink with SBA. -revised 9/5  Toileting from toilet with Min A for hygiene and clothing management. -revised 9/5  Toilet transfer to toilet with Min A. -revised 9/5  Increased functional strength to 3/5 LUE through therEX, neuromuscular re-ed.  Pt will visually attend to L side of environment with no cues                        Time Tracking:     OT Date of Treatment: 09/16/24  OT Start Time: 1110  OT Stop Time: 1148  OT Total Time (min): 38 min    Billable Minutes:Therapeutic Activity 23  Neuromuscular Re-education 15    OT/MAC: MAC     Number of MAC visits since last OT visit: 3    9/16/2024

## 2024-09-16 NOTE — PT/OT/SLP RE-EVAL
Physical Therapy Re-Evaluation    Patient Name:  Dell Garrett Jr.   MRN:  [id number]    Recommendations:     Discharge therapy intensity: Moderate Intensity Therapy   Discharge Equipment Recommendations: to be determined by next level of care   Barriers to discharge: Decreased caregiver support    Assessment:     Dell Garrett Jr. is a 59 y.o. male admitted with a medical diagnosis of acute IPH R thalamic capsular with midline shift, acute SAH, L-sided weakness.  He presents with the following impairments/functional limitations: weakness, impaired endurance, impaired cognition, impaired self care skills, decreased upper extremity function, decreased lower extremity function, impaired functional mobility, gait instability, decreased safety awareness, impaired balance . Pt now with return of functional strength of LLE, primarily hip flexion. Able to initiate LLE advancement during gait training. Sitting/standing postural control still with severe deficits impacting mobility. Overall modA x2 for functional mobility. Pt with good participation in therapy sessions and encouraged by his progress thus far. Continue to recommend moderate intensity therapy at discharge.    Rehab Prognosis: Good; patient would benefit from acute skilled PT services to address these deficits and reach maximum level of function.    Recent Surgery: * No surgery found *      Plan:     During this hospitalization, patient would benefit from acute PT services 5 x/week to address the identified rehab impairments via gait training, therapeutic activities, therapeutic exercises, neuromuscular re-education and progress toward the following goals:    Plan of Care Expires:  10/16/24    PT/PTA conference to discuss PT POC and patient's progression towards goals held with Julio Kay PTA.     Subjective     Chief Complaint: ready to get back to bed  Patient/Family Comments/goals: independent, walking  Pain/Comfort:  Pain Rating 1: 0/10    Patients  cultural, spiritual, Mandaeism conflicts given the current situation: no    Objective:     Communicated with RN prior to session.  Patient found up in chair with telemetry, PureWick, chair check  upon PT entry to room.    General Precautions: Standard, fall  Orthopedic Precautions:N/A   Braces: N/A  Respiratory Status: Room air      Exams:  Postural Exam:  Patient presented with the following abnormalities:    -       Pushers syndrome with frequent L lateral LOB/lean  RLE Strength: WNL  LLE Strength: 2-/5 hip flexion, 1/5 quads, 0/5 ankle DF  Skin integrity: Visible skin intact      Functional Mobility:  Bed Mobility:     Sit to Supine: moderate assistance and of 2 persons  Transfers:     Sit to Stand:  minimum assistance, moderate assistance, and of 2 persons with grab bars  Bed to Chair: moderate assistance and of 2 persons with  no AD  using  Stand Pivot  Gait: 1x10ft and 1x15ft with R handrail with modAx2, max verbal cues for weight shifting and BLE advancement. L knee blocked in stance phase, chair close in tow  Balance: Excessive L lean in sitting worse than standing      AM-PAC 6 CLICK MOBILITY  Total Score:12       Treatment & Education:  2 gait trials at [facility] includes the [facility] with RUE support    Patient provided with verbal education education regarding PT role/goals/POC, fall prevention, safety awareness, and discharge/DME recommendations.  Understanding was verbalized, however additional teaching warranted.     Patient left HOB elevated with all lines intact, call button in reach, bed alarm on, RN notified, and LUE propped on pillows .    GOALS:   Multidisciplinary Problems       Physical Therapy Goals          Problem: Physical Therapy    Goal Priority Disciplines Outcome Goal Variances Interventions   Physical Therapy Goal     PT, PT/OT Progressing     Description: Goals to be met by: 10/16/2024     Patient will increase functional independence with mobility by performin. Supine to sit with MInimal  Assistance  2. Sit to supine with MInimal Assistance  3. Sit to stand transfer with Minimal Assistance  4. Pt to follow 75% of commands. MET 9/16/24  5. Gait  x 75 feet with Minimal Assistance using Rolling Walker vs LRAD.   6. Pt will increase functional strength of LLE to 3/5 hip/knee extension.                         History:     Past Medical History:   Diagnosis Date    Crohn's disease     GERD (gastroesophageal reflux disease)        History reviewed. No pertinent surgical history.    Time Tracking:     PT Received On: 09/16/24  PT Start Time: 1425     PT Stop Time: 1451  PT Total Time (min): 26 min     Billable Minutes: Re-eval 1 and Gait Training 10      09/16/2024

## 2024-09-16 NOTE — PLAN OF CARE
Problem: Physical Therapy  Goal: Physical Therapy Goal  Description: Goals to be met by: 10/16/2024     Patient will increase functional independence with mobility by performin. Supine to sit with MInimal Assistance  2. Sit to supine with MInimal Assistance  3. Sit to stand transfer with Minimal Assistance  4. Pt to follow 75% of commands. MET 24  5. Gait  x 75 feet with Minimal Assistance using Rolling Walker vs LRAD.   6. Pt will increase functional strength of LLE to 3/5 hip/knee extension.    Outcome: Progressing

## 2024-09-17 PROCEDURE — 25000003 PHARM REV CODE 250: Performed by: INTERNAL MEDICINE

## 2024-09-17 PROCEDURE — 21400001 HC TELEMETRY ROOM

## 2024-09-17 PROCEDURE — 25000003 PHARM REV CODE 250: Performed by: HOSPITALIST

## 2024-09-17 PROCEDURE — 97530 THERAPEUTIC ACTIVITIES: CPT | Mod: CQ

## 2024-09-17 PROCEDURE — 25000003 PHARM REV CODE 250

## 2024-09-17 PROCEDURE — 97530 THERAPEUTIC ACTIVITIES: CPT | Mod: CO

## 2024-09-17 PROCEDURE — 25000003 PHARM REV CODE 250: Performed by: NURSE PRACTITIONER

## 2024-09-17 PROCEDURE — 92526 ORAL FUNCTION THERAPY: CPT

## 2024-09-17 PROCEDURE — 97112 NEUROMUSCULAR REEDUCATION: CPT | Mod: CO

## 2024-09-17 PROCEDURE — 97535 SELF CARE MNGMENT TRAINING: CPT | Mod: CO

## 2024-09-17 RX ADMIN — OXYCODONE HYDROCHLORIDE 5 MG: 5 TABLET ORAL at 09:09

## 2024-09-17 RX ADMIN — OLOPATADINE HYDROCHLORIDE 1 DROP: 1 SOLUTION OPHTHALMIC at 09:09

## 2024-09-17 RX ADMIN — TAMSULOSIN HYDROCHLORIDE 0.4 MG: 0.4 CAPSULE ORAL at 08:09

## 2024-09-17 RX ADMIN — DIPHENHYDRAMINE HYDROCHLORIDE 25 MG: 25 CAPSULE ORAL at 01:09

## 2024-09-17 RX ADMIN — ATORVASTATIN CALCIUM 40 MG: 40 TABLET, FILM COATED ORAL at 09:09

## 2024-09-17 RX ADMIN — OXYCODONE HYDROCHLORIDE 5 MG: 5 TABLET ORAL at 08:09

## 2024-09-17 RX ADMIN — MICONAZOLE NITRATE 2 % TOPICAL POWDER: at 09:09

## 2024-09-17 RX ADMIN — RISPERIDONE 1 MG: 1 SOLUTION ORAL at 08:09

## 2024-09-17 RX ADMIN — Medication: at 08:09

## 2024-09-17 RX ADMIN — AMMONIUM LACTATE: 12 LOTION TOPICAL at 09:09

## 2024-09-17 RX ADMIN — LEVETIRACETAM 500 MG: 500 TABLET, FILM COATED ORAL at 08:09

## 2024-09-17 RX ADMIN — GABAPENTIN 100 MG: 100 CAPSULE ORAL at 09:09

## 2024-09-17 RX ADMIN — OLOPATADINE HYDROCHLORIDE 1 DROP: 1 SOLUTION OPHTHALMIC at 08:09

## 2024-09-17 RX ADMIN — RISPERIDONE 1 MG: 1 SOLUTION ORAL at 09:09

## 2024-09-17 RX ADMIN — MICONAZOLE NITRATE 2 % TOPICAL POWDER: at 08:09

## 2024-09-17 RX ADMIN — GABAPENTIN 100 MG: 100 CAPSULE ORAL at 08:09

## 2024-09-17 RX ADMIN — SERTRALINE HYDROCHLORIDE 50 MG: 50 TABLET ORAL at 08:09

## 2024-09-17 RX ADMIN — LEVETIRACETAM 500 MG: 500 TABLET, FILM COATED ORAL at 09:09

## 2024-09-17 RX ADMIN — Medication: at 09:09

## 2024-09-17 RX ADMIN — TRAZODONE HYDROCHLORIDE 100 MG: 100 TABLET ORAL at 09:09

## 2024-09-17 RX ADMIN — ALPRAZOLAM 0.25 MG: 0.25 TABLET ORAL at 08:09

## 2024-09-17 NOTE — PT/OT/SLP PROGRESS
[facility]  Speech Language Pathology Department  Dysphagia Therapy Progress Note    Patient Name:  Dell Garrett Jr.   MRN:  [id number]    Recommendations     General recommendations:  dysphagia therapy and cognitive-linguistic therapy  Solid texture recommendation:  Puree Diet - IDDSI Level 4  Liquid consistency recommendation: Thin liquids - IDDSI Level 0   Medications: crushed in puree  Aspiration precautions: small bites/sips, slow rate, additional swallow per bite/sip, alternate solids/liquids, check for pocketing, upright for PO intake, supervision with meals, assist with feeding as needed, and limit distractions     Discharge therapy intensity: Moderate Intensity Therapy   Barriers to safe discharge:  limited insight into deficits and safety awareness    Subjective     Patient awake, alert, and cooperative.  Spiritual/Cultural/Presybeterian Beliefs/Practices that affect care: no    Pain/Comfort: Pain Rating 1: 0/10    Objective     Therapeutic PO Trials:  Consistency Amount Fed By Oral Symptoms Pharyngeal Symptoms   Mildly thick liquid by cup ~3 oz Self Decreased lip closure  Anterior spillage None   Puree x6 SLP Bolus holding  Prolonged bolus formation/mastication None     Laryngeal/BOT exercises x20 completed given moderate assistance.  Effortful swallows completed with each PO trial.    Assessment     Pt continues to present with oropharyngeal dysphagia requiring diet modification to improve swallow safety and efficiency.    Outcome Measures     Functional Oral Intake Scale: 5 - Total oral diet with multiple consistencies, by requiring special preparation or compensations    Goals     Multidisciplinary Problems       SLP Goals          Problem: SLP    Goal Priority Disciplines Outcome   SLP Goal     SLP Progressing   Description: LTG: Patient will tolerate safest and least restrictive PO diet without signs/sx of aspiration.  STG: Meal trial of pureed solids and mildly thick liquids  via cup without adequate REILLY and no signs/sx of aspiration. -met  STG: Minced/moist solids with adequate bolus preparation/mastication and without signs/sx of aspiration. -resume  STG: Soft/bite sized solids with adequate bolus preparation/mastication and without signs/sx of aspiration.  STG: Laryngeal/BOT exercises Min A.    LTG: Patient will improve cognitive-linguistic skills in order to return to PLOF.  STG: Dysarthria drills at the phrase level with 90% accuracy.  STG: Problem solving/reasoning tasks with 90% accuracy.  STG: Short term memory tasks with 90% accuracy.                     Patient Education     Patient provided with verbal education regarding ST POC.  Understanding was verbalized, however additional teaching warranted.    Plan     Will continue to follow and tx as appropriate.    SLP Follow-Up:  Yes   Patient to be seen:  5 x/week   Plan of Care expires:  09/24/24  Plan of Care reviewed with:  patient       Time Tracking     SLP Treatment Date:   09/17/24  Speech Start Time:  1015  Speech Stop Time:  1030     Speech Total Time (min):  15 min    Billable minutes:  Treatment of Swallow Dysfunction, 15 minutes       09/17/2024

## 2024-09-17 NOTE — PT/OT/SLP PROGRESS
Physical Therapy Treatment    Patient Name:  Dell Garrett Jr.   MRN:  [id number]    Recommendations:     Discharge therapy intensity: Moderate Intensity Therapy   Discharge Equipment Recommendations: to be determined by next level of care  Barriers to discharge: Decreased caregiver support and Impaired mobility    Assessment:     Dell Garrett Jr. is a 59 y.o. male admitted with a medical diagnosis of acute IPH R thalamic capsular with midline shift, acute SAH, L-sided weakness .  He presents with the following impairments/functional limitations: weakness, impaired endurance, impaired self care skills, impaired functional mobility, gait instability, impaired balance, decreased upper extremity function, decreased lower extremity function, impaired cognition, decreased safety awareness, pain, impaired cardiopulmonary response to activity .    Rehab Prognosis: Good; patient would benefit from acute skilled PT services to address these deficits and reach maximum level of function.    Recent Surgery: * No surgery found *      Plan:     During this hospitalization, patient would benefit from acute PT services 5 x/week to address the identified rehab impairments via gait training, therapeutic activities and progress toward the following goals:    Plan of Care Expires:  10/16/24    Subjective     Chief Complaint:   Patient/Family Comments/goals:   Pain/Comfort:         Objective:     Communicated with nurse prior to session.  Patient found  on floor  with pulse ox (continuous), telemetry, peripheral IV, chair check upon PT entry to room.     General Precautions: Standard, fall  Orthopedic Precautions: N/A  Braces: N/A  Respiratory Status: Room air  Blood Pressure:   Skin Integrity: Visible skin intact      Functional Mobility:  Pt found on floor in front of chair, totalA t/f back to chair and then squat pivot transferred with modAx2 to EOB.   Returned to supine and placed with all needs within reach.   Nursing informed of  patient fall and complaints of minimal L knee pain.     Education:  Patient provided with verbal education education regarding fall prevention and safety awareness.  Understanding was verbalized, however additional teaching warranted.     Patient left HOB elevated with all lines intact, call button in reach, and bed alarm on    GOALS:   Multidisciplinary Problems       Physical Therapy Goals          Problem: Physical Therapy    Goal Priority Disciplines Outcome Goal Variances Interventions   Physical Therapy Goal     PT, PT/OT Progressing     Description: Goals to be met by: 10/16/2024     Patient will increase functional independence with mobility by performin. Supine to sit with MInimal Assistance  2. Sit to supine with MInimal Assistance  3. Sit to stand transfer with Minimal Assistance  4. Pt to follow 75% of commands. MET 24  5. Gait  x 75 feet with Minimal Assistance using Rolling Walker vs LRAD.   6. Pt will increase functional strength of LLE to 3/5 hip/knee extension.                         Time Tracking:     PT Received On: 24  PT Start Time: 1431     PT Stop Time: 1451  PT Total Time (min): 20 min     Billable Minutes: Therapeutic Activity 20    Treatment Type: Treatment  PT/PTA: PTA     Number of PTA visits since last PT visit: 2024

## 2024-09-17 NOTE — PROGRESS NOTES
[facility]  Hospital Medicine Progress Note        Chief Complaint: Inpatient Follow-up for hemorrhagic stroke    HPI per admitting team: 59-year-old male with significant history of Crohn's disease, GERD, chronic tobacco use/substance use-cocaine presented to the ED with complaints of acute onset left-sided weakness, facial droop and aphasia along with sudden onset headache.  Imaging in the ED revealed intraparenchymal hemorrhage of right thalamus with surrounding edema, compression of right lateral ventricle with 8 mm midline shift, early obstructive hydrocephalus, possible subarachnoid hemorrhage. patient admitted to ICU, neurology and neurosurgery services were consulted.  CT angiogram with mild dolichoectasia of basilar artery , echocardiogram negative for bubble study, EF intact, grade 2 diastolic dysfunction. Conservative management, recommended to keep BP at goal, Keppra for seizure prevention, close neuro checks, 3% saline initiated.  Follow up CT with stable findings, therapy services consulted.  Holding anticoagulation, antiplatelets.  Hypertonic saline discontinued, initiate high-intensity statin, patient downgraded to hospital medicine services, case management consulted for placement, neurology recommended to continue to avoid antiplatelets.  Cleared for p.o. intake by speech, tolerating well.  Patient continues to participate with therapy services, case management actively working on placement.  Resumed home amlodipine, labs stable.  Psych consulted on 08/28 for impulsive behavior, initiated risperidone, Zoloft and p.r.n. trazodone   Concern for urinary retention, closely monitoring with bladder scan, added tamsulosin   spoke to sister over the phone   Patient asking when he can go home unfortunately he is unable to live by himself  He has been denied by multiple facilities and  is attempting to place him    Interval Hx:   Patient seen and examined this  morning all vitals have been stable saturating well on room air no issues reported by the nursing staff     Objective/physical exam:  General: In no acute distress, afebrile  Chest: Clear to auscultation bilaterally  Heart: RRR, +S1, S2, no appreciable murmur  Abdomen: Soft, nontender, BS +  MSK: Warm, no lower extremity edema, no clubbing or cyanosis  Neurologic: Alert and oriented,  VITAL SIGNS: 24 HRS MIN & MAX LAST   Temp  Min: 97.7 °F (36.5 °C)  Max: 99.1 °F (37.3 °C) 98 °F (36.7 °C)   BP  Min: 114/72  Max: 142/77 126/85   Pulse  Min: 58  Max: 72  (!) 58   Resp  Min: 17  Max: 20 20   SpO2  Min: 97 %  Max: 100 % 97 %     I reviewed the labs below:  Recent Labs   Lab 09/13/24 0347 09/16/24 0437   WBC 5.01 6.47   RBC 3.67* 3.90*   HGB 11.0* 11.6*   HCT 32.8* 35.4*   MCV 89.4 90.8   MCH 30.0 29.7   MCHC 33.5 32.8*   RDW 12.2 12.3    279   MPV 8.7 8.6     Recent Labs   Lab 09/13/24 0347 09/16/24 0437    140   K 3.9 4.2    105   CO2 28 29   BUN 17.4 18.3   CREATININE 0.82 0.78   CALCIUM 9.2 9.4   MG 1.80  --      Microbiology Results (last 7 days)       ** No results found for the last 168 hours. **           See below for Radiology    Intake and Output:  No intake or output data in the 24 hours ending 09/17/24 5578        Assessment/Plan:  Acute intra parenchymal hemorrhage-right thalamic capsular with edema/midline shift/intraventricular extension/early obstructive hydrocephalus  Acute subarachnoid hemorrhage   Left-sided weakness/dysarthria secondary to above  Oropharyngeal dysphagia initially secondary to above- cleared for modified diet now  New diagnosis essential HTN- stable  Impulsive behavior - improving  Urinary retention-improved today  History of Crohn's disease/GERD  Chronic tobacco use/substance use  Moderate malnutrition       All vitals and lab work looks stable  Neurology recommended avoiding all antiplatelet and anticoagulation  Psychiatry was consulted for his aggressive  behavior during the initial days of his admission, recommended to continue Zoloft, risperidone p.r.n. Trazodone for bedtime, since then patient cooperative and calm  Blood pressure normal off of all antihypertensive medications   Neurology, neurosurgery has now signed off  Continue Keppra for seizure prophylaxis  Mobilize.  Monitor for urinary retention  Continue p.o. and topical analgesics, p.r.n. anxiolytics    Continue with PT and OT Case management is working on placement patient has been denied by multiple facilities  is planning to give more options    VTE prophylaxis:  SCDs, no chemical prophylaxis given IPH    Patient condition:  Stable    Anticipated discharge and Disposition:   SNF once accepted, patient is medically cleared for discharge      All diagnosis and differential diagnosis have been reviewed; assessment and plan has been documented; I have personally reviewed the labs and test results that are presently available; I have reviewed the patients medication list; I have reviewed the consulting providers response and recommendations. I have reviewed or attempted to review medical records based upon their availability    All of the patient's questions have been  addressed and answered. Patient's is agreeable to the above stated plan. I will continue to monitor closely and make adjustments to medical management as needed.    Portions of this note dictated using EMR integrated voice recognition software, and may be subject to voice recognition errors not corrected at proofreading. Please contact writer for clarification if needed.   _____________________________________________________________________    Nutrition Status:  Patient meets ASPEN criteria for moderate malnutrition of acute illness or injury per RD assessment as evidenced by:  Energy Intake (Malnutrition):  (does not meet criteria)  Weight Loss (Malnutrition):  (does not meet criteria)  Subcutaneous Fat (Malnutrition): mild  depletion  Muscle Mass (Malnutrition): mild depletion           A minimum of two characteristics is recommended for diagnosis of either severe or non-severe malnutrition.     Current Med:   atorvastatin  40 mg Oral QHS    camphor-methyl salicyl-menthoL   Topical (Top) TID    gabapentin  100 mg Oral TID    levETIRAcetam  500 mg Oral BID    miconazole NITRATE 2 %   Topical (Top) BID    olopatadine  1 drop Both Eyes BID    risperidone 1 mg/ml  1 mg Oral BID    sertraline  50 mg Oral Daily    tamsulosin  0.4 mg Oral Daily      PRN meds:  Current Facility-Administered Medications:     0.9% NaCl, , Intravenous, PRN    acetaminophen, 650 mg, Rectal, Q6H PRN    acetaminophen, 650 mg, Oral, Q6H PRN    albuterol, 2 puff, Inhalation, Q6H PRN    ALPRAZolam, 0.25 mg, Oral, Q4H PRN    ammonium lactate, , Topical (Top), BID PRN    bisacodyL, 10 mg, Rectal, Daily PRN    cloNIDine, 0.1 mg, Oral, Q6H PRN    diphenhydrAMINE, 25 mg, Oral, Q6H PRN    haloperidol lactate, 5 mg, Intravenous, Q6H PRN    hydrALAZINE, 10 mg, Intravenous, Q4H PRN    oxyCODONE, 5 mg, Oral, Q6H PRN    polyethylene glycol, 17 g, Oral, BID PRN    traMADoL, 50 mg, Oral, Q6H PRN    traZODone, 100 mg, Oral, Nightly PRN    Continuous infusion:       Radiology:   I have personally reviewed the images and agree with radiologist report  X-Ray Shoulder 2 or More Views Left  Narrative: EXAMINATION:  XR SHOULDER COMPLETE 2 OR MORE VIEWS LEFT    CLINICAL HISTORY:  Left shoulder pain;    COMPARISON:  None.    FINDINGS:  No acute displaced fractures or dislocations.    Joint spaces preserved.    No blastic or lytic lesions.    Soft tissues within normal limits.  Impression: No acute osseous abnormality.    Electronically signed by: Nikhil Ignacio  Date:    09/12/2024  Time:    07:55  X-Ray Hip 2 or 3 views Left with Pelvis when performed  Narrative: EXAMINATION:  XR HIP WITH PELVIS WHEN PERFORMED 2 OR 3 VIEWS LEFT    CLINICAL HISTORY:  left hip  pain;    COMPARISON:  None.    FINDINGS:  No acute displaced fractures or dislocations.    There is narrowing of the inferior medial aspect of both hip joints with minimal degenerative changes of the lumbosacral spine articular spaces are otherwise preserved with smooth articular surfaces    No blastic or lytic lesions.    Soft tissues within normal limits.  Impression: Minimal degenerative changes.    Electronically signed by: Nikhil Ignacio  Date:    09/12/2024  Time:    07:53  CT Head Without Contrast  Narrative: EXAMINATION:  CT HEAD WITHOUT CONTRAST    CLINICAL HISTORY:  Pt states that he fell here on 9/6/2024 in his room and hit his head;    TECHNIQUE:  CT imaging of the head performed from the skull base to the vertex without intravenous contrast.   mGycm. Automatic exposure control, adjustment of mA/kV or iterative reconstruction technique was used to reduce radiation.    COMPARISON:  12 August 2024    FINDINGS:  There is improved appearance of the right basal ganglia hematoma with decreased mass effect.  Minimal residual midline shift towards the left.  The ventricles are not enlarged.  No new parenchymal attenuation abnormality.    Visualized paranasal sinuses and mastoid air cells are clear.  Impression: Improved appearance of the right basal ganglia hematoma.  No new suspicious findings.    Electronically signed by: Clifford Doyle  Date:    09/12/2024  Time:    06:41        Angi Rodríguez MD  Department of Hospital Medicine   [facility]   09/17/2024

## 2024-09-17 NOTE — PT/OT/SLP PROGRESS
Occupational Therapy   Treatment    Name: Dell Garrett Jr.  MRN: [id number]  Admitting Diagnosis:  Intraparenchymal hemorrhage of brain       Recommendations:     Recommended therapy intensity at discharge: Moderate Intensity Therapy   Discharge Equipment Recommendations:  to be determined by next level of care  Barriers to discharge:  Decreased caregiver support    Assessment:     Dell Garrett Jr. is a 59 y.o. male with a medical diagnosis of Intraparenchymal hemorrhage of brain.  He presents with good participation. Performance deficits affecting function are weakness, gait instability, decreased upper extremity function, decreased lower extremity function, impaired balance, impaired endurance, decreased safety awareness, impaired cognition, impaired self care skills, impaired functional mobility, decreased coordination.     Rehab Prognosis:  Good; patient would benefit from acute skilled OT services to address these deficits and reach maximum level of function.       Plan:     Patient to be seen 5 x/week to address the above listed problems via self-care/home management, therapeutic activities, therapeutic exercises, neuromuscular re-education  Plan of Care Expires: 10/11/24  Plan of Care Reviewed with: patient    Subjective     Pain/Comfort:       Objective:     Communicated with: nurse prior to session.  Patient found HOB elevated, leaning to left side with pulse ox (continuous), telemetry, peripheral IV, chair check upon OT entry to room.    General Precautions: Standard, fall    Orthopedic Precautions:N/A  Braces:  (L resting hand splint)  Respiratory Status: Room air     Occupational Performance:     Bed Mobility:    Patient completed Rolling/Turning to Left with  moderate assistance  Patient completed Rolling/Turning to Right with moderate assistance  Patient completed Supine to Sit with moderate assistance     Functional Mobility/Transfers:  Patient completed Sit <> Stand Transfer with moderate assistance  and of 2 persons  with  no assistive device   Patient completed Bed <> Chair Transfer using Stand Pivot technique with moderate assistance and of 2 persons with no assistive device    Therapeutic Activities:  Sitting balance ax with focus on remaining in midline and correcting of leaning to weak side, and acknowledging sitting position  Weight bearing on LUE sitting edge of bed  PROM to LUE following weight bearing ax     Therapeutic Positioning    OT interventions performed during the course of today's session in an effort to prevent and/or reduce acquired pressure injuries:   Therapeutic positioning was provided at the conclusion of session to offload all bony prominences for the prevention and/or reduction of pressure injuries and wedging patient on left hand side to ovoid leaning in chair    [facility] 6 Click ADL: 12    Patient Education:  Patient provided with verbal education and demonstrations education regarding fall prevention and safety awareness.  Understanding was verbalized, however additional teaching warranted.      Patient left up in chair with all lines intact and call button in reach.    GOALS:   Multidisciplinary Problems       Occupational Therapy Goals          Problem: Occupational Therapy    Goal Priority Disciplines Outcome Interventions   Occupational Therapy Goal     OT, PT/OT Progressing    Description: Goals to be met by:  10/12/24    Patient will increase functional independence with ADLs by performing:    UE Dressing with Stand-by Assistance.  LE Dressing with mod A  Grooming while seated at sink with SBA. -revised 9/5  Toileting from toilet with Min A for hygiene and clothing management. -revised 9/5  Toilet transfer to toilet with Min A. -revised 9/5  Increased functional strength to 3/5 LUE through therEX, neuromuscular re-ed.  Pt will visually attend to L side of environment with no cues                       Time Tracking:     OT Date of Treatment: 09/17/24  OT Start Time: 1000  OT  Stop Time: 1039  OT Total Time (min): 39 min    Billable Minutes:Therapeutic Activity 23  Neuromuscular Re-education 16    OT/MAC: MAC     Number of MAC visits since last OT visit: 4    9/17/2024

## 2024-09-17 NOTE — PLAN OF CARE
Sent clinical updates to facilities still reviewing this patient's referral. Awaiting response at this time.      For detailed list of referrals sent at their response please see addended note from 8/22.

## 2024-09-17 NOTE — PLAN OF CARE
Problem: Adult Inpatient Plan of Care  Goal: Optimal Comfort and Wellbeing  Outcome: Progressing     Problem: Skin Injury Risk Increased  Goal: Skin Health and Integrity  Outcome: Progressing     Problem: Stroke, Intracerebral Hemorrhage  Goal: Optimal Coping  Outcome: Progressing     Problem: Stroke, Intracerebral Hemorrhage  Goal: Optimal Cerebral Tissue Perfusion  Outcome: Progressing     Problem: Stroke, Intracerebral Hemorrhage  Goal: Effective Communication Skills  Outcome: Progressing     Problem: Stroke, Intracerebral Hemorrhage  Goal: Optimal Functional Ability  Outcome: Progressing     Problem: Stroke, Intracerebral Hemorrhage  Goal: Optimal Pain Control and Function  Outcome: Progressing

## 2024-09-17 NOTE — PT/OT/SLP PROGRESS
Occupational Therapy   Treatment    Name: Dell Garrett Jr.  MRN: [id number]  Admitting Diagnosis:  Intraparenchymal hemorrhage of brain       Recommendations:     Recommended therapy intensity at discharge: Moderate Intensity Therapy   Discharge Equipment Recommendations:  to be determined by next level of care  Barriers to discharge:  Decreased caregiver support    Assessment:     Dell Garrett Jr. is a 59 y.o. male with a medical diagnosis of Intraparenchymal hemorrhage of brain.  He presents with encouragement regarding safety concerns. Performance deficits affecting function are weakness, gait instability, decreased upper extremity function, decreased lower extremity function, impaired balance, impaired endurance, decreased safety awareness, impaired cognition, impaired self care skills, impaired functional mobility, decreased coordination.     Rehab Prognosis:  Good; patient would benefit from acute skilled OT services to address these deficits and reach maximum level of function.       Plan:     Patient to be seen 5 x/week to address the above listed problems via self-care/home management, therapeutic activities, therapeutic exercises, neuromuscular re-education  Plan of Care Expires: 10/11/24  Plan of Care Reviewed with: patient    Subjective     Pain/Comfort:       Objective:     Communicated with: nurse prior to session.  Patient found HOB elevated with pulse ox (continuous), telemetry, peripheral IV, chair check upon OT entry to room.    General Precautions: Standard, fall    Orthopedic Precautions:N/A  Braces:  (L resting hand splint)  Respiratory Status: Room air     Occupational Performance:     Activities of Daily Living:  Education with patient on safety and the need to call for assistance before attempting mobility independently, patient unaware of his limitations with mobility. Education on use of call light and safety associated with a fall. Patient able to acknowledge understanding however  continues to state he is capable of transferring  himself    [facility] 6 Click ADL: 12    Patient Education:  Patient provided with verbal education and demonstrations education regarding safety awareness.  Additional teaching is warranted.      Patient left HOB elevated with all lines intact and call button in reach.    GOALS:   Multidisciplinary Problems       Occupational Therapy Goals          Problem: Occupational Therapy    Goal Priority Disciplines Outcome Interventions   Occupational Therapy Goal     OT, PT/OT Progressing    Description: Goals to be met by:  10/12/24    Patient will increase functional independence with ADLs by performing:    UE Dressing with Stand-by Assistance.  LE Dressing with mod A  Grooming while seated at sink with SBA. -revised 9/5  Toileting from toilet with Min A for hygiene and clothing management. -revised 9/5  Toilet transfer to toilet with Min A. -revised 9/5  Increased functional strength to 3/5 LUE through therEX, neuromuscular re-ed.  Pt will visually attend to L side of environment with no cues                       Time Tracking:     OT Date of Treatment: 09/17/24  OT Start Time: 1516  OT Stop Time: 1530  OT Total Time (min): 14 min    Billable Minutes:Self Care/Home Management 14    OT/MAC: MAC     Number of MAC visits since last OT visit: 4    9/17/2024

## 2024-09-18 PROCEDURE — 92507 TX SP LANG VOICE COMM INDIV: CPT

## 2024-09-18 PROCEDURE — 25000003 PHARM REV CODE 250: Performed by: INTERNAL MEDICINE

## 2024-09-18 PROCEDURE — 25000003 PHARM REV CODE 250: Performed by: HOSPITALIST

## 2024-09-18 PROCEDURE — 21400001 HC TELEMETRY ROOM

## 2024-09-18 PROCEDURE — 97530 THERAPEUTIC ACTIVITIES: CPT | Mod: CQ

## 2024-09-18 PROCEDURE — 25000003 PHARM REV CODE 250

## 2024-09-18 PROCEDURE — 97116 GAIT TRAINING THERAPY: CPT | Mod: CQ

## 2024-09-18 RX ADMIN — LEVETIRACETAM 500 MG: 500 TABLET, FILM COATED ORAL at 09:09

## 2024-09-18 RX ADMIN — LEVETIRACETAM 500 MG: 500 TABLET, FILM COATED ORAL at 08:09

## 2024-09-18 RX ADMIN — OXYCODONE HYDROCHLORIDE 5 MG: 5 TABLET ORAL at 07:09

## 2024-09-18 RX ADMIN — Medication: at 09:09

## 2024-09-18 RX ADMIN — OLOPATADINE HYDROCHLORIDE 1 DROP: 1 SOLUTION OPHTHALMIC at 08:09

## 2024-09-18 RX ADMIN — MICONAZOLE NITRATE 2 % TOPICAL POWDER: at 09:09

## 2024-09-18 RX ADMIN — GABAPENTIN 100 MG: 100 CAPSULE ORAL at 03:09

## 2024-09-18 RX ADMIN — OLOPATADINE HYDROCHLORIDE 1 DROP: 1 SOLUTION OPHTHALMIC at 09:09

## 2024-09-18 RX ADMIN — Medication: at 03:09

## 2024-09-18 RX ADMIN — TAMSULOSIN HYDROCHLORIDE 0.4 MG: 0.4 CAPSULE ORAL at 09:09

## 2024-09-18 RX ADMIN — ATORVASTATIN CALCIUM 40 MG: 40 TABLET, FILM COATED ORAL at 08:09

## 2024-09-18 RX ADMIN — GABAPENTIN 100 MG: 100 CAPSULE ORAL at 08:09

## 2024-09-18 RX ADMIN — ALPRAZOLAM 0.25 MG: 0.25 TABLET ORAL at 04:09

## 2024-09-18 RX ADMIN — OXYCODONE HYDROCHLORIDE 5 MG: 5 TABLET ORAL at 08:09

## 2024-09-18 RX ADMIN — AMMONIUM LACTATE: 12 LOTION TOPICAL at 09:09

## 2024-09-18 RX ADMIN — RISPERIDONE 1 MG: 1 SOLUTION ORAL at 09:09

## 2024-09-18 RX ADMIN — Medication: at 08:09

## 2024-09-18 RX ADMIN — RISPERIDONE 1 MG: 1 SOLUTION ORAL at 08:09

## 2024-09-18 RX ADMIN — MICONAZOLE NITRATE 2 % TOPICAL POWDER: at 08:09

## 2024-09-18 RX ADMIN — TRAZODONE HYDROCHLORIDE 100 MG: 100 TABLET ORAL at 08:09

## 2024-09-18 RX ADMIN — SERTRALINE HYDROCHLORIDE 50 MG: 50 TABLET ORAL at 09:09

## 2024-09-18 RX ADMIN — GABAPENTIN 100 MG: 100 CAPSULE ORAL at 09:09

## 2024-09-18 NOTE — PLAN OF CARE
Problem: Adult Inpatient Plan of Care  Goal: Plan of Care Review  Outcome: Progressing  Goal: Patient-Specific Goal (Individualized)  Outcome: Progressing  Goal: Absence of Hospital-Acquired Illness or Injury  Outcome: Progressing  Goal: Optimal Comfort and Wellbeing  Outcome: Progressing  Goal: Readiness for Transition of Care  Outcome: Progressing     Problem: Skin Injury Risk Increased  Goal: Skin Health and Integrity  Outcome: Progressing     Problem: Stroke, Intracerebral Hemorrhage  Goal: Optimal Cognitive Function  Outcome: Progressing     Problem: Stroke, Intracerebral Hemorrhage  Goal: Optimal Nutrition Intake  Outcome: Progressing     Problem: Stroke, Intracerebral Hemorrhage  Goal: Optimal Pain Control and Function  Outcome: Progressing     Problem: Stroke, Intracerebral Hemorrhage  Goal: Effective Oxygenation and Ventilation  Outcome: Progressing     Problem: Wound  Goal: Optimal Coping  Outcome: Progressing  Goal: Optimal Functional Ability  Outcome: Progressing  Goal: Absence of Infection Signs and Symptoms  Outcome: Progressing  Goal: Improved Oral Intake  Outcome: Progressing  Goal: Optimal Pain Control and Function  Outcome: Progressing  Goal: Skin Health and Integrity  Outcome: Progressing  Goal: Optimal Wound Healing  Outcome: Progressing

## 2024-09-18 NOTE — PROGRESS NOTES
Subjective:   Patient ID:  Roland Lopez is a 56 y.o. male who presents for evaluation of No chief complaint on file.      HPI  September 9, 2024.    Comes in for follow-up.    He had a normal stress test after last visit he walked for 4 minutes and a half.  Got to 87% target heart rate.    Denies any palpitations or limiting angina or dyspnea.    No bleeding.    We switched him off amiodarone to flecainide last visit.    Compliant with his CPAP.      JULY 2, 2024.    Comes in for a 1 year follow-up   Still in sinus rhythm on EKG today.    Denies palpitations.    Compliant with CPAP.    States sometimes can get breathless cutting his yd on it is hot outside.    However denies any chest pain or tightness.      7.12.2023  Comes in for follow-up after his SOFYA cardioversion.  Done 2 weeks ago.    Put on metoprolol and amiodarone.    Continues to be in sinus rhythm today.    He states that he cut the grass today.  He did plenty of exercise and work without any chest pain.    He states that for some reason he is not been receiving his Ozempic refills.    Compliant with CPAP.        6.14.2023  55-year-old male, morbid obesity, obstructive sleep apnea on CPAP.    Smoked only a few times in his teens.  .    Was recently in the emergency room on May 22nd with fatigue found to have AFib with RVR.    Started on Eliquis and metoprolol by the emergency room Lasix.    He reports mild improvement symptoms.  [facility]  Has not had any further workup.    Denies any excessive caffeine.  No alcohol.    Denies any chest pain  Past Medical History:   Diagnosis Date    Hypertension     EVELYN (obstructive sleep apnea)        Past Surgical History:   Procedure Laterality Date    TRANSESOPHAGEAL ECHOCARDIOGRAM WITH POSSIBLE CARDIOVERSION (SOFYA W/ POSS CARDIOVERSION) N/A 6/27/2023    Procedure: Transesophageal echo (SOFYA) intra-procedure log documentation;  Surgeon: Santy Martinez MD;  Location: [facility] CATH LAB;  Service:  Inpatient Nutrition Assessment    Admit Date: 8/10/2024   Total duration of encounter: 39 days   Patient Age: 59 y.o.    Nutrition Recommendation/Prescription     Continue current diet (Diet Pureed (IDDSI Level 4) No Straws, Supervision with Meals, Double Portions; Mildly Thick Liquids (IDDSI Level 2)) as tolerated.  Vanilla Boost Very High Calorie (provides 530 kcal, 22 g protein per serving) with breakfast.  Boost Breeze (provides 250 kcal, 9 g protein per serving) BID. Thicken with 2 packets of mildly thick thickener, or per SLP recommendations.  Obtain measured weight.    Communication of Recommendations: reviewed with patient    Nutrition Assessment     Malnutrition Assessment/Nutrition-Focused Physical Exam    Malnutrition Context: acute illness or injury (08/12/24 1142)  Malnutrition Level: moderate (08/12/24 1142)  Energy Intake (Malnutrition):  (does not meet criteria) (08/12/24 1142)  Weight Loss (Malnutrition):  (does not meet criteria) (08/12/24 1142)  Subcutaneous Fat (Malnutrition): mild depletion (08/12/24 1142)     Upper Arm Region (Subcutaneous Fat Loss): mild depletion     Muscle Mass (Malnutrition): mild depletion (08/12/24 1142)     Clavicle Bone Region (Muscle Loss): mild depletion                             A minimum of two characteristics is recommended for diagnosis of either severe or non-severe malnutrition.    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    Review of Systems   Constitutional: Negative for malaise/fatigue.   Cardiovascular:  Positive for dyspnea on exertion. Negative for chest pain, palpitations and syncope.   Genitourinary: Negative.    Neurological: Negative.        Current Outpatient Medications on File Prior to Visit   Medication Sig    amLODIPine (NORVASC) 10 MG tablet 10 mg.    apixaban (ELIQUIS) 5 mg Tab Take 1 tablet (5 mg total) by mouth 2 (two) times daily.    calcipotriene (DOVONOX) 0.005 % cream APPLY MODERATE AMOUNT TOPICALLY TWICE A DAY FOR PLAQUE PSORIASIS    cholecalciferol, vitamin D3, (VITAMIN D3) 50 mcg (2,000 unit) Tab 50 mcg.    flecainide (TAMBOCOR) 50 MG Tab Take 1 tablet (50 mg total) by mouth every 12 (twelve) hours.    insulin glargine-yfgn 100 unit/mL Soln INJECT 20 UNITS SUBCUTANEOUSLY AT BEDTIME FOR DIABETES    metFORMIN (GLUCOPHAGE) 1000 MG tablet 1,000 mg.    semaglutide (OZEMPIC) 0.25 mg or 0.5 mg(2 mg/1.5 mL) pen injector INJECT SEMAGLUTIDE SUBCUTANEOUSLY AS DIRECTED FOR DIABETES 0.25MG UNDER THE SKIN INJECTION WEEKLY FOR FOUR WEEKS, THEN INCREASE TO 0.5MG INJECTION UNDER SKIN WEEKLY.    atorvastatin (LIPITOR) 40 MG tablet Take 1 tablet (40 mg total) by mouth once daily.    furosemide (LASIX) 20 MG tablet Take 1 tablet (20 mg total) by mouth once daily.    lisinopriL-hydrochlorothiazide (PRINZIDE,ZESTORETIC) 20-12.5 mg per tablet Take 2 tablets by mouth once daily.    [DISCONTINUED] amiodarone (PACERONE) 200 MG Tab Take 1 tablet (200 mg total) by mouth once daily.    [DISCONTINUED] metoprolol succinate (TOPROL-XL) 25 MG 24 hr tablet Take 1 tablet (25 mg total) by mouth once daily.     No current  Medications:  atorvastatin, 40 mg, QHS  camphor-methyl salicyl-menthoL, , TID  gabapentin, 100 mg, TID  levETIRAcetam, 500 mg, BID  miconazole NITRATE 2 %, , BID  olopatadine, 1 drop, BID  risperidone 1 mg/ml, 1 mg, BID  sertraline, 50 mg, Daily  tamsulosin, 0.4 mg, Daily    Continuous Infusions: none       PRN Medications:   0.9% NaCl, , PRN  acetaminophen, 650 mg, Q6H PRN  acetaminophen, 650 mg, Q6H PRN  albuterol, 2 puff, Q6H PRN  ALPRAZolam, 0.25 mg, Q4H PRN  ammonium lactate, , BID PRN  bisacodyL, 10 mg, Daily PRN  cloNIDine, 0.1 mg, Q6H PRN  diphenhydrAMINE, 25 mg, Q6H PRN  haloperidol lactate, 5 mg, Q6H PRN  hydrALAZINE, 10 mg, Q4H PRN  oxyCODONE, 5 mg, Q6H PRN  polyethylene glycol, 17 g, BID PRN  traMADoL, 50 mg, Q6H PRN  traZODone, 100 mg, Nightly PRN    Calorie Containing IV Medications: no significant kcals from medications at this time    Recent Labs   Lab 09/13/24  0347 09/16/24  0437    140   K 3.9 4.2   CALCIUM 9.2 9.4   MG 1.80  --    CO2 28 29   BUN 17.4 18.3   CREATININE 0.82 0.78   EGFRNORACEVR >60 >60   GLUCOSE 93 92   WBC 5.01 6.47   HGB 11.0* 11.6*   HCT 32.8* 35.4*       Nutrition Orders:  Diet Pureed (IDDSI Level 4) No Straws, Supervision with Meals, Double Portions; Mildly Thick Liquids (IDDSI Level 2)  Dietary nutrition supplements BID; Boost Breeze - Any flavor,Dietary nutrition supplements Daily; Boost Very High Calorie Nutritional Drink - Vanilla    Appetite/Oral Intake: good/% of meals  Factors Affecting Nutritional Intake: none identified  Social Needs Impacting Access to Food: none identified  Food/Sikhism/Cultural Preferences: none reported  Food Allergies: no known food allergies  Last Bowel Movement: 09/18/24  Wound(s):     Wound 09/10/24 1701 Moisture associated dermatitis Left Foot-Tissue loss description: Not applicableno pressure injuries documented at this time     Comments    8/12/24 Patient confused, sister at bedside. Patient's sister reports good  "appetite/intake prior to admission, regular diet at home, denies weight loss. Patient reports liking vanilla/strawberry Ensure. He is currently NPO, had not been able to participate in SLP evaluation. Tube feeding recommendation provided if oral intake remains not feasible.    8/14/24 Patient remains NPO, started on tube feeding, no longer receiving calories from Cleviprex, will change to standard polymeric formula.    8/19/24 Tube feeding tolerated at goal.    8/23/24 Dysphagia diet, no longer on tube feeding, nurse reports patient eating 100% and asking for more food, will add double portions and Boost.    8/28/24: RN reports pt has been eating fairly well, nursing has been feeding him his Boost supplements BID. Noted last BM was 8/22.     8/30/24: Spoke with rn, pt eating well and enjoying his Boost supplements.     9/4/24: RN reports good oral intake. Patient states drinking Boost supplements.    9/9/24: RN reports patient with good oral intake of meals served and supplements. Will continue double portions with supplements Boost VHC and Boost Breeze.     9/13/24: Eating breakfast, nurse at bedside. States uses Boost VHC in morning to cover breakfast, would like only vanilla; changed order. And likes Boost Breeze for other meals. Discussed with RN and pt to ensure thickening Boost Breeze appropriately; verbalized understanding. Denies any GI complaints.     9/18/24: Reports continued good appetite and intake of meals and drinking Boost supplements. Reports lots of gas but no significant pain, nausea or vomiting.     Anthropometrics    Height: 5' 7" (170.2 cm), Height Method: Measured  Last Weight: 65.3 kg (143 lb 15.4 oz) (08/10/24 1346), Weight Method: Bed Scale  BMI (Calculated): 22.5  BMI Classification: normal (BMI 18.5-24.9)        Ideal Body Weight (IBW), Male: 148 lb     % Ideal Body Weight, Male (lb): 97.27 %                          Usual Weight Provided By: family/caregiver denies unintentional weight " "facility-administered medications on file prior to visit.       Objective:   Objective:  Wt Readings from Last 3 Encounters:   09/09/24 (!) 157.9 kg (347 lb 15.9 oz)   07/02/24 (!) 157.7 kg (347 lb 10.7 oz)   04/23/24 (!) 155.4 kg (342 lb 9.5 oz)     Temp Readings from Last 3 Encounters:   04/23/24 97.9 °F (36.6 °C) (Oral)   06/27/23 98 °F (36.7 °C) (Temporal)   05/22/23 98.9 °F (37.2 °C)     BP Readings from Last 3 Encounters:   09/09/24 (!) 140/80   07/02/24 (!) 168/78   04/23/24 128/78     Pulse Readings from Last 3 Encounters:   09/09/24 73   07/02/24 71   04/23/24 63       Physical Exam  Vitals reviewed.   Constitutional:       Appearance: He is well-developed.   Neck:      Vascular: No carotid bruit.   Cardiovascular:      Rate and Rhythm: Normal rate. Rhythm irregular.      Pulses: Intact distal pulses.      Heart sounds: Normal heart sounds. No murmur heard.  Pulmonary:      Breath sounds: Normal breath sounds.   Neurological:      Mental Status: He is oriented to person, place, and time.         No results found for: "CHOL"  No results found for: "HDL"  No results found for: "LDLCALC"  No results found for: "TRIG"  No results found for: "CHOLHDL"    Chemistry        Component Value Date/Time     04/23/2024 0936    K 3.8 04/23/2024 0936     04/23/2024 0936    CO2 25 04/23/2024 0936    BUN 11 04/23/2024 0936    CREATININE 0.8 04/23/2024 0936     (H) 04/23/2024 0936        Component Value Date/Time    CALCIUM 9.1 04/23/2024 0936    ALKPHOS 65 04/23/2024 0936    AST 17 04/23/2024 0936    ALT 25 04/23/2024 0936    BILITOT 0.7 04/23/2024 0936    ESTGFRAFRICA >60 10/30/2016 1012    EGFRNONAA >60 10/30/2016 1012          Lab Results   Component Value Date    TSH 3.541 05/22/2023     Lab Results   Component Value Date    INR 1.1 06/27/2023    INR 1.1 10/30/2016     Lab Results   Component Value Date    WBC 5.25 04/23/2024    HGB 13.7 (L) 04/23/2024    HCT 41.0 04/23/2024    MCV 83 04/23/2024    "  04/23/2024     BNP  @LABRCNTIP(BNP,BNPTRIAGEBLO)@  CrCl cannot be calculated (Patient's most recent lab result is older than the maximum 7 days allowed.).     Imaging:  ======  No results found for this or any previous visit.    No results found for this or any previous visit.    No results found for this or any previous visit.    Results for orders placed during the hospital encounter of 05/22/23    X-Ray Chest PA And Lateral    Narrative  EXAMINATION:  XR CHEST PA AND LATERAL    CLINICAL HISTORY:  shortness of breath;    TECHNIQUE:  PA and lateral views of the chest were performed.    COMPARISON:  None    FINDINGS:  Cardiomegaly with mild basilar atelectasis.  Flattening of the diaphragms.  Mild central pulmonary vascular crowding.  Element of CHF not excluded.    Impression  As above      Electronically signed by: Brock Galvez  Date:    05/22/2023  Time:    17:25    No results found for this or any previous visit.    No valid procedures specified.    Diagnostic Results:  ECG: Reviewed  No results found for this or any previous visit.    Summary  Show Result ComparisonThe left ventricle is normal in size with normal systolic function.  Normal right ventricular size with normal right ventricular systolic function.  Normal central venous pressure (3 mmHg).  Atrial fibrillation observed.  Mild aortic regurgitation.  Mild mitral regurgitation.  Mild tricuspid regurgitation.  Normal appearing left atrial appendage. No thrombus is present in the appendage.  The estimated ejection fraction is 55%.  A 200 J synchronized cardioversion was successfully performed with restoration of normal sinus rhythm.    Results for orders placed during the hospital encounter of 07/09/24    Exercise Stress - EKG    Interpretation Summary    The patient exercised for 4 minutes 26 seconds on a [place] protocol, corresponding to a functional capacity of 7METS, achieving a peak heart rate of 141 bpm, which is 87% of the age predicted  loss    Wt Readings from Last 5 Encounters:   08/10/24 65.3 kg (143 lb 15.4 oz)   05/12/24 65.8 kg (145 lb)   09/25/23 67.1 kg (148 lb)   08/30/23 67.1 kg (148 lb)   08/21/23 63.5 kg (140 lb)     Weight Change(s) Since Admission:   9/18/24 no updated weights  Wt Readings from Last 1 Encounters:   08/10/24 1346 65.3 kg (143 lb 15.4 oz)   08/10/24 0743 65.3 kg (143 lb 15.4 oz)   Admit Weight: 65.3 kg (143 lb 15.4 oz) (08/10/24 0743), Weight Method: Bed Scale    Estimated Needs    Weight Used For Calorie Calculations: 65.3 kg (143 lb 15.4 oz)  Energy Calorie Requirements (kcal): 1984, 1.4 stress factor  Energy Need Method: [place]-St Jeor  Weight Used For Protein Calculations: 65.3 kg (143 lb 15.4 oz)  Protein Requirements: 79-98 g, 1.2-1.5 g/kg  Fluid Requirements (mL): 1984, 1 ml/kcal      Enteral Nutrition Patient not receiving enteral nutrition support at this time.    Parenteral Nutrition Patient not receiving parenteral nutrition support at this time.    Evaluation of Received Nutrient Intake    Calories: meeting estimated needs  Protein: meeting estimated needs    Patient Education Not applicable.    Nutrition Diagnosis     PES: Inadequate energy intake related to inability to consume sufficient nutrients as evidenced by less than 80% needs met. (resolved)  PES: Moderate acute disease or injury related malnutrition related to acute illness as evidenced by mild fat depletion and mild muscle depletion. (active)    Nutrition Interventions     Intervention(s): modified composition of meals/snacks, commercial beverage, and collaboration with other providers  Goal: Meet greater than 80% of nutritional needs by follow-up. (goal met)  Goal: Maintain weight throughout hospitalization. (New)    Nutrition Goals & Monitoring     Dietitian will monitor: food and beverage intake, energy intake, and weight change  Discharge planning:  regular diet with texture modifications per SLP and Boost supplements  Nutrition  Risk/Follow-Up: moderate (follow-up in 3-5 days)   Please consult if re-assessment needed sooner.     maximum heart rate.    The ECG portion of the study is negative for ischemia.    The patient reported no chest pain during the stress test.    There were no arrhythmias during stress.    The ASCVD Risk score (Jn DK, et al., 2019) failed to calculate for the following reasons:    Cannot find a previous HDL lab    Cannot find a previous total cholesterol lab    Assessment and Plan:   Hypertension, unspecified type  -     carvediloL (COREG) 6.25 MG tablet; Take 1 tablet (6.25 mg total) by mouth 2 (two) times daily with meals.  Dispense: 180 tablet; Refill: 3    Paroxysmal atrial fibrillation    Type 2 diabetes mellitus without complication, with long-term current use of insulin    Obstructive sleep apnea    Obesity, Class III, BMI 40-49.9 (morbid obesity)          Continue with Eliquis status post cardioversion June 2023  BP higher side  States he was in the traffic today.  We will change his Toprol to carvedilol.  Switched from amiodarone to flecainide on his last visit to avoid long-term commitment to amiodarone.  Reviewed all tests and above medical conditions with patient in detail and formulated treatment plan.  Risk factor modification discussed.   Cardiac low salt diet discussed.  Maintaining healthy weight and weight loss goals were discussed in clinic.    Follow up six-month

## 2024-09-18 NOTE — PROGRESS NOTES
[facility]  Hospital Medicine Progress Note        Chief Complaint: Inpatient Follow-up for hemorrhagic stroke    HPI per admitting team: 59-year-old male with significant history of Crohn's disease, GERD, chronic tobacco use/substance use-cocaine presented to the ED with complaints of acute onset left-sided weakness, facial droop and aphasia along with sudden onset headache.  Imaging in the ED revealed intraparenchymal hemorrhage of right thalamus with surrounding edema, compression of right lateral ventricle with 8 mm midline shift, early obstructive hydrocephalus, possible subarachnoid hemorrhage. patient admitted to ICU, neurology and neurosurgery services were consulted.  CT angiogram with mild dolichoectasia of basilar artery , echocardiogram negative for bubble study, EF intact, grade 2 diastolic dysfunction. Conservative management, recommended to keep BP at goal, Keppra for seizure prevention, close neuro checks, 3% saline initiated.  Follow up CT with stable findings, therapy services consulted.  Holding anticoagulation, antiplatelets.  Hypertonic saline discontinued, initiate high-intensity statin, patient downgraded to hospital medicine services, case management consulted for placement, neurology recommended to continue to avoid antiplatelets.  Cleared for p.o. intake by speech, tolerating well.  Patient continues to participate with therapy services, case management actively working on placement.  Resumed home amlodipine, labs stable.  Psych consulted on 08/28 for impulsive behavior, initiated risperidone, Zoloft and p.r.n. trazodone   Concern for urinary retention, closely monitoring with bladder scan, added tamsulosin   spoke to sister over the phone   Patient asking when he can go home unfortunately he is unable to live by himself  He has been denied by multiple facilities and  is attempting to place him    Interval Hx:   Patient seen and examined this  morning all vitals have been stable no issues reported    Objective/physical exam:  General: In no acute distress, afebrile  Chest: Clear to auscultation bilaterally  Heart: RRR, +S1, S2, no appreciable murmur  Abdomen: Soft, nontender, BS +  MSK: Warm, no lower extremity edema, no clubbing or cyanosis  Neurologic: Alert and oriented,  VITAL SIGNS: 24 HRS MIN & MAX LAST   Temp  Min: 97.9 °F (36.6 °C)  Max: 98.7 °F (37.1 °C) 98.7 °F (37.1 °C)   BP  Min: 104/67  Max: 120/77 111/77   Pulse  Min: 55  Max: 63  61   Resp  Min: 17  Max: 20 19   SpO2  Min: 96 %  Max: 97 % 96 %     I reviewed the labs below:  Recent Labs   Lab 09/13/24 0347 09/16/24 0437   WBC 5.01 6.47   RBC 3.67* 3.90*   HGB 11.0* 11.6*   HCT 32.8* 35.4*   MCV 89.4 90.8   MCH 30.0 29.7   MCHC 33.5 32.8*   RDW 12.2 12.3    279   MPV 8.7 8.6     Recent Labs   Lab 09/13/24 0347 09/16/24 0437    140   K 3.9 4.2    105   CO2 28 29   BUN 17.4 18.3   CREATININE 0.82 0.78   CALCIUM 9.2 9.4   MG 1.80  --      Microbiology Results (last 7 days)       ** No results found for the last 168 hours. **           See below for Radiology    Intake and Output:    Intake/Output Summary (Last 24 hours) at 9/18/2024 0770  Last data filed at 9/18/2024 0637  Gross per 24 hour   Intake --   Output 1000 ml   Net -1000 ml           Assessment/Plan:  Acute intra parenchymal hemorrhage-right thalamic capsular with edema/midline shift/intraventricular extension/early obstructive hydrocephalus  Acute subarachnoid hemorrhage   Left-sided weakness/dysarthria secondary to above  Oropharyngeal dysphagia initially secondary to above- cleared for modified diet now  New diagnosis essential HTN- stable  Impulsive behavior - improving  Urinary retention-improved today  History of Crohn's disease/GERD  Chronic tobacco use/substance use  Moderate malnutrition       Awaiting placement all vitals stable  Neurology recommended avoiding all antiplatelet and  anticoagulation  Psychiatry was consulted for his aggressive behavior during the initial days of his admission, recommended to continue Zoloft, risperidone p.r.n. Trazodone for bedtime, since then patient cooperative and calm  Blood pressure normal off of all antihypertensive medications   Neurology, neurosurgery has now signed off  Continue Keppra for seizure prophylaxis  Mobilize.  Monitor for urinary retention  Continue p.o. and topical analgesics, p.r.n. anxiolytics    Continue with PT and OT Case management is working on placement patient has been denied by multiple facilities  is planning to give more options    VTE prophylaxis:  SCDs, no chemical prophylaxis given [facility]    Patient condition:  Stable    Anticipated discharge and Disposition:   SNF once accepted, patient is medically cleared for discharge      All diagnosis and differential diagnosis have been reviewed; assessment and plan has been documented; I have personally reviewed the labs and test results that are presently available; I have reviewed the patients medication list; I have reviewed the consulting providers response and recommendations. I have reviewed or attempted to review medical records based upon their availability    All of the patient's questions have been  addressed and answered. Patient's is agreeable to the above stated plan. I will continue to monitor closely and make adjustments to medical management as needed.    Portions of this note dictated using EMR integrated voice recognition software, and may be subject to voice recognition errors not corrected at proofreading. Please contact writer for clarification if needed.   _____________________________________________________________________    Nutrition Status:  Patient meets ASPEN criteria for moderate malnutrition of acute illness or injury per RD assessment as evidenced by:  Energy Intake (Malnutrition):  (does not meet criteria)  Weight Loss (Malnutrition):  (does  not meet criteria)  Subcutaneous Fat (Malnutrition): mild depletion  Muscle Mass (Malnutrition): mild depletion           A minimum of two characteristics is recommended for diagnosis of either severe or non-severe malnutrition.     Current Med:   atorvastatin  40 mg Oral QHS    camphor-methyl salicyl-menthoL   Topical (Top) TID    gabapentin  100 mg Oral TID    levETIRAcetam  500 mg Oral BID    miconazole NITRATE 2 %   Topical (Top) BID    olopatadine  1 drop Both Eyes BID    risperidone 1 mg/ml  1 mg Oral BID    sertraline  50 mg Oral Daily    tamsulosin  0.4 mg Oral Daily      PRN meds:  Current Facility-Administered Medications:     0.9% NaCl, , Intravenous, PRN    acetaminophen, 650 mg, Rectal, Q6H PRN    acetaminophen, 650 mg, Oral, Q6H PRN    albuterol, 2 puff, Inhalation, Q6H PRN    ALPRAZolam, 0.25 mg, Oral, Q4H PRN    ammonium lactate, , Topical (Top), BID PRN    bisacodyL, 10 mg, Rectal, Daily PRN    cloNIDine, 0.1 mg, Oral, Q6H PRN    diphenhydrAMINE, 25 mg, Oral, Q6H PRN    haloperidol lactate, 5 mg, Intravenous, Q6H PRN    hydrALAZINE, 10 mg, Intravenous, Q4H PRN    oxyCODONE, 5 mg, Oral, Q6H PRN    polyethylene glycol, 17 g, Oral, BID PRN    traMADoL, 50 mg, Oral, Q6H PRN    traZODone, 100 mg, Oral, Nightly PRN    Continuous infusion:       Radiology:   I have personally reviewed the images and agree with radiologist report  X-Ray Knee Complete 4 or More Views Left  Narrative: EXAMINATION:  XR KNEE COMP 4 OR MORE VIEWS LEFT    CLINICAL HISTORY:  fall with pain;    TECHNIQUE:  AP, lateral, and bilateral oblique views of the left knee.    COMPARISON:  08/25/2024    FINDINGS:  No fracture. No dislocation. Mild joint space narrowing and marginal osteophyte formation.    Regional soft tissues are normal.  Impression: No acute osseous abnormality.    Electronically signed by: Isabella Jacques  Date:    09/17/2024  Time:    17:45        Angi Rodríguez MD  Department of Hospital Medicine   [facility]  [facility]   09/18/2024

## 2024-09-18 NOTE — PT/OT/SLP PROGRESS
Physical Therapy Treatment    Patient Name:  Dell Garrett Jr.   MRN:  [id number]    Recommendations:     Discharge therapy intensity: Moderate Intensity Therapy   Discharge Equipment Recommendations: to be determined by next level of care  Barriers to discharge: Impaired mobility and Ongoing medical needs    Assessment:     Dell Garrett Jr. is a 59 y.o. male admitted with a medical diagnosis of acute IPH R thalamic capsular with midline shift, acute SAH, L-sided weakness .  He presents with the following impairments/functional limitations: weakness, gait instability, decreased upper extremity function, decreased lower extremity function, impaired cognition, decreased safety awareness, impaired balance, impaired cardiopulmonary response to activity, impaired functional mobility, impaired self care skills .    Rehab Prognosis: Good; patient would benefit from acute skilled PT services to address these deficits and reach maximum level of function.    Recent Surgery: * No surgery found *      Plan:     During this hospitalization, patient would benefit from acute PT services 5 x/week to address the identified rehab impairments via gait training, therapeutic activities and progress toward the following goals:    Plan of Care Expires:  10/16/24    Subjective     Chief Complaint:   Patient/Family Comments/goals:   Pain/Comfort:         Objective:     Communicated with nurse prior to session.  Patient found HOB elevated with pulse ox (continuous), telemetry, peripheral IV, bed alarm upon PT entry to room.     General Precautions: Standard, fall  Orthopedic Precautions: N/A  Braces: N/A  Respiratory Status: Room air  Blood Pressure:   Skin Integrity: Visible skin intact      Functional Mobility:  Bed Mobility:     Supine to Sit: moderate assistance and of 2 persons  Sit to Supine: moderate assistance and of 2 persons  Transfers:     Sit to Stand:  moderate assistance and of 2 persons with R HR  Bed to Chair: moderate  assistance and of 2 persons with  hand-held assist  using  Squat Pivot  Gait: Pt amb x12ft with modA and R HR with cues for lateral weight shift, step pattern and upright posture during stance phase          Education:  Patient provided with verbal education education regarding fall prevention and safety awareness.  Understanding was verbalized, however additional teaching warranted.     Patient left HOB elevated with all lines intact, call button in reach, and bed alarm on    GOALS:   Multidisciplinary Problems       Physical Therapy Goals          Problem: Physical Therapy    Goal Priority Disciplines Outcome Goal Variances Interventions   Physical Therapy Goal     PT, PT/OT Progressing     Description: Goals to be met by: 10/16/2024     Patient will increase functional independence with mobility by performin. Supine to sit with MInimal Assistance  2. Sit to supine with MInimal Assistance  3. Sit to stand transfer with Minimal Assistance  4. Pt to follow 75% of commands. MET 24  5. Gait  x 75 feet with Minimal Assistance using Rolling Walker vs LRAD.   6. Pt will increase functional strength of LLE to 3/5 hip/knee extension.                         Time Tracking:     PT Received On: 24  PT Start Time: 1308     PT Stop Time: 1334  PT Total Time (min): 26 min     Billable Minutes: Gait Training 10 and Therapeutic Activity 16    Treatment Type: Treatment  PT/PTA: PTA     Number of PTA visits since last PT visit: 2     2024

## 2024-09-18 NOTE — PLAN OF CARE
Spoke w/ Elaine from [facility] and she is reaching out to the pt for pre-admission screening.     Spoke to Elaine at [facility]. They have clinically accepted this patient for SNF to long term placement. They will most likely not be able to accept him tomorrow as previously expected due to waiting on insurance auth, but she will let me know as soon as auth has been obtained and they are able to admit.

## 2024-09-18 NOTE — NURSING
Nurses Note -- 4 Eyes      9/18/2024   12:37 AM      Skin assessed during: Q Shift Change      [] No Altered Skin Integrity Present    []Prevention Measures Documented      [x] Yes- Altered Skin Integrity Present or Discovered   [] LDA Added if Not in Epic (Describe Wound)   [] New Altered Skin Integrity was Present on Admit and Documented in LDA   [x] Wound Image Taken    Wound Care Consulted? Yes    Attending Nurse:  Charline Bethea RN/Staff Member:  Yaa WU RN

## 2024-09-18 NOTE — PT/OT/SLP PROGRESS
[facility]  Speech Language Pathology Department  Therapy Progress Note    Patient Name:  Dell Garrett Jr.   MRN:  [id number]    Recommendations     General recommendations:  dysphagia therapy and cognitive-linguistic therapy  Communication strategies:  provide increased time to answer and go to room if call light pushed    Discharge therapy intensity: Moderate Intensity Therapy   Barriers to safe discharge: safety awareness    Subjective     Patient awake, alert, and distracted.    Pain/Comfort: Pain Rating 1: 0/10  Spiritual/Cultural/Sikhism Beliefs/Practices that affect care: no      Objective     Dysarthria drills: phrase level with 60% accuracy given max cues  Problem solving/reasoning: requires max cues to provide solutions to functional problems/situations     Assessment     Pt continues to present with cognitive-linguistic impairments warranting further ST intervention.    Goals     Multidisciplinary Problems       SLP Goals          Problem: SLP    Goal Priority Disciplines Outcome   SLP Goal     SLP Progressing   Description: LTG: Patient will tolerate safest and least restrictive PO diet without signs/sx of aspiration.  STG: Meal trial of pureed solids and mildly thick liquids via cup without adequate REILLY and no signs/sx of aspiration. -met  STG: Minced/moist solids with adequate bolus preparation/mastication and without signs/sx of aspiration. -resume  STG: Soft/bite sized solids with adequate bolus preparation/mastication and without signs/sx of aspiration.  STG: Laryngeal/BOT exercises Min A.    LTG: Patient will improve cognitive-linguistic skills in order to return to PLOF.  STG: Dysarthria drills at the phrase level with 90% accuracy.  STG: Problem solving/reasoning tasks with 90% accuracy.  STG: Short term memory tasks with 90% accuracy.                     Patient Education     Patient provided with verbal education regarding ST POC.  Understanding was verbalized,  however additional teaching warranted.    Plan     Will continue to follow and tx as appropriate.    SLP Follow-Up:  Yes   Patient to be seen:  5 x/week   Plan of Care expires:  09/24/24  Plan of Care reviewed with:  patient     Time Tracking     SLP Treatment Date:   09/18/24  Speech Start Time:  1130  Speech Stop Time:  1145     Speech Total Time (min):  15 min    Billable minutes:  Speech/Language Therapy, 15 minutes     09/18/2024

## 2024-09-19 VITALS
SYSTOLIC BLOOD PRESSURE: 101 MMHG | HEART RATE: 56 BPM | HEIGHT: 67 IN | RESPIRATION RATE: 18 BRPM | TEMPERATURE: 98 F | BODY MASS INDEX: 22.59 KG/M2 | WEIGHT: 143.94 LBS | DIASTOLIC BLOOD PRESSURE: 61 MMHG | OXYGEN SATURATION: 95 %

## 2024-09-19 PROCEDURE — 97535 SELF CARE MNGMENT TRAINING: CPT | Mod: CO

## 2024-09-19 PROCEDURE — 25000003 PHARM REV CODE 250: Performed by: INTERNAL MEDICINE

## 2024-09-19 PROCEDURE — 25000003 PHARM REV CODE 250: Performed by: HOSPITALIST

## 2024-09-19 PROCEDURE — 25000003 PHARM REV CODE 250: Performed by: NURSE PRACTITIONER

## 2024-09-19 PROCEDURE — 92507 TX SP LANG VOICE COMM INDIV: CPT

## 2024-09-19 PROCEDURE — 25000003 PHARM REV CODE 250

## 2024-09-19 RX ORDER — ALPRAZOLAM 0.25 MG/1
0.25 TABLET ORAL EVERY 4 HOURS PRN
Qty: 15 TABLET | Refills: 0 | Status: SHIPPED | OUTPATIENT
Start: 2024-09-19 | End: 2024-09-24

## 2024-09-19 RX ORDER — GABAPENTIN 100 MG/1
100 CAPSULE ORAL 3 TIMES DAILY
Qty: 90 CAPSULE | Refills: 11 | Status: SHIPPED | OUTPATIENT
Start: 2024-09-19 | End: 2025-09-19

## 2024-09-19 RX ORDER — OXYCODONE HYDROCHLORIDE 5 MG/1
5 TABLET ORAL EVERY 6 HOURS PRN
Qty: 20 TABLET | Refills: 0 | Status: SHIPPED | OUTPATIENT
Start: 2024-09-19 | End: 2024-09-19

## 2024-09-19 RX ORDER — OXYCODONE HYDROCHLORIDE 5 MG/1
5 TABLET ORAL EVERY 6 HOURS PRN
Qty: 20 TABLET | Refills: 0 | Status: SHIPPED | OUTPATIENT
Start: 2024-09-19 | End: 2024-09-24

## 2024-09-19 RX ORDER — SERTRALINE HYDROCHLORIDE 50 MG/1
50 TABLET, FILM COATED ORAL DAILY
Qty: 30 TABLET | Refills: 11 | Status: SHIPPED | OUTPATIENT
Start: 2024-09-19 | End: 2025-09-19

## 2024-09-19 RX ORDER — RISPERIDONE 1 MG/ML
1 SOLUTION ORAL 2 TIMES DAILY
Qty: 60 ML | Refills: 11 | Status: SHIPPED | OUTPATIENT
Start: 2024-09-19 | End: 2025-09-19

## 2024-09-19 RX ORDER — TAMSULOSIN HYDROCHLORIDE 0.4 MG/1
0.4 CAPSULE ORAL DAILY
Qty: 30 CAPSULE | Refills: 11 | Status: SHIPPED | OUTPATIENT
Start: 2024-09-19 | End: 2025-09-19

## 2024-09-19 RX ORDER — ATORVASTATIN CALCIUM 40 MG/1
40 TABLET, FILM COATED ORAL NIGHTLY
Qty: 90 TABLET | Refills: 3 | Status: SHIPPED | OUTPATIENT
Start: 2024-09-19 | End: 2025-09-19

## 2024-09-19 RX ORDER — LEVETIRACETAM 500 MG/1
500 TABLET ORAL 2 TIMES DAILY
Qty: 60 TABLET | Refills: 11 | Status: SHIPPED | OUTPATIENT
Start: 2024-09-19 | End: 2025-09-19

## 2024-09-19 RX ADMIN — SERTRALINE HYDROCHLORIDE 50 MG: 50 TABLET ORAL at 08:09

## 2024-09-19 RX ADMIN — GABAPENTIN 100 MG: 100 CAPSULE ORAL at 08:09

## 2024-09-19 RX ADMIN — OLOPATADINE HYDROCHLORIDE 1 DROP: 1 SOLUTION OPHTHALMIC at 08:09

## 2024-09-19 RX ADMIN — MICONAZOLE NITRATE 2 % TOPICAL POWDER: at 08:09

## 2024-09-19 RX ADMIN — LEVETIRACETAM 500 MG: 500 TABLET, FILM COATED ORAL at 08:09

## 2024-09-19 RX ADMIN — Medication: at 08:09

## 2024-09-19 RX ADMIN — DIPHENHYDRAMINE HYDROCHLORIDE 25 MG: 25 CAPSULE ORAL at 12:09

## 2024-09-19 RX ADMIN — TAMSULOSIN HYDROCHLORIDE 0.4 MG: 0.4 CAPSULE ORAL at 08:09

## 2024-09-19 RX ADMIN — AMMONIUM LACTATE: 12 LOTION TOPICAL at 08:09

## 2024-09-19 NOTE — NURSING
Nurses Note -- 4 Eyes      9/19/2024   1:55 AM      Skin assessed during: Q Shift Change      [] No Altered Skin Integrity Present    []Prevention Measures Documented      [x] Yes- Altered Skin Integrity Present or Discovered   [] LDA Added if Not in Epic (Describe Wound)   [] New Altered Skin Integrity was Present on Admit and Documented in LDA   [x] Wound Image Taken    Wound Care Consulted?  Yes    Attending Nurse:  Charline Bethea RN/Staff Member:  Tessa SALGUERO LPN

## 2024-09-19 NOTE — PT/OT/SLP PROGRESS
Occupational Therapy   Treatment    Name: Dell Garrett Jr.  MRN: [id number]  Admitting Diagnosis:  Intraparenchymal hemorrhage of brain       Recommendations:     Recommended therapy intensity at discharge: Moderate Intensity Therapy   Discharge Equipment Recommendations:  to be determined by next level of care  Barriers to discharge:  Decreased caregiver support    Assessment:     Dell Garrett Jr. is a 59 y.o. male with a medical diagnosis of Intraparenchymal hemorrhage of brain.  He presents with good participation. Performance deficits affecting function are weakness, gait instability, decreased upper extremity function, decreased lower extremity function, impaired balance, impaired endurance, decreased safety awareness, impaired self care skills, impaired functional mobility, impaired cognition, pain, abnormal tone.     Rehab Prognosis:  Good; patient would benefit from acute skilled OT services to address these deficits and reach maximum level of function.       Plan:     Patient to be seen 5 x/week to address the above listed problems via self-care/home management, therapeutic activities, therapeutic exercises, neuromuscular re-education  Plan of Care Expires: 10/11/24  Plan of Care Reviewed with: patient    Subjective     Pain/Comfort:  Pain Rating 1: 0/10    Objective:     Communicated with: nurse prior to session.  Patient found HOB elevated with pulse ox (continuous), telemetry, peripheral IV, bed alarm upon OT entry to room.    General Precautions: Standard, fall    Orthopedic Precautions:N/A  Braces: N/A  Respiratory Status: Room air     Occupational Performance:     Bed Mobility:    Patient completed Rolling/Turning to Left with  moderate assistance  Patient completed Rolling/Turning to Right with moderate assistance  Patient completed Scooting/Bridging with moderate assistance  Patient completed Supine to Sit with moderate assistance     Functional Mobility/Transfers:  Patient completed Sit <> Stand  Transfer with minimum assistance  with  hand-held assist   Patient completed Bed <> ambulance gurny Transfer using Step Transfer technique with moderate assistance and maximal assistance with hand-held assist    Activities of Daily Living:  Upper Body Dressing: maximal assistance supported long sitting in bed  Lower Body Dressing: maximal assistance with rolling side to side supine and side lying in bed      AMPAC 6 Click ADL: 12    Patient Education:  Patient provided with verbal education and demonstrations education regarding fall prevention and safety awareness.  Understanding was verbalized, however additional teaching warranted.      Patient left  with ambulance personnel .    GOALS:   Multidisciplinary Problems       Occupational Therapy Goals          Problem: Occupational Therapy    Goal Priority Disciplines Outcome Interventions   Occupational Therapy Goal     OT, PT/OT Progressing    Description: Goals to be met by:  10/12/24    Patient will increase functional independence with ADLs by performing:    UE Dressing with Stand-by Assistance.  LE Dressing with mod A  Grooming while seated at sink with SBA. -revised 9/5  Toileting from toilet with Min A for hygiene and clothing management. -revised 9/5  Toilet transfer to toilet with Min A. -revised 9/5  Increased functional strength to 3/5 LUE through therEX, neuromuscular re-ed.  Pt will visually attend to L side of environment with no cues                       Time Tracking:     OT Date of Treatment: 09/19/24  OT Start Time: 1054  OT Stop Time: 1110  OT Total Time (min): 16 min    Billable Minutes:Self Care/Home Management 16    OT/MAC: MAC     Number of MAC visits since last OT visit: 5    9/19/2024

## 2024-09-19 NOTE — PLAN OF CARE
CM phoned Melina , patient's sister to notify her that patient is being discharged today to [facility] facility in [place], [place].      CM phoned [facility] to transport patient to [facility] in [place], [place] . Ambulance transport authorized by Eugenie Kern CM Director. .[facility] will transport patient and get him to facility before 4 pm.

## 2024-09-19 NOTE — PT/OT/SLP PROGRESS
[facility]  Speech Language Pathology Department  Therapy Progress Note    Patient Name:  Dell Garrett Jr.   MRN:  [id number]    Recommendations     General recommendations:  dysphagia therapy and cognitive-linguistic therapy  Communication strategies:  provide increased time to answer and go to room if call light pushed    Discharge therapy intensity: Moderate Intensity Therapy   Barriers to safe discharge: safety awareness    Subjective     Patient awake, alert, and cooperative.    Pain/Comfort: Pain Rating 1: 0/10  Spiritual/Cultural/Druze Beliefs/Practices that affect care: no    Objective     Dysarthria drills: phrase level with 80% accuracy given max cues  Problem solving/reasoning: requires max cues to provide solutions to functional problems/situations     Assessment     Pt continues to present with cognitive-linguistic impairments warranting further ST intervention.    Goals     Multidisciplinary Problems       SLP Goals          Problem: SLP    Goal Priority Disciplines Outcome   SLP Goal     SLP Progressing   Description: LTG: Patient will tolerate safest and least restrictive PO diet without signs/sx of aspiration.  STG: Meal trial of pureed solids and mildly thick liquids via cup without adequate REILLY and no signs/sx of aspiration. -met  STG: Minced/moist solids with adequate bolus preparation/mastication and without signs/sx of aspiration. -resume  STG: Soft/bite sized solids with adequate bolus preparation/mastication and without signs/sx of aspiration.  STG: Laryngeal/BOT exercises Min A.    LTG: Patient will improve cognitive-linguistic skills in order to return to PLOF.  STG: Dysarthria drills at the phrase level with 90% accuracy.  STG: Problem solving/reasoning tasks with 90% accuracy.  STG: Short term memory tasks with 90% accuracy.                     Patient Education     Patient provided with verbal education regarding ST POC.  Understanding was verbalized,  however additional teaching warranted.    Plan     Will continue to follow and tx as appropriate.    SLP Follow-Up:  Yes   Patient to be seen:  5 x/week   Plan of Care expires:  09/24/24  Plan of Care reviewed with:  patient     Time Tracking     SLP Treatment Date:   09/19/24  Speech Start Time:  1010  Speech Stop Time:  1030     Speech Total Time (min):  20 min    Billable minutes:  Speech/Language Therapy, 20 minutes     09/19/2024

## 2024-09-19 NOTE — PLAN OF CARE
Update: discharge information was sent to [facility] and waiting on call on whom to our nurse can report to and transportation has been arranged for pt.

## 2024-09-19 NOTE — DISCHARGE SUMMARY
[facility] Medicine Discharge Summary    Admit Date: 8/10/2024  Discharge Date and Time: 9/19/20248:39 AM  Admitting Physician: Hospitalist team   Discharging Physician: Rocky Vallecillo MD.  Primary Care Physician: Yaa Taveras FNP      Discharge Diagnoses:  Acute intra parenchymal hemorrhage-right thalamic capsular with edema/midline shift/intraventricular extension/early obstructive hydrocephalus  Acute subarachnoid hemorrhage   Left-sided weakness/dysarthria secondary to above  Oropharyngeal dysphagia initially secondary to above- cleared for modified diet now  New diagnosis essential HTN- stable  Impulsive behavior - improving  Urinary retention-improved today  History of Crohn's disease/GERD  Chronic tobacco use/substance use  Moderate malnutrition    Hospital Course:   59-year-old male with significant history of Crohn's disease, GERD, chronic tobacco use/substance use-cocaine presented to the ED with complaints of acute onset left-sided weakness, facial droop and aphasia along with sudden onset headache.  Imaging in the ED revealed intraparenchymal hemorrhage of right thalamus with surrounding edema, compression of right lateral ventricle with 8 mm midline shift, early obstructive hydrocephalus, possible subarachnoid hemorrhage. patient admitted to ICU, neurology and neurosurgery services were consulted.  CT angiogram with mild dolichoectasia of basilar artery , echocardiogram negative for bubble study, EF intact, grade 2 diastolic dysfunction. Conservative management, recommended to keep BP at goal, Keppra for seizure prevention, close neuro checks, 3% saline initiated.  Follow up CT with stable findings, therapy services consulted.  Holding anticoagulation, antiplatelets.  Hypertonic saline discontinued, initiate high-intensity statin, patient downgraded to hospital medicine services, case management consulted for placement, neurology recommended to continue to  "avoid antiplatelets.  Cleared for p.o. intake by speech, tolerating well.  Patient continues to participate with therapy services, case management actively working on placement.  Resumed home amlodipine, labs stable.  Psych consulted on 08/28 for impulsive behavior, initiated risperidone, Zoloft and p.r.n. trazodone   Concern for urinary retention, closely monitoring with bladder scan, added tamsulosin   spoke to sister over the phone   Patient asking when he can go home unfortunately he is unable to live by himself. Accepted to [facility] on 9/19    Vitals:  Blood pressure 101/61, pulse (!) 56, temperature 98 °F (36.7 °C), temperature source Oral, resp. rate 18, height 5' 7" (1.702 m), weight 65.3 kg (143 lb 15.4 oz), SpO2 95%..    Physical Exam:  Awake, Alert, Oriented x 3, No new focal Neurologic deficit, Normal Affect  NC/AT, PERRLA, EOMI  Supple neck, no JVD, No cervical lymphadenopathy  Symmetrical chest, Good air entry bilaterally. No rhonchi, wheezes, crackles appreciated  RRR, No gallop, rub or murmur  +ve Bowel sounds x4, Abd soft and non tender, no rebound, guarding or rigidity  No Cyanosis, cludding or edema, No new rash or bruises    Procedures Performed: No admission procedures for hospital encounter.     Significant Diagnostic Studies: See Full reports for all details  No results displayed because visit has over 200 results.           Microbiology Results (last 7 days)       ** No results found for the last 168 hours. **             X-Ray Chest 1 View    Result Date: 9/18/2024  EXAMINATION: XR CHEST 1 VIEW CLINICAL HISTORY: Nursing home placement; TECHNIQUE: Single frontal view of the chest was performed. COMPARISON: 08/13/2024 FINDINGS: LINES AND TUBES: None MEDIASTINUM AND MADINA: Cardiac silhouette is at the upper limits of normal. LUNGS: No lobar consolidation. No edema. PLEURA:No pleural effusion. No pneumothorax. OTHER: No acute osseous abnormality.     Borderline enlargement of cardiac " silhouette without edema. Electronically signed by: Isabella Jacques Date:    09/18/2024 Time:    16:37    X-Ray Knee Complete 4 or More Views Left    Result Date: 9/17/2024  EXAMINATION: XR KNEE COMP 4 OR MORE VIEWS LEFT CLINICAL HISTORY: fall with pain; TECHNIQUE: AP, lateral, and bilateral oblique views of the left knee. COMPARISON: 08/25/2024 FINDINGS: No fracture. No dislocation. Mild joint space narrowing and marginal osteophyte formation. Regional soft tissues are normal.     No acute osseous abnormality. Electronically signed by: Isabella Jacques Date:    09/17/2024 Time:    17:45    X-Ray Shoulder 2 or More Views Left    Result Date: 9/12/2024  EXAMINATION: XR SHOULDER COMPLETE 2 OR MORE VIEWS LEFT CLINICAL HISTORY: Left shoulder pain; COMPARISON: None. FINDINGS: No acute displaced fractures or dislocations. Joint spaces preserved. No blastic or lytic lesions. Soft tissues within normal limits.     No acute osseous abnormality. Electronically signed by: Nikhil Ignacio Date:    09/12/2024 Time:    07:55    X-Ray Hip 2 or 3 views Left with Pelvis when performed    Result Date: 9/12/2024  EXAMINATION: XR HIP WITH PELVIS WHEN PERFORMED 2 OR 3 VIEWS LEFT CLINICAL HISTORY: left hip pain; COMPARISON: None. FINDINGS: No acute displaced fractures or dislocations. There is narrowing of the inferior medial aspect of both hip joints with minimal degenerative changes of the lumbosacral spine articular spaces are otherwise preserved with smooth articular surfaces No blastic or lytic lesions. Soft tissues within normal limits.     Minimal degenerative changes. Electronically signed by: Nikhil Ignacio Date:    09/12/2024 Time:    07:53    CT Head Without Contrast    Result Date: 9/12/2024  EXAMINATION: CT HEAD WITHOUT CONTRAST CLINICAL HISTORY: Pt states that he fell here on 9/6/2024 in his room and hit his head; TECHNIQUE: CT imaging of the head performed from the skull base to the vertex without intravenous  contrast.   mGycm. Automatic exposure control, adjustment of mA/kV or iterative reconstruction technique was used to reduce radiation. COMPARISON: 12 August 2024 FINDINGS: There is improved appearance of the right basal ganglia hematoma with decreased mass effect.  Minimal residual midline shift towards the left.  The ventricles are not enlarged.  No new parenchymal attenuation abnormality. Visualized paranasal sinuses and mastoid air cells are clear.     Improved appearance of the right basal ganglia hematoma.  No new suspicious findings. Electronically signed by: Clifford Doyle Date:    09/12/2024 Time:    06:41    X-Ray Knee 1 or 2 View Left    Result Date: 8/25/2024  EXAMINATION: XR KNEE 1 OR 2 VIEW LEFT CLINICAL HISTORY: pain; TECHNIQUE: Two-view COMPARISON: Right knee radiographs August 30, 2023. FINDINGS: There is mild degenerative narrowing of the medial compartment of the left knee.  No significant subchondral sclerosis or osteophytes.  No acute fracture or dislocation.     Left knee medial compartment mild degenerative narrowing. Electronically signed by: Garland Salazar Date:    08/25/2024 Time:    14:11  - pulls last radiology orders        Medication List        START taking these medications      ALPRAZolam 0.25 MG tablet  Commonly known as: XANAX  Take 1 tablet (0.25 mg total) by mouth every 4 (four) hours as needed for Anxiety.     atorvastatin 40 MG tablet  Commonly known as: LIPITOR  Take 1 tablet (40 mg total) by mouth every evening.     camphor-methyl salicyl-menthoL 4-30-10 % Crea  Apply 1 Application topically 3 (three) times daily.     gabapentin 100 MG capsule  Commonly known as: NEURONTIN  Take 1 capsule (100 mg total) by mouth 3 (three) times daily.     levETIRAcetam 500 MG Tab  Commonly known as: KEPPRA  Take 1 tablet (500 mg total) by mouth 2 (two) times daily.     oxyCODONE 5 MG immediate release tablet  Commonly known as: ROXICODONE  Take 1 tablet (5 mg total) by mouth every 6 (six)  hours as needed for Pain.     risperidone 1 mg/ml 1 mg/mL Soln  Commonly known as: RISPERDAL  Take 1 mL (1 mg total) by mouth 2 (two) times daily.     sertraline 50 MG tablet  Commonly known as: ZOLOFT  Take 1 tablet (50 mg total) by mouth once daily.     tamsulosin 0.4 mg Cap  Commonly known as: FLOMAX  Take 1 capsule (0.4 mg total) by mouth once daily.            CONTINUE taking these medications      albuterol 90 mcg/actuation inhaler  Commonly known as: PROVENTIL/VENTOLIN HFA     iron-vitamin C 100-250 mg (ICAR-C) 100-250 mg Tab            STOP taking these medications      diclofenac sodium 1 % Gel  Commonly known as: VOLTAREN     dicyclomine 20 mg tablet  Commonly known as: BENTYL     loratadine 10 mg tablet  Commonly known as: CLARITIN     ondansetron 8 MG Tbdl  Commonly known as: ZOFRAN-ODT     RINVOQ 45 mg Tb24  Generic drug: upadacitinib     tiZANidine 4 MG tablet  Commonly known as: ZANAFLEX     traMADoL 50 mg tablet  Commonly known as: ULTRAM               Where to Get Your Medications        These medications were sent to [facility] [address]  [address], [facility] [address]      Phone: [phone]   ALPRAZolam 0.25 MG tablet  atorvastatin 40 MG tablet  camphor-methyl salicyl-menthoL 4-30-10 % Crea  gabapentin 100 MG capsule  levETIRAcetam 500 MG Tab  oxyCODONE 5 MG immediate release tablet  risperidone 1 mg/ml 1 mg/mL Soln  sertraline 50 MG tablet  tamsulosin 0.4 mg Cap          Explained in detail to the patient about the discharge plan, medications, and follow-up visits. Pt understands and agrees with the treatment plan  Discharged Condition: stable  Diet: pureed, cardiac  Disposition:  [place] [facility]    Medications Per [place] med rec  Activities as tolerated  Follow up with your PCP in 2 wks   For further questions contact hospitalist office    Discharge time 33 minutes    For worsening symptoms, chest pain, shortness of breath, increased abdominal  pain, high grade fever, stroke or stroke like symptoms, immediately go to the nearest Emergency Room or call 911 as soon as possible.        Rocky Lucero M.D, on 9/19/2024. at 8:39 AM.

## 2024-09-19 NOTE — PLAN OF CARE
09/19/24 1747   Final Note   Assessment Type Final Discharge Note   Anticipated Discharge Disposition [facility]   Post-Acute Status   Post-Acute Authorization Placement   Post-Acute Placement Status Set-up Complete/Auth obtained   Coverage [facility]   Patient choice form signed by patient/caregiver List from CMS Compare   Discharge Delays None known at this time

## 2024-09-19 NOTE — PROGRESS NOTES
[facility] Medicine Progress Note        Chief Complaint: Inpatient Follow-up     HPI:   59-year-old male with significant history of Crohn's disease, GERD, chronic tobacco use/substance use-cocaine presented to the ED with complaints of acute onset left-sided weakness, facial droop and aphasia along with sudden onset headache.  Imaging in the ED revealed intraparenchymal hemorrhage of right thalamus with surrounding edema, compression of right lateral ventricle with 8 mm midline shift, early obstructive hydrocephalus, possible subarachnoid hemorrhage. patient admitted to ICU, neurology and neurosurgery services were consulted.  CT angiogram with mild dolichoectasia of basilar artery , echocardiogram negative for bubble study, EF intact, grade 2 diastolic dysfunction. Conservative management, recommended to keep BP at goal, Keppra for seizure prevention, close neuro checks, 3% saline initiated.  Follow up CT with stable findings, therapy services consulted.  Holding anticoagulation, antiplatelets.  Hypertonic saline discontinued, initiate high-intensity statin, patient downgraded to hospital medicine services, case management consulted for placement, neurology recommended to continue to avoid antiplatelets.  Cleared for p.o. intake by speech, tolerating well.  Patient continues to participate with therapy services, case management actively working on placement.  Resumed home amlodipine, labs stable.  Psych consulted on 08/28 for impulsive behavior, initiated risperidone, Zoloft and p.r.n. trazodone   Concern for urinary retention, closely monitoring with bladder scan, added tamsulosin   spoke to sister over the phone   Patient asking when he can go home unfortunately he is unable to live by himself  He has been denied by multiple facilities and  is attempting to place him     Interval Hx:   No significant changes overnight.  Resting comfortably in bed.  His sister  is at the bedside.  She was believes that he has been accepted to [facility].  Discussed that we are still waiting on insurance approval but if we can set today he can go. patient was very motivated to continue working with therapy     Objective/physical exam:  General: In no acute distress, afebrile  Chest: Clear to auscultation bilaterally  Heart: RRR, +S1, S2, no appreciable murmur  Abdomen: Soft, nontender, BS +  MSK: Warm, no lower extremity edema, no clubbing or cyanosis  Neurologic: Alert and oriented,    VITAL SIGNS: 24 HRS MIN & MAX LAST   Temp  Min: 97.8 °F (36.6 °C)  Max: 98.4 °F (36.9 °C) 98.4 °F (36.9 °C)   BP  Min: 102/68  Max: 115/70 115/70   Pulse  Min: 62  Max: 73  73   Resp  Min: 18  Max: 19 18   SpO2  Min: 95 %  Max: 99 % 95 %       Recent Labs   Lab 09/13/24 0347 09/16/24 0437   WBC 5.01 6.47   RBC 3.67* 3.90*   HGB 11.0* 11.6*   HCT 32.8* 35.4*   MCV 89.4 90.8   MCH 30.0 29.7   MCHC 33.5 32.8*   RDW 12.2 12.3    279   MPV 8.7 8.6       Recent Labs   Lab 09/13/24 0347 09/16/24  0437    140   K 3.9 4.2    105   CO2 28 29   BUN 17.4 18.3   CREATININE 0.82 0.78   CALCIUM 9.2 9.4   MG 1.80  --           Microbiology Results (last 7 days)       ** No results found for the last 168 hours. **             Radiology:  X-Ray Chest 1 View  Narrative: EXAMINATION:  XR CHEST 1 VIEW    CLINICAL HISTORY:  Nursing home placement;    TECHNIQUE:  Single frontal view of the chest was performed.    COMPARISON:  08/13/2024    FINDINGS:  LINES AND TUBES: None    MEDIASTINUM AND MADINA: Cardiac silhouette is at the upper limits of normal.    LUNGS: No lobar consolidation. No edema.    PLEURA:No pleural effusion. No pneumothorax.    OTHER: No acute osseous abnormality.  Impression: Borderline enlargement of cardiac silhouette without edema.    Electronically signed by: Isabella Jacques  Date:    09/18/2024  Time:    16:37        Medications:  Scheduled Meds:   atorvastatin  40 mg Oral QHS     camphor-methyl salicyl-menthoL   Topical (Top) TID    gabapentin  100 mg Oral TID    levETIRAcetam  500 mg Oral BID    miconazole NITRATE 2 %   Topical (Top) BID    olopatadine  1 drop Both Eyes BID    risperidone 1 mg/ml  1 mg Oral BID    sertraline  50 mg Oral Daily    tamsulosin  0.4 mg Oral Daily     Continuous Infusions:  PRN Meds:.  Current Facility-Administered Medications:     0.9% NaCl, , Intravenous, PRN    acetaminophen, 650 mg, Rectal, Q6H PRN    acetaminophen, 650 mg, Oral, Q6H PRN    albuterol, 2 puff, Inhalation, Q6H PRN    ALPRAZolam, 0.25 mg, Oral, Q4H PRN    ammonium lactate, , Topical (Top), BID PRN    bisacodyL, 10 mg, Rectal, Daily PRN    cloNIDine, 0.1 mg, Oral, Q6H PRN    diphenhydrAMINE, 25 mg, Oral, Q6H PRN    haloperidol lactate, 5 mg, Intravenous, Q6H PRN    hydrALAZINE, 10 mg, Intravenous, Q4H PRN    oxyCODONE, 5 mg, Oral, Q6H PRN    polyethylene glycol, 17 g, Oral, BID PRN    traMADoL, 50 mg, Oral, Q6H PRN    traZODone, 100 mg, Oral, Nightly PRN        Assessment/Plan:   Acute intra parenchymal hemorrhage-right thalamic capsular with edema/midline shift/intraventricular extension/early obstructive hydrocephalus  Acute subarachnoid hemorrhage   Left-sided weakness/dysarthria secondary to above  Oropharyngeal dysphagia initially secondary to above- cleared for modified diet now  New diagnosis essential HTN- stable  Impulsive behavior - improving  Urinary retention-improved today  History of Crohn's disease/GERD  Chronic tobacco use/substance use  Moderate malnutrition    Plan for SNF placement.  Awaiting insurance approval.  She would have it today or tomorrow.    Doing well with therapy.  Making progress   Neurology and Neurosurgery have signed off   Continue with statin therapy.  He was not a candidate for antiplatelet or anticoagulation   Continue Keppra for seizure prophylaxis         Rocky Vallecillo MD   09/19/2024     All diagnosis and differential diagnosis have been reviewed;  assessment and plan has been documented; I have personally reviewed the labs and test results that are presently available; I have reviewed the patients medication list; I have reviewed the consulting providers response and recommendations. I have reviewed or attempted to review medical records based upon their availability    All of the patient's questions have been  addressed and answered. Patient's is agreeable to the above stated plan. I will continue to monitor closely and make adjustments to medical management as needed.  _____________________________________________________________________

## 2024-09-19 NOTE — PLAN OF CARE
Problem: Skin Injury Risk Increased  Goal: Skin Health and Integrity  Outcome: Progressing     Problem: Stroke, Intracerebral Hemorrhage  Goal: Optimal Cognitive Function  Outcome: Progressing     Problem: Stroke, Intracerebral Hemorrhage  Goal: Optimal Cerebral Tissue Perfusion  Outcome: Progressing     Problem: Adult Inpatient Plan of Care  Goal: Absence of Hospital-Acquired Illness or Injury  Outcome: Progressing     Problem: Wound  Goal: Optimal Coping  Outcome: Progressing  Goal: Optimal Functional Ability  Outcome: Progressing  Goal: Absence of Infection Signs and Symptoms  Outcome: Progressing  Goal: Improved Oral Intake  Outcome: Progressing  Goal: Optimal Pain Control and Function  Outcome: Progressing  Goal: Skin Health and Integrity  Outcome: Progressing  Goal: Optimal Wound Healing  Outcome: Progressing

## 2025-05-26 NOTE — NURSING
Nurses Note -- 4 Eyes      8/31/2024   7:13 AM      Skin assessed during: Q Shift Change      [x] No Altered Skin Integrity Present    [x]Prevention Measures Documented      [] Yes- Altered Skin Integrity Present or Discovered   [] LDA Added if Not in Epic (Describe Wound)   [] New Altered Skin Integrity was Present on Admit and Documented in LDA   [] Wound Image Taken    Wound Care Consulted? No    Attending Nurse:  Tessa Bethea RN/Staff Member:  Rosemary           Alert